# Patient Record
Sex: FEMALE | Race: WHITE | NOT HISPANIC OR LATINO | Employment: OTHER | URBAN - NONMETROPOLITAN AREA
[De-identification: names, ages, dates, MRNs, and addresses within clinical notes are randomized per-mention and may not be internally consistent; named-entity substitution may affect disease eponyms.]

---

## 2017-01-13 ENCOUNTER — OFFICE VISIT (OUTPATIENT)
Dept: RETAIL CLINIC | Facility: CLINIC | Age: 57
End: 2017-01-13

## 2017-01-13 VITALS
DIASTOLIC BLOOD PRESSURE: 76 MMHG | SYSTOLIC BLOOD PRESSURE: 120 MMHG | WEIGHT: 219 LBS | TEMPERATURE: 97.9 F | RESPIRATION RATE: 18 BRPM | OXYGEN SATURATION: 98 % | HEART RATE: 79 BPM

## 2017-01-13 DIAGNOSIS — J02.9 ACUTE PHARYNGITIS, UNSPECIFIED ETIOLOGY: Primary | ICD-10-CM

## 2017-01-13 DIAGNOSIS — J06.9 ACUTE URI: ICD-10-CM

## 2017-01-13 LAB
EXPIRATION DATE: NORMAL
EXPIRATION DATE: NORMAL
FLUAV AG NPH QL: NORMAL
FLUBV AG NPH QL: NORMAL
INTERNAL CONTROL: NORMAL
INTERNAL CONTROL: NORMAL
Lab: NORMAL
Lab: NORMAL
S PYO AG THROAT QL: NEGATIVE

## 2017-01-13 PROCEDURE — 87804 INFLUENZA ASSAY W/OPTIC: CPT | Performed by: NURSE PRACTITIONER

## 2017-01-13 PROCEDURE — 87880 STREP A ASSAY W/OPTIC: CPT | Performed by: NURSE PRACTITIONER

## 2017-01-13 PROCEDURE — 99213 OFFICE O/P EST LOW 20 MIN: CPT | Performed by: NURSE PRACTITIONER

## 2017-01-13 RX ORDER — ONDANSETRON 4 MG/1
4 TABLET, ORALLY DISINTEGRATING ORAL EVERY 8 HOURS PRN
COMMUNITY
End: 2017-11-01

## 2017-01-13 RX ORDER — PANTOPRAZOLE SODIUM 40 MG/1
40 TABLET, DELAYED RELEASE ORAL DAILY
COMMUNITY

## 2017-01-13 RX ORDER — LANSOPRAZOLE 30 MG/1
30 CAPSULE, DELAYED RELEASE ORAL DAILY
COMMUNITY
End: 2018-10-29

## 2017-01-13 RX ORDER — BUDESONIDE AND FORMOTEROL FUMARATE DIHYDRATE 160; 4.5 UG/1; UG/1
2 AEROSOL RESPIRATORY (INHALATION)
COMMUNITY
End: 2017-11-01 | Stop reason: SDUPTHER

## 2017-01-13 RX ORDER — FLUTICASONE PROPIONATE 50 MCG
2 SPRAY, SUSPENSION (ML) NASAL DAILY
Qty: 1 BOTTLE | Refills: 0 | Status: SHIPPED | OUTPATIENT
Start: 2017-01-13

## 2017-01-13 RX ORDER — FUROSEMIDE 20 MG/1
20 TABLET ORAL AS NEEDED
COMMUNITY

## 2017-01-13 RX ORDER — SULFAMETHOXAZOLE AND TRIMETHOPRIM 800; 160 MG/1; MG/1
1 TABLET ORAL 2 TIMES DAILY
Qty: 14 TABLET | Refills: 0 | Status: SHIPPED | OUTPATIENT
Start: 2017-01-13 | End: 2017-11-01

## 2017-01-13 NOTE — MR AVS SNAPSHOT
Phuong Tuttle   1/13/2017 10:15 AM   Office Visit    Dept Phone:  [phone]   Encounter #:  [id number]    Provider:  ALETHA SANCHEZ   Department:  [facility]                Your Full Care Plan              Today's Medication Changes          These changes are accurate as of: 1/13/17 11:07 AM.  If you have any questions, ask your nurse or doctor.               New Medication(s)Ordered:     fluticasone 50 MCG/ACT nasal spray   Commonly known as:  FLONASE   2 sprays into each nostril Daily.       sulfamethoxazole-trimethoprim 800-160 MG per tablet   Commonly known as:  BACTRIM DS   Take 1 tablet by mouth 2 (Two) Times a Day.            Where to Get Your Medications      These medications were sent to [facility] - [address] - [phone]  - [phone] [address] , [facility] [address]     Phone:  [phone]     fluticasone 50 MCG/ACT nasal spray    sulfamethoxazole-trimethoprim 800-160 MG per tablet                  Your Updated Medication List          This list is accurate as of: 1/13/17 11:07 AM.  Always use your most recent med list.                budesonide-formoterol 160-4.5 MCG/ACT inhaler   Commonly known as:  SYMBICORT       fluticasone 50 MCG/ACT nasal spray   Commonly known as:  FLONASE   2 sprays into each nostril Daily.       furosemide 20 MG tablet   Commonly known as:  LASIX       ipratropium-albuterol  MCG/ACT inhaler   Commonly known as:  COMBIVENT RESPIMAT       lansoprazole 30 MG capsule   Commonly known as:  PREVACID       ondansetron ODT 4 MG disintegrating tablet   Commonly known as:  ZOFRAN-ODT       pantoprazole 40 MG EC tablet   Commonly known as:  PROTONIX       sulfamethoxazole-trimethoprim 800-160 MG per tablet   Commonly known as:  BACTRIM DS   Take 1 tablet by mouth 2 (Two) Times a Day.               We Performed the Following     POC Influenza A / B     POC Rapid Strep A     "  You Were Diagnosed With        Codes Comments    Acute pharyngitis, unspecified etiology    -  Primary ICD-10-CM: J02.9  ICD-9-CM: 462     Acute URI     ICD-10-CM: J06.9  ICD-9-CM: 465.9       Instructions    Upper Respiratory Infection, Adult  Most upper respiratory infections (URIs) are a viral infection of the air passages leading to the lungs. A URI affects the nose, throat, and upper air passages. The most common type of URI is nasopharyngitis and is typically referred to as \"the common cold.\"  URIs run their course and usually go away on their own. Most of the time, a URI does not require medical attention, but sometimes a bacterial infection in the upper airways can follow a viral infection. This is called a secondary infection. Sinus and middle ear infections are common types of secondary upper respiratory infections.  Bacterial pneumonia can also complicate a URI. A URI can worsen asthma and chronic obstructive pulmonary disease (COPD). Sometimes, these complications can require emergency medical care and may be life threatening.   CAUSES  Almost all URIs are caused by viruses. A virus is a type of germ and can spread from one person to another.   RISKS FACTORS  You may be at risk for a URI if:   · You smoke.    · You have chronic heart or lung disease.  · You have a weakened defense (immune) system.    · You are very young or very old.    · You have nasal allergies or asthma.  · You work in crowded or poorly ventilated areas.  · You work in health care facilities or schools.  SIGNS AND SYMPTOMS   Symptoms typically develop 2-3 days after you come in contact with a cold virus. Most viral URIs last 7-10 days. However, viral URIs from the influenza virus (flu virus) can last 14-18 days and are typically more severe. Symptoms may include:   · Runny or stuffy (congested) nose.    · Sneezing.    · Cough.    · Sore throat.    · Headache.    · Fatigue.    · Fever.    · Loss of appetite.    · Pain in your " forehead, behind your eyes, and over your cheekbones (sinus pain).  · Muscle aches.    DIAGNOSIS   Your health care provider may diagnose a URI by:  · Physical exam.  · Tests to check that your symptoms are not due to another condition such as:  ¨ Strep throat.  ¨ Sinusitis.  ¨ Pneumonia.  ¨ Asthma.  TREATMENT   A URI goes away on its own with time. It cannot be cured with medicines, but medicines may be prescribed or recommended to relieve symptoms. Medicines may help:  · Reduce your fever.  · Reduce your cough.  · Relieve nasal congestion.  HOME CARE INSTRUCTIONS   · Take medicines only as directed by your health care provider.    · Gargle warm saltwater or take cough drops to comfort your throat as directed by your health care provider.  · Use a warm mist humidifier or inhale steam from a shower to increase air moisture. This may make it easier to breathe.  · Drink enough fluid to keep your urine clear or pale yellow.    · Eat soups and other clear broths and maintain good nutrition.    · Rest as needed.    · Return to work when your temperature has returned to normal or as your health care provider advises. You may need to stay home longer to avoid infecting others. You can also use a face mask and careful hand washing to prevent spread of the virus.  · Increase the usage of your inhaler if you have asthma.    · Do not use any tobacco products, including cigarettes, chewing tobacco, or electronic cigarettes. If you need help quitting, ask your health care provider.  PREVENTION   The best way to protect yourself from getting a cold is to practice good hygiene.   · Avoid oral or hand contact with people with cold symptoms.    · Wash your hands often if contact occurs.    There is no clear evidence that vitamin C, vitamin E, echinacea, or exercise reduces the chance of developing a cold. However, it is always recommended to get plenty of rest, exercise, and practice good nutrition.   SEEK MEDICAL CARE IF:   · You  are getting worse rather than better.    · Your symptoms are not controlled by medicine.    · You have chills.  · You have worsening shortness of breath.  · You have brown or red mucus.  · You have yellow or brown nasal discharge.  · You have pain in your face, especially when you bend forward.  · You have a fever.  · You have swollen neck glands.  · You have pain while swallowing.  · You have white areas in the back of your throat.  SEEK IMMEDIATE MEDICAL CARE IF:   · You have severe or persistent:    Headache.    Ear pain.    Sinus pain.    Chest pain.  · You have chronic lung disease and any of the following:    Wheezing.    Prolonged cough.    Coughing up blood.    A change in your usual mucus.  · You have a stiff neck.  · You have changes in your:    Vision.    Hearing.    Thinking.    Mood.  MAKE SURE YOU:   · Understand these instructions.  · Will watch your condition.  · Will get help right away if you are not doing well or get worse.     This information is not intended to replace advice given to you by your health care provider. Make sure you discuss any questions you have with your health care provider.     Document Released: 2002 Document Revised: 2016 Document Reviewed: 2015  [employer] Interactive Patient Education ©6 [employer]       Patient Instructions History      Upcoming Appointments     Visit Type Date Time Department    OFFICE VISIT 2017 10:15 AM [facility]      Team-Match Signup     [facility] Team-Match allows you to send messages to your doctor, view your test results, renew your prescriptions, schedule appointments, and more. To sign up, go to [email or web address] and click on the Sign Up Now link in the New User? box. Enter your Team-Match Activation Code exactly as it appears below along with the last four digits of your Social Security Number and your Date of Birth () to complete the sign-up process. If you do not sign up before the expiration date, you  must request a new code.    [employer] Activation Code: [email or web address]  Expires: 1/27/2017 11:07 AM    If you have questions, you can email [email or web address] or call [phone] to talk to our [employer] staff. Remember, [employer] is NOT to be used for urgent needs. For medical emergencies, dial 911.               Other Info from Your Visit           Allergies     Zithromax [Azithromycin]  Nausea And Vomiting    Penicillins  Rash      Reason for Visit     Sore Throat     Cough           Vital Signs     Blood Pressure Pulse Temperature Respirations Weight Oxygen Saturation    120/76 79 97.9 °F (36.6 °C) (Oral) 18 219 lb (99.3 kg) 98%    Smoking Status                   Current Some Day Smoker           Problems and Diagnoses Noted     Acute sore throat    -  Primary    Acute upper respiratory infection

## 2017-01-13 NOTE — PROGRESS NOTES
Subjective   Phuong Tuttle is a 56 y.o. female.     Chief Complaint   Patient presents with   • Sore Throat   • Cough      History of Present Illness     Presents to [facility] with c/o onset of sore throat with chest congestion and cough for past several days. Has PMH of GI bleed with Anemia. Also reports currently on an autoimmune medication (unable to recall the medication by name). Is current everyday smoker.     The following portions of the patient's history were reviewed and updated as appropriate: allergies, current medications, past family history, past medical history, past social history, past surgical history and problem list.      Current Outpatient Prescriptions:   •  budesonide-formoterol (SYMBICORT) 160-4.5 MCG/ACT inhaler, Inhale 2 puffs 2 (Two) Times a Day., Disp: , Rfl:   •  furosemide (LASIX) 20 MG tablet, Take 20 mg by mouth 2 (Two) Times a Day., Disp: , Rfl:   •  ipratropium-albuterol (COMBIVENT RESPIMAT)  MCG/ACT inhaler, Inhale 1 puff 4 (Four) Times a Day As Needed for wheezing., Disp: , Rfl:   •  lansoprazole (PREVACID) 30 MG capsule, Take 30 mg by mouth Daily., Disp: , Rfl:   •  ondansetron ODT (ZOFRAN-ODT) 4 MG disintegrating tablet, Take 4 mg by mouth Every 8 (Eight) Hours As Needed for nausea or vomiting., Disp: , Rfl:   •  pantoprazole (PROTONIX) 40 MG EC tablet, Take 40 mg by mouth Daily., Disp: , Rfl:   •  fluticasone (FLONASE) 50 MCG/ACT nasal spray, 2 sprays into each nostril Daily., Disp: 1 bottle, Rfl: 0  •  sulfamethoxazole-trimethoprim (BACTRIM DS) 800-160 MG per tablet, Take 1 tablet by mouth 2 (Two) Times a Day., Disp: 14 tablet, Rfl: 0    Visit Vitals   • /76   • Pulse 79   • Temp 97.9 °F (36.6 °C) (Oral)   • Resp 18   • Wt 219 lb (99.3 kg)   • SpO2 98%     Review of Systems   Constitutional: Negative for chills and fever.   HENT: Positive for postnasal drip, rhinorrhea and sore throat. Negative for ear pain and sinus pressure.    Eyes: Negative for itching.    Respiratory: Positive for cough and wheezing (upon awaking). Negative for chest tightness.    Skin: Negative for color change.   Neurological: Negative for dizziness.        Allergies   Allergen Reactions   • Zithromax [Azithromycin] Nausea And Vomiting   • Penicillins Rash       Physical Exam   Constitutional: She is oriented to person, place, and time.   HENT:   Head: Normocephalic.   Right Ear: Tympanic membrane is bulging. Tympanic membrane is not erythematous.   Left Ear: Tympanic membrane is bulging. Tympanic membrane is not erythematous.   Nose: Mucosal edema and rhinorrhea present.   Mouth/Throat: No uvula swelling. Posterior oropharyngeal erythema (mild) present. No posterior oropharyngeal edema.   Eyes: Conjunctivae are normal.   Cardiovascular: Normal rate and regular rhythm.    Pulmonary/Chest: Effort normal and breath sounds normal. She has no wheezes.   Lymphadenopathy:     She has no cervical adenopathy.   Neurological: She is oriented to person, place, and time.   Skin: Skin is warm and dry.      Assessment/Plan     Phuong was seen today for sore throat and cough.    Diagnoses and all orders for this visit:    Acute pharyngitis, unspecified etiology  -     POC Rapid Strep A  -     POC Influenza A / B    Acute URI  -     sulfamethoxazole-trimethoprim (BACTRIM DS) 800-160 MG per tablet; Take 1 tablet by mouth 2 (Two) Times a Day.  -     fluticasone (FLONASE) 50 MCG/ACT nasal spray; 2 sprays into each nostril Daily.      Lab Results   Component Value Date    RAPFLUA neg 01/13/2017    RAPFLUB neg 01/13/2017     Lab Results   Component Value Date    RAPSCRN Negative 01/13/2017          Discussed POCT.  Follow up with PCP or at the Urgent Care if symptoms worsen or fail to improve within next 48-72 hours.  Patient teaching information discussed and provided to the patient. Patient verbalized understanding.      January 13, 2017 12:07 PM

## 2017-01-13 NOTE — PATIENT INSTRUCTIONS
"Upper Respiratory Infection, Adult  Most upper respiratory infections (URIs) are a viral infection of the air passages leading to the lungs. A URI affects the nose, throat, and upper air passages. The most common type of URI is nasopharyngitis and is typically referred to as \"the common cold.\"  URIs run their course and usually go away on their own. Most of the time, a URI does not require medical attention, but sometimes a bacterial infection in the upper airways can follow a viral infection. This is called a secondary infection. Sinus and middle ear infections are common types of secondary upper respiratory infections.  Bacterial pneumonia can also complicate a URI. A URI can worsen asthma and chronic obstructive pulmonary disease (COPD). Sometimes, these complications can require emergency medical care and may be life threatening.   CAUSES  Almost all URIs are caused by viruses. A virus is a type of germ and can spread from one person to another.   RISKS FACTORS  You may be at risk for a URI if:   · You smoke.    · You have chronic heart or lung disease.  · You have a weakened defense (immune) system.    · You are very young or very old.    · You have nasal allergies or asthma.  · You work in crowded or poorly ventilated areas.  · You work in health care facilities or schools.  SIGNS AND SYMPTOMS   Symptoms typically develop 2-3 days after you come in contact with a cold virus. Most viral URIs last 7-10 days. However, viral URIs from the influenza virus (flu virus) can last 14-18 days and are typically more severe. Symptoms may include:   · Runny or stuffy (congested) nose.    · Sneezing.    · Cough.    · Sore throat.    · Headache.    · Fatigue.    · Fever.    · Loss of appetite.    · Pain in your forehead, behind your eyes, and over your cheekbones (sinus pain).  · Muscle aches.    DIAGNOSIS   Your health care provider may diagnose a URI by:  · Physical exam.  · Tests to check that your symptoms are not due to " another condition such as:  ¨ Strep throat.  ¨ Sinusitis.  ¨ Pneumonia.  ¨ Asthma.  TREATMENT   A URI goes away on its own with time. It cannot be cured with medicines, but medicines may be prescribed or recommended to relieve symptoms. Medicines may help:  · Reduce your fever.  · Reduce your cough.  · Relieve nasal congestion.  HOME CARE INSTRUCTIONS   · Take medicines only as directed by your health care provider.    · Gargle warm saltwater or take cough drops to comfort your throat as directed by your health care provider.  · Use a warm mist humidifier or inhale steam from a shower to increase air moisture. This may make it easier to breathe.  · Drink enough fluid to keep your urine clear or pale yellow.    · Eat soups and other clear broths and maintain good nutrition.    · Rest as needed.    · Return to work when your temperature has returned to normal or as your health care provider advises. You may need to stay home longer to avoid infecting others. You can also use a face mask and careful hand washing to prevent spread of the virus.  · Increase the usage of your inhaler if you have asthma.    · Do not use any tobacco products, including cigarettes, chewing tobacco, or electronic cigarettes. If you need help quitting, ask your health care provider.  PREVENTION   The best way to protect yourself from getting a cold is to practice good hygiene.   · Avoid oral or hand contact with people with cold symptoms.    · Wash your hands often if contact occurs.    There is no clear evidence that vitamin C, vitamin E, echinacea, or exercise reduces the chance of developing a cold. However, it is always recommended to get plenty of rest, exercise, and practice good nutrition.   SEEK MEDICAL CARE IF:   · You are getting worse rather than better.    · Your symptoms are not controlled by medicine.    · You have chills.  · You have worsening shortness of breath.  · You have brown or red mucus.  · You have yellow or brown nasal  discharge.  · You have pain in your face, especially when you bend forward.  · You have a fever.  · You have swollen neck glands.  · You have pain while swallowing.  · You have white areas in the back of your throat.  SEEK IMMEDIATE MEDICAL CARE IF:   · You have severe or persistent:    Headache.    Ear pain.    Sinus pain.    Chest pain.  · You have chronic lung disease and any of the following:    Wheezing.    Prolonged cough.    Coughing up blood.    A change in your usual mucus.  · You have a stiff neck.  · You have changes in your:    Vision.    Hearing.    Thinking.    Mood.  MAKE SURE YOU:   · Understand these instructions.  · Will watch your condition.  · Will get help right away if you are not doing well or get worse.     This information is not intended to replace advice given to you by your health care provider. Make sure you discuss any questions you have with your health care provider.     Document Released: 06/13/2002 Document Revised: 05/03/2016 Document Reviewed: 03/25/2015  [employer] Interactive Patient Education ©2016 [employer].

## 2017-02-07 ENCOUNTER — HOSPITAL ENCOUNTER (EMERGENCY)
Facility: HOSPITAL | Age: 57
Discharge: HOME | End: 2017-02-07
Attending: EMERGENCY MEDICINE | Admitting: EMERGENCY MEDICINE

## 2017-02-07 ENCOUNTER — APPOINTMENT (OUTPATIENT)
Dept: GENERAL RADIOLOGY | Facility: HOSPITAL | Age: 57
End: 2017-02-07

## 2017-02-07 ENCOUNTER — APPOINTMENT (OUTPATIENT)
Dept: CT IMAGING | Facility: HOSPITAL | Age: 57
End: 2017-02-07

## 2017-02-07 VITALS
HEIGHT: 68 IN | WEIGHT: 210 LBS | HEART RATE: 77 BPM | RESPIRATION RATE: 18 BRPM | TEMPERATURE: 98.3 F | SYSTOLIC BLOOD PRESSURE: 91 MMHG | BODY MASS INDEX: 31.83 KG/M2 | OXYGEN SATURATION: 99 % | DIASTOLIC BLOOD PRESSURE: 46 MMHG

## 2017-02-07 DIAGNOSIS — R10.13 EPIGASTRIC PAIN: ICD-10-CM

## 2017-02-07 DIAGNOSIS — D73.2 CHRONIC CONGESTIVE SPLENOMEGALY: ICD-10-CM

## 2017-02-07 DIAGNOSIS — K74.60 CIRRHOSIS OF LIVER WITHOUT ASCITES, UNSPECIFIED HEPATIC CIRRHOSIS TYPE (HCC): Primary | ICD-10-CM

## 2017-02-07 LAB
ALBUMIN SERPL-MCNC: 3.6 G/DL (ref 3.5–5)
ALBUMIN/GLOB SERPL: 1.1 G/DL (ref 1.5–2.5)
ALP SERPL-CCNC: 85 U/L (ref 46–116)
ALT SERPL W P-5'-P-CCNC: 22 U/L (ref 10–36)
AMYLASE SERPL-CCNC: 61 U/L (ref 28–100)
ANION GAP SERPL CALCULATED.3IONS-SCNC: 5.5 MMOL/L (ref 3.6–11.2)
AST SERPL-CCNC: 30 U/L (ref 10–30)
BASOPHILS # BLD AUTO: 0.01 10*3/MM3 (ref 0–0.3)
BASOPHILS NFR BLD AUTO: 0.7 % (ref 0–2)
BILIRUB SERPL-MCNC: 1.6 MG/DL (ref 0.2–1.8)
BUN BLD-MCNC: 10 MG/DL (ref 7–21)
BUN/CREAT SERPL: 11.2 (ref 7–25)
CALCIUM SPEC-SCNC: 8.5 MG/DL (ref 7.7–10)
CHLORIDE SERPL-SCNC: 111 MMOL/L (ref 99–112)
CO2 SERPL-SCNC: 25.5 MMOL/L (ref 24.3–31.9)
CREAT BLD-MCNC: 0.89 MG/DL (ref 0.43–1.29)
DEPRECATED RDW RBC AUTO: 46.1 FL (ref 37–54)
EOSINOPHIL # BLD AUTO: 0.06 10*3/MM3 (ref 0–0.7)
EOSINOPHIL NFR BLD AUTO: 4 % (ref 0–5)
ERYTHROCYTE [DISTWIDTH] IN BLOOD BY AUTOMATED COUNT: 15 % (ref 11.5–14.5)
GFR SERPL CREATININE-BSD FRML MDRD: 66 ML/MIN/1.73
GLOBULIN UR ELPH-MCNC: 3.2 GM/DL
GLUCOSE BLD-MCNC: 141 MG/DL (ref 70–110)
HCT VFR BLD AUTO: 32.9 % (ref 37–47)
HGB BLD-MCNC: 10.7 G/DL (ref 12–16)
IMM GRANULOCYTES # BLD: 0 10*3/MM3 (ref 0–0.03)
IMM GRANULOCYTES NFR BLD: 0 % (ref 0–0.5)
LIPASE SERPL-CCNC: 30 U/L (ref 13–60)
LYMPHOCYTES # BLD AUTO: 0.27 10*3/MM3 (ref 1–3)
LYMPHOCYTES NFR BLD AUTO: 17.9 % (ref 21–51)
MCH RBC QN AUTO: 27.6 PG (ref 27–33)
MCHC RBC AUTO-ENTMCNC: 32.5 G/DL (ref 33–37)
MCV RBC AUTO: 84.8 FL (ref 80–94)
MONOCYTES # BLD AUTO: 0.13 10*3/MM3 (ref 0.1–0.9)
MONOCYTES NFR BLD AUTO: 8.6 % (ref 0–10)
NEUTROPHILS # BLD AUTO: 1.04 10*3/MM3 (ref 1.4–6.5)
NEUTROPHILS NFR BLD AUTO: 68.8 % (ref 30–70)
OSMOLALITY SERPL CALC.SUM OF ELEC: 284.5 MOSM/KG (ref 273–305)
OVALOCYTES BLD QL SMEAR: NORMAL
PLATELET # BLD AUTO: 46 10*3/MM3 (ref 130–400)
PMV BLD AUTO: 11.1 FL (ref 6–10)
POTASSIUM BLD-SCNC: 3.9 MMOL/L (ref 3.5–5.3)
PROT SERPL-MCNC: 6.8 G/DL (ref 6–8)
RBC # BLD AUTO: 3.88 10*6/MM3 (ref 4.2–5.4)
SMALL PLATELETS BLD QL SMEAR: NORMAL
SODIUM BLD-SCNC: 142 MMOL/L (ref 135–153)
WBC NRBC COR # BLD: 1.51 10*3/MM3 (ref 4.5–12.5)

## 2017-02-07 PROCEDURE — 25010000002 MORPHINE PER 10 MG: Performed by: EMERGENCY MEDICINE

## 2017-02-07 PROCEDURE — 36415 COLL VENOUS BLD VENIPUNCTURE: CPT

## 2017-02-07 PROCEDURE — 85025 COMPLETE CBC W/AUTO DIFF WBC: CPT | Performed by: EMERGENCY MEDICINE

## 2017-02-07 PROCEDURE — 83690 ASSAY OF LIPASE: CPT | Performed by: EMERGENCY MEDICINE

## 2017-02-07 PROCEDURE — 25010000002 ONDANSETRON PER 1 MG: Performed by: EMERGENCY MEDICINE

## 2017-02-07 PROCEDURE — 96375 TX/PRO/DX INJ NEW DRUG ADDON: CPT

## 2017-02-07 PROCEDURE — 71010 XR CHEST 1 VW: CPT | Performed by: RADIOLOGY

## 2017-02-07 PROCEDURE — 96374 THER/PROPH/DIAG INJ IV PUSH: CPT

## 2017-02-07 PROCEDURE — 85007 BL SMEAR W/DIFF WBC COUNT: CPT | Performed by: EMERGENCY MEDICINE

## 2017-02-07 PROCEDURE — 71010 HC CHEST PA OR AP: CPT

## 2017-02-07 PROCEDURE — 80053 COMPREHEN METABOLIC PANEL: CPT | Performed by: EMERGENCY MEDICINE

## 2017-02-07 PROCEDURE — 82150 ASSAY OF AMYLASE: CPT | Performed by: EMERGENCY MEDICINE

## 2017-02-07 PROCEDURE — 74176 CT ABD & PELVIS W/O CONTRAST: CPT | Performed by: RADIOLOGY

## 2017-02-07 PROCEDURE — 74176 CT ABD & PELVIS W/O CONTRAST: CPT

## 2017-02-07 PROCEDURE — 99284 EMERGENCY DEPT VISIT MOD MDM: CPT

## 2017-02-07 RX ORDER — SODIUM CHLORIDE 0.9 % (FLUSH) 0.9 %
10 SYRINGE (ML) INJECTION AS NEEDED
Status: DISCONTINUED | OUTPATIENT
Start: 2017-02-07 | End: 2017-02-07 | Stop reason: HOSPADM

## 2017-02-07 RX ORDER — PROMETHAZINE HYDROCHLORIDE 25 MG/1
25 TABLET ORAL EVERY 6 HOURS PRN
Qty: 15 TABLET | Refills: 0 | Status: SHIPPED | OUTPATIENT
Start: 2017-02-07 | End: 2017-11-01

## 2017-02-07 RX ORDER — ONDANSETRON 2 MG/ML
4 INJECTION INTRAMUSCULAR; INTRAVENOUS ONCE
Status: COMPLETED | OUTPATIENT
Start: 2017-02-07 | End: 2017-02-07

## 2017-02-07 RX ORDER — HYDROCODONE BITARTRATE AND ACETAMINOPHEN 7.5; 325 MG/1; MG/1
1 TABLET ORAL EVERY 6 HOURS PRN
Qty: 12 TABLET | Refills: 0 | Status: SHIPPED | OUTPATIENT
Start: 2017-02-07 | End: 2018-10-29

## 2017-02-07 RX ORDER — PANTOPRAZOLE SODIUM 40 MG/1
40 TABLET, DELAYED RELEASE ORAL DAILY
Qty: 30 TABLET | Refills: 0 | Status: SHIPPED | OUTPATIENT
Start: 2017-02-07 | End: 2018-10-29

## 2017-02-07 RX ADMIN — ONDANSETRON 4 MG: 2 INJECTION, SOLUTION INTRAMUSCULAR; INTRAVENOUS at 11:04

## 2017-02-07 RX ADMIN — MORPHINE SULFATE 4 MG: 4 INJECTION, SOLUTION INTRAMUSCULAR; INTRAVENOUS at 11:02

## 2017-02-07 NOTE — DISCHARGE INSTRUCTIONS

## 2017-02-07 NOTE — ED NOTES
"Pt reports epigastric pain began 2-3 hours prior to arrival; pt reports she has a history of cirrhosis of the liver and states \"I think they said I have leukemia but I'm not sure\"; pt abdomen appears slightly distended at this time; pt report the pain begins \"right in the middle here and goes to the right side\"; bowel sounds are present in all four quadrants; pt reports history of constipation however is reporting one episode of diarrhea and vomiting this morning. Pt denies seeing any blood in her stool or emesis this morning.       Debbie Mcgee RN  02/07/17 1038    "

## 2017-02-07 NOTE — ED PROVIDER NOTES
Subjective   HPI Comments: Patient with history of cirrhosis and esophageal varices is noncompliant with follow-up. Has not followed up for her liver since her hospitalization here in November 2015    Patient is a 56 y.o. female presenting with abdominal pain.   History provided by:  Patient   used: No    Abdominal Pain   Pain location:  Epigastric  Pain quality: sharp and shooting    Pain radiates to:  Does not radiate  Pain severity:  Moderate  Onset quality:  Gradual  Duration:  2 hours  Timing:  Constant  Progression:  Worsening  Chronicity:  Recurrent  Context: eating and recent illness    Relieved by:  None tried  Worsened by:  Nothing  Ineffective treatments:  None tried  Associated symptoms: cough and nausea    Associated symptoms: no chest pain, no dysuria and no fever        Review of Systems   Constitutional: Negative.  Negative for fever.   HENT: Negative.    Respiratory: Positive for cough.    Cardiovascular: Negative.  Negative for chest pain.   Gastrointestinal: Positive for abdominal pain and nausea.   Endocrine: Negative.    Genitourinary: Negative.  Negative for dysuria.   Skin: Negative.    Neurological: Negative.    Psychiatric/Behavioral: Negative.    All other systems reviewed and are negative.      Past Medical History   Diagnosis Date   • Anemia    • GI bleed    • Headache    • Immunocompromised patient    • Peptic ulceration        Allergies   Allergen Reactions   • Zithromax [Azithromycin] Nausea And Vomiting   • Penicillins Rash       History reviewed. No pertinent past surgical history.    History reviewed. No pertinent family history.    Social History     Social History   • Marital status:      Spouse name: N/A   • Number of children: N/A   • Years of education: N/A     Occupational History   • Disabled      Social History Main Topics   • Smoking status: Current Some Day Smoker   • Smokeless tobacco: None   • Alcohol use None   • Drug use: None   • Sexual  activity: Not Asked     Other Topics Concern   • None     Social History Narrative   • None           Objective   Physical Exam   Constitutional: She is oriented to person, place, and time. She appears well-developed and well-nourished. No distress.   HENT:   Head: Normocephalic and atraumatic.   Right Ear: External ear normal.   Left Ear: External ear normal.   Nose: Nose normal.   Eyes: Conjunctivae and EOM are normal. Pupils are equal, round, and reactive to light.   Neck: Normal range of motion. Neck supple. No JVD present. No tracheal deviation present.   Cardiovascular: Normal rate, regular rhythm and normal heart sounds.    No murmur heard.  Pulmonary/Chest: Effort normal and breath sounds normal. No respiratory distress. She has no wheezes.   Abdominal: Soft. She exhibits distension. There is tenderness in the epigastric area.   Musculoskeletal: Normal range of motion. She exhibits no edema or deformity.   Neurological: She is alert and oriented to person, place, and time. No cranial nerve deficit.   Skin: Skin is warm and dry. No rash noted. She is not diaphoretic. No erythema. No pallor.   Psychiatric: She has a normal mood and affect. Her behavior is normal. Thought content normal.   Nursing note and vitals reviewed.      Procedures         ED Course  ED Course                  MDM    Final diagnoses:   Cirrhosis of liver without ascites, unspecified hepatic cirrhosis type   Chronic congestive splenomegaly   Epigastric pain            Steve Avendaño MD  02/07/17 4705

## 2017-02-07 NOTE — ED NOTES
"Upon attempting to obtain consent for IV contrast needed for CT ABD/PEL, pt refused stating \"I'm too scared\"; Patient also educated on importance of providing ED staff with urine specimen for further evaluation and testing; pt verbalizes understanding. Dr. Avendaño made aware of refusal of contrast; new orders noted.      Debbie Mcgee RN  02/07/17 1110       Debbie Mcgee RN  02/07/17 1112    "

## 2017-11-01 ENCOUNTER — OFFICE VISIT (OUTPATIENT)
Dept: RETAIL CLINIC | Facility: CLINIC | Age: 57
End: 2017-11-01

## 2017-11-01 VITALS
WEIGHT: 230 LBS | RESPIRATION RATE: 16 BRPM | HEART RATE: 82 BPM | OXYGEN SATURATION: 97 % | BODY MASS INDEX: 36.1 KG/M2 | TEMPERATURE: 98.2 F | HEIGHT: 67 IN

## 2017-11-01 DIAGNOSIS — J44.9 CHRONIC OBSTRUCTIVE PULMONARY DISEASE, UNSPECIFIED COPD TYPE (HCC): Chronic | ICD-10-CM

## 2017-11-01 DIAGNOSIS — K52.9 GASTROENTERITIS: Primary | ICD-10-CM

## 2017-11-01 DIAGNOSIS — F17.200 CURRENT SMOKER: ICD-10-CM

## 2017-11-01 PROCEDURE — 99213 OFFICE O/P EST LOW 20 MIN: CPT | Performed by: NURSE PRACTITIONER

## 2017-11-01 RX ORDER — ONDANSETRON 8 MG/1
8 TABLET, ORALLY DISINTEGRATING ORAL EVERY 8 HOURS PRN
Qty: 15 TABLET | Refills: 0 | Status: SHIPPED | OUTPATIENT
Start: 2017-11-01 | End: 2020-08-12

## 2017-11-01 RX ORDER — BUDESONIDE AND FORMOTEROL FUMARATE DIHYDRATE 160; 4.5 UG/1; UG/1
2 AEROSOL RESPIRATORY (INHALATION)
Qty: 1 INHALER | Refills: 0 | Status: SHIPPED | OUTPATIENT
Start: 2017-11-01 | End: 2020-08-12 | Stop reason: ALTCHOICE

## 2017-11-01 NOTE — PROGRESS NOTES
"  HPI Comments: Phuong presents to the clinic today c/o n/v and diarrhea which started two days ago. Associated symptoms include nausea and vomiting with her last episode of vomiting eight hours ago. She had several episodes of diarrhea yesterday but none today. She has not taken anything for her symptoms. She reports that two of her neighbors came over who had similar symptoms.   In addition, she is requesting refills of her maintenance COPD inhalers which she is needing. She is in transition of changing primary care physicians.  Refer to ROS for additional information.    Vomiting    This is a new problem. The current episode started yesterday. The problem occurs 2 to 4 times per day. The problem has been gradually improving. The emesis has an appearance of stomach contents. There has been no fever. Associated symptoms include diarrhea. Pertinent negatives include no abdominal pain, chills, coughing, dizziness, fever, myalgias or weight loss. Risk factors include ill contacts. She has tried nothing for the symptoms.     Vitals:    11/01/17 1322   Pulse: 82   Resp: 16   Temp: 98.2 °F (36.8 °C)   TempSrc: Oral   SpO2: 97%   Weight: 230 lb (104 kg)   Height: 67\" (170.2 cm)      The following portions of the patient's history were reviewed and updated as appropriate: allergies, current medications, past family history, past medical history, past social history, past surgical history and problem list.    Review of Systems   Constitutional: Positive for appetite change. Negative for activity change, chills, fatigue, fever and weight loss.   HENT: Positive for congestion. Negative for postnasal drip, sore throat and tinnitus.    Eyes: Negative for discharge and redness.   Respiratory: Negative for cough, shortness of breath and wheezing.    Gastrointestinal: Positive for diarrhea, nausea and vomiting. Negative for abdominal pain and blood in stool.   Genitourinary: Negative for difficulty urinating and dysuria. "   Musculoskeletal: Negative for myalgias.   Skin: Negative for color change and rash.   Neurological: Negative for dizziness.   All other systems reviewed and are negative.      Physical Exam   Constitutional: She is oriented to person, place, and time. She appears well-developed and well-nourished. No distress.   HENT:   Head: Normocephalic.   Right Ear: Tympanic membrane and ear canal normal.   Left Ear: Tympanic membrane and ear canal normal.   Nose: Nose normal.   Mouth/Throat: Oropharynx is clear and moist. No oropharyngeal exudate.   Eyes: Conjunctivae are normal. Pupils are equal, round, and reactive to light. Right eye exhibits no discharge. Left eye exhibits no discharge. No scleral icterus.   Neck: Neck supple. No tracheal tenderness present.   Cardiovascular: Normal rate, regular rhythm and normal heart sounds.  Exam reveals no friction rub.    No murmur heard.  Pulmonary/Chest: Effort normal and breath sounds normal. No respiratory distress. She has no wheezes. She has no rales.   Abdominal: Soft. Bowel sounds are increased. There is no tenderness. There is no rebound and no guarding.   Musculoskeletal: She exhibits no edema or tenderness.   Lymphadenopathy:     She has no cervical adenopathy.   Neurological: She is alert and oriented to person, place, and time.   Skin: Skin is warm and dry. No rash noted. No erythema.   Nursing note and vitals reviewed.    Assessment/Plan   Problems Addressed this Visit     None      Visit Diagnoses     Gastroenteritis    -  Primary    Relevant Medications    ondansetron ODT (ZOFRAN ODT) 8 MG disintegrating tablet    Chronic obstructive pulmonary disease, unspecified COPD type        Relevant Medications    budesonide-formoterol (SYMBICORT) 160-4.5 MCG/ACT inhaler    ipratropium-albuterol (COMBIVENT RESPIMAT)  MCG/ACT inhaler        Findings and recommendations discussed with Phuong. Counseled regarding supportive care measures.S/S of concern reviewed and if occur  to seek further medical evaluation or if symptoms do not continue to improve. Encouraged smoking cessation and provided information for her to consider.

## 2017-11-01 NOTE — PATIENT INSTRUCTIONS
Viral Gastroenteritis, Adult    Food Choices to Help Relieve Diarrhea, Adult  When you have diarrhea, the foods you eat and your eating habits are very important. Choosing the right foods and drinks can help relieve diarrhea. Also, because diarrhea can last up to 7 days, you need to replace lost fluids and electrolytes (such as sodium, potassium, and chloride) in order to help prevent dehydration.   WHAT GENERAL GUIDELINES DO I NEED TO FOLLOW?  · Slowly drink 1 cup (8 oz) of fluid for each episode of diarrhea. If you are getting enough fluid, your urine will be clear or pale yellow.  · Eat starchy foods. Some good choices include white rice, white toast, pasta, low-fiber cereal, baked potatoes (without the skin), saltine crackers, and bagels.  · Avoid large servings of any cooked vegetables.  · Limit fruit to two servings per day. A serving is ½ cup or 1 small piece.  · Choose foods with less than 2 g of fiber per serving.  · Limit fats to less than 8 tsp (38 g) per day.  · Avoid fried foods.  · Eat foods that have probiotics in them. Probiotics can be found in certain dairy products.  · Avoid foods and beverages that may increase the speed at which food moves through the stomach and intestines (gastrointestinal tract). Things to avoid include:  ¨ High-fiber foods, such as dried fruit, raw fruits and vegetables, nuts, seeds, and whole grain foods.  ¨ Spicy foods and high-fat foods.  ¨ Foods and beverages sweetened with high-fructose corn syrup, honey, or sugar alcohols such as xylitol, sorbitol, and mannitol.  WHAT FOODS ARE RECOMMENDED?  Grains  White rice. White, [place]ian, or erin breads (fresh or toasted), including plain rolls, buns, or bagels. White pasta. Saltine, soda, or myrna crackers. Pretzels. Low-fiber cereal. Cooked cereals made with water (such as cornmeal, farina, or cream cereals). Plain muffins. Matzo. Brenda toast. Zwieback.   Vegetables  Potatoes (without the skin). Strained tomato and vegetable  juices. Most well-cooked and canned vegetables without seeds. Tender lettuce.  Fruits  Cooked or canned applesauce, apricots, cherries, fruit cocktail, grapefruit, peaches, pears, or plums. Fresh bananas, apples without skin, cherries, grapes, cantaloupe, grapefruit, peaches, oranges, or plums.   Meat and Other Protein Products  Baked or boiled chicken. Eggs. Tofu. Fish. Seafood. Smooth peanut butter. Ground or well-cooked tender beef, ham, veal, lamb, pork, or poultry.   Dairy  Plain yogurt, kefir, and unsweetened liquid yogurt. Lactose-free milk, buttermilk, or soy milk. Plain hard cheese.  Beverages  Sport drinks. Clear broths. Diluted fruit juices (except prune). Regular, caffeine-free sodas such as ginger ale. Water. Decaffeinated teas. Oral rehydration solutions. Sugar-free beverages not sweetened with sugar alcohols.  Other  Bouillon, broth, or soups made from recommended foods.   The items listed above may not be a complete list of recommended foods or beverages. Contact your dietitian for more options.  WHAT FOODS ARE NOT RECOMMENDED?  Grains  Whole grain, whole wheat, bran, or rye breads, rolls, pastas, crackers, and cereals. Wild or brown rice. Cereals that contain more than 2 g of fiber per serving. Corn tortillas or taco shells. Cooked or dry oatmeal. Granola. Popcorn.  Vegetables  Raw vegetables. Cabbage, broccoli, [place] sprouts, artichokes, baked beans, beet greens, corn, kale, legumes, peas, sweet potatoes, and yams. Potato skins. Cooked spinach and cabbage.  Fruits  Dried fruit, including raisins and dates. Raw fruits. Stewed or dried prunes. Fresh apples with skin, apricots, mangoes, pears, raspberries, and strawberries.   Meat and Other Protein Products  [place] peanut butter. Nuts and seeds. Beans and lentils. Chavez.   Dairy  High-fat cheeses. Milk, chocolate milk, and beverages made with milk, such as milk shakes. Cream. Ice cream.  Sweets and Desserts  Sweet rolls, doughnuts, and sweet  breads. Pancakes and waffles.  Fats and Oils  Butter. Cream sauces. Margarine. Salad oils. Plain salad dressings. Olives. Avocados.   Beverages  Caffeinated beverages (such as coffee, tea, soda, or energy drinks). Alcoholic beverages. Fruit juices with pulp. Prune juice. Soft drinks sweetened with high-fructose corn syrup or sugar alcohols.  Other  Coconut. Hot sauce. Chili powder. Mayonnaise. Gravy. Cream-based or milk-based soups.   The items listed above may not be a complete list of foods and beverages to avoid. Contact your dietitian for more information.  WHAT SHOULD I DO IF I BECOME DEHYDRATED?  Diarrhea can sometimes lead to dehydration. Signs of dehydration include dark urine and dry mouth and skin. If you think you are dehydrated, you should rehydrate with an oral rehydration solution. These solutions can be purchased at pharmacies, retail stores, or online.   Drink ½-1 cup (120-240 mL) of oral rehydration solution each time you have an episode of diarrhea. If drinking this amount makes your diarrhea worse, try drinking smaller amounts more often. For example, drink 1-3 tsp (5-15 mL) every 5-10 minutes.   A general rule for staying hydrated is to drink 1½-2 L of fluid per day. Talk to your health care provider about the specific amount you should be drinking each day. Drink enough fluids to keep your urine clear or pale yellow.     This information is not intended to replace advice given to you by your health care provider. Make sure you discuss any questions you have with your health care provider.     Document Released: 03/09/2005 Document Revised: 01/08/2016 Document Reviewed: 11/10/2014  [employer] Interactive Patient Education ©2017 [employer]  Viral gastroenteritis is also known as the stomach flu. This condition is caused by various viruses. These viruses can be passed from person to person very easily (are very contagious). This condition may affect your stomach, small intestine, and large  intestine. It can cause sudden watery diarrhea, fever, and vomiting.  Diarrhea and vomiting can make you feel weak and cause you to become dehydrated. You may not be able to keep fluids down. Dehydration can make you tired and thirsty, cause you to have a dry mouth, and decrease how often you urinate. Older adults and people with other diseases or a weak immune system are at higher risk for dehydration.  It is important to replace the fluids that you lose from diarrhea and vomiting. If you become severely dehydrated, you may need to get fluids through an IV tube.  CAUSES  Gastroenteritis is caused by various viruses, including rotavirus and norovirus. Norovirus is the most common cause in adults.  You can get sick by eating food, drinking water, or touching a surface contaminated with one of these viruses. You can also get sick from sharing utensils or other personal items with an infected person.  RISK FACTORS  This condition is more likely to develop in people:  · Who have a weak defense system (immune system).  · Who live with one or more children who are younger than 2 years old.  · Who live in a nursing home.  · Who go on cruise ships.  SYMPTOMS  Symptoms of this condition start suddenly 1-2 days after exposure to a virus. Symptoms may last a few days or as long as a week. The most common symptoms are watery diarrhea and vomiting. Other symptoms include:  · Fever.  · Headache.  · Fatigue.  · Pain in the abdomen.  · Chills.  · Weakness.  · Nausea.  · Muscle aches.  · Loss of appetite.  DIAGNOSIS  This condition is diagnosed with a medical history and physical exam. You may also have a stool test to check for viruses or other infections.  TREATMENT  This condition typically goes away on its own. The focus of treatment is to restore lost fluids (rehydration). Your health care provider may recommend that you take an oral rehydration solution (ORS) to replace important salts and minerals (electrolytes) in your body.  Severe cases of this condition may require giving fluids through an IV tube.  Treatment may also include medicine to help with your symptoms.  HOME CARE INSTRUCTIONS  Follow instructions from your health care provider about how to care for yourself at home.  Eating and Drinking  Follow these recommendations as told by your health care provider:  · Take an ORS. This is a drink that is sold at pharmacies and retail stores.  · Drink clear fluids in small amounts as you are able. Clear fluids include water, ice chips, diluted fruit juice, and low-calorie sports drinks.  · Eat bland, easy-to-digest foods in small amounts as you are able. These foods include bananas, applesauce, rice, lean meats, toast, and crackers.  · Avoid fluids that contain a lot of sugar or caffeine, such as energy drinks, sports drinks, and soda.  · Avoid alcohol.  · Avoid spicy or fatty foods.  General Instructions  · Drink enough fluid to keep your urine clear or pale yellow.  · Wash your hands often. If soap and water are not available, use hand .  · Make sure that all people in your household wash their hands well and often.  · Take over-the-counter and prescription medicines only as told by your health care provider.  · Rest at home while you recover.  · Watch your condition for any changes.  · Take a warm bath to relieve any burning or pain from frequent diarrhea episodes.  · Keep all follow-up visits as told by your health care provider. This is important.  SEEK MEDICAL CARE IF:  · You cannot keep fluids down.  · Your symptoms get worse.  · You have new symptoms.  · You feel light-headed or dizzy.  · You have muscle cramps.  SEEK IMMEDIATE MEDICAL CARE IF:  · You have chest pain.  · You feel extremely weak or you faint.  · You see blood in your vomit.  · Your vomit looks like coffee grounds.  · You have bloody or black stools or stools that look like tar.  · You have a severe headache, a stiff neck, or both.  · You have a  rash.  · You have severe pain, cramping, or bloating in your abdomen.  · You have trouble breathing or you are breathing very quickly.  · Your heart is beating very quickly.  · Your skin feels cold and clammy.  · You feel confused.  · You have pain when you urinate.  · You have signs of dehydration, such as:    Dark urine, very little urine, or no urine.    Cracked lips.    Dry mouth.    Sunken eyes.    Sleepiness.    Weakness.     This information is not intended to replace advice given to you by your health care provider. Make sure you discuss any questions you have with your health care provider.     Document Released: 12/18/2006 Document Revised: 04/10/2017 Document Reviewed: 08/23/2016  [employer] Interactive Patient Education ©2017 [employer]  Steps to Quit Smoking   Smoking tobacco can be harmful to your health and can affect almost every organ in your body. Smoking puts you, and those around you, at risk for developing many serious chronic diseases. Quitting smoking is difficult, but it is one of the best things that you can do for your health. It is never too late to quit.  WHAT ARE THE BENEFITS OF QUITTING SMOKING?  When you quit smoking, you lower your risk of developing serious diseases and conditions, such as:  · Lung cancer or lung disease, such as COPD.  · Heart disease.  · Stroke.  · Heart attack.  · Infertility.  · Osteoporosis and bone fractures.  Additionally, symptoms such as coughing, wheezing, and shortness of breath may get better when you quit. You may also find that you get sick less often because your body is stronger at fighting off colds and infections. If you are pregnant, quitting smoking can help to reduce your chances of having a baby of low birth weight.  HOW DO I GET READY TO QUIT?  When you decide to quit smoking, create a plan to make sure that you are successful. Before you quit:  · Pick a date to quit. Set a date within the next two weeks to give you time to prepare.  · Write  "down the reasons why you are quitting. Keep this list in places where you will see it often, such as on your bathroom mirror or in your car or wallet.  · Identify the people, places, things, and activities that make you want to smoke (triggers) and avoid them. Make sure to take these actions:    Throw away all cigarettes at home, at work, and in your car.    Throw away smoking accessories, such as ashtrays and lighters.    Clean your car and make sure to empty the ashtray.    Clean your home, including curtains and carpets.  · Tell your family, friends, and coworkers that you are quitting. Support from your loved ones can make quitting easier.  · Talk with your health care provider about your options for quitting smoking.  · Find out what treatment options are covered by your health insurance.  WHAT STRATEGIES CAN I USE TO QUIT SMOKING?   Talk with your healthcare provider about different strategies to quit smoking. Some strategies include:  · Quitting smoking altogether instead of gradually lessening how much you smoke over a period of time. Research shows that quitting \"cold turkey\" is more successful than gradually quitting.  · Attending in-person counseling to help you build problem-solving skills. You are more likely to have success in quitting if you attend several counseling sessions. Even short sessions of 10 minutes can be effective.  · Finding resources and support systems that can help you to quit smoking and remain smoke-free after you quit. These resources are most helpful when you use them often. They can include:    Online chats with a counselor.    Telephone quitlines.    Printed self-help materials.    Support groups or group counseling.    Text messaging programs.    Mobile phone applications.  · Taking medicines to help you quit smoking. (If you are pregnant or breastfeeding, talk with your health care provider first.) Some medicines contain nicotine and some do not. Both types of medicines help " with cravings, but the medicines that include nicotine help to relieve withdrawal symptoms. Your health care provider may recommend:    Nicotine patches, gum, or lozenges.    Nicotine inhalers or sprays.    Non-nicotine medicine that is taken by mouth.  Talk with your health care provider about combining strategies, such as taking medicines while you are also receiving in-person counseling. Using these two strategies together makes you more likely to succeed in quitting than if you used either strategy on its own.  If you are pregnant or breastfeeding, talk with your health care provider about finding counseling or other support strategies to quit smoking. Do not take medicine to help you quit smoking unless told to do so by your health care provider.  WHAT THINGS CAN I DO TO MAKE IT EASIER TO QUIT?  Quitting smoking might feel overwhelming at first, but there is a lot that you can do to make it easier. Take these important actions:  · Reach out to your family and friends and ask that they support and encourage you during this time. Call telephone quitlines, reach out to support groups, or work with a counselor for support.  · Ask people who smoke to avoid smoking around you.  · Avoid places that trigger you to smoke, such as bars, parties, or smoke-break areas at work.  · Spend time around people who do not smoke.  · Lessen stress in your life, because stress can be a smoking trigger for some people. To lessen stress, try:    Exercising regularly.    Deep-breathing exercises.    Yoga.    Meditating.    Performing a body scan. This involves closing your eyes, scanning your body from head to toe, and noticing which parts of your body are particularly tense. Purposefully relax the muscles in those areas.  · Download or purchase mobile phone or tablet apps (applications) that can help you stick to your quit plan by providing reminders, tips, and encouragement. There are many free apps, such as QuitGuide from the [employer]  (Centers for Disease Control and Prevention). You can find other support for quitting smoking (smoking cessation) through smokefree.gov and other websites.  HOW WILL I FEEL WHEN I QUIT SMOKING?  Within the first 24 hours of quitting smoking, you may start to feel some withdrawal symptoms. These symptoms are usually most noticeable 2-3 days after quitting, but they usually do not last beyond 2-3 weeks. Changes or symptoms that you might experience include:  · Mood swings.  · Restlessness, anxiety, or irritation.  · Difficulty concentrating.  · Dizziness.  · Strong cravings for sugary foods in addition to nicotine.  · Mild weight gain.  · Constipation.  · Nausea.  · Coughing or a sore throat.  · Changes in how your medicines work in your body.  · A depressed mood.  · Difficulty sleeping (insomnia).  After the first 2-3 weeks of quitting, you may start to notice more positive results, such as:  · Improved sense of smell and taste.  · Decreased coughing and sore throat.  · Slower heart rate.  · Lower blood pressure.  · Clearer skin.  · The ability to breathe more easily.  · Fewer sick days.  Quitting smoking is very challenging for most people. Do not get discouraged if you are not successful the first time. Some people need to make many attempts to quit before they achieve long-term success. Do your best to stick to your quit plan, and talk with your health care provider if you have any questions or concerns.     This information is not intended to replace advice given to you by your health care provider. Make sure you discuss any questions you have with your health care provider.     Document Released: 12/12/2002 Document Revised: 05/03/2016 Document Reviewed: 05/03/2016  [employer] Interactive Patient Education ©2017 [employer]

## 2018-03-23 ENCOUNTER — OFFICE VISIT (OUTPATIENT)
Dept: RETAIL CLINIC | Facility: CLINIC | Age: 58
End: 2018-03-23

## 2018-03-23 VITALS
WEIGHT: 235.2 LBS | BODY MASS INDEX: 36.84 KG/M2 | RESPIRATION RATE: 18 BRPM | TEMPERATURE: 98.5 F | OXYGEN SATURATION: 97 % | HEART RATE: 88 BPM

## 2018-03-23 DIAGNOSIS — Z76.0 MEDICATION REFILL: Primary | ICD-10-CM

## 2018-03-23 DIAGNOSIS — J44.9 CHRONIC OBSTRUCTIVE PULMONARY DISEASE, UNSPECIFIED COPD TYPE (HCC): ICD-10-CM

## 2018-03-23 PROCEDURE — 99213 OFFICE O/P EST LOW 20 MIN: CPT | Performed by: NURSE PRACTITIONER

## 2018-03-23 NOTE — PROGRESS NOTES
Subjective   Phuong Tuttle is a 57 y.o. female.     Chief Complaint   Patient presents with   • Med Refill      History of Present Illness   Presents to [facility] requesting refill on mediation refills on inhalers. She reports had PMH of COPD and has missed routine scheduled appointment. She denies any smother or shortness of breath.     The following portions of the patient's history were reviewed and updated as appropriate: allergies, current medications, past family history, past medical history, past social history, past surgical history and problem list.      Current Outpatient Prescriptions:   •  fluticasone (FLONASE) 50 MCG/ACT nasal spray, 2 sprays into each nostril Daily., Disp: 1 bottle, Rfl: 0  •  Fluticasone Furoate-Vilanterol (BREO ELLIPTA) 100-25 MCG/INH aerosol powder , Inhale 1 puff Daily., Disp: , Rfl:   •  furosemide (LASIX) 20 MG tablet, Take 20 mg by mouth 2 (Two) Times a Day., Disp: , Rfl:   •  pantoprazole (PROTONIX) 40 MG EC tablet, Take 40 mg by mouth Daily., Disp: , Rfl:   •  pantoprazole (PROTONIX) 40 MG EC tablet, Take 1 tablet by mouth Daily., Disp: 30 tablet, Rfl: 0  •  budesonide-formoterol (SYMBICORT) 160-4.5 MCG/ACT inhaler, Inhale 2 puffs 2 (Two) Times a Day., Disp: 1 inhaler, Rfl: 0  •  HYDROcodone-acetaminophen (NORCO) 7.5-325 MG per tablet, Take 1 tablet by mouth Every 6 (Six) Hours As Needed for moderate pain (4-6)., Disp: 12 tablet, Rfl: 0  •  ipratropium-albuterol (COMBIVENT RESPIMAT)  MCG/ACT inhaler, Inhale 1 puff 4 (Four) Times a Day As Needed for Wheezing., Disp: 4 g, Rfl: 0  •  lansoprazole (PREVACID) 30 MG capsule, Take 30 mg by mouth Daily., Disp: , Rfl:   •  ondansetron ODT (ZOFRAN ODT) 8 MG disintegrating tablet, Take 1 tablet by mouth Every 8 (Eight) Hours As Needed for Nausea or Vomiting., Disp: 15 tablet, Rfl: 0    Pulse 88   Temp 98.5 °F (36.9 °C) (Oral)   Resp 18   Wt 107 kg (235 lb 3.2 oz)   SpO2 97%   BMI 36.84 kg/m²     Review of Systems    Constitutional: Negative for chills, fatigue and fever.   HENT: Positive for congestion. Negative for ear pain.    Respiratory: Positive for cough (nonproductive) and shortness of breath (with exertion). Negative for chest tightness and wheezing.    Cardiovascular: Negative for chest pain.   Gastrointestinal: Negative for diarrhea, nausea and vomiting.   Skin: Negative for color change, pallor and rash.   Neurological: Negative for dizziness and headaches.      Allergies   Allergen Reactions   • Zithromax [Azithromycin] Nausea And Vomiting   • Haldol [Haloperidol Lactate] Rash   • Penicillins Rash     Physical Exam   Constitutional: She is oriented to person, place, and time. She appears well-developed and well-nourished.   HENT:   Head: Normocephalic.   Right Ear: Tympanic membrane normal.   Left Ear: Tympanic membrane normal.   Nose: Mucosal edema and rhinorrhea present.   Mouth/Throat: No posterior oropharyngeal erythema.   Eyes: Pupils are equal, round, and reactive to light.   Cardiovascular: Normal rate and regular rhythm.    Pulmonary/Chest: Effort normal. She has no decreased breath sounds. She has no wheezes. She has no rhonchi. She has no rales. She exhibits no tenderness.   Lymphadenopathy:     She has no cervical adenopathy.   Neurological: She is alert and oriented to person, place, and time.   Skin: Skin is warm and dry.   Psychiatric: She has a normal mood and affect. Her behavior is normal.      Assessment/Plan     Phuong was seen today for med refill.    Diagnoses and all orders for this visit:    Medication refill  -     ipratropium-albuterol (COMBIVENT RESPIMAT)  MCG/ACT inhaler; Inhale 1 puff 4 (Four) Times a Day As Needed for Wheezing.    Chronic obstructive pulmonary disease, unspecified COPD type  -     ipratropium-albuterol (COMBIVENT RESPIMAT)  MCG/ACT inhaler; Inhale 1 puff 4 (Four) Times a Day As Needed for Wheezing.             Follow up with PCP or at the Urgent Care if  symptoms worsen or fail to improve.  Patient teaching information discussed and provided to the patient. Patient verbalized understanding.      March 23, 2018 4:09 PM

## 2018-07-24 ENCOUNTER — HOSPITAL ENCOUNTER (EMERGENCY)
Facility: HOSPITAL | Age: 58
Discharge: HOME | End: 2018-07-24
Attending: INTERNAL MEDICINE | Admitting: EMERGENCY MEDICINE

## 2018-07-24 ENCOUNTER — APPOINTMENT (OUTPATIENT)
Dept: GENERAL RADIOLOGY | Facility: HOSPITAL | Age: 58
End: 2018-07-24

## 2018-07-24 VITALS
OXYGEN SATURATION: 98 % | SYSTOLIC BLOOD PRESSURE: 122 MMHG | HEIGHT: 67 IN | HEART RATE: 88 BPM | DIASTOLIC BLOOD PRESSURE: 68 MMHG | BODY MASS INDEX: 31.39 KG/M2 | TEMPERATURE: 97.8 F | WEIGHT: 200 LBS | RESPIRATION RATE: 20 BRPM
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
## 2018-07-24 DIAGNOSIS — S80.812A ABRASION OF ANTERIOR LEFT LOWER LEG, INITIAL ENCOUNTER: ICD-10-CM

## 2018-07-24 DIAGNOSIS — S89.91XA RIGHT KNEE INJURY, INITIAL ENCOUNTER: Primary | ICD-10-CM

## 2018-07-24 DIAGNOSIS — S39.92XA LOWER BACK INJURY, INITIAL ENCOUNTER: ICD-10-CM

## 2018-07-24 PROCEDURE — 73590 X-RAY EXAM OF LOWER LEG: CPT | Performed by: RADIOLOGY

## 2018-07-24 PROCEDURE — 72110 X-RAY EXAM L-2 SPINE 4/>VWS: CPT

## 2018-07-24 PROCEDURE — 72110 X-RAY EXAM L-2 SPINE 4/>VWS: CPT | Performed by: RADIOLOGY

## 2018-07-24 PROCEDURE — 73630 X-RAY EXAM OF FOOT: CPT

## 2018-07-24 PROCEDURE — 73630 X-RAY EXAM OF FOOT: CPT | Performed by: RADIOLOGY

## 2018-07-24 PROCEDURE — 99284 EMERGENCY DEPT VISIT MOD MDM: CPT

## 2018-07-24 PROCEDURE — 73562 X-RAY EXAM OF KNEE 3: CPT

## 2018-07-24 PROCEDURE — 73590 X-RAY EXAM OF LOWER LEG: CPT

## 2018-07-24 PROCEDURE — 73562 X-RAY EXAM OF KNEE 3: CPT | Performed by: RADIOLOGY

## 2018-07-24 RX ORDER — HYDROCODONE BITARTRATE AND ACETAMINOPHEN 5; 325 MG/1; MG/1
1 TABLET ORAL ONCE
Status: COMPLETED | OUTPATIENT
Start: 2018-07-24 | End: 2018-07-24

## 2018-07-24 RX ADMIN — HYDROCODONE BITARTRATE AND ACETAMINOPHEN 1 TABLET: 5; 325 TABLET ORAL at 19:05

## 2018-10-11 ENCOUNTER — HOSPITAL ENCOUNTER (OUTPATIENT)
Dept: GENERAL RADIOLOGY | Facility: HOSPITAL | Age: 58
Discharge: HOME | End: 2018-10-11
Admitting: NURSE PRACTITIONER

## 2018-10-11 ENCOUNTER — TRANSCRIBE ORDERS (OUTPATIENT)
Dept: ADMINISTRATIVE | Facility: HOSPITAL | Age: 58
End: 2018-10-11

## 2018-10-11 DIAGNOSIS — M25.561 RIGHT KNEE PAIN, UNSPECIFIED CHRONICITY: Primary | ICD-10-CM

## 2018-10-11 DIAGNOSIS — M25.561 RIGHT KNEE PAIN, UNSPECIFIED CHRONICITY: ICD-10-CM

## 2018-10-11 DIAGNOSIS — M25.562 LEFT KNEE PAIN, UNSPECIFIED CHRONICITY: ICD-10-CM

## 2018-10-11 PROCEDURE — 73562 X-RAY EXAM OF KNEE 3: CPT

## 2018-10-11 PROCEDURE — 73562 X-RAY EXAM OF KNEE 3: CPT | Performed by: RADIOLOGY

## 2018-10-23 ENCOUNTER — APPOINTMENT (OUTPATIENT)
Dept: CT IMAGING | Facility: HOSPITAL | Age: 58
End: 2018-10-23

## 2018-10-23 ENCOUNTER — TRANSCRIBE ORDERS (OUTPATIENT)
Dept: ADMINISTRATIVE | Facility: HOSPITAL | Age: 58
End: 2018-10-23

## 2018-10-23 ENCOUNTER — HOSPITAL ENCOUNTER (EMERGENCY)
Facility: HOSPITAL | Age: 58
Discharge: HOME | End: 2018-10-23
Attending: EMERGENCY MEDICINE | Admitting: EMERGENCY MEDICINE

## 2018-10-23 VITALS
SYSTOLIC BLOOD PRESSURE: 119 MMHG | BODY MASS INDEX: 36.37 KG/M2 | RESPIRATION RATE: 18 BRPM | WEIGHT: 240 LBS | DIASTOLIC BLOOD PRESSURE: 80 MMHG | HEIGHT: 68 IN | TEMPERATURE: 97.9 F | OXYGEN SATURATION: 98 % | HEART RATE: 81 BPM

## 2018-10-23 DIAGNOSIS — M54.50 ACUTE LEFT-SIDED LOW BACK PAIN WITHOUT SCIATICA: Primary | ICD-10-CM

## 2018-10-23 DIAGNOSIS — M48.50XA COMPRESSION FRACTURE OF VERTEBRAL COLUMN (HCC): ICD-10-CM

## 2018-10-23 DIAGNOSIS — I73.9 PERIPHERAL VASCULAR DISEASE, UNSPECIFIED (HCC): Primary | ICD-10-CM

## 2018-10-23 LAB
BILIRUB UR QL STRIP: NEGATIVE
CLARITY UR: CLEAR
COLOR UR: YELLOW
GLUCOSE UR STRIP-MCNC: NEGATIVE MG/DL
HGB UR QL STRIP.AUTO: NEGATIVE
KETONES UR QL STRIP: NEGATIVE
LEUKOCYTE ESTERASE UR QL STRIP.AUTO: NEGATIVE
NITRITE UR QL STRIP: NEGATIVE
PH UR STRIP.AUTO: 7 [PH] (ref 5–8)
PROT UR QL STRIP: NEGATIVE
SP GR UR STRIP: 1.01 (ref 1–1.03)
UROBILINOGEN UR QL STRIP: NORMAL

## 2018-10-23 PROCEDURE — 93010 ELECTROCARDIOGRAM REPORT: CPT | Performed by: INTERNAL MEDICINE

## 2018-10-23 PROCEDURE — 25010000002 MORPHINE PER 10 MG: Performed by: EMERGENCY MEDICINE

## 2018-10-23 PROCEDURE — 96372 THER/PROPH/DIAG INJ SC/IM: CPT

## 2018-10-23 PROCEDURE — 93005 ELECTROCARDIOGRAM TRACING: CPT | Performed by: EMERGENCY MEDICINE

## 2018-10-23 PROCEDURE — 72131 CT LUMBAR SPINE W/O DYE: CPT | Performed by: RADIOLOGY

## 2018-10-23 PROCEDURE — 25010000002 ORPHENADRINE CITRATE PER 60 MG: Performed by: EMERGENCY MEDICINE

## 2018-10-23 PROCEDURE — 99285 EMERGENCY DEPT VISIT HI MDM: CPT

## 2018-10-23 PROCEDURE — 72131 CT LUMBAR SPINE W/O DYE: CPT

## 2018-10-23 PROCEDURE — 81003 URINALYSIS AUTO W/O SCOPE: CPT | Performed by: EMERGENCY MEDICINE

## 2018-10-23 RX ORDER — CYCLOBENZAPRINE HCL 10 MG
10 TABLET ORAL 3 TIMES DAILY PRN
Qty: 10 TABLET | Refills: 0 | Status: SHIPPED | OUTPATIENT
Start: 2018-10-23 | End: 2020-08-12

## 2018-10-23 RX ORDER — ONDANSETRON 4 MG/1
4 TABLET, ORALLY DISINTEGRATING ORAL ONCE
Status: COMPLETED | OUTPATIENT
Start: 2018-10-23 | End: 2018-10-23

## 2018-10-23 RX ORDER — MORPHINE SULFATE 10 MG/ML
6 INJECTION INTRAMUSCULAR; INTRAVENOUS; SUBCUTANEOUS ONCE
Status: COMPLETED | OUTPATIENT
Start: 2018-10-23 | End: 2018-10-23

## 2018-10-23 RX ORDER — ERGOCALCIFEROL 1.25 MG/1
50000 CAPSULE ORAL WEEKLY
COMMUNITY

## 2018-10-23 RX ORDER — ORPHENADRINE CITRATE 30 MG/ML
60 INJECTION INTRAMUSCULAR; INTRAVENOUS ONCE
Status: COMPLETED | OUTPATIENT
Start: 2018-10-23 | End: 2018-10-23

## 2018-10-23 RX ORDER — HYDROCODONE BITARTRATE AND ACETAMINOPHEN 7.5; 325 MG/1; MG/1
1 TABLET ORAL EVERY 6 HOURS PRN
Qty: 12 TABLET | Refills: 0 | Status: SHIPPED | OUTPATIENT
Start: 2018-10-23 | End: 2018-10-29

## 2018-10-23 RX ADMIN — MORPHINE SULFATE 6 MG: 10 INJECTION INTRAVENOUS at 10:24

## 2018-10-23 RX ADMIN — ORPHENADRINE CITRATE 60 MG: 30 INJECTION INTRAMUSCULAR; INTRAVENOUS at 10:24

## 2018-10-23 RX ADMIN — ONDANSETRON 4 MG: 4 TABLET, ORALLY DISINTEGRATING ORAL at 14:39

## 2018-10-23 RX ADMIN — ONDANSETRON 4 MG: 4 TABLET, ORALLY DISINTEGRATING ORAL at 10:23

## 2018-10-23 RX ADMIN — MORPHINE SULFATE 6 MG: 10 INJECTION INTRAVENOUS at 14:39

## 2018-10-23 NOTE — ED PROVIDER NOTES
Subjective   57-year-old white female complains of back pain.  She states that she had a fall 3 months ago resulting in back pain.  This had resolved until the last few days when it recurred.  This morning she got up to go to the bathroom, and when she was walking back into her living room she had sudden severe lower back pain which brought her to her knees.  She complains of severe lumbar midline and left-sided pain.  This is worse with movement.  She denies any numbness, weakness, tingling, incontinence.  She has taken Tylenol and hydrocodone without relief.        Back Pain       Review of Systems   Musculoskeletal: Positive for back pain.   All other systems reviewed and are negative.      Past Medical History:   Diagnosis Date   • Anemia    • COPD (chronic obstructive pulmonary disease) (CMS/[facility])    • GI bleed    • Headache    • Immunocompromised patient (CMS/[facility])    • Peptic ulceration        Allergies   Allergen Reactions   • Ibuprofen Anaphylaxis     Pt reports causes bleeding   • Zithromax [Azithromycin] Nausea And Vomiting   • Doxycycline Rash   • Haldol [Haloperidol Lactate] Rash   • Penicillins Rash       History reviewed. No pertinent surgical history.    Family History   Problem Relation Age of Onset   • Hypertension Mother    • Pneumonia Father        Social History     Social History   • Marital status:      Occupational History   • Disabled      Social History Main Topics   • Smoking status: Current Some Day Smoker     Types: Cigarettes   • Smokeless tobacco: Never Used   • Alcohol use No   • Drug use: Unknown   • Sexual activity: Defer     Other Topics Concern   • Not on file           Objective   Physical Exam   Constitutional: She is oriented to person, place, and time. She appears well-developed and well-nourished.   HENT:   Head: Normocephalic and atraumatic.   Cardiovascular: Normal rate, regular rhythm and normal heart sounds.  Exam reveals no gallop and no friction rub.    No murmur  heard.  Pulmonary/Chest: Effort normal and breath sounds normal. No respiratory distress. She has no wheezes. She has no rales.   Musculoskeletal: She exhibits no deformity.        Lumbar back: She exhibits decreased range of motion and tenderness (Left side). She exhibits no spasm.   Neurological: She is alert and oriented to person, place, and time. She has normal strength. She exhibits normal muscle tone.   Reflex Scores:       Patellar reflexes are 0 on the right side and 0 on the left side.  Negative straight leg raise bilaterally.  Dorsiflexion and plantar flexion strength normal bilaterally.   Skin: Skin is warm and dry.   Psychiatric: She has a normal mood and affect.   Nursing note and vitals reviewed.      Procedures       Results for orders placed or performed during the hospital encounter of 10/23/18   Urinalysis With Microscopic If Indicated (No Culture) - Urine, Clean Catch   Result Value Ref Range    Color, UA Yellow Yellow, Straw    Appearance, UA Clear Clear    pH, UA 7.0 5.0 - 8.0    Specific Gravity, UA 1.006 1.005 - 1.030    Glucose, UA Negative Negative    Ketones, UA Negative Negative    Bilirubin, UA Negative Negative    Blood, UA Negative Negative    Protein, UA Negative Negative    Leuk Esterase, UA Negative Negative    Nitrite, UA Negative Negative    Urobilinogen, UA 1.0 E.U./dL 0.2 - 1.0 E.U./dL     Ct Lumbar Spine Without Contrast    Result Date: 10/23/2018  Narrative: EXAMINATION: CT LUMBAR SPINE WO CONTRAST-  CLINICAL INDICATION: Back pain     DOSE:    COMPARISON: None available  Radiation dose reduction techniques were utilized per ALARA protocol. Automated exposure control was initiated through either or Mambu or DoseRight software packages by  protocol.     PROCEDURE: Axial images of the lumbar spine were acquired without any IV contrast. Reformatted images were created.  FINDINGS: Today's study shows mild compression of the superior endplates at L1 and L2. These are  of indeterminate age, but do not appear to be hyperacute  There is no retropulsion  Vacuum phenomena at L1-2, T12-L1, and L4-5      Impression: Slight compression of the superior endplates of L1 and L2. These are of indeterminate age. No retropulsion  This report was finalized on 10/23/2018 1:24 PM by Dr. Rodri Renteria MD.      Xr Knee 3 View Bilateral    Result Date: 10/11/2018  Narrative: EXAMINATION: XR KNEE 3 VW BILATERAL-  CLINICAL INDICATION:M25.561; M25.561-Pain in right knee; M25.562-Pain in left knee    COMPARISON: None  TECHNIQUE: 3 view bilateral knee series  FINDINGS: No acute osseous or articular abnormality. No soft tissue abnormality.  Mild osteoarthritic change noted.      Impression: No acute osseous or articular abnormalities.  This report was finalized on 10/11/2018 1:00 PM by Dr. Robbie Still MD.          ED Course  ED Course as of Oct 23 1431   Tue Oct 23, 2018   1416 I discussed results of workup with patient.  Urinalysis is negative.  CT shows some endplate compression of L1 and L2.  In discussion with the radiologist, they state that the L1 was present on a plain film of the lumbar spine from July 2018, but the other lesion could possibly be new.  There is no retropulsion.  In review of the patient's KEN #[id number], shows that she is on Suboxone, which she did not tell me initially.  She states that she is going to stop going to the clinic because she has got to have  pain medicine for her back.  She states pain medicine here helped, but pain was exacerbated by getting on and off the CT table.  [BC]      ED Course User Index  [BC] Lamont Robles MD                  MDM  Number of Diagnoses or Management Options  Acute left-sided low back pain without sciatica:   Compression fracture of vertebral column (CMS/HCC):      Amount and/or Complexity of Data Reviewed  Clinical lab tests: reviewed  Tests in the radiology section of CPT®: reviewed    Risk of Complications, Morbidity, and/or  Mortality  Presenting problems: moderate  Diagnostic procedures: moderate  Management options: moderate          Final diagnoses:   Acute left-sided low back pain without sciatica   Compression fracture of vertebral column (CMS/HCC)            Lamont Robles MD  10/23/18 [id number]

## 2018-10-24 ENCOUNTER — APPOINTMENT (OUTPATIENT)
Dept: PHYSICAL THERAPY | Facility: HOSPITAL | Age: 58
End: 2018-10-24

## 2018-10-26 ENCOUNTER — APPOINTMENT (OUTPATIENT)
Dept: CARDIOLOGY | Facility: HOSPITAL | Age: 58
End: 2018-10-26
Attending: PSYCHIATRY & NEUROLOGY

## 2018-10-29 ENCOUNTER — HOSPITAL ENCOUNTER (EMERGENCY)
Facility: HOSPITAL | Age: 58
Discharge: HOME | End: 2018-10-29
Attending: EMERGENCY MEDICINE | Admitting: EMERGENCY MEDICINE

## 2018-10-29 VITALS
BODY MASS INDEX: 36.37 KG/M2 | RESPIRATION RATE: 12 BRPM | SYSTOLIC BLOOD PRESSURE: 132 MMHG | DIASTOLIC BLOOD PRESSURE: 84 MMHG | HEIGHT: 68 IN | WEIGHT: 240 LBS | OXYGEN SATURATION: 91 % | TEMPERATURE: 99.1 F | HEART RATE: 81 BPM

## 2018-10-29 DIAGNOSIS — M54.50 CHRONIC MIDLINE LOW BACK PAIN WITHOUT SCIATICA: Primary | ICD-10-CM

## 2018-10-29 DIAGNOSIS — G89.29 CHRONIC MIDLINE LOW BACK PAIN WITHOUT SCIATICA: Primary | ICD-10-CM

## 2018-10-29 LAB
6-ACETYL MORPHINE: NEGATIVE
AMPHET+METHAMPHET UR QL: NEGATIVE
BACTERIA UR QL AUTO: ABNORMAL /HPF
BARBITURATES UR QL SCN: NEGATIVE
BENZODIAZ UR QL SCN: POSITIVE
BILIRUB UR QL STRIP: ABNORMAL
BUPRENORPHINE SERPL-MCNC: POSITIVE NG/ML
CANNABINOIDS SERPL QL: NEGATIVE
CLARITY UR: CLEAR
COCAINE UR QL: NEGATIVE
COLOR UR: ABNORMAL
GLUCOSE UR STRIP-MCNC: NEGATIVE MG/DL
HGB UR QL STRIP.AUTO: NEGATIVE
HYALINE CASTS UR QL AUTO: ABNORMAL /LPF
KETONES UR QL STRIP: ABNORMAL
LEUKOCYTE ESTERASE UR QL STRIP.AUTO: ABNORMAL
METHADONE UR QL SCN: NEGATIVE
NITRITE UR QL STRIP: NEGATIVE
OPIATES UR QL: POSITIVE
OXYCODONE UR QL SCN: NEGATIVE
PCP UR QL SCN: NEGATIVE
PH UR STRIP.AUTO: 6.5 [PH] (ref 5–8)
PROT UR QL STRIP: NEGATIVE
RBC # UR: ABNORMAL /HPF
REF LAB TEST METHOD: ABNORMAL
SP GR UR STRIP: 1.03 (ref 1–1.03)
SQUAMOUS #/AREA URNS HPF: ABNORMAL /HPF
UROBILINOGEN UR QL STRIP: ABNORMAL
WBC UR QL AUTO: ABNORMAL /HPF

## 2018-10-29 PROCEDURE — 81001 URINALYSIS AUTO W/SCOPE: CPT | Performed by: PHYSICIAN ASSISTANT

## 2018-10-29 PROCEDURE — 80307 DRUG TEST PRSMV CHEM ANLYZR: CPT | Performed by: PHYSICIAN ASSISTANT

## 2018-10-29 PROCEDURE — 99283 EMERGENCY DEPT VISIT LOW MDM: CPT

## 2018-10-29 PROCEDURE — 25010000002 KETOROLAC TROMETHAMINE PER 15 MG: Performed by: PHYSICIAN ASSISTANT

## 2018-10-29 PROCEDURE — 96372 THER/PROPH/DIAG INJ SC/IM: CPT

## 2018-10-29 RX ORDER — KETOROLAC TROMETHAMINE 30 MG/ML
60 INJECTION, SOLUTION INTRAMUSCULAR; INTRAVENOUS ONCE
Status: COMPLETED | OUTPATIENT
Start: 2018-10-29 | End: 2018-10-29

## 2018-10-29 RX ADMIN — KETOROLAC TROMETHAMINE 60 MG: 60 INJECTION, SOLUTION INTRAMUSCULAR at 18:33

## 2018-10-31 ENCOUNTER — HOSPITAL ENCOUNTER (EMERGENCY)
Facility: HOSPITAL | Age: 58
Discharge: ANOTHER HOSPITAL | End: 2018-10-31
Attending: FAMILY MEDICINE | Admitting: FAMILY MEDICINE

## 2018-10-31 ENCOUNTER — APPOINTMENT (OUTPATIENT)
Dept: GENERAL RADIOLOGY | Facility: HOSPITAL | Age: 58
End: 2018-10-31

## 2018-10-31 VITALS
BODY MASS INDEX: 36.37 KG/M2 | TEMPERATURE: 97.8 F | WEIGHT: 240 LBS | HEART RATE: 81 BPM | OXYGEN SATURATION: 99 % | RESPIRATION RATE: 18 BRPM | HEIGHT: 68 IN | SYSTOLIC BLOOD PRESSURE: 102 MMHG | DIASTOLIC BLOOD PRESSURE: 59 MMHG

## 2018-10-31 DIAGNOSIS — K92.2 ACUTE UPPER GI BLEEDING: Primary | ICD-10-CM

## 2018-10-31 LAB
ABO GROUP BLD: NORMAL
ABO GROUP BLD: NORMAL
ALBUMIN SERPL-MCNC: 3.6 G/DL (ref 3.5–5)
ALBUMIN/GLOB SERPL: 1.2 G/DL (ref 1.5–2.5)
ALP SERPL-CCNC: 61 U/L (ref 35–104)
ALT SERPL W P-5'-P-CCNC: 14 U/L (ref 10–36)
AMYLASE SERPL-CCNC: 43 U/L (ref 28–100)
ANION GAP SERPL CALCULATED.3IONS-SCNC: 6.4 MMOL/L (ref 3.6–11.2)
ANTI-S: NORMAL
AST SERPL-CCNC: 24 U/L (ref 10–30)
BASOPHILS # BLD AUTO: 0.03 10*3/MM3 (ref 0–0.3)
BASOPHILS NFR BLD AUTO: 0.6 % (ref 0–2)
BILIRUB SERPL-MCNC: 2.6 MG/DL (ref 0.2–1.8)
BLD GP AB SCN SERPL QL: POSITIVE
BLD GP AB SCN SERPL QL: POSITIVE
BNP SERPL-MCNC: 38 PG/ML (ref 0–100)
BUN BLD-MCNC: 35 MG/DL (ref 7–21)
BUN/CREAT SERPL: 34 (ref 7–25)
CALCIUM SPEC-SCNC: 8.4 MG/DL (ref 7.7–10)
CHLORIDE SERPL-SCNC: 110 MMOL/L (ref 99–112)
CO2 SERPL-SCNC: 24.6 MMOL/L (ref 24.3–31.9)
CREAT BLD-MCNC: 1.03 MG/DL (ref 0.43–1.29)
DEPRECATED RDW RBC AUTO: 48.7 FL (ref 37–54)
EOSINOPHIL # BLD AUTO: 0.1 10*3/MM3 (ref 0–0.7)
EOSINOPHIL NFR BLD AUTO: 1.9 % (ref 0–5)
ERYTHROCYTE [DISTWIDTH] IN BLOOD BY AUTOMATED COUNT: 15.3 % (ref 11.5–14.5)
GFR SERPL CREATININE-BSD FRML MDRD: 56 ML/MIN/1.73
GLOBULIN UR ELPH-MCNC: 3 GM/DL
GLUCOSE BLD-MCNC: 108 MG/DL (ref 70–110)
HCT VFR BLD AUTO: 33.7 % (ref 37–47)
HGB BLD-MCNC: 10.6 G/DL (ref 12–16)
IMM GRANULOCYTES # BLD: 0.01 10*3/MM3 (ref 0–0.03)
IMM GRANULOCYTES NFR BLD: 0.2 % (ref 0–0.5)
INR PPP: 1.38 (ref 0.9–1.1)
LIPASE SERPL-CCNC: 33 U/L (ref 13–60)
LYMPHOCYTES # BLD AUTO: 0.78 10*3/MM3 (ref 1–3)
LYMPHOCYTES NFR BLD AUTO: 15.1 % (ref 21–51)
MCH RBC QN AUTO: 27.5 PG (ref 27–33)
MCHC RBC AUTO-ENTMCNC: 31.5 G/DL (ref 33–37)
MCV RBC AUTO: 87.5 FL (ref 80–94)
MONOCYTES # BLD AUTO: 0.29 10*3/MM3 (ref 0.1–0.9)
MONOCYTES NFR BLD AUTO: 5.6 % (ref 0–10)
NEUTROPHILS # BLD AUTO: 3.95 10*3/MM3 (ref 1.4–6.5)
NEUTROPHILS NFR BLD AUTO: 76.6 % (ref 30–70)
OSMOLALITY SERPL CALC.SUM OF ELEC: 289.8 MOSM/KG (ref 273–305)
PLATELET # BLD AUTO: 108 10*3/MM3 (ref 130–400)
PMV BLD AUTO: 9.9 FL (ref 6–10)
POTASSIUM BLD-SCNC: 4.4 MMOL/L (ref 3.5–5.3)
PROT SERPL-MCNC: 6.6 G/DL (ref 6–8)
PROTHROMBIN TIME: 17.2 SECONDS (ref 11–15.4)
RBC # BLD AUTO: 3.85 10*6/MM3 (ref 4.2–5.4)
RH BLD: POSITIVE
RH BLD: POSITIVE
SODIUM BLD-SCNC: 141 MMOL/L (ref 135–153)
T&S EXPIRATION DATE: NORMAL
T&S EXPIRATION DATE: NORMAL
TROPONIN I SERPL-MCNC: <0.006 NG/ML
WBC NRBC COR # BLD: 5.16 10*3/MM3 (ref 4.5–12.5)

## 2018-10-31 PROCEDURE — 86900 BLOOD TYPING SEROLOGIC ABO: CPT | Performed by: FAMILY MEDICINE

## 2018-10-31 PROCEDURE — 96365 THER/PROPH/DIAG IV INF INIT: CPT

## 2018-10-31 PROCEDURE — 83880 ASSAY OF NATRIURETIC PEPTIDE: CPT | Performed by: FAMILY MEDICINE

## 2018-10-31 PROCEDURE — 96376 TX/PRO/DX INJ SAME DRUG ADON: CPT

## 2018-10-31 PROCEDURE — 85610 PROTHROMBIN TIME: CPT | Performed by: FAMILY MEDICINE

## 2018-10-31 PROCEDURE — 85025 COMPLETE CBC W/AUTO DIFF WBC: CPT | Performed by: FAMILY MEDICINE

## 2018-10-31 PROCEDURE — 25010000002 OCTREOTIDE PER 25 MCG: Performed by: FAMILY MEDICINE

## 2018-10-31 PROCEDURE — 82150 ASSAY OF AMYLASE: CPT | Performed by: FAMILY MEDICINE

## 2018-10-31 PROCEDURE — 86920 COMPATIBILITY TEST SPIN: CPT

## 2018-10-31 PROCEDURE — 93005 ELECTROCARDIOGRAM TRACING: CPT | Performed by: FAMILY MEDICINE

## 2018-10-31 PROCEDURE — 86901 BLOOD TYPING SEROLOGIC RH(D): CPT | Performed by: FAMILY MEDICINE

## 2018-10-31 PROCEDURE — 86870 RBC ANTIBODY IDENTIFICATION: CPT

## 2018-10-31 PROCEDURE — 86850 RBC ANTIBODY SCREEN: CPT | Performed by: FAMILY MEDICINE

## 2018-10-31 PROCEDURE — 84484 ASSAY OF TROPONIN QUANT: CPT | Performed by: FAMILY MEDICINE

## 2018-10-31 PROCEDURE — 71045 X-RAY EXAM CHEST 1 VIEW: CPT | Performed by: RADIOLOGY

## 2018-10-31 PROCEDURE — 36415 COLL VENOUS BLD VENIPUNCTURE: CPT

## 2018-10-31 PROCEDURE — 25010000002 ONDANSETRON PER 1 MG: Performed by: FAMILY MEDICINE

## 2018-10-31 PROCEDURE — 86922 COMPATIBILITY TEST ANTIGLOB: CPT

## 2018-10-31 PROCEDURE — 86905 BLOOD TYPING RBC ANTIGENS: CPT

## 2018-10-31 PROCEDURE — 83690 ASSAY OF LIPASE: CPT | Performed by: FAMILY MEDICINE

## 2018-10-31 PROCEDURE — 86870 RBC ANTIBODY IDENTIFICATION: CPT | Performed by: FAMILY MEDICINE

## 2018-10-31 PROCEDURE — 80053 COMPREHEN METABOLIC PANEL: CPT | Performed by: FAMILY MEDICINE

## 2018-10-31 PROCEDURE — 71045 X-RAY EXAM CHEST 1 VIEW: CPT

## 2018-10-31 PROCEDURE — 86902 BLOOD TYPE ANTIGEN DONOR EA: CPT

## 2018-10-31 PROCEDURE — 93010 ELECTROCARDIOGRAM REPORT: CPT | Performed by: INTERNAL MEDICINE

## 2018-10-31 PROCEDURE — 99284 EMERGENCY DEPT VISIT MOD MDM: CPT

## 2018-10-31 PROCEDURE — 96366 THER/PROPH/DIAG IV INF ADDON: CPT

## 2018-10-31 PROCEDURE — 96375 TX/PRO/DX INJ NEW DRUG ADDON: CPT

## 2018-10-31 RX ORDER — PANTOPRAZOLE SODIUM 40 MG/10ML
80 INJECTION, POWDER, LYOPHILIZED, FOR SOLUTION INTRAVENOUS ONCE
Status: COMPLETED | OUTPATIENT
Start: 2018-10-31 | End: 2018-10-31

## 2018-10-31 RX ORDER — OCTREOTIDE ACETATE 100 UG/ML
50 INJECTION, SOLUTION INTRAVENOUS; SUBCUTANEOUS ONCE
Status: COMPLETED | OUTPATIENT
Start: 2018-10-31 | End: 2018-10-31

## 2018-10-31 RX ORDER — ONDANSETRON 2 MG/ML
4 INJECTION INTRAMUSCULAR; INTRAVENOUS ONCE
Status: COMPLETED | OUTPATIENT
Start: 2018-10-31 | End: 2018-10-31

## 2018-10-31 RX ORDER — ONDANSETRON 2 MG/ML
INJECTION INTRAMUSCULAR; INTRAVENOUS
Status: DISPENSED
Start: 2018-10-31 | End: 2018-10-31

## 2018-10-31 RX ORDER — PANTOPRAZOLE SODIUM 40 MG/10ML
INJECTION, POWDER, LYOPHILIZED, FOR SOLUTION INTRAVENOUS
Status: DISPENSED
Start: 2018-10-31 | End: 2018-10-31

## 2018-10-31 RX ORDER — SODIUM CHLORIDE 0.9 % (FLUSH) 0.9 %
10 SYRINGE (ML) INJECTION AS NEEDED
Status: DISCONTINUED | OUTPATIENT
Start: 2018-10-31 | End: 2018-10-31 | Stop reason: HOSPADM

## 2018-10-31 RX ADMIN — SODIUM CHLORIDE 1000 ML: 9 INJECTION, SOLUTION INTRAVENOUS at 05:54

## 2018-10-31 RX ADMIN — ONDANSETRON 4 MG: 2 INJECTION, SOLUTION INTRAMUSCULAR; INTRAVENOUS at 04:57

## 2018-10-31 RX ADMIN — ONDANSETRON 4 MG: 2 INJECTION, SOLUTION INTRAMUSCULAR; INTRAVENOUS at 04:17

## 2018-10-31 RX ADMIN — OCTREOTIDE ACETATE 25 MCG/HR: 100 INJECTION, SOLUTION INTRAVENOUS; SUBCUTANEOUS at 05:02

## 2018-10-31 RX ADMIN — PANTOPRAZOLE SODIUM 80 MG: 40 INJECTION, POWDER, FOR SOLUTION INTRAVENOUS at 04:26

## 2018-10-31 RX ADMIN — SODIUM CHLORIDE 1000 ML: 9 INJECTION, SOLUTION INTRAVENOUS at 04:29

## 2018-10-31 RX ADMIN — OCTREOTIDE ACETATE 50 MCG: 100 INJECTION, SOLUTION INTRAVENOUS; SUBCUTANEOUS at 04:47

## 2018-10-31 RX ADMIN — PANTOPRAZOLE SODIUM 8 MG/HR: 40 INJECTION, POWDER, FOR SOLUTION INTRAVENOUS at 04:49

## 2018-11-01 LAB
ABO + RH BLD: NORMAL
ABO + RH BLD: NORMAL
BH BB BLOOD EXPIRATION DATE: NORMAL
BH BB BLOOD EXPIRATION DATE: NORMAL
BH BB BLOOD TYPE BARCODE: 7300
BH BB BLOOD TYPE BARCODE: 7300
BH BB DISPENSE STATUS: NORMAL
BH BB DISPENSE STATUS: NORMAL
BH BB PRODUCT CODE: NORMAL
BH BB PRODUCT CODE: NORMAL
BH BB UNIT NUMBER: NORMAL
BH BB UNIT NUMBER: NORMAL
CROSSMATCH INTERPRETATION: NORMAL
CROSSMATCH INTERPRETATION: NORMAL
UNIT  ABO: NORMAL
UNIT  ABO: NORMAL
UNIT  RH: NORMAL
UNIT  RH: NORMAL

## 2018-11-05 ENCOUNTER — HOSPITAL ENCOUNTER (OUTPATIENT)
Dept: CARDIOLOGY | Facility: HOSPITAL | Age: 58
Discharge: HOME | End: 2018-11-05
Attending: PSYCHIATRY & NEUROLOGY | Admitting: PSYCHIATRY & NEUROLOGY

## 2018-11-05 DIAGNOSIS — I73.9 PERIPHERAL VASCULAR DISEASE, UNSPECIFIED (HCC): ICD-10-CM

## 2018-11-05 PROCEDURE — 93923 UPR/LXTR ART STDY 3+ LVLS: CPT | Performed by: RADIOLOGY

## 2018-11-05 PROCEDURE — 93923 UPR/LXTR ART STDY 3+ LVLS: CPT

## 2019-04-03 ENCOUNTER — TRANSCRIBE ORDERS (OUTPATIENT)
Dept: ADMINISTRATIVE | Facility: HOSPITAL | Age: 59
End: 2019-04-03

## 2019-04-03 DIAGNOSIS — R09.89 SUSPECTED DEEP VEIN THROMBOSIS (DVT): Primary | ICD-10-CM

## 2019-04-18 ENCOUNTER — TRANSCRIBE ORDERS (OUTPATIENT)
Dept: ADMINISTRATIVE | Facility: HOSPITAL | Age: 59
End: 2019-04-18

## 2019-04-18 DIAGNOSIS — I50.9 HEART FAILURE, UNSPECIFIED HF CHRONICITY, UNSPECIFIED HEART FAILURE TYPE (HCC): Primary | ICD-10-CM

## 2019-04-22 ENCOUNTER — TRANSCRIBE ORDERS (OUTPATIENT)
Dept: ADMINISTRATIVE | Facility: HOSPITAL | Age: 59
End: 2019-04-22

## 2019-04-22 DIAGNOSIS — D61.818 ACQUIRED PANCYTOPENIA (HCC): Primary | ICD-10-CM

## 2019-05-16 ENCOUNTER — HOSPITAL ENCOUNTER (OUTPATIENT)
Dept: ULTRASOUND IMAGING | Facility: HOSPITAL | Age: 59
Discharge: HOME | End: 2019-05-16

## 2019-05-16 ENCOUNTER — HOSPITAL ENCOUNTER (OUTPATIENT)
Dept: CARDIOLOGY | Facility: HOSPITAL | Age: 59
Discharge: HOME | End: 2019-05-16
Admitting: INTERNAL MEDICINE

## 2019-05-16 DIAGNOSIS — D61.818 ACQUIRED PANCYTOPENIA (HCC): ICD-10-CM

## 2019-05-16 DIAGNOSIS — I50.9 HEART FAILURE, UNSPECIFIED HF CHRONICITY, UNSPECIFIED HEART FAILURE TYPE (HCC): ICD-10-CM

## 2019-05-16 LAB
BH CV ECHO MEAS - % IVS THICK: -16.7 %
BH CV ECHO MEAS - % LVPW THICK: -7.1 %
BH CV ECHO MEAS - ACS: 2.1 CM
BH CV ECHO MEAS - AO MAX PG: 17.6 MMHG
BH CV ECHO MEAS - AO MEAN PG: 7 MMHG
BH CV ECHO MEAS - AO ROOT AREA (BSA CORRECTED): 1.3
BH CV ECHO MEAS - AO ROOT AREA: 6.2 CM^2
BH CV ECHO MEAS - AO ROOT DIAM: 2.8 CM
BH CV ECHO MEAS - AO V2 MAX: 210 CM/SEC
BH CV ECHO MEAS - AO V2 MEAN: 122 CM/SEC
BH CV ECHO MEAS - AO V2 VTI: 52.3 CM
BH CV ECHO MEAS - BSA(HAYCOCK): 2.3 M^2
BH CV ECHO MEAS - BSA: 2.2 M^2
BH CV ECHO MEAS - BZI_BMI: 36.5 KILOGRAMS/M^2
BH CV ECHO MEAS - BZI_METRIC_HEIGHT: 172.7 CM
BH CV ECHO MEAS - BZI_METRIC_WEIGHT: 108.9 KG
BH CV ECHO MEAS - CI(CUBED): 1.9 L/MIN/M^2
BH CV ECHO MEAS - CI(MOD-SP4): 1.7 L/MIN/M^2
BH CV ECHO MEAS - CI(TEICH): 1.7 L/MIN/M^2
BH CV ECHO MEAS - CO(CUBED): 4.2 L/MIN
BH CV ECHO MEAS - CO(MOD-SP4): 3.9 L/MIN
BH CV ECHO MEAS - CO(TEICH): 3.9 L/MIN
BH CV ECHO MEAS - EDV(CUBED): 84.9 ML
BH CV ECHO MEAS - EDV(MOD-SP4): 85 ML
BH CV ECHO MEAS - EDV(TEICH): 87.5 ML
BH CV ECHO MEAS - EF(CUBED): 72 %
BH CV ECHO MEAS - EF(MOD-SP4): 65.9 %
BH CV ECHO MEAS - EF(TEICH): 63.9 %
BH CV ECHO MEAS - ESV(CUBED): 23.8 ML
BH CV ECHO MEAS - ESV(MOD-SP4): 29 ML
BH CV ECHO MEAS - ESV(TEICH): 31.5 ML
BH CV ECHO MEAS - FS: 34.6 %
BH CV ECHO MEAS - IVS/LVPW: 1
BH CV ECHO MEAS - IVSD: 1.3 CM
BH CV ECHO MEAS - IVSS: 1.1 CM
BH CV ECHO MEAS - LA DIMENSION: 4 CM
BH CV ECHO MEAS - LA/AO: 1.4
BH CV ECHO MEAS - LV DIASTOLIC VOL/BSA (35-75): 38.5 ML/M^2
BH CV ECHO MEAS - LV MASS(C)D: 209.3 GRAMS
BH CV ECHO MEAS - LV MASS(C)DI: 94.8 GRAMS/M^2
BH CV ECHO MEAS - LV MASS(C)S: 92.8 GRAMS
BH CV ECHO MEAS - LV MASS(C)SI: 42 GRAMS/M^2
BH CV ECHO MEAS - LV SYSTOLIC VOL/BSA (12-30): 13.1 ML/M^2
BH CV ECHO MEAS - LVIDD: 4.4 CM
BH CV ECHO MEAS - LVIDS: 2.9 CM
BH CV ECHO MEAS - LVLD AP4: 7 CM
BH CV ECHO MEAS - LVLS AP4: 5.4 CM
BH CV ECHO MEAS - LVOT AREA (M): 2.8 CM^2
BH CV ECHO MEAS - LVOT AREA: 2.8 CM^2
BH CV ECHO MEAS - LVOT DIAM: 1.9 CM
BH CV ECHO MEAS - LVPWD: 1.3 CM
BH CV ECHO MEAS - LVPWS: 1.2 CM
BH CV ECHO MEAS - MM HR: 69 BPM
BH CV ECHO MEAS - MM R-R INT: 0.87 SEC
BH CV ECHO MEAS - MV A MAX VEL: 72.1 CM/SEC
BH CV ECHO MEAS - MV E MAX VEL: 107 CM/SEC
BH CV ECHO MEAS - MV E/A: 1.5
BH CV ECHO MEAS - PA ACC SLOPE: 1045 CM/SEC^2
BH CV ECHO MEAS - PA ACC TIME: 0.11 SEC
BH CV ECHO MEAS - PA PR(ACCEL): 30 MMHG
BH CV ECHO MEAS - RAP SYSTOLE: 10 MMHG
BH CV ECHO MEAS - RVSP: 34 MMHG
BH CV ECHO MEAS - SI(AO): 145.8 ML/M^2
BH CV ECHO MEAS - SI(CUBED): 27.7 ML/M^2
BH CV ECHO MEAS - SI(MOD-SP4): 25.4 ML/M^2
BH CV ECHO MEAS - SI(TEICH): 25.3 ML/M^2
BH CV ECHO MEAS - SV(AO): 322 ML
BH CV ECHO MEAS - SV(CUBED): 61.1 ML
BH CV ECHO MEAS - SV(MOD-SP4): 56 ML
BH CV ECHO MEAS - SV(TEICH): 55.9 ML
BH CV ECHO MEAS - TR MAX VEL: 293 CM/SEC
MAXIMAL PREDICTED HEART RATE: 164 BPM
STRESS TARGET HR: 139 BPM

## 2019-05-16 PROCEDURE — 93306 TTE W/DOPPLER COMPLETE: CPT

## 2019-05-16 PROCEDURE — 93306 TTE W/DOPPLER COMPLETE: CPT | Performed by: INTERNAL MEDICINE

## 2019-05-16 PROCEDURE — 76700 US EXAM ABDOM COMPLETE: CPT | Performed by: RADIOLOGY

## 2019-05-16 PROCEDURE — 76700 US EXAM ABDOM COMPLETE: CPT

## 2019-06-12 ENCOUNTER — OFFICE VISIT (OUTPATIENT)
Dept: PHARMACY | Facility: HOSPITAL | Age: 59
End: 2019-06-12

## 2019-06-12 VITALS
DIASTOLIC BLOOD PRESSURE: 62 MMHG | SYSTOLIC BLOOD PRESSURE: 117 MMHG | HEART RATE: 74 BPM | BODY MASS INDEX: 33.45 KG/M2 | WEIGHT: 220 LBS | OXYGEN SATURATION: 93 %

## 2019-06-12 DIAGNOSIS — Z87.19 HISTORY OF ESOPHAGEAL VARICES: ICD-10-CM

## 2019-06-12 DIAGNOSIS — B18.2 CHRONIC HEPATITIS C WITHOUT HEPATIC COMA (HCC): Primary | ICD-10-CM

## 2019-06-12 DIAGNOSIS — K74.60 CIRRHOSIS OF LIVER WITHOUT ASCITES, UNSPECIFIED HEPATIC CIRRHOSIS TYPE (HCC): ICD-10-CM

## 2019-06-12 DIAGNOSIS — F19.91 HISTORY OF DRUG USE: ICD-10-CM

## 2019-06-12 LAB
ALBUMIN SERPL-MCNC: 4 G/DL (ref 3.5–5.2)
ALBUMIN/GLOB SERPL: 1.3 G/DL
ALP SERPL-CCNC: 76 U/L (ref 39–117)
ALPHA-FETOPROTEIN: 1.83 NG/ML (ref 0–8.3)
ALT SERPL W P-5'-P-CCNC: 15 U/L (ref 1–33)
ANION GAP SERPL CALCULATED.3IONS-SCNC: 11.2 MMOL/L
AST SERPL-CCNC: 27 U/L (ref 1–32)
BILIRUB SERPL-MCNC: 0.8 MG/DL (ref 0.2–1.2)
BUN BLD-MCNC: 15 MG/DL (ref 6–20)
BUN/CREAT SERPL: 15 (ref 7–25)
CALCIUM SPEC-SCNC: 8.7 MG/DL (ref 8.6–10.5)
CERULOPLASMIN SERPL-MCNC: 24 MG/DL (ref 19–39)
CHLORIDE SERPL-SCNC: 106 MMOL/L (ref 98–107)
CO2 SERPL-SCNC: 24.8 MMOL/L (ref 22–29)
CREAT BLD-MCNC: 1 MG/DL (ref 0.57–1)
GFR SERPL CREATININE-BSD FRML MDRD: 57 ML/MIN/1.73
GLOBULIN UR ELPH-MCNC: 3.2 GM/DL
GLUCOSE BLD-MCNC: 86 MG/DL (ref 65–99)
HBV SURFACE AB SER RIA-ACNC: NORMAL
HIV1+2 AB SER QL: NORMAL
IGA1 MFR SER: 119 MG/DL (ref 70–400)
IGG1 SER-MCNC: 1498 MG/DL (ref 700–1600)
IGM SERPL-MCNC: 62 MG/DL (ref 40–230)
INR PPP: 1.23 (ref 0.9–1.1)
POTASSIUM BLD-SCNC: 4.2 MMOL/L (ref 3.5–5.2)
PROT SERPL-MCNC: 7.2 G/DL (ref 6–8.5)
PROTHROMBIN TIME: 16.2 SECONDS (ref 11–15.4)
SODIUM BLD-SCNC: 142 MMOL/L (ref 136–145)

## 2019-06-12 PROCEDURE — 82104 ALPHA-1-ANTITRYPSIN PHENO: CPT

## 2019-06-12 PROCEDURE — 80053 COMPREHEN METABOLIC PANEL: CPT

## 2019-06-12 PROCEDURE — 83516 IMMUNOASSAY NONANTIBODY: CPT

## 2019-06-12 PROCEDURE — 85610 PROTHROMBIN TIME: CPT

## 2019-06-12 PROCEDURE — 87522 HEPATITIS C REVRS TRNSCRPJ: CPT

## 2019-06-12 PROCEDURE — 82784 ASSAY IGA/IGD/IGG/IGM EACH: CPT

## 2019-06-12 PROCEDURE — 86708 HEPATITIS A ANTIBODY: CPT

## 2019-06-12 PROCEDURE — G0483 DRUG TEST DEF 22+ CLASSES: HCPCS

## 2019-06-12 PROCEDURE — 36415 COLL VENOUS BLD VENIPUNCTURE: CPT

## 2019-06-12 PROCEDURE — 82103 ALPHA-1-ANTITRYPSIN TOTAL: CPT

## 2019-06-12 PROCEDURE — 80307 DRUG TEST PRSMV CHEM ANLYZR: CPT

## 2019-06-12 PROCEDURE — 82105 ALPHA-FETOPROTEIN SERUM: CPT

## 2019-06-12 PROCEDURE — 87902 NFCT AGT GNTYP ALYS HEP C: CPT

## 2019-06-12 PROCEDURE — 86706 HEP B SURFACE ANTIBODY: CPT

## 2019-06-12 PROCEDURE — G0432 EIA HIV-1/HIV-2 SCREEN: HCPCS

## 2019-06-12 PROCEDURE — 82390 ASSAY OF CERULOPLASMIN: CPT

## 2019-06-12 PROCEDURE — 81596 NFCT DS CHRNC HCV 6 ASSAYS: CPT

## 2019-06-12 PROCEDURE — 99244 OFF/OP CNSLTJ NEW/EST MOD 40: CPT | Performed by: PHYSICIAN ASSISTANT

## 2019-06-12 RX ORDER — BUPRENORPHINE HYDROCHLORIDE AND NALOXONE HYDROCHLORIDE DIHYDRATE 2; .5 MG/1; MG/1
TABLET SUBLINGUAL DAILY
COMMUNITY
End: 2020-08-12

## 2019-06-12 RX ORDER — VITAMIN B COMPLEX
CAPSULE ORAL DAILY
COMMUNITY

## 2019-06-12 NOTE — PROGRESS NOTES
": 1960    Chief Complaint   Patient presents with   • Hepatitis C       Phuong Tuttle is a 58 y.o. female who presents to the office today as a consultation from Dr. Banerjee for evaluation of Hepatitis C.    History of Present Illness:  She has known about having Hep C infection for the past couple of years. She is now prescribed Suboxone from a physician in [place]. She has never been treated for Hep C in the past. She reports swelling in her abdomen and LEs. She notes there are swollen veins on her abdomen. She denies any past history of daily alcohol use and no current alcohol use. She previously used IV drugs but denies any current illicit drug use. No tattoos. No known family history of liver disease.    Her  was murdered years ago and had a \"nervous breakdown\" after. She was told in  that she did not have but a few months to live because of her severe liver condition. She lived [facility] and states that she spent all of her money and sold her home at that time. 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 She thinks that her liver disease is related to long term tylenol and pain medication use. She later started taking Motrin OTC instead. 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 In the past, she had UGI related to taking Motrin. She vomited blood at that time and has had several blood transfusions related to this. She states she only learned of cirrhosis for certain recently. Has been told that she has esophageal varices by Dr. LOC Olmos years ago and that was the time of her last scope and was told not to have it again because she would \"bleed to death on the table.\"    No decline in memory that she considers abnormal. No confusion.     2019 US abd:  1. Liver cirrhosis and portal hypertension with splenomegaly.  2. No ascites identified.  3. Otherwise unremarkable exam.     Review of Systems   Constitutional: Positive for activity " change, appetite change, chills, fatigue, fever and unexpected weight change.   HENT: Positive for sore throat and trouble swallowing.    Eyes: Negative.    Respiratory: Positive for cough, chest tightness, shortness of breath and wheezing.    Cardiovascular: Positive for leg swelling. Negative for chest pain.   Gastrointestinal: Positive for abdominal distention, abdominal pain, constipation, nausea, rectal pain and vomiting. Negative for anal bleeding, blood in stool and diarrhea.   Endocrine: Negative.    Genitourinary: Negative for difficulty urinating.   Musculoskeletal: Positive for back pain, joint swelling, neck pain and neck stiffness.   Skin: Positive for color change and rash.   Allergic/Immunologic: Positive for environmental allergies and food allergies.   Neurological: Positive for dizziness, tremors, seizures, syncope, speech difficulty, weakness and headaches.   Hematological: Bruises/bleeds easily.   Psychiatric/Behavioral: Positive for agitation, confusion and sleep disturbance. Negative for suicidal ideas. The patient is nervous/anxious.        Past Medical History:   Diagnosis Date   • Anemia    • Cirrhosis of liver (CMS/HCC)    • COPD (chronic obstructive pulmonary disease) (CMS/HCC)    • Esophageal varices with bleeding (CMS/HCC)    • GI bleed    • Headache    • Immunocompromised patient (CMS/HCC)    • Peptic ulceration        Past Surgical History:   Procedure Laterality Date   • UPPER GASTROINTESTINAL ENDOSCOPY         Family History   Problem Relation Age of Onset   • Hypertension Mother    • Pneumonia Father        Social History     Socioeconomic History   • Marital status:      Spouse name: Not on file   • Number of children: Not on file   • Years of education: Not on file   • Highest education level: Not on file   Occupational History   • Occupation: Disabled   Tobacco Use   • Smoking status: Current Some Day Smoker     Types: Cigarettes   • Smokeless tobacco: Never Used    Substance and Sexual Activity   • Alcohol use: No   • Drug use: Defer   • Sexual activity: Defer       Current Outpatient Medications:   •  B Complex Vitamins (VITAMIN B COMPLEX) capsule capsule, Take  by mouth Daily., Disp: , Rfl:   •  budesonide-formoterol (SYMBICORT) 160-4.5 MCG/ACT inhaler, Inhale 2 puffs 2 (Two) Times a Day., Disp: 1 inhaler, Rfl: 0  •  cyclobenzaprine (FLEXERIL) 10 MG tablet, Take 1 tablet by mouth 3 (Three) Times a Day As Needed for Muscle Spasms., Disp: 10 tablet, Rfl: 0  •  fluticasone (FLONASE) 50 MCG/ACT nasal spray, 2 sprays into each nostril Daily., Disp: 1 bottle, Rfl: 0  •  furosemide (LASIX) 20 MG tablet, Take 20 mg by mouth As Needed., Disp: , Rfl:   •  ipratropium-albuterol (COMBIVENT RESPIMAT)  MCG/ACT inhaler, Inhale 1 puff 4 (Four) Times a Day As Needed for Wheezing., Disp: 4 g, Rfl: 0  •  ondansetron ODT (ZOFRAN ODT) 8 MG disintegrating tablet, Take 1 tablet by mouth Every 8 (Eight) Hours As Needed for Nausea or Vomiting., Disp: 15 tablet, Rfl: 0  •  pantoprazole (PROTONIX) 40 MG EC tablet, Take 40 mg by mouth Daily., Disp: , Rfl:   •  vitamin D (ERGOCALCIFEROL) 92097 units capsule capsule, Take 50,000 Units by mouth 1 (One) Time Per Week., Disp: , Rfl:   •  Fluticasone Furoate-Vilanterol (BREO ELLIPTA) 100-25 MCG/INH aerosol powder , Inhale 1 puff Daily., Disp: 1 each, Rfl: 0    Allergies:   Ibuprofen; Tylenol [acetaminophen]; Zithromax [azithromycin]; Doxycycline; Haldol [haloperidol lactate]; and Penicillins    Vitals:  /62   Pulse 74   Wt 99.8 kg (220 lb)   SpO2 93%   BMI 33.45 kg/m²     Physical Exam   Constitutional: She is oriented to person, place, and time. She appears well-developed and well-nourished. No distress.   HENT:   Head: Normocephalic and atraumatic.   Nose: Nose normal.   Mouth/Throat: Oropharynx is clear and moist.   Eyes: Conjunctivae are normal. Right eye exhibits no discharge. Left eye exhibits no discharge. No scleral icterus.   Neck:  Normal range of motion. No JVD present.   Cardiovascular: Normal rate and regular rhythm. Exam reveals no gallop and no friction rub.   Murmur (systolic, best heard on left) heard.  Pulmonary/Chest: Effort normal and breath sounds normal. No respiratory distress. She has no wheezes. She has no rales. She exhibits no tenderness.   Abdominal: Soft. Bowel sounds are normal. She exhibits distension (mild). She exhibits no mass. There is no tenderness.   Caput medusa, mild   Musculoskeletal: Normal range of motion. She exhibits edema (mild bilateral with chronic skin changes (severely hyperpigmented)). She exhibits no deformity.   Neurological: She is alert and oriented to person, place, and time. Coordination normal.   Skin: Skin is warm and dry. She is not diaphoretic.   Dry, scaling skin on bilateral lower extremities   Psychiatric: Her behavior is normal. Judgment and thought content normal.   Flat affect   Vitals reviewed.      Assessment/Plan:  1. Chronic hepatitis C without hepatic coma (CMS/HCC)    2. Cirrhosis of liver without ascites, unspecified hepatic cirrhosis type (CMS/HCC)    3. History of drug use    4. History of esophageal varices      Orders Placed This Encounter   Procedures   • Alpha - 1 - Antitrypsin Phenotype   • Anti-Smooth Muscle Antibody Titer   • Ceruloplasmin   • HCV RNA By PCR, Qn Rfx Ailyn   • Hepatitis A Antibody, Total   • Hepatitis B Surface Antibody   • IgG, IgA, IgM   • Mitochondrial Antibodies, M2   • Soluble Liver Ag (IgG Ab)   • Tissue Transglutaminase, IgA   • AFP Tumor Marker   • Comprehensive Metabolic Panel   • HCV FibroSURE   • HIV-1 / O / 2 Ag / Antibody 4th Generation   • Compliance Drug Analysis, Ur - Urine, Clean Catch   • Protime-INR     She would like to be considered for Hepatitis C treatment but she appears to have advanced cirrhosis and likely end stage liver disease with secondary esophageal varices, caput medusa, peripheral edema, etc. She does not seem to have  abdominal distension with ascites and denies any memory loss concerning for hepatic encephalopathy. She will have labs as directed today. We will be looking for liver diseases that could have lead to her cirrhosis including autoimmune or genetic conditions. She will have labs which will be used to calculate her Child Ellis score and MELD score. Depending on those results, she may be considered for direct acting antiviral treatment for her chronic hepatitis C infection. Recent US abdomen on file.    She will need repeat EGD with possible banding, she is currently declining this test.         Follow up will be dependent on lab results. She will definitely need long term monitoring of her cirrhosis.      Electronically signed 6/14/2019 2:10 PM  Leonila Greene PA-C, [facility]

## 2019-06-13 LAB
ACTIN IGG SERPL-ACNC: 9 UNITS (ref 0–19)
DEPRECATED MITOCHONDRIA M2 IGG SER-ACNC: <20 UNITS (ref 0–20)
HAV AB SER QL IA: NEGATIVE
TTG IGA SER-ACNC: <2 U/ML (ref 0–3)

## 2019-06-14 LAB
A2 MACROGLOB SERPL-MCNC: 223 MG/DL (ref 110–276)
ALT SERPL W P-5'-P-CCNC: 19 IU/L (ref 0–40)
APO A-I SERPL-MCNC: 119 MG/DL (ref 116–209)
BILIRUB SERPL-MCNC: 0.7 MG/DL (ref 0–1.2)
FIBROSIS SCORING:: ABNORMAL
FIBROSIS STAGE SERPL QL: ABNORMAL
GGT SERPL-CCNC: 22 IU/L (ref 0–60)
HAPTOGLOB SERPL-MCNC: 47 MG/DL (ref 34–200)
HCV AB SER QL: ABNORMAL
LABORATORY COMMENT REPORT: ABNORMAL
LIMITATIONS:: ABNORMAL
LIVER FIBR SCORE SERPL CALC.FIBROSURE: 0.47 (ref 0–0.21)
NECROINFLAMM ACTIVITY SCORING:: ABNORMAL
NECROINFLAMMATORY ACT GRADE SERPL QL: ABNORMAL
NECROINFLAMMATORY ACT SCORE SERPL: 0.09 (ref 0–0.17)

## 2019-06-17 LAB
A1AT SERPL-MCNC: 157 MG/DL (ref 90–200)
HCV GENTYP SERPL NAA+PROBE: NORMAL
HCV GENTYP SERPL NAA+PROBE: NORMAL
HCV RNA SERPL NAA+PROBE-ACNC: NORMAL IU/ML
HCV RNA SERPL NAA+PROBE-LOG IU: 5.63 LOG10 IU/ML
Lab: NORMAL
PHENOTYPE: NORMAL
REF LAB TEST REF RANGE: NORMAL

## 2019-06-18 ENCOUNTER — TELEPHONE (OUTPATIENT)
Dept: GASTROENTEROLOGY | Facility: CLINIC | Age: 59
End: 2019-06-18

## 2019-06-18 LAB
CONV REPORT SUMMARY: NORMAL
SOLUBLE LIVER IGG SER IA-ACNC: 1.7 UNITS (ref 0–20)

## 2019-07-25 ENCOUNTER — TRANSCRIBE ORDERS (OUTPATIENT)
Dept: INFUSION THERAPY | Facility: HOSPITAL | Age: 59
End: 2019-07-25

## 2019-07-25 DIAGNOSIS — D50.9 IRON DEFICIENCY ANEMIA, UNSPECIFIED IRON DEFICIENCY ANEMIA TYPE: Primary | ICD-10-CM

## 2019-07-25 DIAGNOSIS — K90.9 INTESTINAL MALABSORPTION, UNSPECIFIED TYPE: ICD-10-CM

## 2019-08-02 PROBLEM — D50.9 IRON DEFICIENCY ANEMIA, UNSPECIFIED: Status: ACTIVE | Noted: 2019-08-02

## 2020-01-12 ENCOUNTER — HOSPITAL ENCOUNTER (EMERGENCY)
Facility: HOSPITAL | Age: 60
Discharge: LEFT BEFORE DISCHARGE | End: 2020-01-12

## 2020-01-12 VITALS
TEMPERATURE: 97.6 F | WEIGHT: 220 LBS | OXYGEN SATURATION: 99 % | SYSTOLIC BLOOD PRESSURE: 131 MMHG | HEART RATE: 71 BPM | BODY MASS INDEX: 33.34 KG/M2 | DIASTOLIC BLOOD PRESSURE: 75 MMHG | HEIGHT: 68 IN | RESPIRATION RATE: 16 BRPM

## 2020-01-12 NOTE — ED NOTES
Called the patient to a room x 3 with no answer. The patient is unable to be found in ED lobby, outside ED doors or in bathroom. Patient appears to have left without being seen and without notifying staff.      Jessica Dunn RN  01/12/20 [id number]

## 2020-01-12 NOTE — ED NOTES
Apologized to the patient for the wait. Ensured that we would get into a room as soon as possible. Encouraged to notify the staff with any worsening or changing symptoms. VS stable. NADN. Patient was directed to ED lobby to await available room.      Jessica Dunn RN  01/12/20 [id number]

## 2020-04-26 ENCOUNTER — APPOINTMENT (OUTPATIENT)
Dept: CT IMAGING | Facility: HOSPITAL | Age: 60
End: 2020-04-26

## 2020-04-26 ENCOUNTER — APPOINTMENT (OUTPATIENT)
Dept: GENERAL RADIOLOGY | Facility: HOSPITAL | Age: 60
End: 2020-04-26

## 2020-04-26 ENCOUNTER — HOSPITAL ENCOUNTER (OUTPATIENT)
Facility: HOSPITAL | Age: 60
Setting detail: OBSERVATION
Discharge: LEFT BEFORE DISCHARGE | End: 2020-04-27
Attending: EMERGENCY MEDICINE | Admitting: INTERNAL MEDICINE

## 2020-04-26 ENCOUNTER — APPOINTMENT (OUTPATIENT)
Dept: ULTRASOUND IMAGING | Facility: HOSPITAL | Age: 60
End: 2020-04-26

## 2020-04-26 DIAGNOSIS — R07.9 CHEST PAIN, UNSPECIFIED TYPE: Primary | ICD-10-CM

## 2020-04-26 LAB
ALBUMIN SERPL-MCNC: 3.78 G/DL (ref 3.5–5.2)
ALBUMIN/GLOB SERPL: 1.3 G/DL
ALP SERPL-CCNC: 89 U/L (ref 39–117)
ALT SERPL W P-5'-P-CCNC: 16 U/L (ref 1–33)
ANION GAP SERPL CALCULATED.3IONS-SCNC: 9.4 MMOL/L (ref 5–15)
ANISOCYTOSIS BLD QL: ABNORMAL
AST SERPL-CCNC: 25 U/L (ref 1–32)
BILIRUB SERPL-MCNC: 0.8 MG/DL (ref 0.2–1.2)
BUN BLD-MCNC: 13 MG/DL (ref 6–20)
BUN/CREAT SERPL: 11.5 (ref 7–25)
CALCIUM SPEC-SCNC: 8.7 MG/DL (ref 8.6–10.5)
CHLORIDE SERPL-SCNC: 107 MMOL/L (ref 98–107)
CO2 SERPL-SCNC: 25.6 MMOL/L (ref 22–29)
CREAT BLD-MCNC: 1.13 MG/DL (ref 0.57–1)
D DIMER PPP FEU-MCNC: 3.85 MCGFEU/ML (ref 0–0.5)
DEPRECATED RDW RBC AUTO: 51.7 FL (ref 37–54)
ERYTHROCYTE [DISTWIDTH] IN BLOOD BY AUTOMATED COUNT: 15.1 % (ref 12.3–15.4)
GFR SERPL CREATININE-BSD FRML MDRD: 50 ML/MIN/1.73
GLOBULIN UR ELPH-MCNC: 2.9 GM/DL
GLUCOSE BLD-MCNC: 95 MG/DL (ref 65–99)
HCT VFR BLD AUTO: 33.6 % (ref 34–46.6)
HGB BLD-MCNC: 10.4 G/DL (ref 12–15.9)
HOLD SPECIMEN: NORMAL
HOLD SPECIMEN: NORMAL
HYPOCHROMIA BLD QL: ABNORMAL
LYMPHOCYTES # BLD MANUAL: 0.51 10*3/MM3 (ref 0.7–3.1)
LYMPHOCYTES NFR BLD MANUAL: 30 % (ref 19.6–45.3)
LYMPHOCYTES NFR BLD MANUAL: 8 % (ref 5–12)
MCH RBC QN AUTO: 28.6 PG (ref 26.6–33)
MCHC RBC AUTO-ENTMCNC: 31 G/DL (ref 31.5–35.7)
MCV RBC AUTO: 92.3 FL (ref 79–97)
MONOCYTES # BLD AUTO: 0.14 10*3/MM3 (ref 0.1–0.9)
NEUTROPHILS # BLD AUTO: 1.05 10*3/MM3 (ref 1.7–7)
NEUTROPHILS NFR BLD MANUAL: 62 % (ref 42.7–76)
PLAT MORPH BLD: NORMAL
PLATELET # BLD AUTO: 53 10*3/MM3 (ref 140–450)
PMV BLD AUTO: 10.4 FL (ref 6–12)
POTASSIUM BLD-SCNC: 3.7 MMOL/L (ref 3.5–5.2)
PROT SERPL-MCNC: 6.7 G/DL (ref 6–8.5)
RBC # BLD AUTO: 3.64 10*6/MM3 (ref 3.77–5.28)
SODIUM BLD-SCNC: 142 MMOL/L (ref 136–145)
TROPONIN T SERPL-MCNC: <0.01 NG/ML (ref 0–0.03)
WBC NRBC COR # BLD: 1.7 10*3/MM3 (ref 3.4–10.8)
WHOLE BLOOD HOLD SPECIMEN: NORMAL

## 2020-04-26 PROCEDURE — 71045 X-RAY EXAM CHEST 1 VIEW: CPT

## 2020-04-26 PROCEDURE — 93005 ELECTROCARDIOGRAM TRACING: CPT | Performed by: PHYSICIAN ASSISTANT

## 2020-04-26 PROCEDURE — 25010000002 MORPHINE PER 10 MG: Performed by: EMERGENCY MEDICINE

## 2020-04-26 PROCEDURE — 93010 ELECTROCARDIOGRAM REPORT: CPT | Performed by: SPECIALIST

## 2020-04-26 PROCEDURE — 36415 COLL VENOUS BLD VENIPUNCTURE: CPT

## 2020-04-26 PROCEDURE — 85007 BL SMEAR W/DIFF WBC COUNT: CPT | Performed by: PHYSICIAN ASSISTANT

## 2020-04-26 PROCEDURE — 71250 CT THORAX DX C-: CPT

## 2020-04-26 PROCEDURE — 84484 ASSAY OF TROPONIN QUANT: CPT | Performed by: PHYSICIAN ASSISTANT

## 2020-04-26 PROCEDURE — 99284 EMERGENCY DEPT VISIT MOD MDM: CPT

## 2020-04-26 PROCEDURE — 85379 FIBRIN DEGRADATION QUANT: CPT | Performed by: PHYSICIAN ASSISTANT

## 2020-04-26 PROCEDURE — 80053 COMPREHEN METABOLIC PANEL: CPT | Performed by: PHYSICIAN ASSISTANT

## 2020-04-26 PROCEDURE — 96374 THER/PROPH/DIAG INJ IV PUSH: CPT

## 2020-04-26 PROCEDURE — 82746 ASSAY OF FOLIC ACID SERUM: CPT | Performed by: PHYSICIAN ASSISTANT

## 2020-04-26 PROCEDURE — 85025 COMPLETE CBC W/AUTO DIFF WBC: CPT | Performed by: PHYSICIAN ASSISTANT

## 2020-04-26 PROCEDURE — 82607 VITAMIN B-12: CPT | Performed by: PHYSICIAN ASSISTANT

## 2020-04-26 PROCEDURE — 93970 EXTREMITY STUDY: CPT

## 2020-04-26 RX ORDER — SODIUM CHLORIDE 0.9 % (FLUSH) 0.9 %
10 SYRINGE (ML) INJECTION AS NEEDED
Status: DISCONTINUED | OUTPATIENT
Start: 2020-04-26 | End: 2020-04-26

## 2020-04-26 RX ORDER — SODIUM CHLORIDE 0.9 % (FLUSH) 0.9 %
10 SYRINGE (ML) INJECTION AS NEEDED
Status: DISCONTINUED | OUTPATIENT
Start: 2020-04-26 | End: 2020-04-27 | Stop reason: HOSPADM

## 2020-04-26 RX ORDER — MORPHINE SULFATE 2 MG/ML
2 INJECTION, SOLUTION INTRAMUSCULAR; INTRAVENOUS ONCE
Status: COMPLETED | OUTPATIENT
Start: 2020-04-26 | End: 2020-04-26

## 2020-04-26 RX ADMIN — MORPHINE SULFATE 2 MG: 2 INJECTION, SOLUTION INTRAMUSCULAR; INTRAVENOUS at 22:59

## 2020-04-27 VITALS
DIASTOLIC BLOOD PRESSURE: 60 MMHG | BODY MASS INDEX: 34.16 KG/M2 | WEIGHT: 225.4 LBS | HEART RATE: 71 BPM | TEMPERATURE: 98.2 F | HEIGHT: 68 IN | OXYGEN SATURATION: 97 % | SYSTOLIC BLOOD PRESSURE: 101 MMHG | RESPIRATION RATE: 18 BRPM

## 2020-04-27 PROBLEM — I85.00 ESOPHAGEAL VARICES (HCC): Chronic | Status: ACTIVE | Noted: 2020-04-27

## 2020-04-27 PROBLEM — D72.819 LEUKOPENIA: Chronic | Status: ACTIVE | Noted: 2020-04-27

## 2020-04-27 PROBLEM — F17.200 CURRENT SMOKER: Chronic | Status: ACTIVE | Noted: 2017-11-01

## 2020-04-27 PROBLEM — F19.11 HISTORY OF SUBSTANCE ABUSE (HCC): Chronic | Status: ACTIVE | Noted: 2020-04-27

## 2020-04-27 PROBLEM — R07.9 CHEST PAIN: Status: ACTIVE | Noted: 2020-04-27

## 2020-04-27 PROBLEM — E66.9 OBESITY (BMI 30-39.9): Chronic | Status: ACTIVE | Noted: 2020-04-27

## 2020-04-27 PROBLEM — B19.20 HEPATITIS C: Chronic | Status: ACTIVE | Noted: 2020-04-27

## 2020-04-27 PROBLEM — K76.6 PORTAL HYPERTENSION (HCC): Chronic | Status: ACTIVE | Noted: 2020-04-27

## 2020-04-27 PROBLEM — N18.30 CHRONIC KIDNEY DISEASE (CKD), STAGE III (MODERATE) (HCC): Chronic | Status: ACTIVE | Noted: 2020-04-27

## 2020-04-27 PROBLEM — J44.9 COPD (CHRONIC OBSTRUCTIVE PULMONARY DISEASE): Chronic | Status: ACTIVE | Noted: 2017-11-01

## 2020-04-27 PROBLEM — K72.10 END STAGE LIVER DISEASE (HCC): Chronic | Status: ACTIVE | Noted: 2020-04-27

## 2020-04-27 PROBLEM — R16.1 SPLENOMEGALY: Chronic | Status: ACTIVE | Noted: 2020-04-27

## 2020-04-27 LAB
ALBUMIN SERPL-MCNC: 3.55 G/DL (ref 3.5–5.2)
ALBUMIN/GLOB SERPL: 1.1 G/DL
ALP SERPL-CCNC: 77 U/L (ref 39–117)
ALT SERPL W P-5'-P-CCNC: 16 U/L (ref 1–33)
AMPHET+METHAMPHET UR QL: NEGATIVE
ANION GAP SERPL CALCULATED.3IONS-SCNC: 12.5 MMOL/L (ref 5–15)
ANISOCYTOSIS BLD QL: ABNORMAL
AST SERPL-CCNC: 28 U/L (ref 1–32)
BACTERIA UR QL AUTO: NORMAL /HPF
BARBITURATES UR QL SCN: NEGATIVE
BENZODIAZ UR QL SCN: POSITIVE
BILIRUB SERPL-MCNC: 0.9 MG/DL (ref 0.2–1.2)
BILIRUB UR QL STRIP: NEGATIVE
BUN BLD-MCNC: 12 MG/DL (ref 6–20)
BUN/CREAT SERPL: 12.8 (ref 7–25)
CALCIUM SPEC-SCNC: 8.6 MG/DL (ref 8.6–10.5)
CANNABINOIDS SERPL QL: NEGATIVE
CHLORIDE SERPL-SCNC: 108 MMOL/L (ref 98–107)
CHOLEST SERPL-MCNC: 114 MG/DL (ref 0–200)
CLARITY UR: CLEAR
CO2 SERPL-SCNC: 20.5 MMOL/L (ref 22–29)
COCAINE UR QL: NEGATIVE
COLOR UR: YELLOW
CREAT BLD-MCNC: 0.94 MG/DL (ref 0.57–1)
DEPRECATED RDW RBC AUTO: 54.4 FL (ref 37–54)
EOSINOPHIL # BLD MANUAL: 0.06 10*3/MM3 (ref 0–0.4)
EOSINOPHIL NFR BLD MANUAL: 4 % (ref 0.3–6.2)
ERYTHROCYTE [DISTWIDTH] IN BLOOD BY AUTOMATED COUNT: 15.5 % (ref 12.3–15.4)
FERRITIN SERPL-MCNC: 45.94 NG/ML (ref 13–150)
FOLATE SERPL-MCNC: 12.5 NG/ML (ref 4.78–24.2)
GFR SERPL CREATININE-BSD FRML MDRD: 61 ML/MIN/1.73
GLOBULIN UR ELPH-MCNC: 3.4 GM/DL
GLUCOSE BLD-MCNC: 78 MG/DL (ref 65–99)
GLUCOSE BLDC GLUCOMTR-MCNC: 100 MG/DL (ref 70–130)
GLUCOSE UR STRIP-MCNC: NEGATIVE MG/DL
HCT VFR BLD AUTO: 36.2 % (ref 34–46.6)
HDLC SERPL-MCNC: 57 MG/DL (ref 40–60)
HGB BLD-MCNC: 10.9 G/DL (ref 12–15.9)
HGB UR QL STRIP.AUTO: NEGATIVE
HYALINE CASTS UR QL AUTO: NORMAL /LPF
HYPOCHROMIA BLD QL: ABNORMAL
INR PPP: 1.25 (ref 0.9–1.1)
IRON 24H UR-MRATE: 28 MCG/DL (ref 37–145)
IRON 24H UR-MRATE: 32 MCG/DL (ref 37–145)
IRON SATN MFR SERPL: 8 % (ref 20–50)
KETONES UR QL STRIP: NEGATIVE
LDLC SERPL CALC-MCNC: 45 MG/DL (ref 0–100)
LDLC/HDLC SERPL: 0.8 {RATIO}
LEUKOCYTE ESTERASE UR QL STRIP.AUTO: ABNORMAL
LYMPHOCYTES # BLD MANUAL: 0.46 10*3/MM3 (ref 0.7–3.1)
LYMPHOCYTES NFR BLD MANUAL: 32 % (ref 19.6–45.3)
LYMPHOCYTES NFR BLD MANUAL: 6 % (ref 5–12)
MAGNESIUM SERPL-MCNC: 2 MG/DL (ref 1.6–2.6)
MCH RBC QN AUTO: 28.5 PG (ref 26.6–33)
MCHC RBC AUTO-ENTMCNC: 30.1 G/DL (ref 31.5–35.7)
MCV RBC AUTO: 94.8 FL (ref 79–97)
METHADONE UR QL SCN: NEGATIVE
MONOCYTES # BLD AUTO: 0.09 10*3/MM3 (ref 0.1–0.9)
NEUTROPHILS # BLD AUTO: 0.84 10*3/MM3 (ref 1.7–7)
NEUTROPHILS NFR BLD MANUAL: 56 % (ref 42.7–76)
NEUTS BAND NFR BLD MANUAL: 2 % (ref 0–5)
NITRITE UR QL STRIP: NEGATIVE
OPIATES UR QL: POSITIVE
OXYCODONE UR QL SCN: NEGATIVE
PH UR STRIP.AUTO: 7 [PH] (ref 5–8)
PHOSPHATE SERPL-MCNC: 4.3 MG/DL (ref 2.5–4.5)
PLAT MORPH BLD: NORMAL
PLATELET # BLD AUTO: 45 10*3/MM3 (ref 140–450)
PMV BLD AUTO: 11 FL (ref 6–12)
POTASSIUM BLD-SCNC: 4.2 MMOL/L (ref 3.5–5.2)
PROT SERPL-MCNC: 6.9 G/DL (ref 6–8.5)
PROT UR QL STRIP: NEGATIVE
PROTHROMBIN TIME: 16.4 SECONDS (ref 11–15.4)
RBC # BLD AUTO: 3.82 10*6/MM3 (ref 3.77–5.28)
RBC # UR: NORMAL /HPF
REF LAB TEST METHOD: NORMAL
SODIUM BLD-SCNC: 141 MMOL/L (ref 136–145)
SP GR UR STRIP: 1.01 (ref 1–1.03)
SQUAMOUS #/AREA URNS HPF: NORMAL /HPF
TIBC SERPL-MCNC: 337 MCG/DL (ref 298–536)
TRANSFERRIN SERPL-MCNC: 226 MG/DL (ref 200–360)
TRIGL SERPL-MCNC: 58 MG/DL (ref 0–150)
TROPONIN T SERPL-MCNC: <0.01 NG/ML (ref 0–0.03)
UROBILINOGEN UR QL STRIP: ABNORMAL
VIT B12 BLD-MCNC: 571 PG/ML (ref 211–946)
VLDLC SERPL-MCNC: 11.6 MG/DL
WBC NRBC COR # BLD: 1.44 10*3/MM3 (ref 3.4–10.8)
WBC UR QL AUTO: NORMAL /HPF

## 2020-04-27 PROCEDURE — 84100 ASSAY OF PHOSPHORUS: CPT | Performed by: PHYSICIAN ASSISTANT

## 2020-04-27 PROCEDURE — 80307 DRUG TEST PRSMV CHEM ANLYZR: CPT | Performed by: EMERGENCY MEDICINE

## 2020-04-27 PROCEDURE — G0378 HOSPITAL OBSERVATION PER HR: HCPCS

## 2020-04-27 PROCEDURE — 81001 URINALYSIS AUTO W/SCOPE: CPT | Performed by: PHYSICIAN ASSISTANT

## 2020-04-27 PROCEDURE — 93005 ELECTROCARDIOGRAM TRACING: CPT | Performed by: INTERNAL MEDICINE

## 2020-04-27 PROCEDURE — 25010000002 MORPHINE PER 10 MG: Performed by: PHYSICIAN ASSISTANT

## 2020-04-27 PROCEDURE — 85610 PROTHROMBIN TIME: CPT | Performed by: PHYSICIAN ASSISTANT

## 2020-04-27 PROCEDURE — 83735 ASSAY OF MAGNESIUM: CPT | Performed by: PHYSICIAN ASSISTANT

## 2020-04-27 PROCEDURE — 80053 COMPREHEN METABOLIC PANEL: CPT | Performed by: INTERNAL MEDICINE

## 2020-04-27 PROCEDURE — 85025 COMPLETE CBC W/AUTO DIFF WBC: CPT | Performed by: INTERNAL MEDICINE

## 2020-04-27 PROCEDURE — 82962 GLUCOSE BLOOD TEST: CPT

## 2020-04-27 PROCEDURE — 93010 ELECTROCARDIOGRAM REPORT: CPT | Performed by: SPECIALIST

## 2020-04-27 PROCEDURE — 85007 BL SMEAR W/DIFF WBC COUNT: CPT | Performed by: INTERNAL MEDICINE

## 2020-04-27 PROCEDURE — 83540 ASSAY OF IRON: CPT | Performed by: PHYSICIAN ASSISTANT

## 2020-04-27 PROCEDURE — 82728 ASSAY OF FERRITIN: CPT | Performed by: PHYSICIAN ASSISTANT

## 2020-04-27 PROCEDURE — 80061 LIPID PANEL: CPT | Performed by: PHYSICIAN ASSISTANT

## 2020-04-27 PROCEDURE — 84484 ASSAY OF TROPONIN QUANT: CPT | Performed by: PHYSICIAN ASSISTANT

## 2020-04-27 PROCEDURE — 84466 ASSAY OF TRANSFERRIN: CPT | Performed by: PHYSICIAN ASSISTANT

## 2020-04-27 PROCEDURE — 99220 PR INITIAL OBSERVATION CARE/DAY 70 MINUTES: CPT | Performed by: PHYSICIAN ASSISTANT

## 2020-04-27 PROCEDURE — 96376 TX/PRO/DX INJ SAME DRUG ADON: CPT

## 2020-04-27 PROCEDURE — 96375 TX/PRO/DX INJ NEW DRUG ADDON: CPT

## 2020-04-27 RX ORDER — NICOTINE 21 MG/24HR
1 PATCH, TRANSDERMAL 24 HOURS TRANSDERMAL
Status: DISCONTINUED | OUTPATIENT
Start: 2020-04-27 | End: 2020-04-27 | Stop reason: HOSPADM

## 2020-04-27 RX ORDER — SODIUM CHLORIDE 0.9 % (FLUSH) 0.9 %
10 SYRINGE (ML) INJECTION EVERY 12 HOURS SCHEDULED
Status: DISCONTINUED | OUTPATIENT
Start: 2020-04-27 | End: 2020-04-27 | Stop reason: HOSPADM

## 2020-04-27 RX ORDER — SODIUM CHLORIDE 0.9 % (FLUSH) 0.9 %
10 SYRINGE (ML) INJECTION AS NEEDED
Status: DISCONTINUED | OUTPATIENT
Start: 2020-04-27 | End: 2020-04-27 | Stop reason: HOSPADM

## 2020-04-27 RX ORDER — MORPHINE SULFATE 2 MG/ML
2 INJECTION, SOLUTION INTRAMUSCULAR; INTRAVENOUS ONCE
Status: DISCONTINUED | OUTPATIENT
Start: 2020-04-27 | End: 2020-04-27

## 2020-04-27 RX ORDER — PANTOPRAZOLE SODIUM 40 MG/10ML
40 INJECTION, POWDER, LYOPHILIZED, FOR SOLUTION INTRAVENOUS EVERY 12 HOURS SCHEDULED
Status: DISCONTINUED | OUTPATIENT
Start: 2020-04-27 | End: 2020-04-27 | Stop reason: HOSPADM

## 2020-04-27 RX ORDER — NITROGLYCERIN 0.4 MG/1
0.4 TABLET SUBLINGUAL
Status: DISCONTINUED | OUTPATIENT
Start: 2020-04-27 | End: 2020-04-27 | Stop reason: HOSPADM

## 2020-04-27 RX ORDER — MORPHINE SULFATE 2 MG/ML
2 INJECTION, SOLUTION INTRAMUSCULAR; INTRAVENOUS ONCE
Status: COMPLETED | OUTPATIENT
Start: 2020-04-27 | End: 2020-04-27

## 2020-04-27 RX ADMIN — PANTOPRAZOLE SODIUM 40 MG: 40 INJECTION, POWDER, FOR SOLUTION INTRAVENOUS at 08:43

## 2020-04-27 RX ADMIN — MORPHINE SULFATE 2 MG: 2 INJECTION, SOLUTION INTRAMUSCULAR; INTRAVENOUS at 05:15

## 2020-04-27 RX ADMIN — SODIUM CHLORIDE, PRESERVATIVE FREE 10 ML: 5 INJECTION INTRAVENOUS at 08:44

## 2020-04-27 NOTE — ED NOTES
Provider at bedside explaining to pt why we need to be allowed to try and gain IV access     Wilda Phillip, RN  04/26/20 [id number]

## 2020-04-27 NOTE — ED NOTES
At bedside at this time. Attempted to gain IV access and get blood x3 times with no success       Wilda Phillip RN  04/27/20 0003

## 2020-04-27 NOTE — ED NOTES
Pt leaving the floor at this time via wheelchair with ED tech. Pt is updated on plan for admission. Pt respirations even and unlabored, HERMELINDO CARVER. Pt belongings being transferred with pt      Wilda Phillip RN  04/27/20 [id number]

## 2020-04-27 NOTE — ED NOTES
Lead RN attempted to stick pt at this time for IV and blood. Pt cussed at lead RN stating that she's already been stuck 4 times and it's ridiculous. Provider notified          Wilda Phillip RN  04/26/20 [id number]

## 2020-04-27 NOTE — NURSING NOTE
"Entered room to perform assessment and to pass medications. Pt complains of chest pain rating it a 10 out of 10. Said she couldn't describe it or tell if it radiates anywhere. Stated, \"I haven't seen a doctor in 2 days, I came here to get pain meds.\" Ordered the protocol, troponin and a EKG. Pt refused for her labs to be drawn. When lab came in to get her labs, pt then pulled her telemetry monitor off and threw it. Pt said she was leaving.  Attempted to get her to sign the AMA form, she refused. Pt stated, \"I am not fucking staying here. Fuck this place. Who is the Doctor so I know who fucking killed me.\" Educated pt on the risks of leaving and the benefits of staying. Pt still stated she was leaving. Attempted multiple times to get pt to sign AMA form, continued to refused. MD notified and aware.   "

## 2020-04-27 NOTE — ED PROVIDER NOTES
Subjective   Patient is a 59-year-old female with COPD, cirrhosis of the liver that presents the ED with complaints of substernal and left-sided chest pain.  She states this started early Sunday morning.  She describes it as a sharp aching pain.  She states it radiates to the left shoulder and arm.  She states when she raises her arm it does make the chest pain worse.  She states when the chest pain is present she does have associated shortness of breath.  When the chest pain eases off shortness of breath subsides.  She denies associated nausea, vomiting, fever, dizziness, diaphoresis, headache, abdominal pain, or dysuria.      History provided by:  Patient   used: No    Chest Pain   Pain location:  Substernal area  Pain quality: aching    Pain radiates to:  L arm  Pain severity:  Moderate  Onset quality:  Sudden  Duration:  1 day  Timing:  Constant  Progression:  Worsening  Chronicity:  New  Context: lifting and at rest    Relieved by:  Nothing  Worsened by:  Nothing  Ineffective treatments:  None tried  Associated symptoms: anxiety    Associated symptoms: no abdominal pain, no back pain, no cough, no diaphoresis, no dizziness, no dysphagia, no fever, no headache, no nausea, no numbness, no shortness of breath, no vomiting and no weakness        Review of Systems   Constitutional: Negative.  Negative for diaphoresis and fever.   HENT: Negative.  Negative for congestion, drooling, ear discharge, ear pain, facial swelling, rhinorrhea, sinus pressure, sinus pain, sneezing, sore throat, tinnitus and trouble swallowing.    Eyes: Negative.  Negative for pain, discharge, redness and itching.   Respiratory: Negative.  Negative for cough, shortness of breath and wheezing.    Cardiovascular: Positive for chest pain.   Gastrointestinal: Negative.  Negative for abdominal pain, diarrhea, nausea and vomiting.   Endocrine: Negative.    Genitourinary: Negative.  Negative for dysuria, flank pain, frequency and  urgency.   Musculoskeletal: Negative.  Negative for back pain.   Skin: Negative.    Neurological: Negative.  Negative for dizziness, syncope, weakness, numbness and headaches.   Psychiatric/Behavioral: Negative.    All other systems reviewed and are negative.      Past Medical History:   Diagnosis Date   • Anemia    • Cirrhosis of liver (CMS/HCC)    • COPD (chronic obstructive pulmonary disease) (CMS/HCC)    • Esophageal varices with bleeding (CMS/HCC)    • GI bleed    • Headache    • Immunocompromised patient (CMS/HCC)    • Peptic ulceration        Allergies   Allergen Reactions   • Ibuprofen Anaphylaxis     Pt reports causes bleeding   • Tylenol [Acetaminophen] GI Bleeding   • Zithromax [Azithromycin] Nausea And Vomiting   • Doxycycline Rash   • Haldol [Haloperidol Lactate] Rash   • Penicillins Rash       Past Surgical History:   Procedure Laterality Date   • UPPER GASTROINTESTINAL ENDOSCOPY         Family History   Problem Relation Age of Onset   • Hypertension Mother    • Pneumonia Father        Social History     Socioeconomic History   • Marital status:      Spouse name: Not on file   • Number of children: Not on file   • Years of education: Not on file   • Highest education level: Not on file   Occupational History   • Occupation: Disabled   Tobacco Use   • Smoking status: Current Some Day Smoker     Types: Cigarettes   • Smokeless tobacco: Never Used   Substance and Sexual Activity   • Alcohol use: No   • Drug use: Defer   • Sexual activity: Defer           Objective   Physical Exam   Constitutional: She is oriented to person, place, and time. She appears well-developed and well-nourished. No distress.   HENT:   Head: Normocephalic and atraumatic.   Right Ear: External ear normal.   Left Ear: External ear normal.   Nose: Nose normal.   Eyes: Pupils are equal, round, and reactive to light. Conjunctivae and EOM are normal.   Neck: Normal range of motion. Neck supple. No JVD present. No tracheal  deviation present.   Cardiovascular: Normal rate, regular rhythm and normal heart sounds.   No murmur heard.  Pulmonary/Chest: Effort normal and breath sounds normal. No respiratory distress. She has no wheezes.   Abdominal: Soft. Bowel sounds are normal. There is no tenderness.   Musculoskeletal: Normal range of motion. She exhibits no edema or deformity.   Neurological: She is alert and oriented to person, place, and time. No cranial nerve deficit.   Skin: Skin is warm and dry. No rash noted. She is not diaphoretic. No erythema. No pallor.   Psychiatric: She has a normal mood and affect. Her behavior is normal. Thought content normal.   Nursing note and vitals reviewed.      Procedures           ED Course  ED Course as of Apr 27 0207   Sun Apr 26, 2020   2230 IMPRESSION:  No active disease is seen in the chest.     Signer Name: Vicky Zuleta MD   Signed: 4/26/2020 10:25 PM   XR Chest 1 View [[facility]]   Mon Apr 27, 2020   0007 FINDINGS:  The lungs are expanded and clear. No diffuse pulmonary edema and no focal or multifocal pulmonary infiltrate. No pleural effusion. Trachea and central bronchi are normal in caliber and widely patent.     Heart size is normal. Normal caliber thoracic aorta. No pericardial effusion. No mass or adenopathy within the chest.     Limited upper abdominal images show end-stage cirrhosis with profound portal venous hypertension, including large distal esophageal varices, a large recanalized periumbilical vein, marked splenomegaly and near aneurysmal dilatation of the portal veins  and splenic vein. Mural calcification within left portal and splenic veins may be related to old recanalized thrombus. No upper abdominal ascites.     IMPRESSION:  1.  No active disease in the chest. The lungs are clear.  2.  End-stage hepatic cirrhosis with multiple sequela of severe portal venous hypertension as detailed above.     Signer Name: Adam Espinosa MD   Signed: 4/26/2020 11:50 PM   CT Chest Without  Contrast []   0026 IMPRESSION:  Negative examination.  No evidence of lower extremity DVT on the right or left.     Signer Name: Adam Espinosa MD   Signed: 4/27/2020 12:16 AM   US Venous Doppler Lower Extremity Bilateral (duplex) []   0057 DR. Ceirra noriega.     []   0121 Spoke with Dr. Norton agrees to admit for further workup    []   0144 Evaluated at bedside, and agree with assessment and plan to admit at this time.  ALFREDO CABA    [ES]      ED Course User Index  [ES] Wojciech Devi MD  [] Meme Moe PA-C                                           MDM  Number of Diagnoses or Management Options  Chest pain, unspecified type: new and requires workup     Amount and/or Complexity of Data Reviewed  Clinical lab tests: reviewed and ordered  Tests in the radiology section of CPT®: reviewed and ordered  Tests in the medicine section of CPT®: reviewed  Discuss the patient with other providers: yes    Risk of Complications, Morbidity, and/or Mortality  Presenting problems: moderate  Diagnostic procedures: moderate  Management options: moderate    Patient Progress  Patient progress: stable      Final diagnoses:   Chest pain, unspecified type            Meme Moe PA-C  04/27/20 0209

## 2020-04-27 NOTE — H&P
[facility]  HISTORY & PHYSICAL    Patient Identification:  Name:  Phuong Tuttle  Age:  59 y.o.  Sex:  female  :  1960  MRN:  [id number]   Visit Number:  [id number]  Primary Care Physician:  Lissette Mike APRN     Subjective     Chief complaint:   Chief Complaint   Patient presents with   • Chest Pain     History of presenting illness:   Patient is a 59 y.o. female with past medical history significant for end-stage liver disease, severe portal venous hypertension, chronic kidney disease stage III, esophageal varices, splenomegaly, history of upper GI bleed, chronic hepatitis C without treatment, history of IV drug use, smoking tobacco use, chronic pancytopenia, that presented to the [facility] emergency department for evaluation of chest pain.    The patient states that she began experiencing left-sided chest pain that she describes as a soreness yesterday.  She states that her chest pain comes and goes with movement and deep breaths making it worse.  She denies any radiation and reports that when she is having the chest pain it lasts less than 5 minutes.  She denies taking any medications at home for the pain.  She reports that she is currently experiencing some chest pain now that she still describes as a soreness but only when she moves.  She denies any cardiac history or family history of cardiac problems.  She does, however, admit to increased stress recently due to her mother passing away around 2 weeks ago.  She is currently in between homes and is supposed to have movers come over this morning to help her move things to her new apartment.  She denies any suicidal homicidal thoughts.  She further denies any recent illness, diaphoresis, chills, respiratory symptoms, palpitations, leg edema, abdominal symptoms, urinary symptoms, dizziness, lightheadedness, syncope, or confusion.  She did state that when her mother was in the hospital she  was sitting for long periods of time.  She denies any recent travel or history of clots however.  She is a current smoking tobacco user and smokes half a pack a day for around 40 years.  She denies any smokeless tobacco use or e-cigarette use.  She further denies any alcohol use or illicit drug use.  She does state that she is receiving Suboxone from a clinic in [place].    RN Sagar present at bedside during exam.     Upon arrival to the ED, vitals were temperature 97.8 °F, pulse 75, respirations 20, /50, SPO2 100% on room air.  Troponin T negative x2.  CMP with creatinine 1.13, GFR 50.  D-dimer 3.85.  CBC with WBC 1.70, RBC 3.64, hemoglobin 10.4, hematocrit 32.6, MCHC 31.0, platelets 53.  Urinalysis with trace leukocytes, otherwise unremarkable.  Chest x-ray shows no acute cardiopulmonary findings.  CT of chest without contrast shows no active disease in the chest, lungs are clear, end-stage hepatic cirrhosis with multiple sequelae of several portal venous hypertension.  Ultrasound venous Doppler of bilateral lower extremities shows a negative exam, no evidence of lower extremity DVT on the right or left.  EKG shows normal sinus rhythm, heart rate 76 bpm, QTc 465 MS.    In the emergency department the patient received IV morphine 2 mg x 1.    Patient has been admitted to the telemetry floor as an observation unit for further evaluation and treatment.   ---------------------------------------------------------------------------------------------------------------------   Review of Systems   Constitutional: Negative for appetite change, chills, diaphoresis and fever.   HENT: Negative for congestion and sore throat.    Eyes: Negative for discharge and visual disturbance.   Respiratory: Negative for cough, shortness of breath and wheezing.    Cardiovascular: Positive for chest pain (Left-sided chest pain described as a soreness without radiation). Negative for palpitations and leg swelling.   Gastrointestinal:  Negative for abdominal pain, constipation, diarrhea, nausea and vomiting.   Endocrine: Negative for polydipsia and polyuria.   Genitourinary: Negative for dysuria and frequency.   Musculoskeletal: Negative for gait problem.   Skin: Negative for rash and wound.   Neurological: Negative for dizziness, syncope and light-headedness.   Hematological: Does not bruise/bleed easily.   Psychiatric/Behavioral: Negative for confusion. The patient is not nervous/anxious.       ---------------------------------------------------------------------------------------------------------------------   Past Medical History:   Diagnosis Date   • Chronic kidney disease (CKD), stage III (moderate) (CMS/[facility]) 4/27/2020   • COPD (chronic obstructive pulmonary disease) (CMS/[facility])     non-oxygen dependent   • End stage liver disease (CMS/[facility]) 4/27/2020   • Esophageal varices (CMS/[facility])    • GI bleed     history of GI bleed    • Hepatitis C 4/27/2020   • History of substance abuse (CMS/[facility]) 4/27/2020   • Immunocompromised patient (CMS/[facility])    • Peptic ulceration    • Portal hypertension (CMS/HCC) 4/27/2020   • Splenomegaly 4/27/2020     Past Surgical History:   Procedure Laterality Date   • UPPER GASTROINTESTINAL ENDOSCOPY       Family History   Problem Relation Age of Onset   • Hypertension Mother    • Pneumonia Father      Social History     Socioeconomic History   • Marital status:      Spouse name: Not on file   • Number of children: Not on file   • Years of education: Not on file   • Highest education level: Not on file   Occupational History   • Occupation: Disabled   Tobacco Use   • Smoking status: Current Some Day Smoker     Packs/day: 0.50     Years: 50.00     Pack years: 25.00     Types: Cigarettes   • Smokeless tobacco: Never Used   Substance and Sexual Activity   • Alcohol use: No   • Drug use: Not Currently     Comment: Prescribed Suboxone    • Sexual activity: Defer      ---------------------------------------------------------------------------------------------------------------------   Allergies:  Ibuprofen; Tylenol [acetaminophen]; Zithromax [azithromycin]; Doxycycline; Haldol [haloperidol lactate]; and Penicillins  ---------------------------------------------------------------------------------------------------------------------   Medications below are reported home medications pulling from within the system; at this time, these medications have not been reconciled unless otherwise specified and are in the verification process for further verifcation as current home medications.    Prior to Admission Medications     Prescriptions Last Dose Informant Patient Reported? Taking?    B Complex Vitamins (VITAMIN B COMPLEX) capsule capsule   Yes No    Take  by mouth Daily.    budesonide-formoterol (SYMBICORT) 160-4.5 MCG/ACT inhaler   No No    Inhale 2 puffs 2 (Two) Times a Day.    buprenorphine-naloxone (SUBOXONE) 2-0.5 MG per SL tablet   Yes No    Place  under the tongue Daily.    cyclobenzaprine (FLEXERIL) 10 MG tablet   No No    Take 1 tablet by mouth 3 (Three) Times a Day As Needed for Muscle Spasms.    fluticasone (FLONASE) 50 MCG/ACT nasal spray   No No    2 sprays into each nostril Daily.    Fluticasone Furoate-Vilanterol (BREO ELLIPTA) 100-25 MCG/INH aerosol powder    No No    Inhale 1 puff Daily.    furosemide (LASIX) 20 MG tablet   Yes No    Take 20 mg by mouth As Needed.    ipratropium-albuterol (COMBIVENT RESPIMAT)  MCG/ACT inhaler   No No    Inhale 1 puff 4 (Four) Times a Day As Needed for Wheezing.    ondansetron ODT (ZOFRAN ODT) 8 MG disintegrating tablet   No No    Take 1 tablet by mouth Every 8 (Eight) Hours As Needed for Nausea or Vomiting.    pantoprazole (PROTONIX) 40 MG EC tablet   Yes No    Take 40 mg by mouth Daily.    vitamin D (ERGOCALCIFEROL) 23876 units capsule capsule   Yes No    Take 50,000 Units by mouth 1 (One) Time Per Week.         ---------------------------------------------------------------------------------------------------------------------    Objective     Hospital Scheduled Meds:    Morphine 2 mg Intravenous Once   pantoprazole 40 mg Intravenous Q12H   sodium chloride 10 mL Intravenous Q12H          Current listed hospital scheduled medications may not yet reflect those currently placed in orders that are signed and held, awaiting patient's arrival to floor/unit.    ---------------------------------------------------------------------------------------------------------------------   Vital Signs:  Temp:  [97.6 °F (36.4 °C)-97.8 °F (36.6 °C)] 97.6 °F (36.4 °C)  Heart Rate:  [66-81] 81  Resp:  [18-20] 18  BP: (103-124)/(46-95) 124/55  Mean Arterial Pressure (Non-Invasive) for the past 24 hrs (Last 3 readings):   Noninvasive MAP (mmHg)   04/27/20 0229 83   04/27/20 0152 80   04/27/20 0121 75     SpO2 Percentage    04/27/20 0147 04/27/20 0150 04/27/20 0229   SpO2: 97% 97% 99%     SpO2:  [97 %-100 %] 99 %  on   ;        Body mass index is 34.27 kg/m².  Wt Readings from Last 3 Encounters:   04/27/20 102 kg (225 lb 6.4 oz)   06/12/19 99.8 kg (220 lb)   10/31/18 109 kg (240 lb)     ---------------------------------------------------------------------------------------------------------------------   Physical Exam:  Physical Exam   Constitutional: She is oriented to person, place, and time. Vital signs are normal. She appears well-developed and well-nourished. She is cooperative.   Upon evaluation, the patient was lying in bed.  Appears to be in no acute distress.   HENT:   Head: Normocephalic and atraumatic.   Nose: Nose normal. No nasal deformity.   Eyes: Conjunctivae and lids are normal. Right eye exhibits no discharge. Left eye exhibits no discharge. Right conjunctiva is not injected. Left conjunctiva is not injected.   Neck: No JVD present. Carotid bruit is not present.   Cardiovascular: Normal rate, regular rhythm, intact distal  pulses and normal pulses. Exam reveals no gallop and no friction rub.   Murmur (TVR) heard.  Pulses:       Dorsalis pedis pulses are 2+ on the right side, and 2+ on the left side.        Posterior tibial pulses are 2+ on the right side, and 2+ on the left side.   Pulmonary/Chest: Effort normal and breath sounds normal. No accessory muscle usage. No respiratory distress. She has no decreased breath sounds. She has no wheezes. She has no rhonchi. She has no rales.   Abdominal: Soft. Normal appearance and bowel sounds are normal. She exhibits distension (Increased body habitus). She exhibits no mass. There is no tenderness. There is no rigidity and no guarding.   Musculoskeletal:        Right lower leg: She exhibits no edema.        Left lower leg: She exhibits no edema.     Vascular Status -  Her right foot exhibits normal foot vasculature  and no edema. Her left foot exhibits normal foot vasculature  and no edema.  Skin Integrity  -  Her right foot skin is intact.Her left foot skin is intact..  Neurological: She is alert and oriented to person, place, and time. She has normal strength. She is not disoriented (Alert and oriented to time, place, herself, month, year). She displays no atrophy (Intact in bilateral upper and lower extremities). No sensory deficit (Intact in bilateral upper and lower extremities).   Able to move arms and legs equal bilaterally   Skin: No abrasion noted. No erythema.   Psychiatric: She has a normal mood and affect. Thought content normal. Her mood appears not anxious. Cognition and memory are normal. She does not exhibit a depressed mood. She expresses no homicidal and no suicidal ideation.     ---------------------------------------------------------------------------------------------------------------------  EKG:    Pending cardiology read. Per my evaluation, NSR with possible left atrial enlargement. HR  76 bpm, QTc 465 ms.     Telemetry:    NSR, HR 71 bpm, SpO2 98% on room air.     I  have personally reviewed the EKG/Telemetry strip  ---------------------------------------------------------------------------------------------------------------------   Results from last 7 days   Lab Units 04/27/20 0018 04/26/20 2205   TROPONIN T ng/mL <0.010 <0.010           Results from last 7 days   Lab Units 04/27/20 0254 04/26/20 2205   WBC 10*3/mm3 1.44* 1.70*   HEMOGLOBIN g/dL 10.9* 10.4*   HEMATOCRIT % 36.2 33.6*   MCV fL 94.8 92.3   MCHC g/dL 30.1* 31.0*   PLATELETS 10*3/mm3 45* 53*     Results from last 7 days   Lab Units 04/27/20 0254 04/26/20 2205   SODIUM mmol/L 141 142   POTASSIUM mmol/L 4.2 3.7   CHLORIDE mmol/L 108* 107   CO2 mmol/L 20.5* 25.6   BUN mg/dL 12 13   CREATININE mg/dL 0.94 1.13*   EGFR IF NONAFRICN AM mL/min/1.73 61 50*   CALCIUM mg/dL 8.6 8.7   GLUCOSE mg/dL 78 95   ALBUMIN g/dL 3.55 3.78   BILIRUBIN mg/dL 0.9 0.8   ALK PHOS U/L 77 89   AST (SGOT) U/L 28 25   ALT (SGPT) U/L 16 16   Estimated Creatinine Clearance: 82.5 mL/min (by C-G formula based on SCr of 0.94 mg/dL).  No results found for: AMMONIA    No results found for: HGBA1C, POCGLU  No results found for: HGBA1C  No results found for: TSH, FREET4    No results found for: BLOODCX  No results found for: URINECX  No results found for: WOUNDCX  No results found for: STOOLCX  No results found for: RESPCX  No results found for: MRSACX  Pain Management Panel     Pain Management Panel Latest Ref Rng & Units 10/29/2018    AMPHETAMINES SCREEN, URINE Negative Negative    BARBITURATES SCREEN Negative Negative    BENZODIAZEPINE SCREEN, URINE Negative Positive(A)    BUPRENORPHINEUR Negative Positive(A)    COCAINE SCREEN, URINE Negative Negative    METHADONE SCREEN, URINE Negative Negative        I have personally reviewed the above laboratory results.   ---------------------------------------------------------------------------------------------------------------------  Imaging Results (Last 7 Days)     Procedure Component Value Units  Date/Time     Venous Doppler Lower Extremity Bilateral (duplex) [[id number]] Collected:  04/27/20 0016     Updated:  04/27/20 0018    Narrative:       VENOUS DOPPLER ULTRASOUND, BILATERAL LOWER EXTREMITIES, 4/26/2020    HISTORY:   59-year-old female in the ED with chest pain. Elevated d-dimer.    TECHNIQUE:   Venous Doppler ultrasound examination of both legs was performed using grey-scale, spectral Doppler, and color flow Doppler ultrasound imaging. This laboratory does not examine anterior tibial veins or the majority of the greater saphenous veins.    FINDINGS:   The examination is negative.  There is no evidence of deep venous thrombosis from the groin to the lower calf bilaterally. Patent saphenofemoral junctions bilaterally.      Impression:       Negative examination.  No evidence of lower extremity DVT on the right or left.    Signer Name: Adam Espinosa MD   Signed: 4/27/2020 12:16 AM   Workstation Name: [id number]    [facility]    CT Chest Without Contrast [[id number]] Collected:  04/26/20 2350     Updated:  04/26/20 2352    Narrative:       CT CHEST, NONCONTRAST, 4/26/2020    HISTORY:  59-year-old female in the ED complaining of chest pain beginning at about 4:00 AM today. D-dimer is elevated. A noncontrast chest CT examination is requested.    TECHNIQUE:  CT imaging of the chest without IV contrast. Radiation dose reduction techniques included automated exposure control. Radiation audit for CT and nuclear cardiology exams in the last 12 months: 0.    FINDINGS:  The lungs are expanded and clear. No diffuse pulmonary edema and no focal or multifocal pulmonary infiltrate. No pleural effusion. Trachea and central bronchi are normal in caliber and widely patent.    Heart size is normal. Normal caliber thoracic aorta. No pericardial effusion. No mass or adenopathy within the chest.    Limited upper abdominal images show end-stage cirrhosis with profound portal venous  hypertension, including large distal esophageal varices, a large recanalized periumbilical vein, marked splenomegaly and near aneurysmal dilatation of the portal veins  and splenic vein. Mural calcification within left portal and splenic veins may be related to old recanalized thrombus. No upper abdominal ascites.      Impression:       1.  No active disease in the chest. The lungs are clear.  2.  End-stage hepatic cirrhosis with multiple sequela of severe portal venous hypertension as detailed above.    Signer Name: Adam Espinosa MD   Signed: 4/26/2020 11:50 PM   Workstation Name: [email or web address]    [facility]    XR Chest 1 View [[id number]] Collected:  04/26/20 2225     Updated:  04/26/20 2227    Narrative:       CR Chest 1 Vw    INDICATION:   Chest pain, ER presentation     COMPARISON:    Chest x-ray 10/31/2018.    FINDINGS:  Single portable AP view(s) of the chest.  Cardiac silhouette size is normal. There is mild to moderate upper thoracic levoconvexed scoliosis, unchanged. There is mild chronic prominence of the central bronchovascular markings. There is no acute  infiltrate, acute congestive failure, pleural effusion or pneumothorax.      Impression:       No active disease is seen in the chest.    Signer Name: Vicky Zuleta MD   Signed: 4/26/2020 10:25 PM   [email or web address]    [facility]        I have personally reviewed the above radiology results.     Last Echocardiogram:  Results for orders placed during the hospital encounter of 05/16/19   Adult Transthoracic Echo Complete W/ Cont if Necessary Per Protocol    Narrative · Normal left ventricular cavity size and wall thickness noted.  · Left ventricular systolic function is normal. Estimated EF appears to be   in the range of 61 - 65%.  · Left ventricular diastolic function is normal.  · Mild tricuspid valve regurgitation is present.  · . No evidence of pulmonary hypertension  · No significant  valvular heart disease  · There is no evidence of pericardial effusion.        ---------------------------------------------------------------------------------------------------------------------    Assessment & Plan      Active Hospital Problems    Diagnosis POA   • **Chest pain [R07.9] Yes   • End stage liver disease (CMS/HCC) [K72.90] Yes   • Obesity (BMI 30-39.9) [E66.9] Yes   • Portal hypertension (CMS/HCC) [K76.6] Yes   • Hepatitis C [B19.20] Yes   • History of substance abuse (CMS/HCC) [F19.11] Yes   • Esophageal varices (CMS/HCC) [I85.00] Yes   • Splenomegaly [R16.1] Yes   • Chronic kidney disease (CKD), stage III (moderate) (CMS/HCC) [N18.3] Yes   • Leukopenia [D72.819] Yes   • COPD (chronic obstructive pulmonary disease) (CMS/HCC) [J44.9] Yes   • Current smoker [F17.200] Yes     · Chest pain with atypical features and elevated d-dimer (3.85): currently experiencing chest pain with movement and with palpation. Troponin negative x 2. EKG without acute ischemic changes. Will obtain fasting AM lipid panel. D-dimer is elevated. Patient states she is allergic to contrast dye, therefore, we will obtain VQ scan. Bilateral venous US of lower extremities shows no DVT. CXR unremarkable. CT of chest shows no active disease. SCDS currently in place given patient ESLD. HR is controlled at 81 bpm, SpO2 99% on room air. Monitor closely.     · Mildly prolonged QTc (465 ms): obtain magnesium and phosphorous levels, replace as necessary per protocol.     · Chronic pancytopenia suspect 2/2 to end stage cirrhosis with chronic hepatitis C: WBC 1.44, absolute neutrophils 1.05. Will hold on neutropenic precautions for now as neutropenia is mild. If neutrophils <0.8, will order precautions.  Obtain iron, folate, ferritin, iron panel, vitamin B12 levels.  Replace as necessary.  If hemoglobin less than 7 will transfuse.    · End stage liver cirrhosis 2/2 to hepatitis C with severe portal venous hypertension, esophageal varices,  splenomegaly and history of UGI bleed: AST and ALT are WNL. Patient denies any treatment of hepatitis C. Will start IV Protonix. CT of chest reviewed. Repeat AM CMP, monitor liver function closely. PT/INR ordered. MELD score 10 showing 6.0% estimated 3 month mortality.     · CKD stage III: baseline creatinine 0.7-1.0. Creatinine on admission 1.13. Repeat AM CMP and monitor renal function closely. Avoid nephrotoxic agents as much as possible. Monitor I's and O's.     · History of IV drug use: denies any current illicit drug use. Obtain Sergio. States she receives suboxone from a clinic in [place]. Continue if pharmacy is able to confirm.     Smoking tobacco use: Phuong Tuttle  reports that she has been smoking cigarettes. She has a 25.00 pack-year smoking history. She has never used smokeless tobacco.. I have educated her on the risk of diseases from using tobacco products such as cancer, COPD and heart diease. I advised her to quit and she is not willing to quit. I spent 4 minutes counseling the patient. Nicotine patch ordered.     · Obesity: 34.27 kg/m2     · F/E/N: No IV fluids. Replace electrolytes as necessary per protocol. Regular diet.     ---------------------------------------------------  DVT Prophylaxis: SCDS  GI Prophylaxis: IV Protonix   Activity: Up with assistance, fall precautions in place    OBSERVATION status, however if further evaluation or treatment plans warrant, status may change.  Based upon current information, I predict patient's care encounter to be less than or equal to 2 midnights.    Code Status: FULL CODE     I have discussed the patient's assessment and plan with the patient, BROOK Keith, and attending physician.       Grace Matthew PA-C  Hospitalist Service -- [facility]       04/27/20  04:27    Attending Physician: Haven Norton DO

## 2020-04-27 NOTE — DISCHARGE SUMMARY
Date of admission: 4/26/2020  Date of discharge: 4/27/20    Disposition: AMA    Principal diagnosis: Chest pain not otherwise specified  Secondary diagnoses:  Chronic kidney disease stage III  COPD  Esophageal varices  Hepatitis C  Portal hypertension  Cirrhosis and splenomegaly  Chronic mild elevation in d-dimer    Consultants: None    Procedures:  CT chest showing end-stage cirrhosis otherwise negative  Venous Doppler negative    Exam: Patient left AMA prior to me being able to examine her    Hospital course: Please see H&P done by Dr. Norton, I was unable to evaluate the patient for she left.  Patient was admitted with chest pain, she was to have VQ scan for minimally elevated d-dimer however this was not new for her to have an elevated d-dimer, Dopplers are negative.  Chest pain was reportedly atypical, EKG which is nonspecific and troponins negative.  I received report that the patient was threatening to leave AMA and wanting something for pain, I was with the patient that was actively having a stroke but I did tell the nursing staff that I would get to her as soon as I possibly could however despite this patient decided to leave AMA before I saw her see chart for details.

## 2020-04-27 NOTE — ED NOTES
Pt presents to ED stating that she has been experiencing chest pain since 4am today. Pt states that her mother recently passed away and whenever she thinks about it her pain increases. Pt is very tearful at this time and continues to talk about her mother. When asked if the pt wanted to hurt herself or anyone else the pt stated no. Provider aware.      Wilda Phillip, RN  04/27/20 0007

## 2020-07-15 ENCOUNTER — TRANSCRIBE ORDERS (OUTPATIENT)
Dept: ADMINISTRATIVE | Facility: HOSPITAL | Age: 60
End: 2020-07-15

## 2020-07-15 ENCOUNTER — HOSPITAL ENCOUNTER (OUTPATIENT)
Dept: ULTRASOUND IMAGING | Facility: HOSPITAL | Age: 60
Discharge: HOME | End: 2020-07-15
Admitting: INTERNAL MEDICINE

## 2020-07-15 DIAGNOSIS — L03.119 CELLULITIS OF LEG, EXCEPT FOOT: ICD-10-CM

## 2020-07-15 DIAGNOSIS — L03.119 CELLULITIS OF LEG, EXCEPT FOOT: Primary | ICD-10-CM

## 2020-07-15 PROCEDURE — 93923 UPR/LXTR ART STDY 3+ LVLS: CPT

## 2020-08-12 ENCOUNTER — LAB (OUTPATIENT)
Dept: LAB | Facility: HOSPITAL | Age: 60
End: 2020-08-12

## 2020-08-12 ENCOUNTER — CONSULT (OUTPATIENT)
Dept: ONCOLOGY | Facility: CLINIC | Age: 60
End: 2020-08-12

## 2020-08-12 ENCOUNTER — TELEPHONE (OUTPATIENT)
Dept: ONCOLOGY | Facility: CLINIC | Age: 60
End: 2020-08-12

## 2020-08-12 VITALS
TEMPERATURE: 97.8 F | HEIGHT: 68 IN | RESPIRATION RATE: 16 BRPM | HEART RATE: 71 BPM | WEIGHT: 225 LBS | OXYGEN SATURATION: 97 % | SYSTOLIC BLOOD PRESSURE: 127 MMHG | DIASTOLIC BLOOD PRESSURE: 61 MMHG | BODY MASS INDEX: 34.1 KG/M2

## 2020-08-12 DIAGNOSIS — D61.818 SPLENIC PANCYTOPENIA SYNDROME (HCC): ICD-10-CM

## 2020-08-12 DIAGNOSIS — K90.9 IRON MALABSORPTION: ICD-10-CM

## 2020-08-12 DIAGNOSIS — D73.9 SPLENIC PANCYTOPENIA SYNDROME (HCC): Primary | ICD-10-CM

## 2020-08-12 DIAGNOSIS — D50.0 IRON DEFICIENCY ANEMIA DUE TO CHRONIC BLOOD LOSS: ICD-10-CM

## 2020-08-12 DIAGNOSIS — D61.818 SPLENIC PANCYTOPENIA SYNDROME (HCC): Primary | ICD-10-CM

## 2020-08-12 DIAGNOSIS — D73.9 SPLENIC PANCYTOPENIA SYNDROME (HCC): ICD-10-CM

## 2020-08-12 PROBLEM — D50.9 IRON DEFICIENCY ANEMIA: Status: ACTIVE | Noted: 2020-08-12

## 2020-08-12 LAB
BASOPHILS # BLD AUTO: 0.01 10*3/MM3 (ref 0–0.2)
BASOPHILS NFR BLD AUTO: 0.6 % (ref 0–1.5)
DEPRECATED RDW RBC AUTO: 55.4 FL (ref 37–54)
EOSINOPHIL # BLD AUTO: 0.03 10*3/MM3 (ref 0–0.4)
EOSINOPHIL NFR BLD AUTO: 1.9 % (ref 0.3–6.2)
ERYTHROCYTE [DISTWIDTH] IN BLOOD BY AUTOMATED COUNT: 19.2 % (ref 12.3–15.4)
FERRITIN SERPL-MCNC: 28.85 NG/ML (ref 13–150)
FOLATE SERPL-MCNC: 13.3 NG/ML (ref 4.78–24.2)
HCT VFR BLD AUTO: 31.3 % (ref 34–46.6)
HGB BLD-MCNC: 9.4 G/DL (ref 12–15.9)
IMM GRANULOCYTES # BLD AUTO: 0.01 10*3/MM3 (ref 0–0.05)
IMM GRANULOCYTES NFR BLD AUTO: 0.6 % (ref 0–0.5)
IRON 24H UR-MRATE: 26 MCG/DL (ref 37–145)
IRON SATN MFR SERPL: 6 % (ref 20–50)
LYMPHOCYTES # BLD AUTO: 0.42 10*3/MM3 (ref 0.7–3.1)
LYMPHOCYTES NFR BLD AUTO: 26.4 % (ref 19.6–45.3)
MCH RBC QN AUTO: 24 PG (ref 26.6–33)
MCHC RBC AUTO-ENTMCNC: 30 G/DL (ref 31.5–35.7)
MCV RBC AUTO: 79.8 FL (ref 79–97)
MONOCYTES # BLD AUTO: 0.06 10*3/MM3 (ref 0.1–0.9)
MONOCYTES NFR BLD AUTO: 3.8 % (ref 5–12)
NEUTROPHILS NFR BLD AUTO: 1.06 10*3/MM3 (ref 1.7–7)
NEUTROPHILS NFR BLD AUTO: 66.7 % (ref 42.7–76)
NRBC BLD AUTO-RTO: 0 /100 WBC (ref 0–0.2)
PLATELET # BLD AUTO: 52 10*3/MM3 (ref 140–450)
PMV BLD AUTO: 10.2 FL (ref 6–12)
RBC # BLD AUTO: 3.92 10*6/MM3 (ref 3.77–5.28)
TIBC SERPL-MCNC: 459 MCG/DL (ref 298–536)
TRANSFERRIN SERPL-MCNC: 308 MG/DL (ref 200–360)
VIT B12 BLD-MCNC: 839 PG/ML (ref 211–946)
WBC # BLD AUTO: 1.59 10*3/MM3 (ref 3.4–10.8)

## 2020-08-12 PROCEDURE — 36415 COLL VENOUS BLD VENIPUNCTURE: CPT

## 2020-08-12 PROCEDURE — 99204 OFFICE O/P NEW MOD 45 MIN: CPT | Performed by: INTERNAL MEDICINE

## 2020-08-12 PROCEDURE — 82607 VITAMIN B-12: CPT

## 2020-08-12 PROCEDURE — 83540 ASSAY OF IRON: CPT

## 2020-08-12 PROCEDURE — 82746 ASSAY OF FOLIC ACID SERUM: CPT

## 2020-08-12 PROCEDURE — 82728 ASSAY OF FERRITIN: CPT

## 2020-08-12 PROCEDURE — 84466 ASSAY OF TRANSFERRIN: CPT

## 2020-08-12 PROCEDURE — 85025 COMPLETE CBC W/AUTO DIFF WBC: CPT

## 2020-08-12 RX ORDER — PAROXETINE HYDROCHLORIDE 20 MG/1
20 TABLET, FILM COATED ORAL DAILY
COMMUNITY
Start: 2020-07-02

## 2020-08-12 RX ORDER — HYDROCODONE BITARTRATE AND ACETAMINOPHEN 5; 325 MG/1; MG/1
1 TABLET ORAL 2 TIMES DAILY
COMMUNITY
Start: 2020-06-15

## 2020-08-12 RX ORDER — TRAZODONE HYDROCHLORIDE 50 MG/1
TABLET ORAL
COMMUNITY
Start: 2020-07-02

## 2020-08-12 RX ORDER — SODIUM CHLORIDE 9 MG/ML
250 INJECTION, SOLUTION INTRAVENOUS ONCE
OUTPATIENT
Start: 2020-08-26

## 2020-08-12 RX ORDER — DIPHENHYDRAMINE HYDROCHLORIDE 50 MG/ML
25 INJECTION INTRAMUSCULAR; INTRAVENOUS ONCE
OUTPATIENT
Start: 2020-08-26

## 2020-08-12 RX ORDER — ALPRAZOLAM 0.5 MG/1
TABLET ORAL
COMMUNITY
Start: 2020-07-02

## 2020-08-12 RX ORDER — SULFAMETHOXAZOLE AND TRIMETHOPRIM 800; 160 MG/1; MG/1
TABLET ORAL
COMMUNITY
Start: 2020-07-15

## 2020-08-12 RX ORDER — FAMOTIDINE 10 MG/ML
20 INJECTION, SOLUTION INTRAVENOUS ONCE
OUTPATIENT
Start: 2020-08-26

## 2020-08-12 RX ORDER — HYDROCHLOROTHIAZIDE 25 MG/1
1 TABLET ORAL DAILY
COMMUNITY
Start: 2016-12-13

## 2020-08-12 RX ORDER — FAMOTIDINE 10 MG/ML
20 INJECTION, SOLUTION INTRAVENOUS ONCE
OUTPATIENT
Start: 2020-08-19

## 2020-08-12 RX ORDER — ONDANSETRON HYDROCHLORIDE 8 MG/1
TABLET, FILM COATED ORAL
COMMUNITY
Start: 2020-07-15 | End: 2020-11-01

## 2020-08-12 RX ORDER — OMEPRAZOLE 40 MG/1
CAPSULE, DELAYED RELEASE ORAL
COMMUNITY
Start: 2020-07-15

## 2020-08-12 RX ORDER — DIPHENHYDRAMINE HYDROCHLORIDE 50 MG/ML
25 INJECTION INTRAMUSCULAR; INTRAVENOUS ONCE
OUTPATIENT
Start: 2020-08-19

## 2020-08-12 RX ORDER — SODIUM CHLORIDE 9 MG/ML
250 INJECTION, SOLUTION INTRAVENOUS ONCE
OUTPATIENT
Start: 2020-08-19

## 2020-08-12 NOTE — PROGRESS NOTES
DATE OF CONSULTATION: 8/12/2020    REFERRING PHYSICIAN: Lissette Mike APRN    Dear Lissette Baez APRN  Thank you for asking for my medical advice on this patient. I saw her in the  [facility] on 8/12/2020    REASON FOR CONSULTATION: Pancytopenia    HISTORY OF PRESENT ILLNESS: The patient is a very pleasant 59 y.o.  female   who was in her usual state of health until the most recent follow-up visit with her primary care provider April 2020 blood work revealed pancytopenia.  The patient was referred to me for recommendations.    SUBJECTIVE: When I saw the patient today she is here by herself.  She has been anemic for a long time.  She has been told before that she has low blood counts.  She is been having on and off upper GI bleeds most likely due to esophageal varices.    Review of Systems   Constitutional: Negative for activity change, appetite change, chills, fatigue, fever and unexpected weight change.   HENT: Negative for hearing loss, mouth sores, nosebleeds, sore throat and trouble swallowing.    Eyes: Negative for visual disturbance.   Respiratory: Negative for cough, chest tightness, shortness of breath and wheezing.    Cardiovascular: Negative for chest pain, palpitations and leg swelling.   Gastrointestinal: Negative for abdominal distention, abdominal pain, blood in stool, constipation, diarrhea, nausea, rectal pain and vomiting.   Endocrine: Negative for cold intolerance and heat intolerance.   Genitourinary: Negative for difficulty urinating, dysuria, frequency and urgency.   Musculoskeletal: Negative for arthralgias, back pain, gait problem, joint swelling and myalgias.   Skin: Negative for rash.   Neurological: Negative for dizziness, tremors, syncope, weakness, light-headedness, numbness and headaches.   Hematological: Negative for adenopathy. Does not bruise/bleed easily.   Psychiatric/Behavioral: Negative for confusion, sleep disturbance and suicidal ideas. The patient is  not nervous/anxious.        Past Medical History:   Diagnosis Date   • Chronic kidney disease (CKD), stage III (moderate) (CMS/HCC) 4/27/2020   • COPD (chronic obstructive pulmonary disease) (CMS/HCC)     non-oxygen dependent   • End stage liver disease (CMS/HCC) 4/27/2020   • Esophageal varices (CMS/HCC)    • GI bleed     history of GI bleed    • Hepatitis C 4/27/2020   • History of substance abuse (CMS/HCC) 4/27/2020   • Immunocompromised patient (CMS/HCC)    • Peptic ulceration    • Portal hypertension (CMS/HCC) 4/27/2020   • Splenomegaly 4/27/2020       Social History     Socioeconomic History   • Marital status:      Spouse name: Not on file   • Number of children: Not on file   • Years of education: Not on file   • Highest education level: Not on file   Occupational History   • Occupation: Disabled   Tobacco Use   • Smoking status: Current Some Day Smoker     Packs/day: 0.50     Years: 35.00     Pack years: 17.50     Types: Cigarettes   • Smokeless tobacco: Never Used   Substance and Sexual Activity   • Alcohol use: No   • Drug use: Not Currently     Comment: Prescribed Suboxone    • Sexual activity: Defer       Family History   Problem Relation Age of Onset   • Hypertension Mother    • Colon cancer Mother    • Pneumonia Father    • Alzheimer's disease Father    • Stroke Father    • Lung cancer Brother    • Alzheimer's disease Maternal Grandfather    • Emphysema Paternal Grandfather        Past Surgical History:   Procedure Laterality Date   • ESOPHAGEAL VARICES LIGATION     • UPPER GASTROINTESTINAL ENDOSCOPY         Allergies   Allergen Reactions   • Doxycycline Rash and GI Bleeding   • Ibuprofen Anaphylaxis     Pt reports causes bleeding   • Iodinated Diagnostic Agents Anaphylaxis   • Prednisone Unknown - Low Severity     No Steroids causes shaking   • Zithromax [Azithromycin] Nausea And Vomiting   • Haldol [Haloperidol Lactate] Rash   • Latex Rash   • Penicillins Rash          Current Outpatient  "Medications:   •  ALPRAZolam (XANAX) 0.5 MG tablet, TAKE 1 Tablet BY MOUTH EVERY DAY AS NEEDED FOR ANXIETY must last 30 days, Disp: , Rfl:   •  B Complex Vitamins (VITAMIN B COMPLEX) capsule capsule, Take  by mouth Daily., Disp: , Rfl:   •  fluticasone (FLONASE) 50 MCG/ACT nasal spray, 2 sprays into each nostril Daily., Disp: 1 bottle, Rfl: 0  •  furosemide (LASIX) 20 MG tablet, Take 20 mg by mouth As Needed., Disp: , Rfl:   •  hydroCHLOROthiazide (HYDRODIURIL) 25 MG tablet, Take 1 tablet by mouth Daily., Disp: , Rfl:   •  HYDROcodone-acetaminophen (NORCO) 5-325 MG per tablet, Take 1 tablet by mouth 2 (Two) Times a Day., Disp: , Rfl:   •  omeprazole (priLOSEC) 40 MG capsule, , Disp: , Rfl:   •  ondansetron (ZOFRAN) 8 MG tablet, , Disp: , Rfl:   •  pantoprazole (PROTONIX) 40 MG EC tablet, Take 40 mg by mouth Daily., Disp: , Rfl:   •  PARoxetine (PAXIL) 20 MG tablet, Take 20 mg by mouth Daily., Disp: , Rfl:   •  sulfamethoxazole-trimethoprim (BACTRIM DS,SEPTRA DS) 800-160 MG per tablet, , Disp: , Rfl:   •  traZODone (DESYREL) 50 MG tablet, TAKE ONE Tablet BY MOUTH EACH NIGHT AT BEDTIME, Disp: , Rfl:   •  vitamin D (ERGOCALCIFEROL) 16197 units capsule capsule, Take 50,000 Units by mouth 1 (One) Time Per Week., Disp: , Rfl:     PHYSICAL EXAMINATION:   /61   Pulse 71   Temp 97.8 °F (36.6 °C) (Temporal)   Resp 16   Ht 172.7 cm (68\")   Wt 102 kg (225 lb)   SpO2 97%   BMI 34.21 kg/m²   Pain Score    08/12/20 1119   PainSc:   6   PainLoc: Back       ECOG Performance Status: 2 - Symptomatic, <50% confined to bed  General Appearance:  alert, cooperative, no apparent distress and appears stated age   Neurologic/Psychiatric: A&O x 3, gait steady, appropriate affect, strength 5/5 in all muscle groups   HEENT:  Normocephalic, without obvious abnormality, mucous membranes moist   Neck: Supple, symmetrical, trachea midline, no adenopathy;  No thyromegaly, masses, or tenderness   Lungs:   Clear to auscultation " bilaterally; respirations regular, even, and unlabored bilaterally   Heart:  Regular rate and rhythm, no murmurs appreciated   Abdomen:   Soft, non-tender, non-distended and no organomegaly   Lymph nodes: No cervical, supraclavicular, inguinal or axillary adenopathy noted   Extremities: Normal, atraumatic; no clubbing, cyanosis, or edema    Skin: No rashes, ulcers, or suspicious lesions noted       No visits with results within 2 Week(s) from this visit.   Latest known visit with results is:   Admission on 04/26/2020, Discharged on 04/27/2020   Component Date Value Ref Range Status   • Glucose 04/26/2020 95  65 - 99 mg/dL Final   • BUN 04/26/2020 13  6 - 20 mg/dL Final   • Creatinine 04/26/2020 1.13* 0.57 - 1.00 mg/dL Final   • Sodium 04/26/2020 142  136 - 145 mmol/L Final   • Potassium 04/26/2020 3.7  3.5 - 5.2 mmol/L Final   • Chloride 04/26/2020 107  98 - 107 mmol/L Final   • CO2 04/26/2020 25.6  22.0 - 29.0 mmol/L Final   • Calcium 04/26/2020 8.7  8.6 - 10.5 mg/dL Final   • Total Protein 04/26/2020 6.7  6.0 - 8.5 g/dL Final   • Albumin 04/26/2020 3.78  3.50 - 5.20 g/dL Final   • ALT (SGPT) 04/26/2020 16  1 - 33 U/L Final   • AST (SGOT) 04/26/2020 25  1 - 32 U/L Final   • Alkaline Phosphatase 04/26/2020 89  39 - 117 U/L Final   • Total Bilirubin 04/26/2020 0.8  0.2 - 1.2 mg/dL Final   • eGFR Non African Amer 04/26/2020 50* >60 mL/min/1.73 Final   • Globulin 04/26/2020 2.9  gm/dL Final   • A/G Ratio 04/26/2020 1.3  g/dL Final   • BUN/Creatinine Ratio 04/26/2020 11.5  7.0 - 25.0 Final   • Anion Gap 04/26/2020 9.4  5.0 - 15.0 mmol/L Final   • Troponin T 04/26/2020 <0.010  0.000 - 0.030 ng/mL Final   • Troponin T 04/27/2020 <0.010  0.000 - 0.030 ng/mL Final   • D-Dimer, Quantitative 04/26/2020 3.85* 0.00 - 0.50 MCGFEU/mL Final   • Color, UA 04/27/2020 Yellow  Yellow, Straw Final   • Appearance, UA 04/27/2020 Clear  Clear Final   • pH, UA 04/27/2020 7.0  5.0 - 8.0 Final   • Specific Gravity, UA 04/27/2020 1.009  1.005  - 1.030 Final   • Glucose, UA 04/27/2020 Negative  Negative Final   • Ketones, UA 04/27/2020 Negative  Negative Final   • Bilirubin, UA 04/27/2020 Negative  Negative Final   • Blood, UA 04/27/2020 Negative  Negative Final   • Protein, UA 04/27/2020 Negative  Negative Final   • Leuk Esterase, UA 04/27/2020 Trace* Negative Final   • Nitrite, UA 04/27/2020 Negative  Negative Final   • Urobilinogen, UA 04/27/2020 1.0 E.U./dL  0.2 - 1.0 E.U./dL Final   • Extra Tube 04/26/2020 hold for add-on   Final    Auto resulted   • Extra Tube 04/26/2020 Hold for add-ons.   Final    Auto resulted.   • Extra Tube 04/26/2020 Hold for add-ons.   Final    Auto resulted.   • WBC 04/26/2020 1.70* 3.40 - 10.80 10*3/mm3 Final   • RBC 04/26/2020 3.64* 3.77 - 5.28 10*6/mm3 Final   • Hemoglobin 04/26/2020 10.4* 12.0 - 15.9 g/dL Final   • Hematocrit 04/26/2020 33.6* 34.0 - 46.6 % Final   • MCV 04/26/2020 92.3  79.0 - 97.0 fL Final   • MCH 04/26/2020 28.6  26.6 - 33.0 pg Final   • MCHC 04/26/2020 31.0* 31.5 - 35.7 g/dL Final   • RDW 04/26/2020 15.1  12.3 - 15.4 % Final   • RDW-SD 04/26/2020 51.7  37.0 - 54.0 fl Final   • MPV 04/26/2020 10.4  6.0 - 12.0 fL Final   • Platelets 04/26/2020 53* 140 - 450 10*3/mm3 Final   • Neutrophil % 04/26/2020 62.0  42.7 - 76.0 % Final   • Lymphocyte % 04/26/2020 30.0  19.6 - 45.3 % Final   • Monocyte % 04/26/2020 8.0  5.0 - 12.0 % Final   • Neutrophils Absolute 04/26/2020 1.05* 1.70 - 7.00 10*3/mm3 Final   • Lymphocytes Absolute 04/26/2020 0.51* 0.70 - 3.10 10*3/mm3 Final   • Monocytes Absolute 04/26/2020 0.14  0.10 - 0.90 10*3/mm3 Final   • Anisocytosis 04/26/2020 Slight/1+  None Seen Final   • Hypochromia 04/26/2020 Slight/1+  None Seen Final   • Platelet Morphology 04/26/2020 Normal  Normal Final   • Amphet/Methamphet, Screen 04/27/2020 Negative  Negative Final   • Barbiturates Screen, Urine 04/27/2020 Negative  Negative Final   • Benzodiazepine Screen, Urine 04/27/2020 Positive* Negative Final   • Cocaine  Screen, Urine 04/27/2020 Negative  Negative Final   • Opiate Screen 04/27/2020 Positive* Negative Final   • THC, Screen, Urine 04/27/2020 Negative  Negative Final   • Methadone Screen, Urine 04/27/2020 Negative  Negative Final   • Oxycodone Screen, Urine 04/27/2020 Negative  Negative Final   • RBC, UA 04/27/2020 0-2  None Seen, 0-2 /HPF Final   • WBC, UA 04/27/2020 0-2  None Seen, 0-2 /HPF Final   • Bacteria, UA 04/27/2020 None Seen  None Seen /HPF Final   • Squamous Epithelial Cells, UA 04/27/2020 0-2  None Seen, 0-2 /HPF Final   • Hyaline Casts, UA 04/27/2020 None Seen  None Seen /LPF Final   • Methodology 04/27/2020 Automated Microscopy   Final   • Glucose 04/27/2020 78  65 - 99 mg/dL Final   • BUN 04/27/2020 12  6 - 20 mg/dL Final   • Creatinine 04/27/2020 0.94  0.57 - 1.00 mg/dL Final   • Sodium 04/27/2020 141  136 - 145 mmol/L Final   • Potassium 04/27/2020 4.2  3.5 - 5.2 mmol/L Final   • Chloride 04/27/2020 108* 98 - 107 mmol/L Final   • CO2 04/27/2020 20.5* 22.0 - 29.0 mmol/L Final   • Calcium 04/27/2020 8.6  8.6 - 10.5 mg/dL Final   • Total Protein 04/27/2020 6.9  6.0 - 8.5 g/dL Final   • Albumin 04/27/2020 3.55  3.50 - 5.20 g/dL Final   • ALT (SGPT) 04/27/2020 16  1 - 33 U/L Final   • AST (SGOT) 04/27/2020 28  1 - 32 U/L Final   • Alkaline Phosphatase 04/27/2020 77  39 - 117 U/L Final   • Total Bilirubin 04/27/2020 0.9  0.2 - 1.2 mg/dL Final   • eGFR Non African Amer 04/27/2020 61  >60 mL/min/1.73 Final   • Globulin 04/27/2020 3.4  gm/dL Final   • A/G Ratio 04/27/2020 1.1  g/dL Final   • BUN/Creatinine Ratio 04/27/2020 12.8  7.0 - 25.0 Final   • Anion Gap 04/27/2020 12.5  5.0 - 15.0 mmol/L Final   • WBC 04/27/2020 1.44* 3.40 - 10.80 10*3/mm3 Final   • RBC 04/27/2020 3.82  3.77 - 5.28 10*6/mm3 Final   • Hemoglobin 04/27/2020 10.9* 12.0 - 15.9 g/dL Final   • Hematocrit 04/27/2020 36.2  34.0 - 46.6 % Final   • MCV 04/27/2020 94.8  79.0 - 97.0 fL Final   • MCH 04/27/2020 28.5  26.6 - 33.0 pg Final   • MCHC  04/27/2020 30.1* 31.5 - 35.7 g/dL Final   • RDW 04/27/2020 15.5* 12.3 - 15.4 % Final   • RDW-SD 04/27/2020 54.4* 37.0 - 54.0 fl Final   • MPV 04/27/2020 11.0  6.0 - 12.0 fL Final   • Platelets 04/27/2020 45* 140 - 450 10*3/mm3 Final   • Neutrophil % 04/27/2020 56.0  42.7 - 76.0 % Final   • Lymphocyte % 04/27/2020 32.0  19.6 - 45.3 % Final   • Monocyte % 04/27/2020 6.0  5.0 - 12.0 % Final   • Eosinophil % 04/27/2020 4.0  0.3 - 6.2 % Final   • Bands %  04/27/2020 2.0  0.0 - 5.0 % Final   • Neutrophils Absolute 04/27/2020 0.84* 1.70 - 7.00 10*3/mm3 Final   • Lymphocytes Absolute 04/27/2020 0.46* 0.70 - 3.10 10*3/mm3 Final   • Monocytes Absolute 04/27/2020 0.09* 0.10 - 0.90 10*3/mm3 Final   • Eosinophils Absolute 04/27/2020 0.06  0.00 - 0.40 10*3/mm3 Final   • Anisocytosis 04/27/2020 Slight/1+  None Seen Final   • Hypochromia 04/27/2020 Slight/1+  None Seen Final   • Platelet Morphology 04/27/2020 Normal  Normal Final   • Magnesium 04/27/2020 2.0  1.6 - 2.6 mg/dL Final   • Phosphorus 04/27/2020 4.3  2.5 - 4.5 mg/dL Final   • Vitamin B-12 04/26/2020 571  211 - 946 pg/mL Final   • Folate 04/26/2020 12.50  4.78 - 24.20 ng/mL Final   • Ferritin 04/27/2020 45.94  13.00 - 150.00 ng/mL Final   • Iron 04/27/2020 32* 37 - 145 mcg/dL Final   • Iron 04/27/2020 28* 37 - 145 mcg/dL Final   • Iron Saturation 04/27/2020 8* 20 - 50 % Final   • Transferrin 04/27/2020 226  200 - 360 mg/dL Final   • TIBC 04/27/2020 337  298 - 536 mcg/dL Final   • Total Cholesterol 04/27/2020 114  0 - 200 mg/dL Final   • Triglycerides 04/27/2020 58  0 - 150 mg/dL Final   • HDL Cholesterol 04/27/2020 57  40 - 60 mg/dL Final   • LDL Cholesterol  04/27/2020 45  0 - 100 mg/dL Final   • VLDL Cholesterol 04/27/2020 11.6  mg/dL Final   • LDL/HDL Ratio 04/27/2020 0.80   Final   • Protime 04/27/2020 16.4* 11.0 - 15.4 Seconds Final   • INR 04/27/2020 1.25* 0.90 - 1.10 Final   • Glucose 04/27/2020 100  70 - 130 mg/dL Final        Us Arterial Doppler Lower Extremity  Bilateral    Result Date: 7/15/2020  Narrative: PROCEDURE: US ARTERIAL DOPPLER LOWER EXTREMITY  BILATERAL-  HISTORY: EDEMA; L03.119-Cellulitis of unspecified part of limb  PROCEDURE: Doppler waveform evaluation of the lower extremities were performed bilaterally.  FINDINGS:  RIGHT LOWER EXTREMITY.      Velocities cm/sec :  CFA: 181 SFA Mid: 124 SFA Dist: 130 POP: 92 PT: 140 HA: 79 Waveforms are monophasic.    LEFT LOWER EXTREMITY.      Velocities cm/sec :  CFA: 242 SFA Mid: 120 SFA Dist: 140 POP: 125 PT: 134 HA: 87 Waveforms are monophasic.       Impression: Diffuse bilateral atherosclerotic disease with no focal stenosis identified.  This report was finalized on 7/15/2020 1:31 PM by Deon Wong M.D..        DIAGNOSTIC DATA:   1. Radiology:    CT CHEST, NONCONTRAST, 4/26/2020     HISTORY:  59-year-old female in the ED complaining of chest pain beginning at about 4:00 AM today. D-dimer is elevated. A noncontrast chest CT examination is requested.     TECHNIQUE:  CT imaging of the chest without IV contrast. Radiation dose reduction techniques included automated exposure control. Radiation audit for CT and nuclear cardiology exams in the last 12 months: 0.     FINDINGS:  The lungs are expanded and clear. No diffuse pulmonary edema and no focal or multifocal pulmonary infiltrate. No pleural effusion. Trachea and central bronchi are normal in caliber and widely patent.     Heart size is normal. Normal caliber thoracic aorta. No pericardial effusion. No mass or adenopathy within the chest.     Limited upper abdominal images show end-stage cirrhosis with profound portal venous hypertension, including large distal esophageal varices, a large recanalized periumbilical vein, marked splenomegaly and near aneurysmal dilatation of the portal veins  and splenic vein. Mural calcification within left portal and splenic veins may be related to old recanalized thrombus. No upper abdominal ascites.     IMPRESSION:  1.  No  active disease in the chest. The lungs are clear.  2.  End-stage hepatic cirrhosis with multiple sequela of severe portal venous hypertension as detailed above.     Signer Name: Adam Espinosa MD   Signed: 4/26/2020 11:50 PM   Workstation Name: [email or web address]    [facility]  2. Dr. Nova's note from April 27, 2020 reviewed by me and documented in the  chart.   3. Pathology report: None available  4. Laboratory data:    Results for KATLYN MARROQUIN (MRN [id number]) as of 8/12/2020 11:16   Ref. Range 6/12/2019 12:38   Hepatitis C Genotype Unknown 1a   HCV Genotype Unknown Comment   HCV Qual Interp Unknown Comment   Fibrosis Scoring: Unknown Comment   Fibrosis Score Latest Ref Range: 0.00 - 0.21  0.47 (H)   Necroinflamm Activity Scoring: Unknown Comment   Necroinflammat Activity Grade Unknown A0-No activity   Necroinflammat Activity Score Latest Ref Range: 0.00 - 0.17  0.09   Fibrosis Stage Unknown F1-F2   Limitations: Unknown Comment   HCV log10 Latest Units: log10 IU/mL 5.635   Results for KATLYN MARROQUIN (MRN [id number]) as of 8/12/2020 11:16   Ref. Range 2/7/2017 11:00 10/31/2018 04:15 4/26/2020 22:05 4/27/2020 02:54   WBC Latest Ref Range: 3.40 - 10.80 10*3/mm3 1.51 (C) 5.16 1.70 (C) 1.44 (C)   RBC Latest Ref Range: 3.77 - 5.28 10*6/mm3 3.88 (L) 3.85 (L) 3.64 (L) 3.82   Hemoglobin Latest Ref Range: 12.0 - 15.9 g/dL 10.7 (L) 10.6 (L) 10.4 (L) 10.9 (L)   Hematocrit Latest Ref Range: 34.0 - 46.6 % 32.9 (L) 33.7 (L) 33.6 (L) 36.2   RDW Latest Ref Range: 12.3 - 15.4 % 15.0 (H) 15.3 (H) 15.1 15.5 (H)   MCV Latest Ref Range: 79.0 - 97.0 fL 84.8 87.5 92.3 94.8   MCH Latest Ref Range: 26.6 - 33.0 pg 27.6 27.5 28.6 28.5   MCHC Latest Ref Range: 31.5 - 35.7 g/dL 32.5 (L) 31.5 (L) 31.0 (L) 30.1 (L)   MPV Latest Ref Range: 6.0 - 12.0 fL 11.1 (H) 9.9 10.4 11.0   Platelets Latest Ref Range: 140 - 450 10*3/mm3 46 (C) 108 (L) 53 (L) 45 (C)     ASSESSMENT: The patient is a very pleasant 59 y.o.  female   with pancytopenia    PROBLEM LIST:   1.  Pancytopenia:  A.  Chronic and stable since 2015  B.  Use by portal hypertension with liver and spleen sequestration  2.  Liver cirrhosis:  A.  Induced by chronic hepatitis C  3.  Iron deficiency anemia      PLAN:   1. I had a long discussion today with the patient about her  new diagnosis of pancytopenia. I reviewed the patient's documents including refereing provider's notes, lab results and imaging report.   2.  The patient will be treated with 2 dose of IV iron using Injectafer 750 mg 1 week apart.  3.  Beyond replacing her iron deficiency and vitamins deficiencies she has any there is nothing much I can do and fortunately her pancytopenia is induced by end-stage liver disease with portal hypertension and associated liver and spleen sequestration.  4.  I will check blood work today including CBC iron profile and vitamin levels per  5 patient follow-up with us in 6 months with repeated labs.  7.  The patient might benefit from thrombopoietin analogs if she gets schedule for a surgical procedue with active bleeding trying to improve her thrombocytopenia.  Asad Haile MD  8/12/2020

## 2020-08-12 NOTE — TELEPHONE ENCOUNTER
----- Message from Mony Jha RN sent at 8/12/2020 12:50 PM EDT -----      Critical Test Results      MD: Mic    Date: 08/12/2020     Critical test result: WBC 1.59  Time results received:12:50

## 2020-09-14 ENCOUNTER — TRANSCRIBE ORDERS (OUTPATIENT)
Dept: OTHER | Facility: OTHER | Age: 60
End: 2020-09-14

## 2020-09-14 DIAGNOSIS — Z12.31 ENCOUNTER FOR SCREENING MAMMOGRAM FOR MALIGNANT NEOPLASM OF BREAST: Primary | ICD-10-CM

## 2020-10-15 ENCOUNTER — HOSPITAL ENCOUNTER (EMERGENCY)
Facility: HOSPITAL | Age: 60
Discharge: HOME | End: 2020-10-15
Attending: FAMILY MEDICINE | Admitting: FAMILY MEDICINE

## 2020-10-15 ENCOUNTER — APPOINTMENT (OUTPATIENT)
Dept: GENERAL RADIOLOGY | Facility: HOSPITAL | Age: 60
End: 2020-10-15

## 2020-10-15 VITALS
SYSTOLIC BLOOD PRESSURE: 124 MMHG | TEMPERATURE: 99.9 F | DIASTOLIC BLOOD PRESSURE: 62 MMHG | HEART RATE: 78 BPM | RESPIRATION RATE: 20 BRPM | BODY MASS INDEX: 30.31 KG/M2 | OXYGEN SATURATION: 100 % | WEIGHT: 200 LBS | HEIGHT: 68 IN
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
## 2020-10-15 DIAGNOSIS — R07.89 CHEST WALL PAIN: Primary | ICD-10-CM

## 2020-10-15 LAB
ALBUMIN SERPL-MCNC: 4.12 G/DL (ref 3.5–5.2)
ALBUMIN/GLOB SERPL: 1.4 G/DL
ALP SERPL-CCNC: 75 U/L (ref 39–117)
ALT SERPL W P-5'-P-CCNC: 10 U/L (ref 1–33)
ANION GAP SERPL CALCULATED.3IONS-SCNC: 8.8 MMOL/L (ref 5–15)
AST SERPL-CCNC: 20 U/L (ref 1–32)
BASOPHILS # BLD AUTO: 0.02 10*3/MM3 (ref 0–0.2)
BASOPHILS NFR BLD AUTO: 1.3 % (ref 0–1.5)
BILIRUB SERPL-MCNC: 1 MG/DL (ref 0–1.2)
BILIRUB UR QL STRIP: NEGATIVE
BUN SERPL-MCNC: 11 MG/DL (ref 6–20)
BUN/CREAT SERPL: 11.1 (ref 7–25)
CALCIUM SPEC-SCNC: 8.8 MG/DL (ref 8.6–10.5)
CHLORIDE SERPL-SCNC: 107 MMOL/L (ref 98–107)
CLARITY UR: CLEAR
CO2 SERPL-SCNC: 26.2 MMOL/L (ref 22–29)
COLOR UR: YELLOW
CREAT SERPL-MCNC: 0.99 MG/DL (ref 0.57–1)
DEPRECATED RDW RBC AUTO: 53.6 FL (ref 37–54)
EOSINOPHIL # BLD AUTO: 0.02 10*3/MM3 (ref 0–0.4)
EOSINOPHIL NFR BLD AUTO: 1.3 % (ref 0.3–6.2)
ERYTHROCYTE [DISTWIDTH] IN BLOOD BY AUTOMATED COUNT: 18.3 % (ref 12.3–15.4)
GFR SERPL CREATININE-BSD FRML MDRD: 58 ML/MIN/1.73
GLOBULIN UR ELPH-MCNC: 3 GM/DL
GLUCOSE SERPL-MCNC: 104 MG/DL (ref 65–99)
GLUCOSE UR STRIP-MCNC: NEGATIVE MG/DL
HCT VFR BLD AUTO: 32.6 % (ref 34–46.6)
HGB BLD-MCNC: 9.5 G/DL (ref 12–15.9)
HGB UR QL STRIP.AUTO: NEGATIVE
HOLD SPECIMEN: NORMAL
HOLD SPECIMEN: NORMAL
IMM GRANULOCYTES # BLD AUTO: 0 10*3/MM3 (ref 0–0.05)
IMM GRANULOCYTES NFR BLD AUTO: 0 % (ref 0–0.5)
KETONES UR QL STRIP: NEGATIVE
LEUKOCYTE ESTERASE UR QL STRIP.AUTO: NEGATIVE
LYMPHOCYTES # BLD AUTO: 0.36 10*3/MM3 (ref 0.7–3.1)
LYMPHOCYTES NFR BLD AUTO: 23.7 % (ref 19.6–45.3)
MCH RBC QN AUTO: 23.4 PG (ref 26.6–33)
MCHC RBC AUTO-ENTMCNC: 29.1 G/DL (ref 31.5–35.7)
MCV RBC AUTO: 80.3 FL (ref 79–97)
MONOCYTES # BLD AUTO: 0.09 10*3/MM3 (ref 0.1–0.9)
MONOCYTES NFR BLD AUTO: 5.9 % (ref 5–12)
NEUTROPHILS NFR BLD AUTO: 1.03 10*3/MM3 (ref 1.7–7)
NEUTROPHILS NFR BLD AUTO: 67.8 % (ref 42.7–76)
NITRITE UR QL STRIP: NEGATIVE
NRBC BLD AUTO-RTO: 0 /100 WBC (ref 0–0.2)
NT-PROBNP SERPL-MCNC: 385.9 PG/ML (ref 0–900)
PH UR STRIP.AUTO: 6.5 [PH] (ref 5–8)
PLATELET # BLD AUTO: 56 10*3/MM3 (ref 140–450)
PMV BLD AUTO: 10 FL (ref 6–12)
POTASSIUM SERPL-SCNC: 4.1 MMOL/L (ref 3.5–5.2)
PROT SERPL-MCNC: 7.1 G/DL (ref 6–8.5)
PROT UR QL STRIP: NEGATIVE
RBC # BLD AUTO: 4.06 10*6/MM3 (ref 3.77–5.28)
SODIUM SERPL-SCNC: 142 MMOL/L (ref 136–145)
SP GR UR STRIP: 1.01 (ref 1–1.03)
TROPONIN T SERPL-MCNC: <0.01 NG/ML (ref 0–0.03)
UROBILINOGEN UR QL STRIP: NORMAL
WBC # BLD AUTO: 1.52 10*3/MM3 (ref 3.4–10.8)
WHOLE BLOOD HOLD SPECIMEN: NORMAL
WHOLE BLOOD HOLD SPECIMEN: NORMAL

## 2020-10-15 PROCEDURE — 84484 ASSAY OF TROPONIN QUANT: CPT | Performed by: PHYSICIAN ASSISTANT

## 2020-10-15 PROCEDURE — 93005 ELECTROCARDIOGRAM TRACING: CPT | Performed by: FAMILY MEDICINE

## 2020-10-15 PROCEDURE — 81003 URINALYSIS AUTO W/O SCOPE: CPT | Performed by: PHYSICIAN ASSISTANT

## 2020-10-15 PROCEDURE — 99284 EMERGENCY DEPT VISIT MOD MDM: CPT

## 2020-10-15 PROCEDURE — 85025 COMPLETE CBC W/AUTO DIFF WBC: CPT | Performed by: PHYSICIAN ASSISTANT

## 2020-10-15 PROCEDURE — 93010 ELECTROCARDIOGRAM REPORT: CPT | Performed by: INTERNAL MEDICINE

## 2020-10-15 PROCEDURE — 71111 X-RAY EXAM RIBS/CHEST4/> VWS: CPT

## 2020-10-15 PROCEDURE — 83880 ASSAY OF NATRIURETIC PEPTIDE: CPT | Performed by: PHYSICIAN ASSISTANT

## 2020-10-15 PROCEDURE — 80053 COMPREHEN METABOLIC PANEL: CPT | Performed by: PHYSICIAN ASSISTANT

## 2020-10-15 PROCEDURE — 93005 ELECTROCARDIOGRAM TRACING: CPT | Performed by: PHYSICIAN ASSISTANT

## 2020-10-15 RX ORDER — HYDROCODONE BITARTRATE AND ACETAMINOPHEN 7.5; 325 MG/1; MG/1
1 TABLET ORAL ONCE
Status: COMPLETED | OUTPATIENT
Start: 2020-10-15 | End: 2020-10-15

## 2020-10-15 RX ADMIN — HYDROCODONE BITARTRATE AND ACETAMINOPHEN 1 TABLET: 7.5; 325 TABLET ORAL at 15:54

## 2020-10-15 NOTE — ED PROVIDER NOTES
Subjective   60 y/o female patient presents to the ED with complaints of chest pain that worsens with stretching or raising arms. Patient states that she has been visiting her ill brother in the hospital and was tiptoeing over the bed rail to see him when she lost her balance and fell on the rail. Since then she has had pain.        History provided by:  Patient   used: No        Review of Systems   Constitutional: Negative.    HENT: Negative.    Eyes: Negative.    Respiratory: Negative.    Cardiovascular: Positive for chest pain.   Gastrointestinal: Negative.    Endocrine: Negative.    Genitourinary: Negative.    Musculoskeletal: Negative.    Skin: Negative.    Allergic/Immunologic: Negative.    Neurological: Negative.    Hematological: Negative.    Psychiatric/Behavioral: Negative.    All other systems reviewed and are negative.      Past Medical History:   Diagnosis Date   • Chronic kidney disease (CKD), stage III (moderate) (CMS/HCC) 4/27/2020   • COPD (chronic obstructive pulmonary disease) (CMS/HCC)     non-oxygen dependent   • End stage liver disease (CMS/[facility]) 4/27/2020   • Esophageal varices (CMS/HCC)    • GI bleed     history of GI bleed    • Hepatitis C 4/27/2020   • History of substance abuse (CMS/HCC) 4/27/2020   • Immunocompromised patient (CMS/HCC)    • Peptic ulceration    • Portal hypertension (CMS/HCC) 4/27/2020   • Splenomegaly 4/27/2020       Allergies   Allergen Reactions   • Doxycycline Rash and GI Bleeding   • Ibuprofen Anaphylaxis     Pt reports causes bleeding   • Iodinated Diagnostic Agents Anaphylaxis   • Prednisone Unknown - Low Severity     No Steroids causes shaking   • Zithromax [Azithromycin] Nausea And Vomiting   • Haldol [Haloperidol Lactate] Rash   • Latex Rash   • Penicillins Rash       Past Surgical History:   Procedure Laterality Date   • ESOPHAGEAL VARICES LIGATION     • UPPER GASTROINTESTINAL ENDOSCOPY         Family History   Problem Relation Age of Onset    • Hypertension Mother    • Colon cancer Mother    • Pneumonia Father    • Alzheimer's disease Father    • Stroke Father    • Lung cancer Brother    • Alzheimer's disease Maternal Grandfather    • Emphysema Paternal Grandfather        Social History     Socioeconomic History   • Marital status:      Spouse name: Not on file   • Number of children: Not on file   • Years of education: Not on file   • Highest education level: Not on file   Occupational History   • Occupation: Disabled   Tobacco Use   • Smoking status: Current Some Day Smoker     Packs/day: 0.50     Years: 35.00     Pack years: 17.50     Types: Cigarettes   • Smokeless tobacco: Never Used   Substance and Sexual Activity   • Alcohol use: No   • Drug use: Not Currently     Comment: Prescribed Suboxone    • Sexual activity: Defer           Objective   Physical Exam  Vitals signs and nursing note reviewed.   Constitutional:       Appearance: She is well-developed and normal weight.   HENT:      Head: Normocephalic and atraumatic.   Eyes:      Extraocular Movements: Extraocular movements intact.      Pupils: Pupils are equal, round, and reactive to light.   Neck:      Musculoskeletal: Normal range of motion and neck supple.   Cardiovascular:      Rate and Rhythm: Normal rate and regular rhythm.      Heart sounds: Normal heart sounds.   Pulmonary:      Effort: Pulmonary effort is normal.      Breath sounds: Normal breath sounds.   Chest:      Chest wall: Tenderness present.      Comments: Chest wall TTP. Reproducible chest wall pain with palpation to the upper chest wall.   Abdominal:      General: Bowel sounds are normal.      Palpations: Abdomen is soft.   Musculoskeletal: Normal range of motion.   Skin:     General: Skin is warm and dry.      Capillary Refill: Capillary refill takes less than 2 seconds.   Neurological:      General: No focal deficit present.      Mental Status: She is alert and oriented to person, place, and time.   Psychiatric:          Mood and Affect: Mood normal.         Behavior: Behavior normal.         Procedures           ED Course  ED Course as of Oct 15 2210   Thu Oct 15, 2020   1540 1415- Reviewed by Flor Green. rate 82 BPM  GA interval 200 ms  QRS duration 90 ms  QT/QTc 394/460 ms  P-R-T axes -1 7 31   NSR  Normal EKG    ECG 12 Lead [ML]   1544    IMPRESSION:  No acute displaced rib fracture. Underlying lung  parenchyma appears unremarkable.     This report was finalized on 10/15/2020 3:40 PM by Dr. Robbie Still MD.      XR Ribs Bilateral 4+ View With PA Chest [ML]   1606 Pt is requesting covid swab.     [ML]      ED Course User Index  [ML] Ramandeep Valadez PA                                           MDM  Number of Diagnoses or Management Options  Chest wall pain:   Fall, initial encounter:      Amount and/or Complexity of Data Reviewed  Clinical lab tests: ordered and reviewed  Tests in the radiology section of CPT®: ordered and reviewed    Risk of Complications, Morbidity, and/or Mortality  Presenting problems: low  Diagnostic procedures: low  Management options: low    Patient Progress  Patient progress: improved      Final diagnoses:   Chest wall pain   Fall, initial encounter            Ramandeep Valadez PA  10/15/20 1547       Ramandeep Valadez PA  10/15/20 2210       Ramandeep Valadez PA  10/15/20 2210

## 2020-10-15 NOTE — ED PROVIDER NOTES
58 y/o female patient presents to the ED with complaints of chest pain that worsens with stretching or raising arms. Patient states that she has been visiting her ill brother in the hospital and was tiptoeing over the bed rail to see him when she lost her balance and fell on the rail. Since then she has had pain.  Chest wall tenderness. Normal breath sounds. Normal rate and rhythm.      Ramandeep Valadez PA  10/15/20 9026

## 2020-10-15 NOTE — DISCHARGE INSTRUCTIONS
Call one of the offices below to establish a primary care provider.  If you are unable to get an appointment and feel it is an emergency and need to be seen immediately please return to the Emergency Department.    Call one of the office below to set up a primary care provider.    Dr. Jose Doty                                                                                                       [address] [facility] [address]  [phone]    Dr. Ferris, Dr. LAISHA Toledo, Dr. THEO Toledo ([facility])  [address] [facility] [address]  [phone]    Dr. Viera, Dr. Fernando, Dr. Raza ([facility])  [address] [facility] [address]  [phone]    Dr. Banerjee  [address] [facility] [address]  [phone]    Dr. Ware, Dr. Carmona, Dr. Olvera, Dr. Alford ([facility])  [address] [place] [address] [address]  [facility] [address]  [phone]    Dr. Ju Huang  [address] [facility] [place] [address]  [phone]    Dr. Sabrina Arreola  [address]   [address]  [facility] [address]  [phone]    Dr. Doty  [address] [facility] [address]  [phone]    Dr. West, Dr. Parekh  [address] [facility] [place] [address]  [phone]    Dr. Rocha  [address]                                                              [address]  [facility] [address]  [phone]    Dr. Wellington  [address] [facility] [address]  [phone]    Dr. Bhavna Slaughter  [address]  [address]  [place] [address]  [phone]    Dr. Tran and [facility]   [address] [facility]  [address]  [place] [address]  [phone]

## 2020-10-15 NOTE — ED NOTES
[place] completed @ 8213 and given to Dr. Moe @ 8336     Elzbieta Echeverria, PCT  10/15/20 5804       Elzbieta Echeverria, PCT  10/15/20 0463

## 2020-10-17 ENCOUNTER — APPOINTMENT (OUTPATIENT)
Dept: GENERAL RADIOLOGY | Facility: HOSPITAL | Age: 60
End: 2020-10-17

## 2020-10-17 ENCOUNTER — HOSPITAL ENCOUNTER (EMERGENCY)
Facility: HOSPITAL | Age: 60
Discharge: HOME | End: 2020-10-17
Attending: EMERGENCY MEDICINE | Admitting: EMERGENCY MEDICINE

## 2020-10-17 ENCOUNTER — APPOINTMENT (OUTPATIENT)
Dept: CT IMAGING | Facility: HOSPITAL | Age: 60
End: 2020-10-17

## 2020-10-17 VITALS
SYSTOLIC BLOOD PRESSURE: 117 MMHG | DIASTOLIC BLOOD PRESSURE: 64 MMHG | BODY MASS INDEX: 34.53 KG/M2 | WEIGHT: 220 LBS | OXYGEN SATURATION: 98 % | RESPIRATION RATE: 18 BRPM | TEMPERATURE: 98.6 F | HEART RATE: 80 BPM | HEIGHT: 67 IN

## 2020-10-17 DIAGNOSIS — S22.20XA CLOSED FRACTURE OF STERNUM, UNSPECIFIED PORTION OF STERNUM, INITIAL ENCOUNTER: Primary | ICD-10-CM

## 2020-10-17 PROCEDURE — 93010 ELECTROCARDIOGRAM REPORT: CPT | Performed by: SPECIALIST

## 2020-10-17 PROCEDURE — 99284 EMERGENCY DEPT VISIT MOD MDM: CPT

## 2020-10-17 PROCEDURE — 71045 X-RAY EXAM CHEST 1 VIEW: CPT | Performed by: RADIOLOGY

## 2020-10-17 PROCEDURE — 71045 X-RAY EXAM CHEST 1 VIEW: CPT

## 2020-10-17 PROCEDURE — 93005 ELECTROCARDIOGRAM TRACING: CPT | Performed by: PHYSICIAN ASSISTANT

## 2020-10-17 RX ORDER — SODIUM CHLORIDE 0.9 % (FLUSH) 0.9 %
10 SYRINGE (ML) INJECTION AS NEEDED
Status: DISCONTINUED | OUTPATIENT
Start: 2020-10-17 | End: 2020-10-17 | Stop reason: HOSPADM

## 2020-10-17 RX ORDER — HYDROCODONE BITARTRATE AND ACETAMINOPHEN 7.5; 325 MG/1; MG/1
1 TABLET ORAL EVERY 4 HOURS PRN
Qty: 12 TABLET | Refills: 0 | Status: SHIPPED | OUTPATIENT
Start: 2020-10-17

## 2020-10-17 RX ORDER — HYDROCODONE BITARTRATE AND ACETAMINOPHEN 10; 325 MG/1; MG/1
1 TABLET ORAL ONCE
Status: COMPLETED | OUTPATIENT
Start: 2020-10-17 | End: 2020-10-17

## 2020-10-17 RX ADMIN — HYDROCODONE BITARTRATE AND ACETAMINOPHEN 1 TABLET: 10; 325 TABLET ORAL at 13:46

## 2020-10-17 NOTE — ED NOTES
Patient is refusing all blood work at this time. Provider has been made aware.      Dorina Augustine  10/17/20 0199

## 2020-10-17 NOTE — ED PROVIDER NOTES
Subjective   60 y/o female patient presents to the ED with complaints of chest pain that worsens with stretching or raising arms. Patient states that she has been visiting her ill brother in the hospital and was tiptoeing over the bed rail to see him when she lost her balance and fell on the rail. Since then she has had pain.        History provided by:  Patient   used: No    Chest Pain  Pain location:  Substernal area  Pain quality: pressure    Pain radiates to:  Does not radiate  Pain severity:  Moderate  Onset quality:  Gradual  Duration:  1 week  Timing:  Intermittent  Progression:  Worsening  Chronicity:  New  Context: not breathing, not drug use, not intercourse and not lifting    Relieved by:  Nothing  Worsened by:  Nothing  Ineffective treatments:  None tried  Associated symptoms: no abdominal pain, no AICD problem, no altered mental status, no back pain, no claudication, no dizziness, no dysphagia, no fatigue, no heartburn, no numbness, no PND and no shortness of breath    Risk factors: no birth control, no Melina-Danlos syndrome, no immobilization, not male, no Marfan's syndrome, not pregnant, no prior DVT/PE and no smoking        Review of Systems   Constitutional: Negative.  Negative for fatigue.   HENT: Negative for trouble swallowing.    Eyes: Negative.    Respiratory: Negative.  Negative for apnea, choking, chest tightness and shortness of breath.    Cardiovascular: Positive for chest pain. Negative for claudication, leg swelling and PND.   Gastrointestinal: Negative.  Negative for abdominal pain, anal bleeding, blood in stool, constipation and heartburn.   Endocrine: Negative.  Negative for cold intolerance and polydipsia.   Genitourinary: Negative.  Negative for difficulty urinating, dyspareunia, dysuria, enuresis, frequency and genital sores.   Musculoskeletal: Negative for arthralgias, back pain, gait problem, joint swelling and myalgias.   Neurological: Negative for dizziness  and numbness.   Hematological: Negative.  Negative for adenopathy. Does not bruise/bleed easily.   Psychiatric/Behavioral: Negative.    All other systems reviewed and are negative.      Past Medical History:   Diagnosis Date   • Chronic kidney disease (CKD), stage III (moderate) (CMS/HCC) 4/27/2020   • COPD (chronic obstructive pulmonary disease) (CMS/HCC)     non-oxygen dependent   • End stage liver disease (CMS/HCC) 4/27/2020   • Esophageal varices (CMS/[facility])    • GI bleed     history of GI bleed    • Hepatitis C 4/27/2020   • History of substance abuse (CMS/HCC) 4/27/2020   • Immunocompromised patient (CMS/HCC)    • Peptic ulceration    • Portal hypertension (CMS/HCC) 4/27/2020   • Splenomegaly 4/27/2020       Allergies   Allergen Reactions   • Doxycycline Rash and GI Bleeding   • Ibuprofen Anaphylaxis     Pt reports causes bleeding   • Iodinated Diagnostic Agents Anaphylaxis   • Prednisone Unknown - Low Severity     No Steroids causes shaking   • Zithromax [Azithromycin] Nausea And Vomiting   • Haldol [Haloperidol Lactate] Rash   • Latex Rash   • Penicillins Rash       Past Surgical History:   Procedure Laterality Date   • ESOPHAGEAL VARICES LIGATION     • UPPER GASTROINTESTINAL ENDOSCOPY         Family History   Problem Relation Age of Onset   • Hypertension Mother    • Colon cancer Mother    • Pneumonia Father    • Alzheimer's disease Father    • Stroke Father    • Lung cancer Brother    • Alzheimer's disease Maternal Grandfather    • Emphysema Paternal Grandfather        Social History     Socioeconomic History   • Marital status:      Spouse name: Not on file   • Number of children: Not on file   • Years of education: Not on file   • Highest education level: Not on file   Occupational History   • Occupation: Disabled   Tobacco Use   • Smoking status: Current Some Day Smoker     Packs/day: 0.50     Years: 35.00     Pack years: 17.50     Types: Cigarettes   • Smokeless tobacco: Never Used   Substance and  Sexual Activity   • Alcohol use: No   • Drug use: Not Currently     Comment: Prescribed Suboxone    • Sexual activity: Defer           Objective   Physical Exam  Vitals signs and nursing note reviewed.   Constitutional:       General: She is not in acute distress.     Appearance: She is well-developed. She is not ill-appearing, toxic-appearing or diaphoretic.   HENT:      Head: Normocephalic and atraumatic.   Eyes:      Extraocular Movements: Extraocular movements intact.      Pupils: Pupils are equal, round, and reactive to light.   Neck:      Musculoskeletal: Normal range of motion and neck supple.      Thyroid: No thyromegaly.      Vascular: No hepatojugular reflux or JVD.   Cardiovascular:      Rate and Rhythm: Normal rate and regular rhythm.  No extrasystoles are present.     Chest Wall: PMI is not displaced.      Pulses:           Carotid pulses are 2+ on the right side and 2+ on the left side.       Radial pulses are 2+ on the right side and 2+ on the left side.        Dorsalis pedis pulses are 2+ on the right side and 2+ on the left side.        Posterior tibial pulses are 2+ on the right side and 2+ on the left side.      Heart sounds: Heart sounds not distant. No murmur. No systolic murmur.   Pulmonary:      Effort: Pulmonary effort is normal. No tachypnea, accessory muscle usage or respiratory distress.      Breath sounds: Normal breath sounds. No stridor. No decreased breath sounds, wheezing, rhonchi or rales.   Chest:      Chest wall: Swelling present. No mass, deformity, tenderness or crepitus.      Comments: Mid sternal pain.   Abdominal:      General: Bowel sounds are normal. There is no abdominal bruit.      Palpations: Abdomen is soft. There is no fluid wave, hepatomegaly, splenomegaly or mass.      Tenderness: There is no abdominal tenderness.   Musculoskeletal: Normal range of motion.      Right lower leg: She exhibits no tenderness. No edema.      Left lower leg: She exhibits no tenderness. No  edema.   Lymphadenopathy:      Cervical: No cervical adenopathy.   Skin:     General: Skin is warm and dry.      Capillary Refill: Capillary refill takes less than 2 seconds.      Coloration: Skin is not cyanotic or pale.      Findings: No ecchymosis, erythema or rash.      Nails: There is no clubbing.     Neurological:      General: No focal deficit present.      Mental Status: She is alert.      Cranial Nerves: No cranial nerve deficit.      Motor: No weakness.   Psychiatric:         Mood and Affect: Mood normal. Mood is not anxious.         Behavior: Behavior normal. Behavior is not agitated.         Procedures           ED Course  ED Course as of Oct 17 1349   Sat Oct 17, 2020   1347 [facility] report [id number].  Patient has appointment with her PCP Monday morning 10 AM.  Vies to follow-up as scheduled.    []      ED Course User Index  [] Akbar Moe PA-C                                           [facility]    Final diagnoses:   Closed fracture of sternum, unspecified portion of sternum, initial encounter            Akbar Moe PA-C  10/17/20 1344

## 2020-10-17 NOTE — ED NOTES
Discharge instructions reviewed with patient, patient instructed to return to ED if symptoms worsen or if any new problems arise. Patient verbalizes understanding of discharge instructions, patient ambulatory out of ED. No acute distress noted.     Ashley Araya RN  10/17/20 1926

## 2020-11-01 ENCOUNTER — HOSPITAL ENCOUNTER (EMERGENCY)
Facility: HOSPITAL | Age: 60
Discharge: HOME | End: 2020-11-01
Attending: EMERGENCY MEDICINE | Admitting: EMERGENCY MEDICINE

## 2020-11-01 VITALS
WEIGHT: 220 LBS | TEMPERATURE: 97.8 F | OXYGEN SATURATION: 98 % | HEART RATE: 74 BPM | BODY MASS INDEX: 33.34 KG/M2 | DIASTOLIC BLOOD PRESSURE: 69 MMHG | HEIGHT: 68 IN | RESPIRATION RATE: 18 BRPM | SYSTOLIC BLOOD PRESSURE: 118 MMHG

## 2020-11-01 DIAGNOSIS — R11.0 NAUSEA: Primary | ICD-10-CM

## 2020-11-01 PROCEDURE — 99283 EMERGENCY DEPT VISIT LOW MDM: CPT

## 2020-11-01 PROCEDURE — 63710000001 ONDANSETRON ODT 4 MG TABLET DISPERSIBLE: Performed by: PHYSICIAN ASSISTANT

## 2020-11-01 RX ORDER — ONDANSETRON 4 MG/1
4 TABLET, FILM COATED ORAL EVERY 8 HOURS PRN
Qty: 21 TABLET | Refills: 0 | Status: SHIPPED | OUTPATIENT
Start: 2020-11-01 | End: 2020-11-08

## 2020-11-01 RX ORDER — ONDANSETRON 4 MG/1
4 TABLET, ORALLY DISINTEGRATING ORAL ONCE
Status: COMPLETED | OUTPATIENT
Start: 2020-11-01 | End: 2020-11-01

## 2020-11-01 RX ADMIN — ONDANSETRON 4 MG: 4 TABLET, ORALLY DISINTEGRATING ORAL at 18:15

## 2020-11-01 NOTE — ED PROVIDER NOTES
Subjective   This is a 59 year old female patient who presents to the ER with nausea. She says that she is taking bactrim for a dental abscess and she was outside of her home and took her bactrim and became nauseous. She says she knows better than to do that because every medication she takes makes her nauseous. She is requesting zofran. She also doesn't have a prescription at home. She denies associated abdominal pain, vomiting, and fever.           Review of Systems   Constitutional: Negative.  Negative for fever.   HENT: Negative.    Respiratory: Negative.    Cardiovascular: Negative.  Negative for chest pain.   Gastrointestinal: Positive for nausea. Negative for abdominal distention, abdominal pain, anal bleeding, blood in stool, constipation, diarrhea, rectal pain and vomiting.   Endocrine: Negative.    Genitourinary: Negative.  Negative for dysuria.   Skin: Negative.    Neurological: Negative.    Psychiatric/Behavioral: Negative.    All other systems reviewed and are negative.      Past Medical History:   Diagnosis Date   • Chronic kidney disease (CKD), stage III (moderate) (CMS/HCC) 4/27/2020   • COPD (chronic obstructive pulmonary disease) (CMS/HCC)     non-oxygen dependent   • End stage liver disease (CMS/HCC) 4/27/2020   • Esophageal varices (CMS/HCC)    • GI bleed     history of GI bleed    • Hepatitis C 4/27/2020   • History of substance abuse (CMS/HCC) 4/27/2020   • Immunocompromised patient (CMS/HCC)    • Peptic ulceration    • Portal hypertension (CMS/HCC) 4/27/2020   • Splenomegaly 4/27/2020       Allergies   Allergen Reactions   • Doxycycline Rash and GI Bleeding   • Ibuprofen Anaphylaxis     Pt reports causes bleeding   • Iodinated Diagnostic Agents Anaphylaxis   • Prednisone Unknown - Low Severity     No Steroids causes shaking   • Zithromax [Azithromycin] Nausea And Vomiting   • Haldol [Haloperidol Lactate] Rash   • Latex Rash   • Penicillins Rash       Past Surgical History:   Procedure  Laterality Date   • ESOPHAGEAL VARICES LIGATION     • UPPER GASTROINTESTINAL ENDOSCOPY         Family History   Problem Relation Age of Onset   • Hypertension Mother    • Colon cancer Mother    • Pneumonia Father    • Alzheimer's disease Father    • Stroke Father    • Lung cancer Brother    • Alzheimer's disease Maternal Grandfather    • Emphysema Paternal Grandfather        Social History     Socioeconomic History   • Marital status:      Spouse name: Not on file   • Number of children: Not on file   • Years of education: Not on file   • Highest education level: Not on file   Occupational History   • Occupation: Disabled   Tobacco Use   • Smoking status: Current Some Day Smoker     Packs/day: 0.50     Years: 35.00     Pack years: 17.50     Types: Cigarettes   • Smokeless tobacco: Never Used   Substance and Sexual Activity   • Alcohol use: No   • Drug use: Not Currently     Comment: Prescribed Suboxone    • Sexual activity: Defer           Objective   Physical Exam  Vitals signs and nursing note reviewed.   Constitutional:       General: She is not in acute distress.     Appearance: She is well-developed. She is not diaphoretic.   HENT:      Head: Normocephalic and atraumatic.      Right Ear: External ear normal.      Left Ear: External ear normal.      Nose: Nose normal.   Eyes:      Conjunctiva/sclera: Conjunctivae normal.      Pupils: Pupils are equal, round, and reactive to light.   Neck:      Musculoskeletal: Normal range of motion and neck supple.      Vascular: No JVD.      Trachea: No tracheal deviation.   Cardiovascular:      Rate and Rhythm: Normal rate and regular rhythm.      Heart sounds: Normal heart sounds. No murmur.   Pulmonary:      Effort: Pulmonary effort is normal. No respiratory distress.      Breath sounds: Normal breath sounds. No wheezing.   Abdominal:      General: Bowel sounds are normal. There is no distension.      Palpations: Abdomen is soft. There is no mass.      Tenderness:  There is no abdominal tenderness. There is no guarding or rebound.      Hernia: No hernia is present.   Musculoskeletal: Normal range of motion.         General: No deformity.   Skin:     General: Skin is warm and dry.      Coloration: Skin is not pale.      Findings: No erythema or rash.   Neurological:      Mental Status: She is alert and oriented to person, place, and time.      Cranial Nerves: No cranial nerve deficit.   Psychiatric:         Behavior: Behavior normal.         Thought Content: Thought content normal.         Procedures           ED Course  ED Course as of Nov 01 1810   Sun Nov 01, 2020 1757 Patient diagnosed with nausea. Will be d/c home with rx for zofran. Will f/u with PCP in 2 days or return to ER if symptoms worsen.     [MM]      ED Course User Index  [MM] Carly Guerrero PA                                           [facility]  Number of Diagnoses or Management Options  Nausea:       Final diagnoses:   Nausea            Carly Guerrero PA  11/01/20 1758       Carly Guerrero PA  11/01/20 1810

## 2020-11-01 NOTE — DISCHARGE INSTRUCTIONS
Please utilize your zofran at home and follow up with one of the following PCPs in 2 days or return to ER if symptoms worsen:    Call one of the offices below to establish a primary care provider.  If you are unable to get an appointment and feel it is an emergency and need to be seen immediately please return to the Emergency Department.    Call one of the office below to set up a primary care provider.    Dr. Jose Doty                                                                                                       [address] [facility] [address]  [phone]    Dr. Ferris, Dr. LAISHA Toledo, Dr. THEO Toledo ([facility])  [address] [facility] [address]  [phone]    Dr. Viera, Dr. Fernando, Dr. Raza ([facility])  [address] [facility] [address]  [phone]    Dr. Banerjee  [address] [facility] [address]  [phone]    Dr. Ware, Dr. Carmona, Dr. Olvera, Dr. Alford ([facility])  [address] [place] [address] [address]  [facility] [address]  [phone]    Dr. Ju Huang  [address] [facility] [address]  [phone]    Dr. Sabrina Arreola  [address]   [address]  [facility] [address]  [phone]    Dr. Doty  [address] [facility] [address]  [phone]    Dr. West, Dr. Parekh  [address] [facility] [address] [place] [address]  [phone]    Dr. Rocha  [address]                                                              [address]  [facility] [address]  [phone]    Dr. Wellington  [address] [facility] [address]  [phone]    Dr. Bhavna Slaughter  [address]  [address]  [facility] [address]  [phone]

## 2020-11-10 ENCOUNTER — TELEPHONE (OUTPATIENT)
Dept: ONCOLOGY | Facility: CLINIC | Age: 60
End: 2020-11-10

## 2020-11-10 NOTE — TELEPHONE ENCOUNTER
Called and spoke with patient.  Advised her of below with Amanda.  Encouraged her to call pcp office to get an appt.  Patient verbalized understanding.

## 2020-11-10 NOTE — TELEPHONE ENCOUNTER
She sees Dr. Haile for pancytopenia. She was in the ED a few days ago, who advised her to follow up with PCP if nausea has not resolved. She needs to follow up with PCP for further evaluation.      Thanks,     Carmina

## 2020-11-25 ENCOUNTER — HOSPITAL ENCOUNTER (EMERGENCY)
Facility: HOSPITAL | Age: 60
Discharge: HOME | End: 2020-11-25
Attending: EMERGENCY MEDICINE | Admitting: EMERGENCY MEDICINE

## 2020-11-25 VITALS
DIASTOLIC BLOOD PRESSURE: 83 MMHG | HEART RATE: 88 BPM | TEMPERATURE: 97.5 F | HEIGHT: 67 IN | OXYGEN SATURATION: 96 % | SYSTOLIC BLOOD PRESSURE: 165 MMHG | RESPIRATION RATE: 19 BRPM | BODY MASS INDEX: 29.82 KG/M2 | WEIGHT: 190 LBS

## 2020-11-25 DIAGNOSIS — R11.0 NAUSEA: Primary | ICD-10-CM

## 2020-11-25 PROCEDURE — 63710000001 ONDANSETRON PER 8 MG: Performed by: PHYSICIAN ASSISTANT

## 2020-11-25 PROCEDURE — 99283 EMERGENCY DEPT VISIT LOW MDM: CPT

## 2020-11-25 RX ORDER — ONDANSETRON 4 MG/1
8 TABLET, FILM COATED ORAL ONCE
Status: COMPLETED | OUTPATIENT
Start: 2020-11-25 | End: 2020-11-25

## 2020-11-25 RX ORDER — ONDANSETRON HYDROCHLORIDE 8 MG/1
8 TABLET, FILM COATED ORAL EVERY 8 HOURS PRN
Qty: 90 TABLET | Refills: 1 | Status: SHIPPED | OUTPATIENT
Start: 2020-11-25 | End: 2021-07-22

## 2020-11-25 RX ADMIN — ONDANSETRON HYDROCHLORIDE 8 MG: 4 TABLET, FILM COATED ORAL at 17:48

## 2020-12-22 ENCOUNTER — HOSPITAL ENCOUNTER (EMERGENCY)
Facility: HOSPITAL | Age: 60
Discharge: HOME | End: 2020-12-22
Attending: FAMILY MEDICINE | Admitting: STUDENT IN AN ORGANIZED HEALTH CARE EDUCATION/TRAINING PROGRAM

## 2020-12-22 ENCOUNTER — APPOINTMENT (OUTPATIENT)
Dept: GENERAL RADIOLOGY | Facility: HOSPITAL | Age: 60
End: 2020-12-22

## 2020-12-22 VITALS
TEMPERATURE: 98.7 F | RESPIRATION RATE: 18 BRPM | WEIGHT: 200 LBS | SYSTOLIC BLOOD PRESSURE: 135 MMHG | HEART RATE: 77 BPM | OXYGEN SATURATION: 97 % | HEIGHT: 67 IN | BODY MASS INDEX: 31.39 KG/M2 | DIASTOLIC BLOOD PRESSURE: 54 MMHG

## 2020-12-22 DIAGNOSIS — R09.1 PLEURISY: Primary | ICD-10-CM

## 2020-12-22 PROCEDURE — 71101 X-RAY EXAM UNILAT RIBS/CHEST: CPT | Performed by: RADIOLOGY

## 2020-12-22 PROCEDURE — 99283 EMERGENCY DEPT VISIT LOW MDM: CPT

## 2020-12-22 PROCEDURE — 71101 X-RAY EXAM UNILAT RIBS/CHEST: CPT

## 2020-12-22 RX ORDER — ACETAMINOPHEN AND CODEINE PHOSPHATE 300; 30 MG/1; MG/1
1 TABLET ORAL EVERY 6 HOURS PRN
Qty: 12 TABLET | Refills: 0 | Status: SHIPPED | OUTPATIENT
Start: 2020-12-22

## 2020-12-22 RX ORDER — HYDROCODONE BITARTRATE AND ACETAMINOPHEN 5; 325 MG/1; MG/1
1 TABLET ORAL ONCE
Status: COMPLETED | OUTPATIENT
Start: 2020-12-22 | End: 2020-12-22

## 2020-12-22 RX ADMIN — HYDROCODONE BITARTRATE AND ACETAMINOPHEN 1 TABLET: 5; 325 TABLET ORAL at 17:42

## 2020-12-22 NOTE — ED PROVIDER NOTES
Subjective     History provided by:  Patient  Chest Pain  Pain location:  L lateral chest  Pain quality: aching    Pain severity:  Moderate  Onset quality:  Sudden  Duration:  2 days  Timing:  Constant  Progression:  Worsening  Chronicity:  New  Relieved by:  Nothing  Worsened by:  Deep breathing  Ineffective treatments:  Certain positions  Associated symptoms: no abdominal pain and no fever        Review of Systems   Constitutional: Negative.  Negative for fever.   HENT: Negative.    Respiratory: Negative.    Cardiovascular: Positive for chest pain.   Gastrointestinal: Negative.  Negative for abdominal pain.   Endocrine: Negative.    Genitourinary: Negative.  Negative for dysuria.   Skin: Negative.    Neurological: Negative.    Psychiatric/Behavioral: Negative.    All other systems reviewed and are negative.      Past Medical History:   Diagnosis Date   • Chronic kidney disease (CKD), stage III (moderate) (CMS/HCC) 4/27/2020   • COPD (chronic obstructive pulmonary disease) (CMS/HCC)     non-oxygen dependent   • End stage liver disease (CMS/HCC) 4/27/2020   • Esophageal varices (CMS/HCC)    • GI bleed     history of GI bleed    • Hepatitis C 4/27/2020   • History of substance abuse (CMS/HCC) 4/27/2020   • Immunocompromised patient (CMS/HCC)    • Peptic ulceration    • Portal hypertension (CMS/HCC) 4/27/2020   • Splenomegaly 4/27/2020       Allergies   Allergen Reactions   • Doxycycline Rash and GI Bleeding   • Ibuprofen Anaphylaxis     Pt reports causes bleeding   • Iodinated Diagnostic Agents Anaphylaxis   • Prednisone Unknown - Low Severity     No Steroids causes shaking   • Zithromax [Azithromycin] Nausea And Vomiting   • Haldol [Haloperidol Lactate] Rash   • Latex Rash   • Penicillins Rash       Past Surgical History:   Procedure Laterality Date   • ESOPHAGEAL VARICES LIGATION     • UPPER GASTROINTESTINAL ENDOSCOPY         Family History   Problem Relation Age of Onset   • Hypertension Mother    • Colon cancer  Mother    • Pneumonia Father    • Alzheimer's disease Father    • Stroke Father    • Lung cancer Brother    • Alzheimer's disease Maternal Grandfather    • Emphysema Paternal Grandfather        Social History     Socioeconomic History   • Marital status:      Spouse name: Not on file   • Number of children: Not on file   • Years of education: Not on file   • Highest education level: Not on file   Occupational History   • Occupation: Disabled   Tobacco Use   • Smoking status: Current Some Day Smoker     Packs/day: 0.50     Years: 35.00     Pack years: 17.50     Types: Cigarettes   • Smokeless tobacco: Never Used   Substance and Sexual Activity   • Alcohol use: No   • Drug use: Not Currently     Comment: Prescribed Suboxone    • Sexual activity: Defer           Objective   Physical Exam  Vitals signs and nursing note reviewed.   Constitutional:       General: She is not in acute distress.     Appearance: She is well-developed. She is not diaphoretic.   HENT:      Head: Normocephalic and atraumatic.      Right Ear: External ear normal.      Left Ear: External ear normal.      Nose: Nose normal.   Eyes:      Conjunctiva/sclera: Conjunctivae normal.   Neck:      Musculoskeletal: Normal range of motion and neck supple.      Vascular: No JVD.      Trachea: No tracheal deviation.   Cardiovascular:      Rate and Rhythm: Normal rate and regular rhythm.      Heart sounds: No murmur.   Pulmonary:      Effort: Pulmonary effort is normal. No respiratory distress.      Breath sounds: No wheezing.   Chest:      Chest wall: Tenderness (left lateral chest wall. ) present.   Abdominal:      Palpations: Abdomen is soft.      Tenderness: There is no abdominal tenderness.   Musculoskeletal: Normal range of motion.         General: No deformity.   Skin:     General: Skin is warm and dry.      Coloration: Skin is not pale.      Findings: No erythema or rash.   Neurological:      Mental Status: She is alert and oriented to person,  place, and time.      Cranial Nerves: No cranial nerve deficit.   Psychiatric:         Behavior: Behavior normal.         Thought Content: Thought content normal.         Procedures           ED Course  ED Course as of Dec 22 1831   Tue Dec 22, 2020   1822 XR ribs rad interpreted:   No acute findings in the chest or left ribs.    [RB]      ED Course User Index  [RB] Nolberto Negrete II, PA                                           MDM  Number of Diagnoses or Management Options  Pleurisy: new and requires workup     Amount and/or Complexity of Data Reviewed  Tests in the radiology section of CPT®: ordered and reviewed    Risk of Complications, Morbidity, and/or Mortality  Presenting problems: low  Diagnostic procedures: low  Management options: low    Patient Progress  Patient progress: stable      Final diagnoses:   Pleurisy            Nolberto Negrete II, PA  12/22/20 1831

## 2021-07-16 ENCOUNTER — TELEPHONE (OUTPATIENT)
Dept: ONCOLOGY | Facility: CLINIC | Age: 61
End: 2021-07-16

## 2021-07-16 NOTE — TELEPHONE ENCOUNTER
Caller: PT    Relationship to patient: SELF    Best call back number: [phone]    Chief complaint: LETTER RECEIVED    Type of visit: F/U    Requested date: LAST WEEK OF MONTH    Additional notes:PT RECEIVED A LETTER FROM [facility] AND WOULD LIKE TO SCJEDULE AN APPT WITH DR CROWDER

## 2021-07-22 ENCOUNTER — OFFICE VISIT (OUTPATIENT)
Dept: FAMILY MEDICINE CLINIC | Facility: CLINIC | Age: 61
End: 2021-07-22

## 2021-07-22 VITALS
BODY MASS INDEX: 31.98 KG/M2 | TEMPERATURE: 97.6 F | OXYGEN SATURATION: 99 % | SYSTOLIC BLOOD PRESSURE: 126 MMHG | HEART RATE: 89 BPM | HEIGHT: 68 IN | WEIGHT: 211 LBS | DIASTOLIC BLOOD PRESSURE: 74 MMHG

## 2021-07-22 DIAGNOSIS — R11.0 NAUSEA: Primary | ICD-10-CM

## 2021-07-22 DIAGNOSIS — K52.9 GASTROENTERITIS: ICD-10-CM

## 2021-07-22 PROCEDURE — 99213 OFFICE O/P EST LOW 20 MIN: CPT | Performed by: NURSE PRACTITIONER

## 2021-07-22 RX ORDER — HYDROXYZINE 50 MG/1
TABLET, FILM COATED ORAL
COMMUNITY
Start: 2021-06-14

## 2021-07-22 RX ORDER — BUPRENORPHINE HYDROCHLORIDE AND NALOXONE HYDROCHLORIDE DIHYDRATE 8; 2 MG/1; MG/1
TABLET SUBLINGUAL
COMMUNITY
Start: 2021-07-14

## 2021-07-22 RX ORDER — ONDANSETRON HYDROCHLORIDE 8 MG/1
8 TABLET, FILM COATED ORAL EVERY 8 HOURS PRN
Qty: 90 TABLET | Refills: 0 | Status: SHIPPED | OUTPATIENT
Start: 2021-07-22

## 2021-07-22 NOTE — PATIENT INSTRUCTIONS
Nausea, Adult  Nausea is the feeling that you have an upset stomach or that you are about to vomit. Nausea on its own is not usually a serious concern, but it may be an early sign of a more serious medical problem. As nausea gets worse, it can lead to vomiting. If vomiting develops, or if you are not able to drink enough fluids, you are at risk of becoming dehydrated. Dehydration can make you tired and thirsty, cause you to have a dry mouth, and decrease how often you urinate. Older adults and people with other diseases or a weak disease-fighting system (immune system) are at higher risk for dehydration. The main goals of treating your nausea are:  · To relieve your nausea.  · To limit repeated nausea episodes.  · To prevent vomiting and dehydration.  Follow these instructions at home:  Watch your symptoms for any changes. Tell your health care provider about them. Follow these instructions as told by your health care provider.  Eating and drinking         · Take an oral rehydration solution (ORS). This is a drink that is sold at pharmacies and retail stores.  · Drink clear fluids slowly and in small amounts as you are able. Clear fluids include water, ice chips, low-calorie sports drinks, and fruit juice that has water added (diluted fruit juice).  · Eat bland, easy-to-digest foods in small amounts as you are able. These foods include bananas, applesauce, rice, lean meats, toast, and crackers.  · Avoid drinking fluids that contain a lot of sugar or caffeine, such as energy drinks, sports drinks, and soda.  · Avoid alcohol.  · Avoid spicy or fatty foods.  General instructions  · Take over-the-counter and prescription medicines only as told by your health care provider.  · Rest at home while you recover.  · Drink enough fluid to keep your urine pale yellow.  · Breathe slowly and deeply when you feel nauseous.  · Avoid smelling things that have strong odors.  · Wash your hands often using soap and water. If soap and  water are not available, use hand .  · Make sure that all people in your household wash their hands well and often.  · Keep all follow-up visits as told by your health care provider. This is important.  Contact a health care provider if:  · Your nausea gets worse.  · Your nausea does not go away after two days.  · You vomit.  · You cannot drink fluids without vomiting.  · You have any of the following:  ? New symptoms.  ? A fever.  ? A headache.  ? Muscle cramps.  ? A rash.  ? Pain while urinating.  · You feel light-headed or dizzy.  Get help right away if:  · You have pain in your chest, neck, arm, or jaw.  · You feel extremely weak or you faint.  · You have vomit that is bright red or looks like coffee grounds.  · You have bloody or black stools or stools that look like tar.  · You have a severe headache, a stiff neck, or both.  · You have severe pain, cramping, or bloating in your abdomen.  · You have difficulty breathing or are breathing very quickly.  · Your heart is beating very quickly.  · Your skin feels cold and clammy.  · You feel confused.  · You have signs of dehydration, such as:  ? Dark urine, very little urine, or no urine.  ? Cracked lips.  ? Dry mouth.  ? Sunken eyes.  ? Sleepiness.  ? Weakness.  These symptoms may represent a serious problem that is an emergency. Do not wait to see if the symptoms will go away. Get medical help right away. Call your local emergency services (911 in the [place] Do not drive yourself to the hospital.  Summary  · Nausea is the feeling that you have an upset stomach or that you are about to vomit. Nausea on its own is not usually a serious concern, but it may be an early sign of a more serious medical problem.  · If vomiting develops, or if you are not able to drink enough fluids, you are at risk of becoming dehydrated.  · Follow recommendations for eating and drinking and take over-the-counter and prescription medicines only as told by your health care  provider.  · Contact a health care provider right away if your symptoms worsen or you have new symptoms.  · Keep all follow-up visits as told by your health care provider. This is important.  This information is not intended to replace advice given to you by your health care provider. Make sure you discuss any questions you have with your health care provider.  Document Revised: 11/17/2020 Document Reviewed: 05/28/2019  [employer] Patient Education © 2021 [employer]    Esophageal Varices    Esophageal varices are enlarged veins in the part of the body that moves food from the mouth to the stomach (esophagus). They develop when extra blood is forced to flow through these veins because the blood's normal pathway is blocked. Without treatment, esophageal varices eventually break and bleed (hemorrhage), which can be life-threatening.  What are the causes?  This condition may be caused by:  · Scarring of the liver (cirrhosis) due to alcoholism. This is the most common cause.  · Long-term (chronic) liver disease.  · Severe heart failure.  · A blood clot in a vein that supplies the liver (portal vein).  · A disease that causes inflammation in the organs and other body areas (sarcoidosis).  · A parasitic infection that can cause liver damage (schistosomiasis).  What are the signs or symptoms?  Esophageal varices usually do not cause symptoms unless they start to bleed. Symptoms of bleeding esophageal varices include:  · Vomiting material that is bright red or that is black and looks like coffee grounds.  · Coughing up blood.  · Stools (feces) that look black and tarry.  · Dizziness or light-headedness.  · Low blood pressure.  · Loss of consciousness.  How is this diagnosed?  This condition is diagnosed with a procedure called endoscopy. During endoscopy, your health care provider uses a flexible tube with a small camera on the end of it (endoscope) to look down your throat and examine your esophagus.  You may also have  other tests, including:  · Imaging tests such as a CT scan or ultrasound.  · Blood tests.  How is this treated?  This condition may be treated with medicines or procedures that reduce pressure in the varices and reduce the risk of bleeding. Medicines are usually used for varices that are not bleeding. Procedures that may be done for bleeding varices include:  · Placing an elastic band around the varices to keep them from bleeding (variceal ligation).  · Replacing blood that you have lost due to bleeding. This may include getting a transfusion of blood or parts of blood, such as platelets or clotting factors.  · You may be given antibiotic medicine to help prevent infection.  · Getting an injection that causes the varices to shrink and close (sclerotherapy). You may also be given medicines that tighten (constrict) blood vessels or change blood flow.  · Placing a tube into your esophagus and then passing a balloon through the tube and inflating the balloon (balloon tamponade). The balloon applies pressure to the bleeding veins to help stop the bleeding.  · Placing a small tube within the veins in the liver (transjugular intrahepatic portosystemic shunt, TIPS). This decreases blood flow and pressure in the esophageal varices.  If other treatments do not work, you may need a liver transplant.  Follow these instructions at home:  · Take over-the-counter and prescription medicines only as told by your health care provider.  · If you were prescribed an antibiotic medicine, take it as told by your health care provider. Do not stop taking the antibiotic even if you start to feel better.  · Do not take any NSAIDs (such as aspirin or ibuprofen) before first getting approval from your health care provider.  · Do not drink alcohol.  · Return to your normal activities as told by your health care provider. Ask your health care provider what activities are safe for you.  · Keep all follow-up visits as told by your health care  provider. This is important.  Contact a health care provider if:  · You have abdominal pain.  · You are unable to eat or drink.  Get help right away if:  · You have blood in your stool or vomit.  · You have stools that look black or tarry.  · You have chest pain.  · You feel dizzy or have low blood pressure.  · You lose consciousness.  These symptoms may represent a serious problem that is an emergency. Do not wait to see if the symptoms will go away. Get medical help right away. Call your local emergency services (911 in the [place] Do not drive yourself to the hospital.  Summary  · Esophageal varices are enlarged veins in the esophagus, the part of your body that moves food from your mouth to your stomach.  · Without treatment, esophageal varices eventually break and bleed (hemorrhage), which can be life-threatening.  · Esophageal varices usually do not cause symptoms unless they start to bleed.  · Keep all follow-up visits as told by your health care provider. This is important.  This information is not intended to replace advice given to you by your health care provider. Make sure you discuss any questions you have with your health care provider.  Document Revised: 11/30/2018 Document Reviewed: 09/19/2018  [employer] Patient Education © 2021 [employer]    Pt informed to go to ER immediately if symptoms worsen or if she beings vomiting.  Pt also educated in the necessity to follow up with her primary care physician and gastroenterologist for further testing/care.

## 2021-07-22 NOTE — PROGRESS NOTES
"Chief Complaint  Abdominal pain, nausea      Subjective          Phuong Tuttle presents to [facility] for acute care (abd pain/nausea).    Abdominal Pain  This is a recurrent problem. The current episode started more than 1 year ago. The onset quality is gradual. The problem occurs intermittently. The problem has been waxing and waning. The pain is located in the generalized abdominal region and epigastric region. The pain is at a severity of 8/10. The pain is moderate. The quality of the pain is aching. The abdominal pain does not radiate. Associated symptoms include constipation and nausea. Pertinent negatives include no anorexia, belching, dysuria, flatus, frequency, hematochezia, hematuria or melena. Nothing aggravates the pain. Relieved by: metamucil. Treatments tried: metamucil. The treatment provided mild relief. Prior diagnostic workup includes GI consult. Her past medical history is significant for GERD, irritable bowel syndrome and ulcerative colitis. pt has a history of esophageal varicies   Nausea  This is a recurrent problem. The current episode started more than 1 year ago. The problem occurs intermittently. The problem has been waxing and waning. Associated symptoms include nausea. Pertinent negatives include no anorexia, change in bowel habit, congestion, diaphoresis, fatigue, joint swelling, neck pain, numbness, rash, sore throat, swollen glands, urinary symptoms, vertigo, visual change or weakness. Nothing aggravates the symptoms. She has tried nothing for the symptoms.       Objective   Vital Signs:   /74 (BP Location: Right arm, Patient Position: Sitting, Cuff Size: Adult)   Pulse 89   Temp 97.6 °F (36.4 °C)   Ht 172.7 cm (68\")   Wt 95.7 kg (211 lb)   SpO2 99%   BMI 32.08 kg/m²     Physical Exam  Vitals and nursing note reviewed.   Constitutional:       General: She is awake.      Appearance: Normal appearance.      Comments: Pt appears to be " intoxicated   HENT:      Head: Normocephalic.      Mouth/Throat:      Lips: Pink.      Mouth: Mucous membranes are moist.   Eyes:      Pupils: Pupils are equal, round, and reactive to light.   Cardiovascular:      Rate and Rhythm: Normal rate and regular rhythm.      Heart sounds: Normal heart sounds.      Comments: Trace edema - BLE  Pulmonary:      Effort: Pulmonary effort is normal.      Breath sounds: Normal breath sounds.   Abdominal:      General: Abdomen is protuberant. Bowel sounds are normal.      Palpations: Abdomen is soft.      Tenderness: There is generalized abdominal tenderness and tenderness in the epigastric area. There is no guarding.   Musculoskeletal:         General: Normal range of motion.      Cervical back: Normal range of motion.   Feet:      Comments: Dark brown skin noted to BLE/feet  Skin:     General: Skin is warm and dry.      Capillary Refill: Capillary refill takes less than 2 seconds.   Neurological:      Mental Status: She is oriented to person, place, and time. She is lethargic.   Psychiatric:         Attention and Perception: Attention normal.         Mood and Affect: Affect is flat.         Speech: Speech is delayed and slurred.         Behavior: Behavior is slowed.        Result Review :   The following data was reviewed by: HARIKA Lyn on 07/22/2021:              Assessment and Plan    Diagnoses and all orders for this visit:    1. Nausea (Primary)  -     ondansetron (Zofran) 8 MG tablet; Take 1 tablet by mouth Every 8 (Eight) Hours As Needed for Nausea or Vomiting.  Dispense: 90 tablet; Refill: 0    2. Gastroenteritis  -     ondansetron (Zofran) 8 MG tablet; Take 1 tablet by mouth Every 8 (Eight) Hours As Needed for Nausea or Vomiting.  Dispense: 90 tablet; Refill: 0        Follow Up   Return if symptoms worsen or fail to improve.  Patient was given instructions and counseling regarding her condition or for health maintenance advice. Please see specific information  pulled into the AVS if appropriate.       This document has been electronically signed by HARIKA Lyn  July 22, 2021 12:38 EDT     Patient is here for an urgent care/acute visit.  Patient has an established, non-S Primary Care Provider.

## 2021-08-02 DIAGNOSIS — D73.9 SPLENIC PANCYTOPENIA SYNDROME (HCC): Primary | ICD-10-CM

## 2021-08-02 DIAGNOSIS — D61.818 SPLENIC PANCYTOPENIA SYNDROME (HCC): Primary | ICD-10-CM

## 2021-08-19 ENCOUNTER — TELEPHONE (OUTPATIENT)
Dept: ONCOLOGY | Facility: CLINIC | Age: 61
End: 2021-08-19

## 2021-08-19 NOTE — TELEPHONE ENCOUNTER
Caller: KATLYN MARROQUIN    Relationship to patient: SELF    Best call back number: [phone]    Chief complaint: PT MISSED HER APPT, AND IS CALLING TO R/S    Type of visit: FOLLOW UP    Requested date: ASAP    If rescheduling, when is the original appointment: 08/04/21

## 2022-04-16 LAB — WHOLE BLOOD HOLD SPECIMEN: NORMAL

## 2023-07-11 NOTE — TELEPHONE ENCOUNTER
Patient is having stomach issue.  She is feeling very naseous.  She can't eat or take her medication.    She is wondering if Dr. Haile will call in Zofran for her.    Send to [facility] in [place]    Please call patient at- [phone]   No

## 2023-09-22 ENCOUNTER — OFFICE VISIT (OUTPATIENT)
Dept: INTERNAL MEDICINE | Facility: CLINIC | Age: 63
End: 2023-09-22
Payer: MEDICAID

## 2023-09-22 ENCOUNTER — LAB (OUTPATIENT)
Dept: INTERNAL MEDICINE | Facility: CLINIC | Age: 63
End: 2023-09-22
Payer: MEDICAID

## 2023-09-22 VITALS
SYSTOLIC BLOOD PRESSURE: 102 MMHG | DIASTOLIC BLOOD PRESSURE: 48 MMHG | WEIGHT: 177 LBS | HEART RATE: 74 BPM | TEMPERATURE: 97.8 F | BODY MASS INDEX: 30.22 KG/M2 | OXYGEN SATURATION: 94 % | HEIGHT: 64 IN

## 2023-09-22 DIAGNOSIS — R05.9 COUGH, UNSPECIFIED TYPE: Primary | ICD-10-CM

## 2023-09-22 DIAGNOSIS — I73.9 PAD (PERIPHERAL ARTERY DISEASE): ICD-10-CM

## 2023-09-22 DIAGNOSIS — Z79.899 CHRONIC PRESCRIPTION BENZODIAZEPINE USE: ICD-10-CM

## 2023-09-22 DIAGNOSIS — F41.1 GENERALIZED ANXIETY DISORDER: ICD-10-CM

## 2023-09-22 DIAGNOSIS — K74.60 CIRRHOSIS OF LIVER WITHOUT ASCITES, UNSPECIFIED HEPATIC CIRRHOSIS TYPE: ICD-10-CM

## 2023-09-22 DIAGNOSIS — R11.0 NAUSEA: ICD-10-CM

## 2023-09-22 DIAGNOSIS — G47.9 DIFFICULTY SLEEPING: ICD-10-CM

## 2023-09-22 DIAGNOSIS — F41.1 GENERALIZED ANXIETY DISORDER: Primary | ICD-10-CM

## 2023-09-22 PROCEDURE — 36415 COLL VENOUS BLD VENIPUNCTURE: CPT | Performed by: STUDENT IN AN ORGANIZED HEALTH CARE EDUCATION/TRAINING PROGRAM

## 2023-09-22 PROCEDURE — 99214 OFFICE O/P EST MOD 30 MIN: CPT | Performed by: STUDENT IN AN ORGANIZED HEALTH CARE EDUCATION/TRAINING PROGRAM

## 2023-09-22 RX ORDER — BUDESONIDE AND FORMOTEROL FUMARATE DIHYDRATE 160; 4.5 UG/1; UG/1
2 AEROSOL RESPIRATORY (INHALATION)
Qty: 10.2 G | Refills: 0 | Status: SHIPPED | OUTPATIENT
Start: 2023-09-22

## 2023-09-22 RX ORDER — METHOCARBAMOL 750 MG/1
750 TABLET, FILM COATED ORAL 3 TIMES DAILY
Qty: 30 TABLET | Refills: 0 | Status: SHIPPED | OUTPATIENT
Start: 2023-09-22

## 2023-09-22 RX ORDER — ONDANSETRON 4 MG/1
4 TABLET, ORALLY DISINTEGRATING ORAL EVERY 8 HOURS PRN
Qty: 30 TABLET | Refills: 0 | Status: SHIPPED | OUTPATIENT
Start: 2023-09-22

## 2023-09-22 RX ORDER — POTASSIUM CHLORIDE 750 MG/1
10 TABLET, FILM COATED, EXTENDED RELEASE ORAL 2 TIMES DAILY
Qty: 90 TABLET | Refills: 1 | Status: SHIPPED | OUTPATIENT
Start: 2023-09-22

## 2023-09-22 RX ORDER — POTASSIUM CHLORIDE 750 MG/1
10 TABLET, FILM COATED, EXTENDED RELEASE ORAL 2 TIMES DAILY
COMMUNITY
Start: 2023-09-18 | End: 2023-09-22 | Stop reason: SDUPTHER

## 2023-09-22 RX ORDER — CALCIUM CARBONATE/VITAMIN D3 500 MG-10
TABLET ORAL
COMMUNITY
Start: 2023-06-10 | End: 2023-09-22

## 2023-09-22 RX ORDER — IPRATROPIUM BROMIDE AND ALBUTEROL 20; 100 UG/1; UG/1
1 SPRAY, METERED RESPIRATORY (INHALATION) 4 TIMES DAILY PRN
Qty: 12 G | Refills: 1 | Status: SHIPPED | OUTPATIENT
Start: 2023-09-22

## 2023-09-22 RX ORDER — HYDROXYZINE PAMOATE 50 MG/1
50 CAPSULE ORAL 3 TIMES DAILY PRN
COMMUNITY
Start: 2023-09-13 | End: 2023-09-22

## 2023-09-22 RX ORDER — ACETAMINOPHEN 500 MG
500 TABLET ORAL DAILY PRN
COMMUNITY
End: 2023-09-24 | Stop reason: SDUPTHER

## 2023-09-22 RX ORDER — MULTIPLE VITAMINS W/ MINERALS TAB 9MG-400MCG
1 TAB ORAL DAILY
Qty: 90 EACH | Refills: 0 | Status: SHIPPED | OUTPATIENT
Start: 2023-09-22

## 2023-09-22 RX ORDER — METHOCARBAMOL 750 MG/1
750 TABLET, FILM COATED ORAL 3 TIMES DAILY
COMMUNITY
End: 2023-09-22 | Stop reason: SDUPTHER

## 2023-09-22 RX ORDER — FUROSEMIDE 20 MG/1
20 TABLET ORAL AS NEEDED
Qty: 90 TABLET | Refills: 0 | Status: SHIPPED | OUTPATIENT
Start: 2023-09-22

## 2023-09-22 RX ORDER — HYDROXYZINE PAMOATE 25 MG/1
25 CAPSULE ORAL 3 TIMES DAILY PRN
Qty: 90 CAPSULE | Refills: 0 | Status: SHIPPED | OUTPATIENT
Start: 2023-09-22

## 2023-09-22 NOTE — TELEPHONE ENCOUNTER
Caller: Phuong Tuttle    Relationship: Self    Best call back number: [phone]    Requested Prescriptions:   Requested Prescriptions     Pending Prescriptions Disp Refills    ALPRAZolam (XANAX) 0.5 MG tablet          Pharmacy where request should be sent: [facility] [place], [place] - [address] - [phone]  - [phone] FX     Last office visit with prescribing clinician: 9/22/2023   Last telemedicine visit with prescribing clinician: Visit date not found   Next office visit with prescribing clinician: Visit date not found     Additional details provided by patient: OUT OF MEDICATION     Does the patient have less than a 3 day supply:  [x] Yes  [] No    Would you like a call back once the refill request has been completed: [] Yes [x] No    If the office needs to give you a call back, can they leave a voicemail: [] Yes [x] No    Jorge Martinez Rep   09/22/23 15:56 EDT

## 2023-09-22 NOTE — PROGRESS NOTES
Office Note     Name: Phuong Tuttle    : 1960     MRN: [id number]     Chief Complaint  Establish Care, Med Refill, and Pneumonia (F/u stated wants chesk x ray to make sure o2 is ok )    Subjective     History of Present Illness:  Phuong Tuttle is a 62 y.o. female who presents today for     COPD excerebration and PNA  2 weeks patient had PNA, and was in [facility] for 2 days. She was treated with antibiotics.   She was treated outpatient with Levaquin, steroid and cough medicine. She was given albuterol.  No cough,no shortness of breath.      Subuxone clinic at [facility]  Been on it     History of anxiety and panic disorder  Michael has been taking it for long time  Has not seen psychiatrist.    Peripheral artery disease  Reports she was told she had poor circulation in her lower legs. She has a chronic hx of smoking, and does not feel the bottom of her foot.  She has had a prior ultrasound done a few years ago which said she had arthrosclerosis in her lower leg arteria vessel.     Review of Systems:   Review of Systems   All other systems reviewed and are negative.    Past Medical History:   Past Medical History:   Diagnosis Date   • Chronic kidney disease (CKD), stage III (moderate) 2020   • COPD (chronic obstructive pulmonary disease)     non-oxygen dependent   • End stage liver disease 2020   • Esophageal varices    • GI bleed     history of GI bleed    • Hepatitis C 2020   • History of substance abuse 2020   • Immunocompromised patient    • Peptic ulceration    • Portal hypertension 2020   • Splenomegaly 2020       Past Surgical History:   Past Surgical History:   Procedure Laterality Date   • ESOPHAGEAL VARICES LIGATION     • UPPER GASTROINTESTINAL ENDOSCOPY         Family History:   Family History   Problem Relation Age of Onset   • Hypertension Mother    • Colon cancer Mother    • Pneumonia Father    • Alzheimer's disease Father    • Stroke Father    • Lung  cancer Brother    • Alzheimer's disease Maternal Grandfather    • Emphysema Paternal Grandfather        Social History:   Social History     Socioeconomic History   • Marital status:    Tobacco Use   • Smoking status: Some Days     Packs/day: 0.50     Years: 35.00     Pack years: 17.50     Types: Cigarettes   • Smokeless tobacco: Never   Vaping Use   • Vaping Use: Never used   Substance and Sexual Activity   • Alcohol use: No   • Drug use: Not Currently     Comment: Prescribed Suboxone    • Sexual activity: Defer       Immunizations:   Immunization History   Administered Date(s) Administered   • COVID-19 (PFIZER) Purple Cap Monovalent 10/10/2021, 10/31/2021   • Td (TDVAX) 05/26/2012   • Tdap 02/12/2022        Medications:     Current Outpatient Medications:   •  ALPRAZolam (XANAX) 0.5 MG tablet, TAKE 1 Tablet BY MOUTH EVERY DAY AS NEEDED FOR ANXIETY must last 30 days, Disp: , Rfl:   •  B Complex Vitamins (VITAMIN B COMPLEX) capsule capsule, Take  by mouth Daily., Disp: , Rfl:   •  buprenorphine-naloxone (SUBOXONE) 8-2 MG per SL tablet, , Disp: , Rfl:   •  fluticasone (FLONASE) 50 MCG/ACT nasal spray, 2 sprays into each nostril Daily., Disp: 1 bottle, Rfl: 0  •  furosemide (LASIX) 20 MG tablet, Take 1 tablet by mouth As Needed (chf)., Disp: 90 tablet, Rfl: 0  •  methocarbamol (ROBAXIN) 750 MG tablet, Take 1 tablet by mouth 3 (Three) Times a Day., Disp: 30 tablet, Rfl: 0  •  ondansetron (Zofran) 8 MG tablet, Take 1 tablet by mouth Every 8 (Eight) Hours As Needed for Nausea or Vomiting., Disp: 90 tablet, Rfl: 0  •  potassium chloride 10 MEQ CR tablet, Take 1 tablet by mouth 2 (Two) Times a Day., Disp: 90 tablet, Rfl: 1  •  sulfamethoxazole-trimethoprim (BACTRIM DS,SEPTRA DS) 800-160 MG per tablet, , Disp: , Rfl:   •  vitamin D (ERGOCALCIFEROL) 12330 units capsule capsule, Take 1 capsule by mouth 1 (One) Time Per Week., Disp: , Rfl:   •  acetaminophen (TYLENOL) 500 MG tablet, Take 1 tablet by mouth Daily As  "Needed for Mild Pain (xa)., Disp: 90 tablet, Rfl: 0  •  ALPRAZolam (Xanax) 0.5 MG tablet, Take 1 tablet by mouth Daily As Needed for Anxiety., Disp: 30 tablet, Rfl: 0  •  budesonide-formoterol (Symbicort) 160-4.5 MCG/ACT inhaler, Inhale 2 puffs 2 (Two) Times a Day., Disp: 10.2 g, Rfl: 0  •  hydrOXYzine pamoate (Vistaril) 25 MG capsule, Take 1 capsule by mouth 3 (Three) Times a Day As Needed for Itching., Disp: 90 capsule, Rfl: 0  •  ipratropium-albuterol (Combivent Respimat)  MCG/ACT inhaler, Inhale 1 puff 4 (Four) Times a Day As Needed for Wheezing or Shortness of Air., Disp: 12 g, Rfl: 1  •  multivitamin with minerals tablet tablet, Take 1 tablet by mouth Daily., Disp: 90 each, Rfl: 0  •  ondansetron ODT (ZOFRAN-ODT) 4 MG disintegrating tablet, Place 1 tablet on the tongue Every 8 (Eight) Hours As Needed for Nausea or Vomiting., Disp: 30 tablet, Rfl: 0  •  pantoprazole (PROTONIX) 40 MG EC tablet, Take 40 mg by mouth Daily. (Patient not taking: Reported on 9/22/2023), Disp: , Rfl:     Allergies:   Allergies   Allergen Reactions   • Doxycycline Rash and GI Bleeding   • Ibuprofen Anaphylaxis     Pt reports causes bleeding   • Iodinated Contrast Media Anaphylaxis   • Prednisone Unknown - Low Severity     No Steroids causes shaking   • Zithromax [Azithromycin] Nausea And Vomiting   • Haldol [Haloperidol Lactate] Rash   • Latex Rash   • Penicillins Rash       Objective     Vital Signs  /48   Pulse 74   Temp 97.8 °F (36.6 °C) (Temporal)   Ht 163 cm (64.17\")   Wt 80.3 kg (177 lb)   SpO2 94%   BMI 30.22 kg/m²   Estimated body mass index is 30.22 kg/m² as calculated from the following:    Height as of this encounter: 163 cm (64.17\").    Weight as of this encounter: 80.3 kg (177 lb).    BMI is >= 30 and <35. (Class 1 Obesity). The following options were offered after discussion;: weight loss educational material (shared in after visit summary), exercise counseling/recommendations, and nutrition " counseling/recommendations      Physical Exam  Constitutional:       Appearance: Normal appearance. She is obese.   Cardiovascular:      Rate and Rhythm: Normal rate and regular rhythm.      Pulses: Normal pulses.      Heart sounds: Normal heart sounds.   Pulmonary:      Effort: Pulmonary effort is normal.      Breath sounds: Normal breath sounds.   Neurological:      General: No focal deficit present.      Mental Status: She is alert and oriented to person, place, and time.        Result Review :            CT Chest Abdomen Pelvis WO IV Contrast  Order: [id number]  Addendum    Addendum by Jairo Diaz MD on 06/14/2023  1:29 PM EDT      There is a nondisplaced fracture of the right pubic bone and adjacent  right pubic rami. There is also a nondisplaced fracture of the anterior  cortex of right sacral ala.     Old healed stress fractures of the left sacral ala.   Findings were discussed with Dr. Amanda.     Created by: Jairo Diaz   Signed by: Jairo Diaz   Signed on: 6/14/2023 1:29 PM   Location: [id number]            Impression           1.   Right-sided nondisplaced rib fractures as described above.       2.   Stable cirrhotic changes of the liver with portal hypertension.   3. No CT evidence of significant trauma to the abdomen and pelvis.         Created by: Saeid Francis     Signed by: Saeid Francis   Signed on: 6/12/2023 4:05 PM   Location: [id number]            Narrative        EXAM: CT CHEST, ABDOMEN, PELVIS WITHOUT IV CONTRAST       INDICATION: Trauma     COMPARISON: CT chest 1/22/2021, CT abdomen and pelvis 10/16/2020     TECHNIQUE: Contiguous axial images were obtained from the thoracic inlet to the pelvic floor without intravenous or oral contrast. Coronal and sagittal reformations are provided. Up-to-date CT equipment and radiation dose reduction techniques were employed.     FINDINGS:     LUNGS AND PLEURA: Lungs clear. No effusions.     MEDIASTINUM: No masses.     THORACIC LYMPH NODES: No  adenopathy.     HEART: Stable cardiomegaly. No pericardial effusion.     THORACIC AORTA AND GREAT VESSELS: No aneurysm.     LIVER: Stable cirrhotic changes with portal hypertension. Stable prominent portal and splenic vein as well as collaterals. Numerous abdominal varices. Paraesophageal varices are stable. Collateral vessels in the subcutaneous abdominal space also stable. No intrahepatic biliary dilatation.     GALLBLADDER: No wall thickening. No radiopaque stones.       COMMON BILE DUCT: Normal caliber. No radiopaque stones.     SPLEEN: Splenomegaly.     PANCREAS: No mass. No pancreatic fluid collections.     ADRENALS: No masses.     KIDNEYS: No masses. No hydronephrosis.     ABDOMINAL/PELVIC LYMPH NODES: No adenopathy.     STOMACH, SMALL BOWEL AND COLON: No bowel wall thickening or obstruction. Appendix is normal.     PERITONEAL CAVITY: No mesenteric stranding or free fluid. No free air.     ABDOMINAL AORTA: No aneurysm.     PELVIC ORGANS: Normal.     SOFT TISSUE: Normal.     OSSEOUS STRUCTURES: There is a buckle deformity of the fourth right anterior rib. Similar findings in the fifth, 6, 7 lateral ribs. And atrophic changes of the thoracic spine. Remote sternal fracture.  Exam End: 06/12/23 12:28 Last Resulted: 06/12/23 16:05   Received From: [facility]  Result Received: 09/22/23 13:19         US Arterial Doppler Lower Extremity Bilateral  Performed 7/15/2020  Final result      S Arterial Doppler Lower Extremity Bilateral [[id number]] (Order [id number])  Order  Status: Final result     Appointment Information    Display Notes    pt  coming in she was  too hot in her car, we  will send her to imaging PT WILL HAVE ORDER                  PACS Images     Radiology Images    Study Result    Narrative & Impression   PROCEDURE: US ARTERIAL DOPPLER LOWER EXTREMITY  BILATERAL-     HISTORY: EDEMA; L03.119-Cellulitis of unspecified part of limb     PROCEDURE: Doppler waveform evaluation of the lower extremities  were  performed bilaterally.     FINDINGS:      RIGHT LOWER EXTREMITY.      Velocities cm/sec :      CFA: 181  SFA Mid: 124   SFA Dist: 130  POP: 92  PT: 140  HA: 79  Waveforms are monophasic.           LEFT LOWER EXTREMITY.      Velocities cm/sec :      CFA: 242  SFA Mid: 120   SFA Dist: 140  POP: 125  PT: 134  HA: 87  Waveforms are monophasic.        IMPRESSION:  Diffuse bilateral atherosclerotic disease with no focal  stenosis identified.     This report was finalized on 7/15/2020 1:31 PM by Deon Wong M.D..       Imaging    US Arterial Doppler Lower Extremity Bilateral (Order: [id number]) - 7/15/2020    Result History    US Arterial Doppler Lower Extremity Bilateral (Order #[id number]) on 7/15/2020 - Order Result History Report      Assessment and Plan     1. Cough, unspecified type    - XR Chest PA & Lateral; Future    2. Cirrhosis of liver without ascites, unspecified hepatic cirrhosis type  Reviewed Prior CT abdomen: reports stable cirrhosis appearance of the liver    - Basic metabolic panel; Future  - CBC No Differential; Future  - Lipid panel; Future  - HCV Antibody Rfx To Qnt PCR; Future  - PATTON Fibrosure; Future  - Protime-INR    3. PAD (peripheral artery disease)    - Ambulatory Referral to Vascular Surgery    4. Chronic prescription benzodiazepine use    5. Generalized anxiety disorder  Takes xanax, will refill medication today, and will refer to psych for med management.  - Ambulatory Referral to Psychiatry    6. Nausea    - ondansetron ODT (ZOFRAN-ODT) 4 MG disintegrating tablet; Place 1 tablet on the tongue Every 8 (Eight) Hours As Needed for Nausea or Vomiting.  Dispense: 30 tablet; Refill: 0    7. Difficulty sleeping         Follow Up  No follow-ups on file.    Pancho Boyce MD  [facility]  [facility]  [address] [facility]  [address] [address] [phone]

## 2023-09-24 DIAGNOSIS — F41.1 GENERALIZED ANXIETY DISORDER: Primary | ICD-10-CM

## 2023-09-24 LAB
INR PPP: 1.3 (ref 0.9–1.2)
PROTHROMBIN TIME: 13.4 SEC (ref 9.1–12)

## 2023-09-24 RX ORDER — ACETAMINOPHEN 500 MG
500 TABLET ORAL DAILY PRN
Qty: 90 TABLET | Refills: 0 | Status: SHIPPED | OUTPATIENT
Start: 2023-09-24

## 2023-09-24 RX ORDER — ALPRAZOLAM 0.5 MG/1
0.5 TABLET ORAL DAILY PRN
Qty: 30 TABLET | Refills: 0 | Status: SHIPPED | OUTPATIENT
Start: 2023-09-24

## 2023-09-25 RX ORDER — ALPRAZOLAM 0.5 MG/1
TABLET ORAL
Qty: 30 TABLET | Refills: 0 | OUTPATIENT
Start: 2023-09-25

## 2023-09-26 LAB
DIAGNOSTIC IMP SPEC-IMP: NORMAL
HCV IGG SERPL QL IA: REACTIVE
HCV RNA SERPL NAA+PROBE-ACNC: NORMAL IU/ML
REF LAB TEST REF RANGE: NORMAL

## 2023-09-27 LAB
A2 MACROGLOB SERPL-MCNC: 224 MG/DL (ref 110–276)
ALT SERPL W P-5'-P-CCNC: 14 IU/L (ref 0–40)
APO A-I SERPL-MCNC: 89 MG/DL (ref 116–209)
AST SERPL W P-5'-P-CCNC: 22 IU/L (ref 0–40)
BILIRUB SERPL-MCNC: 0.3 MG/DL (ref 0–1.2)
BUN SERPL-MCNC: 13 MG/DL (ref 8–27)
BUN/CREAT SERPL: 11 (ref 12–28)
CALCIUM SERPL-MCNC: 8.8 MG/DL (ref 8.7–10.3)
CHLORIDE SERPL-SCNC: 101 MMOL/L (ref 96–106)
CHOLEST SERPL-MCNC: 117 MG/DL (ref 100–199)
CHOLEST SERPL-MCNC: 127 MG/DL (ref 100–199)
CO2 SERPL-SCNC: 22 MMOL/L (ref 20–29)
CREAT SERPL-MCNC: 1.17 MG/DL (ref 0.57–1)
EGFRCR SERPLBLD CKD-EPI 2021: 53 ML/MIN/1.73
ERYTHROCYTE [DISTWIDTH] IN BLOOD BY AUTOMATED COUNT: 13.6 % (ref 11.7–15.4)
FIBROSIS SCORING:: ABNORMAL
FIBROSIS STAGE SERPL QL: ABNORMAL
GGT SERPL-CCNC: 32 IU/L (ref 0–60)
GLUCOSE SERPL-MCNC: 83 MG/DL (ref 70–99)
GLUCOSE SERPL-MCNC: 85 MG/DL (ref 70–99)
HAPTOGLOB SERPL-MCNC: 111 MG/DL (ref 33–346)
HBA1C MFR BLD: 5 % (ref 4.8–5.6)
HCT VFR BLD AUTO: 33.9 % (ref 34–46.6)
HDLC SERPL-MCNC: 39 MG/DL
HGB BLD-MCNC: 11.3 G/DL (ref 11.1–15.9)
LABORATORY COMMENT REPORT: ABNORMAL
LDLC SERPL CALC-MCNC: 62 MG/DL (ref 0–99)
LIVER FIBR SCORE SERPL CALC.FIBROSURE: 0.35 (ref 0–0.21)
LIVER STEATOSIS GRADE SERPL QL: ABNORMAL
LIVER STEATOSIS SCORE SERPL: 0.44 (ref 0–0.4)
MCH RBC QN AUTO: 28.3 PG (ref 26.6–33)
MCHC RBC AUTO-ENTMCNC: 33.3 G/DL (ref 31.5–35.7)
MCV RBC AUTO: 85 FL (ref 79–97)
MORPHOLOGY BLD-IMP: ABNORMAL
NASH GRADE SERPL QL: ABNORMAL
NASH INTERPRETATION SERPL-IMP: ABNORMAL
NASH SCORE SERPL: 0.39 (ref 0–0.25)
NASH SCORING: ABNORMAL
PLATELET # BLD AUTO: 78 X10E3/UL (ref 150–450)
POTASSIUM SERPL-SCNC: 4.3 MMOL/L (ref 3.5–5.2)
RBC # BLD AUTO: 4 X10E6/UL (ref 3.77–5.28)
SODIUM SERPL-SCNC: 139 MMOL/L (ref 134–144)
STEATOSIS SCORING: ABNORMAL
TEST PERFORMANCE INFO SPEC: ABNORMAL
TEST PERFORMANCE INFO SPEC: ABNORMAL
TRIGL SERPL-MCNC: 100 MG/DL (ref 0–149)
TRIGL SERPL-MCNC: 82 MG/DL (ref 0–149)
VLDLC SERPL CALC-MCNC: 16 MG/DL (ref 5–40)
WBC # BLD AUTO: 3.8 X10E3/UL (ref 3.4–10.8)

## 2023-10-03 ENCOUNTER — TELEPHONE (OUTPATIENT)
Dept: INTERNAL MEDICINE | Facility: CLINIC | Age: 63
End: 2023-10-03
Payer: MEDICAID

## 2023-10-03 NOTE — TELEPHONE ENCOUNTER
----- Message from Pancho Boyce MD sent at 10/2/2023 10:16 PM EDT -----  Please let patient know   That her blood work shows that she has scarring of liver. I am placing an order for her to see the Liver specialist so she can be monitored    Her A1c blood sugar was normal range,  Her cholesterol panel was within acceptable limits,Her HDL cholesterol (this is the good cholesterol that helps transport cholesterol plaques away from her arteries) is slightly low, and recommend she try to incorporate healthy fats in her diet. This include fish such as salmon, healthy fats are in Avocados, nuts, almonds, and eating lean protein.    She was negative for active Hep C.

## 2023-10-03 NOTE — TELEPHONE ENCOUNTER
Called and lvm for patient to return call, office number provided.      HUB TO RELAY:     ----- Message from Pancho Boyce MD sent at 10/2/2023 10:16 PM EDT -----  Please let patient know   That her blood work shows that she has scarring of liver. I am placing an order for her to see the Liver specialist so she can be monitored     Her A1c blood sugar was normal range,  Her cholesterol panel was within acceptable limits,Her HDL cholesterol (this is the good cholesterol that helps transport cholesterol plaques away from her arteries) is slightly low, and recommend she try to incorporate healthy fats in her diet. This include fish such as salmon, healthy fats are in Avocados, nuts, almonds, and eating lean protein.     She was negative for active Hep C.       Rupinder, CMA

## 2023-10-04 NOTE — TELEPHONE ENCOUNTER
Called and was informed that number is wrong and is not pt's number      called pt's brother elias per BLANCHE AND HE STATED PTS' NUMBER [phone] LFT MESSAGE FOR PT TO CONTACT THE OFFICE WITH. ELIAS  STATED HE WILL LET PT KNOW. CALLED NUMBER PROVIDED AND MESSAGE STATED TO TRY CALL AGAIN LATER.       HUB TO RELAY:      ----- Message from Pacnho Boyce MD sent at 10/2/2023 10:16 PM EDT -----  Please let patient know   That her blood work shows that she has scarring of liver. I am placing an order for her to see the Liver specialist so she can be monitored     Her A1c blood sugar was normal range,  Her cholesterol panel was within acceptable limits,Her HDL cholesterol (this is the good cholesterol that helps transport cholesterol plaques away from her arteries) is slightly low, and recommend she try to incorporate healthy fats in her diet. This include fish such as salmon, healthy fats are in Avocados, nuts, almonds, and eating lean protein.     She was negative for active Hep C.

## 2023-10-06 NOTE — TELEPHONE ENCOUNTER
2ND ATTEMPTED TO CALL PT MESSAGE STATED TO TRY CALL AGAIN LATER.        HUB TO RELAY:      ----- Message from Pancho Boyce MD sent at 10/2/2023 10:16 PM EDT -----  Please let patient know   That her blood work shows that she has scarring of liver. I am placing an order for her to see the Liver specialist so she can be monitored     Her A1c blood sugar was normal range,  Her cholesterol panel was within acceptable limits,Her HDL cholesterol (this is the good cholesterol that helps transport cholesterol plaques away from her arteries) is slightly low, and recommend she try to incorporate healthy fats in her diet. This include fish such as salmon, healthy fats are in Avocados, nuts, almonds, and eating lean protein.     She was negative for active Hep C.

## 2024-01-09 ENCOUNTER — HOSPITAL ENCOUNTER (INPATIENT)
Facility: HOSPITAL | Age: 64
DRG: 560 | End: 2024-01-09
Attending: FAMILY MEDICINE | Admitting: FAMILY MEDICINE
Payer: MEDICAID

## 2024-01-09 ENCOUNTER — PREP FOR SURGERY (OUTPATIENT)
Dept: OTHER | Facility: HOSPITAL | Age: 64
End: 2024-01-09
Payer: MEDICAID

## 2024-01-09 DIAGNOSIS — Z87.81 S/P LEFT HIP FRACTURE: Primary | ICD-10-CM

## 2024-01-09 DIAGNOSIS — F41.1 GAD (GENERALIZED ANXIETY DISORDER): ICD-10-CM

## 2024-01-09 PROCEDURE — 94664 DEMO&/EVAL PT USE INHALER: CPT

## 2024-01-09 PROCEDURE — 94799 UNLISTED PULMONARY SVC/PX: CPT

## 2024-01-09 PROCEDURE — 63710000001 ONDANSETRON PER 8 MG: Performed by: FAMILY MEDICINE

## 2024-01-09 PROCEDURE — 94761 N-INVAS EAR/PLS OXIMETRY MLT: CPT

## 2024-01-09 PROCEDURE — 94640 AIRWAY INHALATION TREATMENT: CPT

## 2024-01-09 RX ORDER — DIPHENOXYLATE HYDROCHLORIDE AND ATROPINE SULFATE 2.5; .025 MG/1; MG/1
1 TABLET ORAL DAILY
Status: DISCONTINUED | OUTPATIENT
Start: 2024-01-10 | End: 2024-01-22 | Stop reason: HOSPADM

## 2024-01-09 RX ORDER — BUPRENORPHINE HYDROCHLORIDE AND NALOXONE HYDROCHLORIDE DIHYDRATE 8; 2 MG/1; MG/1
2 TABLET SUBLINGUAL DAILY
Qty: 10 TABLET | Refills: 0 | Status: DISCONTINUED | OUTPATIENT
Start: 2024-01-10 | End: 2024-01-10

## 2024-01-09 RX ORDER — PANTOPRAZOLE SODIUM 40 MG/1
40 TABLET, DELAYED RELEASE ORAL
Status: DISCONTINUED | OUTPATIENT
Start: 2024-01-10 | End: 2024-01-22 | Stop reason: HOSPADM

## 2024-01-09 RX ORDER — DIPHENOXYLATE HYDROCHLORIDE AND ATROPINE SULFATE 2.5; .025 MG/1; MG/1
1 TABLET ORAL DAILY
Status: CANCELLED | OUTPATIENT
Start: 2024-01-09

## 2024-01-09 RX ORDER — PANTOPRAZOLE SODIUM 40 MG/1
40 TABLET, DELAYED RELEASE ORAL DAILY
COMMUNITY

## 2024-01-09 RX ORDER — BUDESONIDE AND FORMOTEROL FUMARATE DIHYDRATE 160; 4.5 UG/1; UG/1
2 AEROSOL RESPIRATORY (INHALATION)
COMMUNITY

## 2024-01-09 RX ORDER — ACETAMINOPHEN 325 MG/1
650 TABLET ORAL EVERY 4 HOURS PRN
Status: CANCELLED | OUTPATIENT
Start: 2024-01-09

## 2024-01-09 RX ORDER — ERGOCALCIFEROL 1.25 MG/1
50000 CAPSULE ORAL WEEKLY
COMMUNITY

## 2024-01-09 RX ORDER — HYDROCODONE BITARTRATE AND ACETAMINOPHEN 7.5; 325 MG/1; MG/1
1 TABLET ORAL EVERY 6 HOURS PRN
Status: DISCONTINUED | OUTPATIENT
Start: 2024-01-09 | End: 2024-01-10

## 2024-01-09 RX ORDER — LACTULOSE 10 G/15ML
10 SOLUTION ORAL EVERY OTHER DAY
Status: DISCONTINUED | OUTPATIENT
Start: 2024-01-10 | End: 2024-01-20

## 2024-01-09 RX ORDER — ACETAMINOPHEN 325 MG/1
650 TABLET ORAL EVERY 4 HOURS PRN
Status: DISCONTINUED | OUTPATIENT
Start: 2024-01-09 | End: 2024-01-22 | Stop reason: HOSPADM

## 2024-01-09 RX ORDER — NALOXONE HCL 0.4 MG/ML
0.4 VIAL (ML) INJECTION
Status: DISCONTINUED | OUTPATIENT
Start: 2024-01-09 | End: 2024-01-22 | Stop reason: HOSPADM

## 2024-01-09 RX ORDER — LEVETIRACETAM 500 MG/1
500 TABLET ORAL 2 TIMES DAILY
Status: CANCELLED | OUTPATIENT
Start: 2024-01-09

## 2024-01-09 RX ORDER — ALBUTEROL SULFATE 90 UG/1
2 AEROSOL, METERED RESPIRATORY (INHALATION)
Status: DISCONTINUED | OUTPATIENT
Start: 2024-01-09 | End: 2024-01-09 | Stop reason: SDUPTHER

## 2024-01-09 RX ORDER — LACTULOSE 10 G/15ML
10 SOLUTION ORAL EVERY OTHER DAY
Status: CANCELLED | OUTPATIENT
Start: 2024-01-09

## 2024-01-09 RX ORDER — ALBUTEROL SULFATE 90 UG/1
2 AEROSOL, METERED RESPIRATORY (INHALATION) EVERY 4 HOURS PRN
COMMUNITY

## 2024-01-09 RX ORDER — MONTELUKAST SODIUM 10 MG/1
10 TABLET ORAL NIGHTLY
Status: DISCONTINUED | OUTPATIENT
Start: 2024-01-09 | End: 2024-01-22 | Stop reason: HOSPADM

## 2024-01-09 RX ORDER — ONDANSETRON HYDROCHLORIDE 8 MG/1
8 TABLET, FILM COATED ORAL 3 TIMES DAILY PRN
COMMUNITY

## 2024-01-09 RX ORDER — IPRATROPIUM BROMIDE AND ALBUTEROL SULFATE 2.5; .5 MG/3ML; MG/3ML
3 SOLUTION RESPIRATORY (INHALATION)
Status: DISCONTINUED | OUTPATIENT
Start: 2024-01-09 | End: 2024-01-10

## 2024-01-09 RX ORDER — LEVETIRACETAM 500 MG/1
500 TABLET ORAL 2 TIMES DAILY
Status: DISCONTINUED | OUTPATIENT
Start: 2024-01-09 | End: 2024-01-10

## 2024-01-09 RX ORDER — BUPRENORPHINE HYDROCHLORIDE AND NALOXONE HYDROCHLORIDE DIHYDRATE 8; 2 MG/1; MG/1
2.5 TABLET SUBLINGUAL DAILY
COMMUNITY
End: 2024-01-22 | Stop reason: HOSPADM

## 2024-01-09 RX ORDER — POLYETHYLENE GLYCOL 3350 17 G/17G
17 POWDER, FOR SOLUTION ORAL DAILY PRN
Status: CANCELLED | OUTPATIENT
Start: 2024-01-09

## 2024-01-09 RX ORDER — BUPRENORPHINE HYDROCHLORIDE AND NALOXONE HYDROCHLORIDE DIHYDRATE 8; 2 MG/1; MG/1
2 TABLET SUBLINGUAL DAILY
Status: CANCELLED | OUTPATIENT
Start: 2024-01-09 | End: 2024-01-14

## 2024-01-09 RX ORDER — MONTELUKAST SODIUM 10 MG/1
10 TABLET ORAL NIGHTLY
Status: CANCELLED | OUTPATIENT
Start: 2024-01-09

## 2024-01-09 RX ORDER — ALPRAZOLAM 0.5 MG/1
0.5 TABLET ORAL 3 TIMES DAILY PRN
Status: CANCELLED | OUTPATIENT
Start: 2024-01-09 | End: 2024-01-14

## 2024-01-09 RX ORDER — ONDANSETRON 4 MG/1
8 TABLET, FILM COATED ORAL EVERY 6 HOURS PRN
Status: DISCONTINUED | OUTPATIENT
Start: 2024-01-09 | End: 2024-01-22 | Stop reason: HOSPADM

## 2024-01-09 RX ORDER — MONTELUKAST SODIUM 10 MG/1
10 TABLET ORAL NIGHTLY
COMMUNITY

## 2024-01-09 RX ORDER — LACTULOSE 10 G/15ML
10 SOLUTION ORAL EVERY OTHER DAY
COMMUNITY
End: 2024-01-22

## 2024-01-09 RX ORDER — BUDESONIDE AND FORMOTEROL FUMARATE DIHYDRATE 160; 4.5 UG/1; UG/1
2 AEROSOL RESPIRATORY (INHALATION)
Status: CANCELLED | OUTPATIENT
Start: 2024-01-09

## 2024-01-09 RX ORDER — CALCIUM CARBONATE 500 MG/1
2 TABLET, CHEWABLE ORAL 3 TIMES DAILY PRN
Status: DISCONTINUED | OUTPATIENT
Start: 2024-01-09 | End: 2024-01-22 | Stop reason: HOSPADM

## 2024-01-09 RX ORDER — BUDESONIDE AND FORMOTEROL FUMARATE DIHYDRATE 160; 4.5 UG/1; UG/1
2 AEROSOL RESPIRATORY (INHALATION)
Status: DISCONTINUED | OUTPATIENT
Start: 2024-01-09 | End: 2024-01-14

## 2024-01-09 RX ORDER — CALCIUM CARBONATE 500 MG/1
2 TABLET, CHEWABLE ORAL 3 TIMES DAILY PRN
Status: CANCELLED | OUTPATIENT
Start: 2024-01-09

## 2024-01-09 RX ORDER — CEFDINIR 300 MG/1
300 CAPSULE ORAL EVERY 12 HOURS SCHEDULED
Status: DISCONTINUED | OUTPATIENT
Start: 2024-01-09 | End: 2024-01-10

## 2024-01-09 RX ORDER — ALBUTEROL SULFATE 90 UG/1
2 AEROSOL, METERED RESPIRATORY (INHALATION)
Status: CANCELLED | OUTPATIENT
Start: 2024-01-09

## 2024-01-09 RX ORDER — PANTOPRAZOLE SODIUM 40 MG/1
40 TABLET, DELAYED RELEASE ORAL
Status: CANCELLED | OUTPATIENT
Start: 2024-01-10

## 2024-01-09 RX ORDER — POLYETHYLENE GLYCOL 3350 17 G/17G
17 POWDER, FOR SOLUTION ORAL DAILY PRN
Status: DISCONTINUED | OUTPATIENT
Start: 2024-01-09 | End: 2024-01-22 | Stop reason: HOSPADM

## 2024-01-09 RX ORDER — FUROSEMIDE 20 MG/1
20 TABLET ORAL DAILY
Status: DISCONTINUED | OUTPATIENT
Start: 2024-01-10 | End: 2024-01-14

## 2024-01-09 RX ORDER — FUROSEMIDE 20 MG/1
20 TABLET ORAL DAILY
Status: CANCELLED | OUTPATIENT
Start: 2024-01-09

## 2024-01-09 RX ORDER — ALPRAZOLAM 0.5 MG/1
0.5 TABLET ORAL 3 TIMES DAILY PRN
Status: DISCONTINUED | OUTPATIENT
Start: 2024-01-09 | End: 2024-01-11

## 2024-01-09 RX ORDER — IPRATROPIUM BROMIDE AND ALBUTEROL SULFATE 2.5; .5 MG/3ML; MG/3ML
3 SOLUTION RESPIRATORY (INHALATION)
Status: CANCELLED | OUTPATIENT
Start: 2024-01-09

## 2024-01-09 RX ORDER — ONDANSETRON 4 MG/1
8 TABLET, FILM COATED ORAL EVERY 6 HOURS PRN
Status: CANCELLED | OUTPATIENT
Start: 2024-01-09

## 2024-01-09 RX ORDER — CEFDINIR 300 MG/1
300 CAPSULE ORAL EVERY 12 HOURS SCHEDULED
Status: CANCELLED | OUTPATIENT
Start: 2024-01-09 | End: 2024-01-13

## 2024-01-09 RX ADMIN — HYDROCODONE BITARTRATE AND ACETAMINOPHEN 1 TABLET: 7.5; 325 TABLET ORAL at 21:01

## 2024-01-09 RX ADMIN — ALPRAZOLAM 0.5 MG: 0.5 TABLET ORAL at 21:01

## 2024-01-09 RX ADMIN — ONDANSETRON HYDROCHLORIDE 8 MG: 4 TABLET, FILM COATED ORAL at 21:01

## 2024-01-09 RX ADMIN — IPRATROPIUM BROMIDE AND ALBUTEROL SULFATE 3 ML: 2.5; .5 SOLUTION RESPIRATORY (INHALATION) at 19:02

## 2024-01-09 NOTE — PROGRESS NOTES
Rehabilitation Nursing  Inpatient Rehabilitation Plan of Care Note    Plan of Care  Copy from POC    Pain    Pain Management (Active)  Current Status (1/15/2024 12:00:00 AM): pt will be free of pain with meds  Weekly Goal: decrease in pain meds  Discharge Goal: pain free    Safety    Potential for Injury (Active)  Current Status (1/12/2024 12:00:00 AM): patient will ring for needs  Weekly Goal: patient uses proper safety with hip  Discharge Goal: injury free      RN Interventions    Pain -  RN: meds given as ordered by md [RN]  RN: pillow support for positioning [RN]    Safety -  RN: falls prevention protocol and education  [RN]  Team: nonskid socks, call light and bedside items within in reach  [RN]    Signed by: Nurse Derrek

## 2024-01-09 NOTE — PROGRESS NOTES
"Patient Assessment Instrument  Quality Indicators - Admission FY 2023    Section A. Ethnicity/ Race/Language  Ethnicity:  Not of , /a, Syriac Origin  Race:  White  Preferred Language:  English  Requests  to Communicate:   No    Section A. Transportation      Section B. Hearing and Vision  Expression of Ideas and Wants: Expresses complex messages without difficulty and  with speech that is clear and easy to understand.  Understanding Verbal and Non-Verbal Content: Understands: Clear comprehension  without cues or repetitions.  Ability to Hear:  Adequate - no difficulty in normal conversation, social  interaction, listening to TV  Ability to See in Adequate Light:  Adequate - sees fine detail, such as regular  print in newspapers/books    Section B. Health Literacy  Frequency of Needing Assistance Reading:  Never      Section C. Cognitive Patterns  Brief Interview for Mental Status (BIMS) was conducted.  Repetition of Three Words: Three words  Able to report correct year: Correct  Able to report correct month: Accurate within 5 days  Able to report correct day of the week: Correct  Able to recall \"sock\": Yes, no cue required  Able to recall \"blue\": Yes, no cue required  Able to recall \"bed\": Yes, no cue required    BIMS SUMMARY SCORE: 15 Cognitively intact Patient was able to complete the Brief  Interview for Mental Status    Section C. Signs and Symptoms of Delirium (from CAM)  Evidence of Acute Change in Mental Status:   No  Inattention: Behavior not present  Thinking Disorganized or Incoherent:   Behavior not present  Altered Level of Consciousness:   Behavior not present    Section D. Mood  Presence of little interest or pleasure in doing things:   Yes  Frequency of having little interest or pleasure in doing things:   7-11 days  (half or more of the days)  Presence of feeling down, depressed, or hopeless:   Yes  Frequency of feeling down, depressed, or hopeless:   7-11 days (half or more " of  the days)   Continue Interview  Presence of trouble falling or staying asleep, or sleeping too much:   Yes  Frequency of trouble falling or staying asleep, or sleeping too much:   12-14  days (nearly every day)  Presence of feeling tired or having little energy:   Yes  Frequency of feeling tired or having little energy:   12-14 days (nearly every  day)  Presence of poor appetite or overeating:   No  Frequency of poor appetite or overeating:   Never or 1 day  Presence of feeling bad about yourself-or that you are a failure or have let  yourself or your family down:   No  Frequency of feeling bad about yourself-or that you are a failure or have let  yourself or your family down:  Never or 1 day  Presence of trouble concentrating on things:   No  Frequency of trouble concentrating on things:   Never or 1 day  Presence of Moving/Speaking Slowly or Being Fidgety:   No  Frequency of Moving/Speaking Slowly or Being Fidgety:  Never or 1 day  Presence of Thoughts of Self Harm:  No  Frequency of Thoughts of Self Harm:   Never or 1 day  Total Severity Score:   010    Section D. Social Isolation  Frequecy of Feeling Lonely or Isolated:  Never    Section [id number]. Prior Functioning      Section BK7012. Prior Device Use      Section [id number]. Self Care Performance      Section [id number]. Self Care Discharge Goals      Section PG6694. Mobility Performance      Section MB0439. Mobility Discharge Goals      Section H. Bladder and Bowel      Section I. Active Diagnosis      Section J. Health Conditions      Section J. Health Conditions (Pain)  Pain Effect on Sleep:   Almost constantly  Pain Interference with Therapy Activities:   Almost constantly  Pain Interference with Day-to-Day Activities:   Almost constantly    Section K. Swallowing/Nutritional Status    Nutritional Approaches on Admission:    Section M. Skin Conditions  Unhealed Pressure Ulcer/Injuries at Stage 1 or Higher on Admission:  No.    Section N. Medication        Section  O. Special Treatments, Procedures, and Programs    Signed by: Jackie Gan, Nurse

## 2024-01-09 NOTE — PLAN OF CARE
Goal Outcome Evaluation:  Plan of Care Reviewed With: patient        Progress: improving  Outcome Evaluation: patient admitted to rehab and will begin therapies in the am. begin plan of care.

## 2024-01-10 LAB
ALBUMIN SERPL-MCNC: 2.6 G/DL (ref 3.5–5.2)
ALBUMIN/GLOB SERPL: 1.2 G/DL
ALP SERPL-CCNC: 102 U/L (ref 39–117)
ALT SERPL W P-5'-P-CCNC: 13 U/L (ref 1–33)
ANION GAP SERPL CALCULATED.3IONS-SCNC: 6.8 MMOL/L (ref 5–15)
AST SERPL-CCNC: 25 U/L (ref 1–32)
BASOPHILS # BLD AUTO: 0.03 10*3/MM3 (ref 0–0.2)
BASOPHILS NFR BLD AUTO: 1.3 % (ref 0–1.5)
BILIRUB SERPL-MCNC: 0.6 MG/DL (ref 0–1.2)
BUN SERPL-MCNC: 16 MG/DL (ref 8–23)
BUN/CREAT SERPL: 16.5 (ref 7–25)
CALCIUM SPEC-SCNC: 8 MG/DL (ref 8.6–10.5)
CHLORIDE SERPL-SCNC: 106 MMOL/L (ref 98–107)
CO2 SERPL-SCNC: 26.2 MMOL/L (ref 22–29)
CREAT SERPL-MCNC: 0.97 MG/DL (ref 0.57–1)
DEPRECATED RDW RBC AUTO: 51.1 FL (ref 37–54)
EGFRCR SERPLBLD CKD-EPI 2021: 66.6 ML/MIN/1.73
EOSINOPHIL # BLD AUTO: 0.11 10*3/MM3 (ref 0–0.4)
EOSINOPHIL NFR BLD AUTO: 4.7 % (ref 0.3–6.2)
ERYTHROCYTE [DISTWIDTH] IN BLOOD BY AUTOMATED COUNT: 15.5 % (ref 12.3–15.4)
GLOBULIN UR ELPH-MCNC: 2.2 GM/DL
GLUCOSE SERPL-MCNC: 116 MG/DL (ref 65–99)
HCT VFR BLD AUTO: 25.4 % (ref 34–46.6)
HGB BLD-MCNC: 8 G/DL (ref 12–15.9)
IMM GRANULOCYTES # BLD AUTO: 0.01 10*3/MM3 (ref 0–0.05)
IMM GRANULOCYTES NFR BLD AUTO: 0.4 % (ref 0–0.5)
LYMPHOCYTES # BLD AUTO: 0.55 10*3/MM3 (ref 0.7–3.1)
LYMPHOCYTES NFR BLD AUTO: 23.6 % (ref 19.6–45.3)
MAGNESIUM SERPL-MCNC: 1.8 MG/DL (ref 1.6–2.4)
MCH RBC QN AUTO: 29.3 PG (ref 26.6–33)
MCHC RBC AUTO-ENTMCNC: 31.5 G/DL (ref 31.5–35.7)
MCV RBC AUTO: 93 FL (ref 79–97)
MONOCYTES # BLD AUTO: 0.29 10*3/MM3 (ref 0.1–0.9)
MONOCYTES NFR BLD AUTO: 12.4 % (ref 5–12)
NEUTROPHILS NFR BLD AUTO: 1.34 10*3/MM3 (ref 1.7–7)
NEUTROPHILS NFR BLD AUTO: 57.6 % (ref 42.7–76)
NRBC BLD AUTO-RTO: 0 /100 WBC (ref 0–0.2)
PLATELET # BLD AUTO: 84 10*3/MM3 (ref 140–450)
PMV BLD AUTO: 10.4 FL (ref 6–12)
POTASSIUM SERPL-SCNC: 3.9 MMOL/L (ref 3.5–5.2)
PROT SERPL-MCNC: 4.8 G/DL (ref 6–8.5)
RBC # BLD AUTO: 2.73 10*6/MM3 (ref 3.77–5.28)
SODIUM SERPL-SCNC: 139 MMOL/L (ref 136–145)
TSH SERPL DL<=0.05 MIU/L-ACNC: 9.88 UIU/ML (ref 0.27–4.2)
WBC NRBC COR # BLD AUTO: 2.33 10*3/MM3 (ref 3.4–10.8)

## 2024-01-10 PROCEDURE — 94799 UNLISTED PULMONARY SVC/PX: CPT

## 2024-01-10 PROCEDURE — 83735 ASSAY OF MAGNESIUM: CPT | Performed by: FAMILY MEDICINE

## 2024-01-10 PROCEDURE — 80053 COMPREHEN METABOLIC PANEL: CPT | Performed by: FAMILY MEDICINE

## 2024-01-10 PROCEDURE — 63710000001 ONDANSETRON PER 8 MG: Performed by: FAMILY MEDICINE

## 2024-01-10 PROCEDURE — 99223 1ST HOSP IP/OBS HIGH 75: CPT | Performed by: FAMILY MEDICINE

## 2024-01-10 PROCEDURE — 97530 THERAPEUTIC ACTIVITIES: CPT

## 2024-01-10 PROCEDURE — 97161 PT EVAL LOW COMPLEX 20 MIN: CPT

## 2024-01-10 PROCEDURE — 85025 COMPLETE CBC W/AUTO DIFF WBC: CPT | Performed by: FAMILY MEDICINE

## 2024-01-10 PROCEDURE — 97110 THERAPEUTIC EXERCISES: CPT

## 2024-01-10 PROCEDURE — 84443 ASSAY THYROID STIM HORMONE: CPT | Performed by: FAMILY MEDICINE

## 2024-01-10 PROCEDURE — 94761 N-INVAS EAR/PLS OXIMETRY MLT: CPT

## 2024-01-10 PROCEDURE — 97116 GAIT TRAINING THERAPY: CPT

## 2024-01-10 PROCEDURE — 97535 SELF CARE MNGMENT TRAINING: CPT

## 2024-01-10 PROCEDURE — 97167 OT EVAL HIGH COMPLEX 60 MIN: CPT

## 2024-01-10 RX ORDER — HYDROCODONE BITARTRATE AND ACETAMINOPHEN 7.5; 325 MG/1; MG/1
1 TABLET ORAL EVERY 4 HOURS PRN
Status: DISCONTINUED | OUTPATIENT
Start: 2024-01-10 | End: 2024-01-10

## 2024-01-10 RX ORDER — OXYCODONE HYDROCHLORIDE 10 MG/1
10 TABLET ORAL EVERY 4 HOURS PRN
Status: DISCONTINUED | OUTPATIENT
Start: 2024-01-10 | End: 2024-01-17

## 2024-01-10 RX ORDER — IPRATROPIUM BROMIDE AND ALBUTEROL SULFATE 2.5; .5 MG/3ML; MG/3ML
3 SOLUTION RESPIRATORY (INHALATION) EVERY 6 HOURS PRN
Status: DISCONTINUED | OUTPATIENT
Start: 2024-01-10 | End: 2024-01-20

## 2024-01-10 RX ADMIN — LACTULOSE 10 G: 20 SOLUTION ORAL at 08:44

## 2024-01-10 RX ADMIN — ONDANSETRON HYDROCHLORIDE 8 MG: 4 TABLET, FILM COATED ORAL at 20:12

## 2024-01-10 RX ADMIN — IPRATROPIUM BROMIDE AND ALBUTEROL SULFATE 3 ML: 2.5; .5 SOLUTION RESPIRATORY (INHALATION) at 00:34

## 2024-01-10 RX ADMIN — ONDANSETRON HYDROCHLORIDE 8 MG: 4 TABLET, FILM COATED ORAL at 12:54

## 2024-01-10 RX ADMIN — OXYCODONE HYDROCHLORIDE 10 MG: 10 TABLET ORAL at 12:53

## 2024-01-10 RX ADMIN — ALPRAZOLAM 0.5 MG: 0.5 TABLET ORAL at 21:03

## 2024-01-10 RX ADMIN — OXYCODONE HYDROCHLORIDE 10 MG: 10 TABLET ORAL at 17:02

## 2024-01-10 RX ADMIN — ALPRAZOLAM 0.5 MG: 0.5 TABLET ORAL at 05:19

## 2024-01-10 RX ADMIN — OXYCODONE HYDROCHLORIDE 10 MG: 10 TABLET ORAL at 08:42

## 2024-01-10 RX ADMIN — HYDROCODONE BITARTRATE AND ACETAMINOPHEN 1 TABLET: 7.5; 325 TABLET ORAL at 03:29

## 2024-01-10 RX ADMIN — OXYCODONE HYDROCHLORIDE 10 MG: 10 TABLET ORAL at 21:03

## 2024-01-10 RX ADMIN — Medication 1 TABLET: at 08:44

## 2024-01-10 RX ADMIN — ONDANSETRON HYDROCHLORIDE 8 MG: 4 TABLET, FILM COATED ORAL at 05:19

## 2024-01-10 RX ADMIN — PANTOPRAZOLE SODIUM 40 MG: 40 TABLET, DELAYED RELEASE ORAL at 05:15

## 2024-01-10 RX ADMIN — MONTELUKAST SODIUM 10 MG: 10 TABLET, COATED ORAL at 20:12

## 2024-01-10 RX ADMIN — ALPRAZOLAM 0.5 MG: 0.5 TABLET ORAL at 13:33

## 2024-01-10 RX ADMIN — Medication 5000 UNITS: at 08:44

## 2024-01-10 NOTE — THERAPY EVALUATION
Inpatient Rehabilitation - Occupational Therapy Initial Evaluation and Treatment Note    ALBERTA Herrera     Patient Name: Phuong Tuttle  : 1960  MRN: [id number]    Today's Date: 1/10/2024                 Admit Date: 2024       No diagnosis found.    Patient Active Problem List   Diagnosis    COPD (chronic obstructive pulmonary disease)    Current smoker    Iron deficiency anemia, unspecified    Chest pain    End stage liver disease    Obesity (BMI 30-39.9)    Portal hypertension    Hepatitis C    History of substance abuse    Esophageal varices    Splenomegaly    Chronic kidney disease (CKD), stage III (moderate)    Leukopenia    Splenic pancytopenia syndrome    Iron deficiency anemia    Iron malabsorption    S/p left hip fracture       Past Medical History:   Diagnosis Date    Chronic kidney disease (CKD), stage III (moderate) 2020    COPD (chronic obstructive pulmonary disease)     non-oxygen dependent    End stage liver disease 2020    Esophageal varices     GI bleed     history of GI bleed     Hepatitis C 2020    History of substance abuse 2020    Immunocompromised patient     Peptic ulceration     Portal hypertension 2020    Splenomegaly 2020       Past Surgical History:   Procedure Laterality Date    ESOPHAGEAL VARICES LIGATION      UPPER GASTROINTESTINAL ENDOSCOPY               IRF OT ASSESSMENT FLOWSHEET (last 12 hours)       IRF OT Evaluation and Treatment       Row Name 01/10/24 1404          OT Time and Intention    Document Type initial evaluation;daily treatment  -HB     Mode of Treatment occupational therapy;individual therapy  -HB     Total Minutes, Occupational Therapy 90  -HB     Patient Effort adequate  -HB       Row Name 01/10/24 1408          General Information    Patient Profile Reviewed yes  -HB     Patient/Family/Caregiver Comments/Observations Patient and RN oksolomon OT this date.  -HB     General Observations of Patient Patient tolerated OT with no adverse  reactions. Pt presents with left hip fx s/p fall. Pt had ORIF at outside hospital and transferred to [facility] IRF 1-9-24. Pt is WBAT.  -HB     Existing Precautions/Restrictions fall;left;hip  WBAT LLE  -       Row Name 01/10/24 1404          Previous Level of Function/Home Environm    Bathing/Grooming, Premorbid Functional Level independent  -HB     Dressing, Premorbid Functional Level independent  -HB     Eating/Feeding, Premorbid Functional Level independent  -HB     Toileting, Premorbid Functional Level independent  -HB     BADLs, Premorbid Functional Level independent  -HB     IADLs, Premorbid Functional Level independent  -HB     Additional Documentation --  pt has brother to assists her with IADLs  -       Row Name 01/10/24 1404          Living Environment    Current Living Arrangements apartment  -       Row Name 01/10/24 1404          Pain Assessment    Pretreatment Pain Rating 4/10  -HB     Posttreatment Pain Rating 5/10  -HB     Pain Location - Side/Orientation --  LLE  -       Row Name 01/10/24 1404          Cognition/Psychosocial    Affect/Mental Status (Cognition) WFL  -HB     Orientation Status (Cognition) oriented x 3  -HB     Follows Commands (Cognition) WFL;verbal cues/prompting required;physical/tactile prompts required  -       Row Name 01/10/24 1404          Range of Motion (ROM)    Range of Motion bilateral upper extremities;ROM is WFL  -       Row Name 01/10/24 1404          Strength (Manual Muscle Testing)    Strength (Manual Muscle Testing) bilateral upper extremities  3+/5 grossly  -       Row Name 01/10/24 1404          Basic Activities of Daily Living (BADLs)    Basic Activities of Daily Living bathing;upper body dressing;lower body dressing;grooming;toileting;self-feeding  -       Row Name 01/10/24 1404          Bathing    [place] Level (Bathing) bathing skills;maximum assist (25% patient effort)  -       Row Name 01/10/24 1404          Upper Body Dressing     [place] Level (Upper Body Dressing) upper body dressing skills;minimum assist (75% or more patient effort)  -[facility] Name 01/10/24 1404          Lower Body Dressing    [place] Level (Lower Body Dressing) maximum assist (25% patient effort)  -[facility] Name 01/10/24 1404          Grooming    [place] Level (Grooming) grooming skills;minimum assist (75% patient effort)  -[facility] Name 01/10/24 1404          Toileting    [place] Level (Toileting) toileting skills;maximum assist (25% patient effort)  -[facility] Name 01/10/24 1404          Self-Feeding    [place] Level (Self-Feeding) feeding skills;set up  -[facility] Name 01/10/24 1404          Sit-Stand Transfer    Sit-Stand [place] (Transfers) minimum assist (75% patient effort);nonverbal cues (demo/gesture);verbal cues  -[facility] Name 01/10/24 1404          Stand-Sit Transfer    Stand-Sit [place] (Transfers) minimum assist (75% patient effort);nonverbal cues (demo/gesture);verbal cues  -[facility] Name 01/10/24 1404          Motor Skills    Motor Skills coordination;functional endurance;motor control/coordination interventions  -     Motor Control/Coordination Interventions fine motor manipulation/dexterity activities;therapeutic exercise/ROM;gross motor coordination activities  -     Therapeutic Exercise shoulder;elbow/forearm;wrist;hand  -     Additional Documentation --  PRE 2x 3 min; BUE TA to increase ADL status/ endurance; rest between tasks  -[facility] Name 01/10/24 1404          Positioning and Restraints    Pre-Treatment Position sitting in chair/recliner  -     Post Treatment Position wheelchair  -     In Wheelchair --  at second session pt in chair and left in in hallway at end of session- RN notified of pts disposition  -[facility] Name 01/10/24 1404          Therapy Assessment/Plan (OT)    Patient's Goals For Discharge return home;take care of myself at home  -[facility] Name  01/10/24 1404          Therapy Assessment/Plan (OT)    OT Diagnosis Decreased I/ADL status/ endurance; functional mobility; safety awareness  -     Rehab Potential/Prognosis (OT) adequate, monitor progress closely  -     Frequency of Treatment (OT) 5 times per week;90 minutes per session  -     Estimated Duration of Therapy (OT) 2 weeks  -     Planned Therapy Interventions (OT) activity tolerance training;adaptive equipment training;BADL retraining;IADL retraining;functional balance retraining;strengthening exercise;ROM/therapeutic exercise;patient/caregiver education/training;transfer/mobility retraining  -       Row Name 01/10/24 1404          Therapy Plan Review/Discharge Plan (OT)    Therapy Plan Review (OT) evaluation/treatment results reviewed;care plan/treatment goals reviewed;patient  -HB     Anticipated Equipment Needs At Discharge (OT) --  TBD  -HB     Anticipated Discharge Disposition (OT) home with assist;home with home health  -       Row Name 01/10/24 1404          IRF OT Goals    Transfer Goal Selection (OT-IRF) transfers, OT goal 1;transfers, OT goal 2  -HB     LB Dressing Goal Selection (OT-IRF) LB dressing, OT goal 1;LB dressing, OT goal 2  -HB     Toileting Goal Selection (OT-IRF) toileting, OT goal 1;toileting, OT goal 2  -       Row Name 01/10/24 1404          Transfer Goal 1 (OT-IRF)    Activity/Assistive Device (Transfer Goal 1, OT-IRF) all transfers  -HB     [place] Level (Transfer Goal 1, OT-IRF) contact guard required  -HB     Time Frame (Transfer Goal 1, OT-IRF) short-term goal (STG)  -HB     Progress/Outcomes (Transfer Goal 1, OT-IRF) new goal  -       Row Name 01/10/24 1404          Transfer Goal 2 (OT-IRF)    Activity/Assistive Device (Transfer Goal 2, OT-IRF) all transfers  -HB     [place] Level (Transfer Goal 2, OT-IRF) supervision required  -HB     Time Frame (Transfer Goal 2, OT-IRF) long-term goal (LTG)  -HB     Progress/Outcomes (Transfer Goal 2, OT-IRF)  new goal  -HB       Row Name 01/10/24 1404          LB Dressing Goal 1 (OT-IRF)    Activity/Device (LB Dressing Goal 1, OT-IRF) lower body dressing  -HB     [place] (LB Dressing Goal 1, OT-IRF) moderate assist (50-74% patient effort)  -HB     Time Frame (LB Dressing Goal 1, OT-IRF) short-term goal (STG)  -HB     Progress/Outcomes (LB Dressing Goal 1, OT-IRF) new goal  -HB       Row Name 01/10/24 1404          LB Dressing Goal 2 (OT-IRF)    Activity/Device (LB Dressing Goal 2, OT-IRF) lower body dressing  -HB     [place] (LB Dressing Goal 2, OT-IRF) minimum assist (75% or more patient effort)  -HB     Time Frame (LB Dressing Goal 2, OT-IRF) long-term goal (LTG)  -HB     Progress/Outcomes (LB Dressing Goal 2, OT-IRF) new goal  -HB       Row Name 01/10/24 1404          Toileting Goal 1 (OT-IRF)    Activity/Device (Toileting Goal 1, OT-IRF) toileting skills, all  -HB     [place] Level (Toileting Goal 1, OT-IRF) moderate assist (50-74% patient effort)  -HB     Progress/Outcomes (Toileting Goal 1, OT-IRF) new goal  -HB     Time Frame (Toileting Goal 1, OT-IRF) short-term goal (STG)  -HB       Row Name 01/10/24 1404          Toileting Goal 2 (OT-IRF)    Activity/Device (Toileting Goal 2, OT-IRF) toileting skills, all  -HB     [place] Level (Toileting Goal 2, OT-IRF) minimum assist (75% or more patient effort)  -HB     Progress/Outcomes (Toileting Goal 2, OT-IRF) new goal  -HB     Time Frame (Toileting Goal 2, OT-IRF) long-term goal (LTG)  -HB               User Key  (r) = Recorded By, (t) = Taken By, (c) = Cosigned By      Initials Name Effective Dates    KWAME Julia Quintana, OT 05/25/21 -                      Occupational Therapy Education       Title: PT OT SLP Therapies (Done)       Topic: Occupational Therapy (Done)       Point: ADL training (Done)       Description:   Instruct learner(s) on proper safety adaptation and remediation techniques during self care or transfers.   Instruct in proper use of  assistive devices.                  Learning Progress Summary             Patient Acceptance, E, VU,NR by  at 1/10/2024 1413                         Point: Home exercise program (Done)       Description:   Instruct learner(s) on appropriate technique for monitoring, assisting and/or progressing therapeutic exercises/activities.                  Learning Progress Summary             Patient Acceptance, E, VU,NR by  at 1/10/2024 1413                         Point: Precautions (Done)       Description:   Instruct learner(s) on prescribed precautions during self-care and functional transfers.                  Learning Progress Summary             Patient Acceptance, E, VU,NR by  at 1/10/2024 1413                         Point: Body mechanics (Done)       Description:   Instruct learner(s) on proper positioning and spine alignment during self-care, functional mobility activities and/or exercises.                  Learning Progress Summary             Patient Acceptance, E, VU,NR by  at 1/10/2024 1413                                         User Key       Initials Effective Dates Name Provider Type Discipline     05/25/21 -  Julia Quintana, OT Occupational Therapist OT                        OT Recommendation and Plan    Planned Therapy Interventions (OT): activity tolerance training, adaptive equipment training, BADL retraining, IADL retraining, functional balance retraining, strengthening exercise, ROM/therapeutic exercise, patient/caregiver education/training, transfer/mobility retraining                    Time Calculation:      Time Calculation- OT       Row Name 01/10/24 1414 01/10/24 1413          Time Calculation- OT    OT Start Time 1245  -HB 1045  -HB     OT Stop Time 1330  -HB 1130  -HB     OT Time Calculation (min) 45 min  -HB 45 min  -HB     Total Timed Code Minutes- OT 45 minute(s)  -HB 45 minute(s)  -HB               User Key  (r) = Recorded By, (t) = Taken By, (c) = Cosigned By      Initials Name  Provider Type     Julia Quintana OT Occupational Therapist                  Therapy Charges for Today       Code Description Service Date Service Provider Modifiers Qty    03125695987 HC OT EVAL HIGH COMPLEXITY 3 1/10/2024 Julia Quintana OT GO 1    39607372901 HC OT SELF CARE/MGMT/TRAIN EA 15 MIN 1/10/2024 Julia Quintana OT GO 1    78956651458 HC OT THERAPEUTIC ACT EA 15 MIN 1/10/2024 Julia Quintana OT GO 2                     Julia Quintana OT  1/10/2024

## 2024-01-10 NOTE — PROGRESS NOTES
Occupational Therapy: Individual: 90 minutes.    Physical Therapy:    Speech Language Pathology:    Signed by: Julia Quintana OT

## 2024-01-10 NOTE — PROGRESS NOTES
Patient Assessment Instrument  Quality Indicators - Admission FY 2023    Section A. Ethnicity/ Race/Language  Ethnicity:  Race:  Preferred Language:    Section A. Transportation      Section B. Hearing and Vision        Section B. Health Literacy        Section C. Cognitive Patterns      Section C. Signs and Symptoms of Delirium (from CAM)      Section D. Mood      Section D. Social Isolation      Section [id number]. Prior Functioning      Section [id number]. Prior Device Use      Section [id number]. Self Care Performance     Eating: [place] sets up or cleans up; patient completes activity. [place] assists  only prior to or following the activity.   Oral Hygiene: [place] sets up or cleans up; patient completes activity. [place]  assists only prior to or following the activity.   Toileting Hygiene: [place] does more than half the effort. [place] lifts or holds  trunk or limbs and provides more than half the effort.   Shower/Bathe Self: [place] does more than half the effort. [place] lifts or holds  trunk or limbs and provides more than half the effort.   Upper Body Dressing: [place] provides verbal cues and/or touching/steadying  and/or contact guard assistance as patient completes activity.   Lower Body Dressing: [place] does more than half the effort. [place] lifts or  holds trunk or limbs and provides more than half the effort.   Putting On/Taking Off Footwear: [place] does all of the effort. Patient does  none of the effort to complete the activity. Or, the assistance of 2 or more  helpers is required for the patient to complete the activity.    Section [id number]. Self Care Discharge Goals     Eating: [place] sets up or cleans up; patient completes activity. [place] assists  only prior to or following the activity.   Oral Hygiene: [place] sets up or cleans up; patient completes activity. [place]  assists only prior to or following the activity.   Toileting Hygiene: [place] does less than half the effort. [place] lifts, holds  or supports trunk  or limbs but provides less than half the effort.   Shower/Bathe Self: [place] does less than half the effort. [place] lifts, holds  or supports trunk or limbs but provides less than half the effort.   Upper Body Dressing: [place] sets up or cleans up; patient completes activity.  [place] assists only prior to or following the activity.   Lower Body Dressing: [place] does less than half the effort. [place] lifts, holds  or supports trunk or limbs but provides less than half the effort.   Putting On/Taking Off Footwear: [place] does more than half the effort. [place]  lifts or holds trunk or limbs and provides more than half the effort.    Section [id number]. Mobility Performance      Section [id number]. Mobility Discharge Goals      Section H. Bladder and Bowel      Section I. Active Diagnosis      Section J. Health Conditions      Section J. Health Conditions (Pain)      Section K. Swallowing/Nutritional Status    Nutritional Approaches on Admission:    Section M. Skin Conditions      Section N. Medication        Section O. Special Treatments, Procedures, and Programs    Signed by: Julia Quintana OT

## 2024-01-10 NOTE — PROGRESS NOTES
Inpatient Rehabilitation Functional Measures Assessment and Plan of Care    Plan of Care   Self Care    [OT] Dressing (Lower) (Active)  Current Status (1/10/2024 12:00:00 AM): max a  Weekly Goal: mod a  Discharge Goal: min a    Pain    [RN] Pain Management (Active)  Current Status (1/15/2024 12:00:00 AM): pt will be free of pain with meds  Weekly Goal: decrease in pain meds  Discharge Goal: pain free    Safety    [RN] Potential for Injury (Active)  Current Status (1/12/2024 12:00:00 AM): patient will ring for needs  Weekly Goal: patient uses proper safety with hip  Discharge Goal: injury free    Functional Measures  EMILY Eating:  EMILY Grooming:  EMILY Bathing:  EMILY Upper Body Dressing:  EMILY Lower Body Dressing:  EMILY Toileting:    EMILY Bladder Management  Level of Assistance:  Frequency/Number of Accidents this Shift:    EMILY Bowel Management  Level of Assistance:  Frequency/Number of Accidents this Shift:    EMILY Bed/Chair/Wheelchair Transfer:  EMILY Toilet Transfer:  EMILY Tub/Shower Transfer:    Previously Documented Mode of Locomotion at Discharge:  EMILY Expected Mode of Locomotion at Discharge:  EMILY Walk/Wheelchair:  EMILY Stairs:    EMILY Comprehension:  EMILY Expression:  EMILY Social Interaction:  EMILY Problem Solving:  EMILY Memory:    Therapy Mode Minutes  Occupational Therapy:  Physical Therapy:  Speech Language Pathology:    Discharge Functional Goals:    Signed by: Julia Quintana OT

## 2024-01-10 NOTE — H&P
[facility] HOSPITALIST HISTORY AND PHYSICAL    Patient Identification:  Name:  Phuong Tuttle  Age:  63 y.o.  Sex:  female  :  1960  MRN:  [id number]   Visit Number:  [id number]  Room number:  107/1S  Primary Care Physician:  Calin Boston NP     Subjective     2024   Chief complaint:  No chief complaint on file.      History of presenting illness:  63 y.o. female  This pt is seen today for post admission physician evaluation to the inpatient rehabilitation facility.  Patient is a 63-year-old female with a history of cirrhosis, portal hypertension, esophageal varices with history of banding, GERD, CHF, anxiety, opioid use disorder.  Patient states that she had a fall at home and fractured her left hip.  Patient was brought into the hospital at [facility] in [place] and was thought to have acute metabolic encephalopathy as well patient did have acute blood loss anemia and did require 2 units of packed red blood cells.  Patient did have open reduction internal fixation performed with total hip arthroplasty.  Patient has had issues with pain control and was on IV Dilaudid, [place] during her admission.  Patient apparently had been on Suboxone as well.  In any event patient having difficulty with ambulation and was thought to be a good candidate for inpatient physical rehab specially in the setting of her chronic medical condition.      Patient continued to progress medically.  Physical therapy and Occupational therapy evaluations were completed with recommended acute inpatient rehabilitation referral for continued functional mobility intervention and reeducation.  Acute rehab referral ordered for continued medical monitoring and management post prolonged hospitalization, continued respiratory status monitoring, lab monitoring, pain mgt needs, bowel/bladder care with new medication education, skin monitoring and breakdown prevention along with ongoing medical comorbidities that require  ongoing care.    The preadmission mini-FIM score as assessed by the referring facility as follows: Eating 4; grooming 4; bathing 2; dressing upper body 3; dressing lower body 1; transfers to bed chair and wheelchair for; transfers to toilet for; locomotion 4; bladder management 5; bowel management 5; comprehension 6; expression 6; substractions 6; problem solving 6; memory 6.    ---------------------------------------------------------------------------------------------------------------------   Review of Systems:  General: Denies fever denies chills  HEENT: Denies headaches denies vision changes  Heart: Denies chest pain denies palpitations.  Patient states she has had history of CHF  Lungs: Denies cough denies wheezing.  Abdomen: History of esophageal varices.  History of cirrhosis.  : Negative  Musculoskeletal: Left hip pain.  Patient had prior history of pelvic fracture in the past  Neuro: Negative  Skin: Negative  ---------------------------------------------------------------------------------------------------------------------   Past Medical History:   Diagnosis Date    Chronic kidney disease (CKD), stage III (moderate) 4/27/2020    COPD (chronic obstructive pulmonary disease)     non-oxygen dependent    End stage liver disease 4/27/2020    Esophageal varices     GI bleed     history of GI bleed     Hepatitis C 4/27/2020    History of substance abuse 4/27/2020    Immunocompromised patient     Peptic ulceration     Portal hypertension 4/27/2020    Splenomegaly 4/27/2020     Past Surgical History:   Procedure Laterality Date    ESOPHAGEAL VARICES LIGATION      UPPER GASTROINTESTINAL ENDOSCOPY       Family History   Problem Relation Age of Onset    Hypertension Mother     Colon cancer Mother     Pneumonia Father     Alzheimer's disease Father     Stroke Father     Lung cancer Brother     Alzheimer's disease Maternal Grandfather     Emphysema Paternal Grandfather      Social History     Socioeconomic History     Marital status:    Tobacco Use    Smoking status: Some Days     Packs/day: 0.50     Years: 35.00     Additional pack years: 0.00     Total pack years: 17.50     Types: Cigarettes    Smokeless tobacco: Never   Vaping Use    Vaping Use: Never used   Substance and Sexual Activity    Alcohol use: No    Drug use: Not Currently     Comment: Prescribed Suboxone     Sexual activity: Defer     ---------------------------------------------------------------------------------------------------------------------   Allergies:  Doxycycline, Ibuprofen, Iodinated contrast media, Prednisone, Zithromax [azithromycin], Haldol [haloperidol lactate], Latex, and Penicillins  ---------------------------------------------------------------------------------------------------------------------   Medications below are reported home medications pulling from within the system; at this time, these medications have not been reconciled unless otherwise specified and are in the verification process for further verifcation as current home medications.    Prior to Admission Medications       Prescriptions Last Dose Informant Patient Reported? Taking?    albuterol sulfate  (90 Base) MCG/ACT inhaler Unknown Self, Pharmacy Yes No    Inhale 2 puffs Every 4 (Four) Hours As Needed for Wheezing.    budesonide-formoterol (SYMBICORT) 160-4.5 MCG/ACT inhaler Unknown Self, Pharmacy Yes No    Inhale 2 puffs 2 (Two) Times a Day.    buprenorphine-naloxone (SUBOXONE) 8-2 MG per SL tablet Unknown Self, Pharmacy Yes No    Place 2.5 tablets under the tongue Daily.    furosemide (LASIX) 20 MG tablet Unknown  No No    Take 1 tablet by mouth As Needed (chf).    ipratropium-albuterol (Combivent Respimat)  MCG/ACT inhaler Unknown  No No    Inhale 1 puff 4 (Four) Times a Day As Needed for Wheezing or Shortness of Air.    lactulose (CHRONULAC) 10 GM/15ML solution Unknown Self, Pharmacy Yes No    Take 15 mL by mouth Every Other Day.    montelukast  (SINGULAIR) 10 MG tablet Unknown Self, Pharmacy Yes No    Take 1 tablet by mouth Every Night.    multivitamin with minerals tablet tablet Unknown  No No    Take 1 tablet by mouth Daily.    ondansetron (ZOFRAN) 8 MG tablet Unknown Self, Pharmacy Yes No    Take 1 tablet by mouth 3 (Three) Times a Day As Needed for Nausea or Vomiting.    pantoprazole (PROTONIX) 40 MG EC tablet Unknown Self, Pharmacy Yes No    Take 1 tablet by mouth Daily.    vitamin D (ERGOCALCIFEROL) 1.25 MG (75738 UT) capsule capsule Unknown Self, Pharmacy Yes No    Take 1 capsule by mouth 1 (One) Time Per Week.          Objective     Vital Signs:  Temp:  [98.6 °F (37 °C)] 98.6 °F (37 °C)  Heart Rate:  [77-92] 90  Resp:  [16-20] 20  BP: ()/(53-68) 107/68    Mean Arterial Pressure (Non-Invasive) for the past 24 hrs (Last 3 readings):   Noninvasive MAP (mmHg)   01/09/24 1630 68     SpO2:  [96 %-97 %] 96 %  on   ;   Device (Oxygen Therapy): room air  Body mass index is 25.82 kg/m².    Wt Readings from Last 3 Encounters:   01/09/24 72.6 kg (160 lb)   09/22/23 80.3 kg (177 lb)   07/22/21 95.7 kg (211 lb)      ---------------------------------------------------------------------------------------------------------------------     Physical exam:  Constitutional: Chronically ill-appearing female in no distress  HEENT: Normocephalic atraumatic  Neck:   Supple  Cardiovascular: Regular rate and rhythm  Pulmonary/Chest: Clear to auscultation  Abdominal:  .  Positive bowel sounds soft, slightly distended  Musculoskeletal: Status post left hip repair  Neurological: No focal deficits  Skin: No rash; incision site at left hip staples are in place with some minimal edema no erythema noted, no drainage noted.  Peripheral vascular: 1+ edema  Genitourinary::  ---------------------------------------------------------------------------------------------------------------------    No orders to display           Last echocardiogram:  Results for orders placed during  "the hospital encounter of 05/16/19    Adult Transthoracic Echo Complete W/ Cont if Necessary Per Protocol    Interpretation Summary  · Normal left ventricular cavity size and wall thickness noted.  · Left ventricular systolic function is normal. Estimated EF appears to be in the range of 61 - 65%.  · Left ventricular diastolic function is normal.  · Mild tricuspid valve regurgitation is present.  · . No evidence of pulmonary hypertension  · No significant valvular heart disease  · There is no evidence of pericardial effusion.    --------------------------------------------------------------------------------------------------------------------  Labs:  Results from last 7 days   Lab Units 01/10/24  0012   WBC 10*3/mm3 2.33*   HEMOGLOBIN g/dL 8.0*   HEMATOCRIT % 25.4*   MCV fL 93.0   MCHC g/dL 31.5   PLATELETS 10*3/mm3 84*         Results from last 7 days   Lab Units 01/10/24  0012 01/06/24  0254   SODIUM mmol/L 139  --    POTASSIUM mmol/L 3.9  --    MAGNESIUM mg/dL 1.8 1.8   CHLORIDE mmol/L 106  --    CO2 mmol/L 26.2  --    BUN mg/dL 16  --    CREATININE mg/dL 0.97  --    CALCIUM mg/dL 8.0*  --    GLUCOSE mg/dL 116*  --    ALBUMIN g/dL 2.6*  --    BILIRUBIN mg/dL 0.6  --    ALK PHOS U/L 102  --    AST (SGOT) U/L 25  --    ALT (SGPT) U/L 13  --    Estimated Creatinine Clearance: 62.1 mL/min (by C-G formula based on SCr of 0.97 mg/dL).  No results found for: \"AMMONIA\"          No results found for: \"HGBA1C\", \"POCGLU\"  Lab Results   Component Value Date    TSH 9.880 (H) 01/10/2024     No results found for: \"PREGTESTUR\", \"PREGSERUM\", \"HCG\", \"HCGQUANT\"  Pain Management Panel  More data may exist         Latest Ref Rng & Units 4/27/2020 10/29/2018   Pain Management Panel   Amphetamine, Urine Qual Negative - Negative    Barbiturates Screen, Urine Negative Negative  Negative    Benzodiazepine Screen, Urine Negative Positive  Positive    Buprenorphine, Screen, Urine Negative - Positive    Cocaine Screen, Urine Negative " "Negative  Negative    Methadone Screen , Urine Negative Negative  Negative      Brief Urine Lab Results       None          No results found for: \"BLOODCX\"  No results found for: \"URINECX\"  No results found for: \"WOUNDCX\"  No results found for: \"STOOLCX\"    Last Urine Toxicity  More data may exist         Latest Ref Rng & Units 4/27/2020 10/29/2018   LAST URINE TOXICITY RESULTS   Amphetamine, Urine Qual Negative - Negative    Barbiturates Screen, Urine Negative Negative  Negative    Benzodiazepine Screen, Urine Negative Positive  Positive    Buprenorphine, Screen, Urine Negative - Positive    Cocaine Screen, Urine Negative Negative  Negative    Methadone Screen , Urine Negative Negative  Negative        I have personally looked at the labs and they are summarized above.  ----------------------------------------------------------------------------------------------------------------------  Detailed radiology reports for the last 24 hours:    Imaging Results (Last 24 Hours)       ** No results found for the last 24 hours. **          Final impressions for the last 30 days of radiology reports:    XR CLAVICLE LEFT    Result Date: 1/5/2024  No acute fracture. Images reviewed, interpreted, and dictated by Dr. DARIUS Farias. Transcribed by Chapincito Tyler PA-C.    XR Pelvis 1 or 2 View    Result Date: 1/1/2024  Surgical changes of left hip arthroplasty without hardware complication. Images reviewed, interpreted, and dictated by Dr. Jez Hallman. Transcribed by Janette Salinas PA-C.    XR femur left    Result Date: 12/31/2023  No additional abnormality. Images reviewed, interpreted, and dictated by Dr. Mark Sal. Transcribed by Gamaliel Light.    XR Chest 1 View    Result Date: 12/31/2023  Cardiomegaly with mild pulmonary edema.    XR hip 2 views left    Result Date: 12/31/2023  Acute left hip fracture as above. Irregularity of the right pubic body. Consider CT for further evaluation. Images reviewed, interpreted, and " dictated by Dr. Jez Hallman. Transcribed by Chapincito Tyler PA-C.    CT Head Without Contrast    Result Date: 12/31/2023  No acute intracranial abnormality. CRITICAL RESULT: No. COMMUNICATION: Per this written report. Images personally reviewed, interpreted, and dictated by SANTHOSH Perera.   I have personally looked at the radiology images and read the final radiology report.    Assessment & Plan    Status post left hip fracture with left total hip arthroplasty--patient will require 1 to 2 hours of physical therapy 5 to 6 days/week for therapeutic exercise, range of motion, endurance, gait training, safety, stair navigation and strengthening.  Will require occupational therapy 90 minutes/day 5 to 6 days/week with dressing, ADLs, feeding, home skills, safety and toileting techniques.  Anticipate patient being able to safely perform activities of daily living using adaptive equipment for improved functional mobility.  Independent and safe bowel bladder function.  Able to navigate home and community territory safe without injury or falls.  Dissipate patient going home with assistance.  May require home health with PT and nursing services.  May require wheelchair, bedside commode and walker at discharge.  Patient has a history of being on Suboxone and thus her pain has been difficult to control.  At this time we will discontinue Suboxone and placed on oxycodone 10 mg every 4 hours as needed and will follow and may have to make further adjustments in analgesic regimen.  Incision site is clean with some minimal surrounding edema    Cirrhosis with pancytopenia and history of esophageal varices--will continue Lasix 20 mg daily, Zofran 8 mg 3 times daily, Protonix 40 mg daily; lactulose.  Patient may have some degree of ascites but at this time does not have tight abdomen.    COPD continue singular, Symbicort and Ventolin.    Anxiety neurosis Xanax 0.5 mg 3 times daily as needed    Pancytopenia secondary to  cirrhosis.      VTE Prophylaxis:   Mechanical Order History:        Ordered        01/09/24 1648  Place Sequential Compression Device  Once            01/09/24 1648  Maintain Sequential Compression Device  Continuous                          Pharmalogical Order History:       None            Falls Risk Assessment high risk secondary to recent left hip fracture and history of falls at home    The patient is high risk due to the following diagnoses/reasons: Cirrhosis  Sylvester Tamayo MD  [facility] Hospitalist  01/10/24  07:47 EST

## 2024-01-10 NOTE — PLAN OF CARE
Problem: Rehabilitation (IRF) Plan of Care  Goal: Plan of Care Review  Outcome: Ongoing, Progressing  Flowsheets (Taken 1/10/2024 1042)  Progress: improving  Plan of Care Reviewed With: patient  Outcome Evaluation: Patient progressing with current therapies. Patient alert and oriented. Call light and items within reach.No acute signs or symptoms of distress noted. PRN pain meds administered for pain. Continue current plan of care.  Goal: Patient-Specific Goal (Individualized)  Outcome: Ongoing, Progressing  Goal: Absence of New-Onset Illness or Injury  Outcome: Ongoing, Progressing  Intervention: Prevent Fall and Fall Injury  Recent Flowsheet Documentation  Taken 1/10/2024 0842 by Sobia Slaughter, RN  Safety Promotion/Fall Prevention:   safety round/check completed   assistive device/personal items within reach   nonskid shoes/slippers when out of bed  Goal: Optimal Comfort and Wellbeing  Outcome: Ongoing, Progressing  Goal: Home and Community Transition Plan Established  Outcome: Ongoing, Progressing   Goal Outcome Evaluation:  Plan of Care Reviewed With: patient        Progress: improving  Outcome Evaluation: Patient progressing with current therapies. Patient alert and oriented. Call light and items within reach.No acute signs or symptoms of distress noted. PRN pain meds administered for pain. Continue current plan of care.

## 2024-01-10 NOTE — PROGRESS NOTES
Rehabilitation Nursing  Inpatient Rehabilitation Plan of Care Note    Plan of Care  Pain    Pain Management (Active)  Current Status (1/15/2024 12:00:00 AM): pt will be free of pain with meds  Weekly Goal: decrease in pain meds  Discharge Goal: pain free    Safety    Potential for Injury (Active)  Current Status (1/12/2024 12:00:00 AM): patient will ring for needs  Weekly Goal: patient uses proper safety with hip  Discharge Goal: injury free    Signed by: Sobia Slaughter RN

## 2024-01-10 NOTE — SIGNIFICANT NOTE
01/10/24 0835   Living Environment   People in Home alone   Current Living Arrangements apartment   Potentially Unsafe Housing Conditions none   In the past 12 months has the electric, gas, oil, or water company threatened to shut off services in your home? Yes  (Pt says [facility] was working on paying her electric bill.)   Primary Care Provided by self   Provides Primary Care For no one   Caregiving Concerns Pt does not have a full-time caregiver.   Quality of Family Relationships supportive   Able to Return to Prior Arrangements yes   Living Arrangement Comments Spoke to pt who states being a  for 20 years and having no children.  Pt's brother Alejandro Tuttle is her primary support person who calls her daily and provides transportation in her car at times.  Pt receives SSI disability and [employer] Medicaid.  PCP is HARIKA Corcoran in [place].  Pt uses [facility].  Pt does not have POA or living will.  Pt says she will independent with mobility and ADL's prior to hospital admission.   Resource/Environmental Concerns   Resource/Environmental Concerns none   Transportation Concerns none   Transition Planning   Patient/Family Anticipates Transition to home   Patient/Family Anticipated Services at Transition   (To be recommended closer to discharge date.)   Transportation Anticipated family or friend will provide   Discharge Needs Assessment (IRF)   Current Outpatient/Agency/Support Group   (Pt did not receive home health or outpatient therapy prior to admission.)   Concerns to be Addressed adjustment to diagnosis/illness   Equipment Currently Used at Home none   Current Discharge Risk lives alone   Discharge Coordination/Progress Pt plans to return to her apartment alone at discharge.  Pt would like to be evaluated for home and community based waiver program.  Brothemmanuel Allen can check on her and make sure she has groceries.  Team conference will be held on  1-11-24.  SS will follow and assist with discharge planning.

## 2024-01-10 NOTE — PROGRESS NOTES
Patient Assessment Instrument  Quality Indicators - Admission FY 2023    Section A. Ethnicity/ Race/Language  Ethnicity:  Race:  Preferred Language:    Section A. Transportation  Issues Due to Lack of Transportation:   No    Section B. Hearing and Vision        Section B. Health Literacy        Section C. Cognitive Patterns      Section C. Signs and Symptoms of Delirium (from CAM)      Section D. Mood      Section D. Social Isolation      Section MB0281. Prior Functioning      Section [id number]. Prior Device Use  Patient does not use manual or motorized wheelchair or scooter, mechanical lift,  walker, or an orthotic/prosthesis.    Section [id number]. Self Care Performance      Section [id number]. Self Care Discharge Goals      Section IL5082. Mobility Performance      Section [id number]. Mobility Discharge Goals      Section H. Bladder and Bowel      Section I. Active Diagnosis      Section J. Health Conditions      Section J. Health Conditions (Pain)      Section K. Swallowing/Nutritional Status    Nutritional Approaches on Admission:    Section M. Skin Conditions      Section N. Medication        Section O. Special Treatments, Procedures, and Programs    Signed by: WILFREDO Momin

## 2024-01-10 NOTE — PROGRESS NOTES
Case Management  Inpatient Rehabilitation Plan of Care and Discharge Plan Note    Rehabilitation Diagnosis:  left hip replacement  Date of Onset:  12-31-23    Medical Summary:  left hip fx, HTN, acute blood loss anemia    Plan of Care      Expected Intensity:  Average of 3 hours of therapy 5 days/week.  Interdisciplinary Team:  Interdisciplinary Team: Medical Supervision and 24 Hour Rehabilitation Nursing.,  Physical Therapy:, Occupational Therapy:, Social Work, Therapeutic Recreation.  Physical Therapy Intensity/Duration: PT 1.5 hours per day/5 days per week  Occupational Therapy Intensity/Duration: OT 1.5 hours per day/5 days per week  Estimated Length of Stay/Anticipated Discharge Date: 14 days  Anticipated Discharge Destination:  Anticipated discharge destination from inpatient rehabilitation is community  discharge with assistance. Pt wants to return to her apartment alone at  discharge.      Based on the patient's medical and functional status, their prognosis and  expected level of functional improvement is:  fair-good    Signed by: WILFREDO Momin

## 2024-01-10 NOTE — PROGRESS NOTES
Inpatient Rehabilitation Functional Measures Assessment and Plan of Care    Plan of Care      Functional Measures  EMILY Eating:  EMILY Grooming:  EMILY Bathing:  EMILY Upper Body Dressing:  EMILY Lower Body Dressing:  EMILY Toileting:    EMILY Bladder Management  Level of Assistance:  Frequency/Number of Accidents this Shift:    EMILY Bowel Management  Level of Assistance:  Frequency/Number of Accidents this Shift:    EMILY Bed/Chair/Wheelchair Transfer:  Bed/chair/wheelchair Transfer Score = 2.  Patient performs 25-49% of effort and requires maximal assistance (most of the  lifting) for transferring to and from the bed/chair/wheelchair. Patient requires  the following assistive device(s): Walker. Seating system of wheelchair.  EMILY Toilet Transfer:  EMILY Tub/Shower Transfer:    Previously Documented Mode of Locomotion at Discharge:  EMILY Expected Mode of Locomotion at Discharge: The expected mode of most  frequently used locomotion, at discharge, is expected to be walking.  EMILY Walk/Wheelchair:  WHEELCHAIR OBSERVATION   Wheelchair did not occur.    WALK OBSERVATION   Walk Distance Scale = 2.  Distance walked is 50 -149 feet. Walk Score = 2.  Patient performs 75% or more of effort and requires minimal assistance.  Incidental assistance, contact guard or steadying was provided. Patient walked a  distance of 60 feet. Patient requires the following assistive device(s): Rolling  walker.  EMILY Stairs:  Stairs did not occur.    EMILY Comprehension:  EMILY Expression:  EMILY Social Interaction:  EMILY Problem Solving:  EMILY Memory:    Therapy Mode Minutes  Occupational Therapy:  Physical Therapy: Individual: 90 minutes.  Speech Language Pathology:    Discharge Functional Goals:  Bed, Chair, Wheelchair Transfers: 6  Walk: 6    Signed by: Caryn Rosen, Physical Therapist

## 2024-01-10 NOTE — THERAPY EVALUATION
Inpatient Rehabilitation - Physical Therapy Initial Evaluation       ALBERTA Herrera     Patient Name: Phuong Tuttle  : 1960  MRN: [id number]    Today's Date: 1/10/2024                    Admit Date: 2024      Visit Dx:   No diagnosis found.    Patient Active Problem List   Diagnosis    COPD (chronic obstructive pulmonary disease)    Current smoker    Iron deficiency anemia, unspecified    Chest pain    End stage liver disease    Obesity (BMI 30-39.9)    Portal hypertension    Hepatitis C    History of substance abuse    Esophageal varices    Splenomegaly    Chronic kidney disease (CKD), stage III (moderate)    Leukopenia    Splenic pancytopenia syndrome    Iron deficiency anemia    Iron malabsorption    S/p left hip fracture       Past Medical History:   Diagnosis Date    Chronic kidney disease (CKD), stage III (moderate) 2020    COPD (chronic obstructive pulmonary disease)     non-oxygen dependent    End stage liver disease 2020    Esophageal varices     GI bleed     history of GI bleed     Hepatitis C 2020    History of substance abuse 2020    Immunocompromised patient     Peptic ulceration     Portal hypertension 2020    Splenomegaly 2020       Past Surgical History:   Procedure Laterality Date    ESOPHAGEAL VARICES LIGATION      UPPER GASTROINTESTINAL ENDOSCOPY         PT ASSESSMENT (last 12 hours)       IRF PT Evaluation and Treatment       Row Name 01/10/24 1038          PT Time and Intention    Document Type initial evaluation;daily treatment  -LB     Mode of Treatment individual therapy;physical therapy  -LB       Row Name 01/10/24 1038          General Information    Existing Precautions/Restrictions fall;left;hip  -LB       Row Name 01/10/24 1038          Living Environment    Current Living Arrangements apartment  -LB     People in Home alone  -LB       Row Name 01/10/24 1038          Pain Scale: FACES Pre/Post-Treatment    Pain: FACES Scale, Pretreatment 6-->hurts  even more  -LB     Posttreatment Pain Rating 6-->hurts even more  -LB     Pain Location - --  L hip  -LB     Pre/Posttreatment Pain Comment rest, repositioned  -LB       Row Name 01/10/24 1038          Cognition/Psychosocial    Affect/Mental Status (Cognition) WFL  -LB     Follows Commands (Cognition) WFL;verbal cues/prompting required;physical/tactile prompts required  -LB     Personal Safety Interventions gait belt;nonskid shoes/slippers when out of bed;supervised activity  -LB       Row Name 01/10/24 1038          Mobility    Left Lower Extremity (Weight-bearing Status) weight-bearing as tolerated (WBAT)  -LB     Right Lower Extremity (Weight-bearing Status) weight-bearing as tolerated (WBAT)  -LB       Row Name 01/10/24 1038          Bed Mobility    Supine-Sit [place] (Bed Mobility) maximum assist (25% patient effort);verbal cues;nonverbal cues (demo/gesture)  -LB     Assistive Device (Bed Mobility) bed rails;draw sheet;head of bed elevated  -LB       Row Name 01/10/24 1038          Bed-Chair Transfer    Bed-Chair [place] (Transfers) contact guard;minimum assist (75% patient effort);verbal cues;nonverbal cues (demo/gesture)  -LB     Assistive Device (Bed-Chair Transfers) walker, front-wheeled  -LB       Row Name 01/10/24 1038          Chair-Bed Transfer    Chair-Bed [place] (Transfers) contact guard;minimum assist (75% patient effort);verbal cues;nonverbal cues (demo/gesture)  -LB     Assistive Device (Chair-Bed Transfers) walker, front-wheeled  -LB       Row Name 01/10/24 1038          Sit-Stand Transfer    Sit-Stand [place] (Transfers) contact guard;minimum assist (75% patient effort);verbal cues;nonverbal cues (demo/gesture)  -LB     Assistive Device (Sit-Stand Transfers) wheelchair;walker, front-wheeled  -LB       Row Name 01/10/24 1038          Stand-Sit Transfer    Stand-Sit [place] (Transfers) contact guard;minimum assist (75% patient effort);verbal cues;nonverbal cues  (demo/gesture)  -LB     Assistive Device (Stand-Sit Transfers) wheelchair;walker, front-wheeled  -LB       Row Name 01/10/24 1038          Gait/Stairs (Locomotion)    [place] Level (Gait) contact guard;verbal cues;nonverbal cues (demo/gesture)  -LB     Assistive Device (Gait) walker, front-wheeled  -LB     Distance in Feet (Gait) 60' 40'  -LB     Deviations/Abnormal Patterns (Gait) antalgic;claire decreased;gait speed decreased;stride length decreased;weight shifting decreased  -LB     Bilateral Gait Deviations forward flexed posture;heel strike decreased  -LB       Row Name 01/10/24 1038          Safety Issues, Functional Mobility    Safety Issues Affecting Function (Mobility) --  reviewed hip precautions  -LB     Impairments Affecting Function (Mobility) balance;endurance/activity tolerance;pain;range of motion (ROM);strength  -LB       Row Name 01/10/24 1038          Balance    Static Sitting Balance supervision  dressing at EOB  -LB     Static Standing Balance --  CGA with RW  -LB       Row Name 01/10/24 1038          Motor Skills    Therapeutic Exercise --  sitting ex in w/c, 2 sets  -LB       Row Name 01/10/24 1038          Positioning and Restraints    Pre-Treatment Position in bed  -LB     In Wheelchair with OT  -LB       Row Name 01/10/24 1038          Therapy Assessment/Plan (PT)    Patient's Goals For Discharge return home  -LB       Row Name 01/10/24 1038          Therapy Assessment/Plan (PT)    PT Diagnosis (PT) s/p L THR  -LB     Rehab Potential/Prognosis (PT) adequate, monitor progress closely  -LB     Frequency of Treatment (PT) 5 times per week  -LB     Estimated Duration of Therapy (PT) 2 weeks  -LB     Problem List (PT) balance;mobility;range of motion (ROM);strength;pain  -LB     Activity Limitations Related to Problem List (PT) unable to ambulate safely;unable to transfer safely  -LB     Comment, Therapy Assessment/Plan (PT) she lives alone and has limited assistance  -LB       Row Name  01/10/24 1038          Therapy Plan Review/Discharge Plan (PT)    Therapy Plan Review (PT) evaluation/treatment results reviewed;care plan/treatment goals reviewed;risks/benefits reviewed;participants voiced agreement with care plan  -LB     Anticipated Equipment Needs at Discharge (PT Eval) --  tbd  -LB     Anticipated Discharge Disposition (PT) home;home with home health  -LB       Row Name 01/10/24 1038          IRF PT Goals    Bed Mobility Goal Selection (PT-IRF) bed mobility, PT goal 1  -LB     Transfer Goal Selection (PT-IRF) transfers, PT goal 1  -LB     Gait (Walking Locomotion) Goal Selection (PT-IRF) gait, PT goal 1  -LB       Row Name 01/10/24 1038          Bed Mobility Goal 1 (PT-IRF)    Activity/Assistive Device (Bed Mobility Goal 1, PT-IRF) sit to supine/supine to sit  -LB     [place] Level (Bed Mobility Goal 1, PT-IRF) modified independence  -LB     Time Frame (Bed Mobility Goal 1, PT-IRF) by discharge  -LB       Row Name 01/10/24 1038          Transfer Goal 1 (PT-IRF)    Activity/Assistive Device (Transfer Goal 1, PT-IRF) sit-to-stand/stand-to-sit;bed-to-chair/chair-to-bed  -LB     [place] Level (Transfer Goal 1, PT-IRF) modified independence  -LB     Time Frame (Transfer Goal 1, PT-IRF) by discharge  -LB       Row Name 01/10/24 1038          Gait/Walking Locomotion Goal 1 (PT-IRF)    Activity/Assistive Device (Gait/Walking Locomotion Goal 1, PT-IRF) walker, rolling  -LB     Gait/Walking Locomotion Distance Goal 1 (PT-IRF) 300'  -LB     [place] Level (Gait/Walking Locomotion Goal 1, PT-IRF) modified independence  -LB     Time Frame (Gait/Walking Locomotion Goal 1, PT-IRF) by discharge  -LB               User Key  (r) = Recorded By, (t) = Taken By, (c) = Cosigned By      Initials Name Provider Type    LB Caryn Rosen, PT Physical Therapist                  Wound 01/09/24 1655 Left anterior hip Incision (Active)   Dressing Appearance dry;intact 01/09/24 2002   Closure Staples  01/10/24 0842   Base clean;dry 01/10/24 0842   Periwound dry;redness;other (see comments) 01/10/24 0842   Drainage Amount none 01/10/24 0842   Care, Wound other (see comments) 01/10/24 0842   Dressing Care dressing changed 01/10/24 0842     Physical Therapy Education       Title: PT OT SLP Therapies (In Progress)       Topic: Physical Therapy (Not Started)       Point: Mobility training (Not Started)       Learner Progress:  Not documented in this visit.              Point: Home exercise program (Not Started)       Learner Progress:  Not documented in this visit.              Point: Body mechanics (Not Started)       Learner Progress:  Not documented in this visit.              Point: Precautions (Not Started)       Learner Progress:  Not documented in this visit.                                    PT Recommendation and Plan    Planned Therapy Interventions (PT): balance training, bed mobility training, gait training, home exercise program, ROM (range of motion), strengthening, transfer training  Frequency of Treatment (PT): 5 times per week  Anticipated Equipment Needs at Discharge (PT Eval):  (tbd)                  Time Calculation:      PT Charges       Row Name 01/10/24 1444             Time Calculation    Start Time 0915  -LB      Stop Time 1045  -LB      Time Calculation (min) 90 min  -LB      PT Received On 01/10/24  -LB      PT Goal Re-Cert Due Date 01/17/24  -LB                User Key  (r) = Recorded By, (t) = Taken By, (c) = Cosigned By      Initials Name Provider Type    LB Caryn Rosen, PT Physical Therapist                    Therapy Charges for Today       Code Description Service Date Service Provider Modifiers Qty    02650014416 HC PT EVAL LOW COMPLEXITY 1 1/10/2024 Caryn Rosen, PT GP 1    83896157871 HC GAIT TRAINING EA 15 MIN 1/10/2024 Caryn Rosen, PT GP 1    23570867549 HC PT THER PROC EA 15 MIN 1/10/2024 Caryn Rosen, PT GP 3    98872526636 HC PT THERAPEUTIC  ACT EA 15 MIN 1/10/2024 Caryn Rosen, PT GP 1              PT G-Codes  AM-PAC 6 Clicks Score (PT): 12      Caryn Rosen, PT  1/10/2024

## 2024-01-10 NOTE — NURSING NOTE
Notified MD patient refused all ordered routine meds, stated she didn't have seizure disorder, she wouldn't take her antibiotic, all meds were refused with the exception of her pain med, xanax and zofran. I educated her on risk of not taking meds as prescribed by MD. She stated she understood the risk.

## 2024-01-10 NOTE — PROGRESS NOTES
Patient Assessment Instrument  Quality Indicators - Admission FY 2023    Section A. Ethnicity/ Race/Language  Ethnicity:  Race:  Preferred Language:    Section A. Transportation      Section B. Hearing and Vision        Section B. Health Literacy        Section C. Cognitive Patterns      Section C. Signs and Symptoms of Delirium (from CAM)      Section D. Mood      Section D. Social Isolation      Section [id number]. Prior Functioning      Section [id number]. Prior Device Use      Section [id number]. Self Care Performance      Section [id number]. Self Care Discharge Goals      Section AW1679. Mobility Performance     Roll Left and Right: [place] does more than half the effort. [place] lifts or  holds trunk or limbs and provides more than half the effort.   Sit to Lying: [place] does more than half the effort. [place] lifts or holds  trunk or limbs and provides more than half the effort.   Lying to Sitting on Side of Bed: [place] does more than half the effort. [place]  lifts or holds trunk or limbs and provides more than half the effort.   Sit to Stand: [place] does less than half the effort. [place] lifts, holds or  supports trunk or limbs but provides less than half the effort.   Chair/Bed to Chair Transfer: [place] does less than half the effort. [place]  lifts, holds or supports trunk or limbs but provides less than half the effort.   Toilet Transfer [place] does more than half the effort. [place] lifts or holds  trunk or limbs and provides more than half the effort.   Car Transfer: [place] does more than half the effort. [place] lifts or holds  trunk or limbs and provides more than half the effort.   Walk 10 Feet:   [place] provides verbal cues and/or touching/steadying and/or  contact guard assistance as patient completes activity. Assistance may be  provided throughout the activity or intermittently.  Walk 50 Feet with 2 Turns:   [place] provides verbal cues and/or  touching/steadying and/or contact guard assistance as patient  completes  activity. Assistance may be provided throughout the activity or intermittently.  Walk 150 Feet:   Not attempted due to medical or safety concerns.  Walking 10 Feet on Uneven Surfaces:   Not attempted due to medical or safety  concerns.  1 Step Over Curb or Up/Down Stair:   Not attempted due to medical or safety  concerns.  Picking up an Object:   Not attempted due to medical or safety concerns.  Uses Wheelchair/Scooter: No    Section PU2520. Mobility Discharge Goals     Sit to Stand: Patient completed the activities by themself with no assistance  from a helper.   Chair/Bed to Chair Transfer: Patient completed the activities by themself with  no assistance from a helper.   Walk Discharge Goals:   Walk 150 Feet: Patient completed the activities by themself with no assistance  from a helper.    Section H. Bladder and Bowel      Section I. Active Diagnosis      Section J. Health Conditions      Section J. Health Conditions (Pain)      Section K. Swallowing/Nutritional Status    Nutritional Approaches on Admission:    Section M. Skin Conditions      Section N. Medication        Section O. Special Treatments, Procedures, and Programs    Signed by: Caryn Rosen, Physical Therapist

## 2024-01-10 NOTE — NURSING NOTE
Patient has and is continuing to refuse turns. Educated on risk of skin breakdown. Will continue to monitor.

## 2024-01-10 NOTE — PLAN OF CARE
Goal Outcome Evaluation: Patient resting in bed, c/o pain to left hip. Repositioned for comfort. Will continue to monitor.

## 2024-01-11 LAB
ALBUMIN SERPL-MCNC: 3.1 G/DL (ref 3.5–5.2)
ALBUMIN/GLOB SERPL: 1.2 G/DL
ALP SERPL-CCNC: 128 U/L (ref 39–117)
ALT SERPL W P-5'-P-CCNC: 20 U/L (ref 1–33)
ANION GAP SERPL CALCULATED.3IONS-SCNC: 4.6 MMOL/L (ref 5–15)
AST SERPL-CCNC: 42 U/L (ref 1–32)
BILIRUB SERPL-MCNC: 0.9 MG/DL (ref 0–1.2)
BUN SERPL-MCNC: 17 MG/DL (ref 8–23)
BUN/CREAT SERPL: 13.1 (ref 7–25)
CA-I SERPL ISE-MCNC: 1.11 MMOL/L (ref 1.12–1.32)
CALCIUM SPEC-SCNC: 8.3 MG/DL (ref 8.6–10.5)
CHLORIDE SERPL-SCNC: 103 MMOL/L (ref 98–107)
CO2 SERPL-SCNC: 30.4 MMOL/L (ref 22–29)
CREAT SERPL-MCNC: 1.3 MG/DL (ref 0.57–1)
EGFRCR SERPLBLD CKD-EPI 2021: 46.9 ML/MIN/1.73
GLOBULIN UR ELPH-MCNC: 2.6 GM/DL
GLUCOSE SERPL-MCNC: 88 MG/DL (ref 65–99)
POTASSIUM SERPL-SCNC: 4.2 MMOL/L (ref 3.5–5.2)
PROT SERPL-MCNC: 5.7 G/DL (ref 6–8.5)
SODIUM SERPL-SCNC: 138 MMOL/L (ref 136–145)

## 2024-01-11 PROCEDURE — 82330 ASSAY OF CALCIUM: CPT | Performed by: FAMILY MEDICINE

## 2024-01-11 PROCEDURE — 97535 SELF CARE MNGMENT TRAINING: CPT

## 2024-01-11 PROCEDURE — 94761 N-INVAS EAR/PLS OXIMETRY MLT: CPT

## 2024-01-11 PROCEDURE — 99231 SBSQ HOSP IP/OBS SF/LOW 25: CPT | Performed by: FAMILY MEDICINE

## 2024-01-11 PROCEDURE — 80053 COMPREHEN METABOLIC PANEL: CPT | Performed by: FAMILY MEDICINE

## 2024-01-11 PROCEDURE — 97530 THERAPEUTIC ACTIVITIES: CPT

## 2024-01-11 PROCEDURE — 97110 THERAPEUTIC EXERCISES: CPT

## 2024-01-11 PROCEDURE — 63710000001 ONDANSETRON PER 8 MG: Performed by: FAMILY MEDICINE

## 2024-01-11 PROCEDURE — 94799 UNLISTED PULMONARY SVC/PX: CPT

## 2024-01-11 PROCEDURE — 97116 GAIT TRAINING THERAPY: CPT

## 2024-01-11 PROCEDURE — 94664 DEMO&/EVAL PT USE INHALER: CPT

## 2024-01-11 RX ORDER — ALPRAZOLAM 0.5 MG/1
0.5 TABLET ORAL 4 TIMES DAILY PRN
Status: DISCONTINUED | OUTPATIENT
Start: 2024-01-11 | End: 2024-01-17

## 2024-01-11 RX ORDER — POTASSIUM CHLORIDE 20 MEQ/1
20 TABLET, EXTENDED RELEASE ORAL DAILY
Status: DISCONTINUED | OUTPATIENT
Start: 2024-01-11 | End: 2024-01-16

## 2024-01-11 RX ADMIN — OXYCODONE HYDROCHLORIDE 10 MG: 10 TABLET ORAL at 11:38

## 2024-01-11 RX ADMIN — ALPRAZOLAM 0.5 MG: 0.5 TABLET ORAL at 11:38

## 2024-01-11 RX ADMIN — ALPRAZOLAM 0.5 MG: 0.5 TABLET ORAL at 05:56

## 2024-01-11 RX ADMIN — ALPRAZOLAM 0.5 MG: 0.5 TABLET ORAL at 17:31

## 2024-01-11 RX ADMIN — ONDANSETRON HYDROCHLORIDE 8 MG: 4 TABLET, FILM COATED ORAL at 05:56

## 2024-01-11 RX ADMIN — ACETAMINOPHEN 650 MG: 325 TABLET ORAL at 13:24

## 2024-01-11 RX ADMIN — ACETAMINOPHEN 650 MG: 325 TABLET ORAL at 09:30

## 2024-01-11 RX ADMIN — OXYCODONE HYDROCHLORIDE 10 MG: 10 TABLET ORAL at 23:24

## 2024-01-11 RX ADMIN — ACETAMINOPHEN 650 MG: 325 TABLET ORAL at 05:56

## 2024-01-11 RX ADMIN — OXYCODONE HYDROCHLORIDE 10 MG: 10 TABLET ORAL at 15:22

## 2024-01-11 RX ADMIN — PANTOPRAZOLE SODIUM 40 MG: 40 TABLET, DELAYED RELEASE ORAL at 05:56

## 2024-01-11 RX ADMIN — FUROSEMIDE 20 MG: 20 TABLET ORAL at 07:31

## 2024-01-11 RX ADMIN — IPRATROPIUM BROMIDE AND ALBUTEROL SULFATE 3 ML: 2.5; .5 SOLUTION RESPIRATORY (INHALATION) at 07:00

## 2024-01-11 RX ADMIN — ONDANSETRON HYDROCHLORIDE 8 MG: 4 TABLET, FILM COATED ORAL at 23:24

## 2024-01-11 RX ADMIN — Medication 5000 UNITS: at 07:31

## 2024-01-11 RX ADMIN — ONDANSETRON HYDROCHLORIDE 8 MG: 4 TABLET, FILM COATED ORAL at 11:42

## 2024-01-11 RX ADMIN — OXYCODONE HYDROCHLORIDE 10 MG: 10 TABLET ORAL at 19:07

## 2024-01-11 RX ADMIN — POTASSIUM CHLORIDE 20 MEQ: 20 TABLET, EXTENDED RELEASE ORAL at 13:23

## 2024-01-11 RX ADMIN — POLYETHYLENE GLYCOL (3350) 17 G: 17 POWDER, FOR SOLUTION ORAL at 13:23

## 2024-01-11 RX ADMIN — ALPRAZOLAM 0.5 MG: 0.5 TABLET ORAL at 23:24

## 2024-01-11 RX ADMIN — MONTELUKAST SODIUM 10 MG: 10 TABLET, COATED ORAL at 20:48

## 2024-01-11 RX ADMIN — ONDANSETRON HYDROCHLORIDE 8 MG: 4 TABLET, FILM COATED ORAL at 17:32

## 2024-01-11 RX ADMIN — OXYCODONE HYDROCHLORIDE 10 MG: 10 TABLET ORAL at 07:31

## 2024-01-11 RX ADMIN — Medication 1 TABLET: at 07:31

## 2024-01-11 RX ADMIN — OXYCODONE HYDROCHLORIDE 10 MG: 10 TABLET ORAL at 03:43

## 2024-01-11 RX ADMIN — ACETAMINOPHEN 650 MG: 325 TABLET ORAL at 17:31

## 2024-01-11 NOTE — THERAPY TREATMENT NOTE
Inpatient Rehabilitation - Occupational Therapy Treatment Note    ALBERTA Herrera     Patient Name: Phuong Tuttle  : 1960  MRN: [id number]    Today's Date: 2024                 Admit Date: 2024       No diagnosis found.    Patient Active Problem List   Diagnosis    COPD (chronic obstructive pulmonary disease)    Current smoker    Iron deficiency anemia, unspecified    Chest pain    End stage liver disease    Obesity (BMI 30-39.9)    Portal hypertension    Hepatitis C    History of substance abuse    Esophageal varices    Splenomegaly    Chronic kidney disease (CKD), stage III (moderate)    Leukopenia    Splenic pancytopenia syndrome    Iron deficiency anemia    Iron malabsorption    S/p left hip fracture       Past Medical History:   Diagnosis Date    Chronic kidney disease (CKD), stage III (moderate) 2020    COPD (chronic obstructive pulmonary disease)     non-oxygen dependent    End stage liver disease 2020    Esophageal varices     GI bleed     history of GI bleed     Hepatitis C 2020    History of substance abuse 2020    Immunocompromised patient     Peptic ulceration     Portal hypertension 2020    Splenomegaly 2020       Past Surgical History:   Procedure Laterality Date    ESOPHAGEAL VARICES LIGATION      UPPER GASTROINTESTINAL ENDOSCOPY               IRF OT ASSESSMENT FLOWSHEET (last 12 hours)       IRF OT Evaluation and Treatment       Row Name 24 [id number]          OT Time and Intention    Document Type daily treatment  -HB     Mode of Treatment individual therapy;occupational therapy  -HB     Total Minutes, Occupational Therapy 100  -HB     Patient Effort good  -HB     Symptoms Noted During/After Treatment none  -HB       Row Name 24 6000          General Information    Patient Profile Reviewed yes  -HB     Patient/Family/Caregiver Comments/Observations Patient and RN oksolomon OT this date.  -HB     General Observations of Patient Patient tolerated OT well  with no adverse reactions.  -     Existing Precautions/Restrictions fall;left;hip  -       Row Name 01/11/24 1347          Pain Assessment    Pretreatment Pain Rating 3/10  -HB     Posttreatment Pain Rating 3/10  -     Pain Location - Side/Orientation --  LLE  -[facility] Name 01/11/24 1347          Cognition/Psychosocial    Affect/Mental Status (Cognition) WFL  -     Orientation Status (Cognition) oriented x 3  -HB     Follows Commands (Cognition) WFL;verbal cues/prompting required;physical/tactile prompts required  -       Row Name 01/11/24 1347          Upper Body Dressing    [place] Level (Upper Body Dressing) upper body dressing skills;don;front opening garment  -     Position (Upper Body Dressing) edge of bed sitting  -       Row Name 01/11/24 1347          Lower Body Dressing    [place] Level (Lower Body Dressing) don;pants/bottoms;socks;dependent (less than 25% patient effort)  -     Position (Lower Body Dressing) edge of bed sitting  -       Row Name 01/11/24 1347          Toileting    [place] Level (Toileting) toileting skills;dependent (less than 25% patient effort)  -     Position (Toileting) unsupported sitting;supported standing  -[facility] Name 01/11/24 1347          Bed Mobility    Supine-Sit [place] (Bed Mobility) moderate assist (50% patient effort);verbal cues;nonverbal cues (demo/gesture)  -[facility] Name 01/11/24 1347          Bed-Chair Transfer    Bed-Chair [place] (Transfers) minimum assist (75% patient effort);nonverbal cues (demo/gesture);verbal cues  -     Assistive Device (Bed-Chair Transfers) wheelchair;walker, front-wheeled  -       Row Name 01/11/24 1347          Sit-Stand Transfer    Sit-Stand [place] (Transfers) minimum assist (75% patient effort);moderate assist (50% patient effort);verbal cues;nonverbal cues (demo/gesture)  -[facility] Name 01/11/24 1347          Stand-Sit Transfer    Stand-Sit [place] (Transfers)  moderate assist (50% patient effort);minimum assist (75% patient effort);verbal cues;nonverbal cues (demo/gesture)  -       Row Name 01/11/24 1347          Motor Skills    Motor Skills coordination;functional endurance;motor control/coordination interventions  -     Motor Control/Coordination Interventions fine motor manipulation/dexterity activities;gross motor coordination activities;therapeutic exercise/ROM  -HB     Therapeutic Exercise shoulder;elbow/forearm;wrist;hand  -HB     Additional Documentation --  PRE 2x 5 min; BUE TA to increase ADL status/ endurance; rest between tasks  -       Row Name 01/11/24 1347          Positioning and Restraints    Pre-Treatment Position in bed  -HB     Post Treatment Position wheelchair  -HB     In Wheelchair with PT  -       Row Name 01/11/24 1347          Transfer Goal 1 (OT-IRF)    Activity/Assistive Device (Transfer Goal 1, OT-IRF) all transfers  -HB     [place] Level (Transfer Goal 1, OT-IRF) contact guard required  -HB     Time Frame (Transfer Goal 1, OT-IRF) short-term goal (STG)  -HB     Progress/Outcomes (Transfer Goal 1, OT-IRF) goal ongoing  -       Row Name 01/11/24 1347          Transfer Goal 2 (OT-IRF)    Activity/Assistive Device (Transfer Goal 2, OT-IRF) all transfers  -HB     [place] Level (Transfer Goal 2, OT-IRF) supervision required  -HB     Time Frame (Transfer Goal 2, OT-IRF) long-term goal (LTG)  -HB     Progress/Outcomes (Transfer Goal 2, OT-IRF) goal ongoing  -[facility] Name 01/11/24 1347          LB Dressing Goal 1 (OT-IRF)    Activity/Device (LB Dressing Goal 1, OT-IRF) lower body dressing  -HB     [place] (LB Dressing Goal 1, OT-IRF) moderate assist (50-74% patient effort)  -HB     Time Frame (LB Dressing Goal 1, OT-IRF) short-term goal (STG)  -HB     Progress/Outcomes (LB Dressing Goal 1, OT-IRF) goal ongoing  -       Row Name 01/11/24 1347          LB Dressing Goal 2 (OT-IRF)    Activity/Device (LB Dressing Goal 2,  OT-IRF) lower body dressing  -HB     [place] (LB Dressing Goal 2, OT-IRF) minimum assist (75% or more patient effort)  -HB     Time Frame (LB Dressing Goal 2, OT-IRF) long-term goal (LTG)  -HB     Progress/Outcomes (LB Dressing Goal 2, OT-IRF) goal ongoing  -HB       Row Name 01/11/24 1347          Toileting Goal 1 (OT-IRF)    Activity/Device (Toileting Goal 1, OT-IRF) toileting skills, all  -HB     [place] Level (Toileting Goal 1, OT-IRF) moderate assist (50-74% patient effort)  -HB     Progress/Outcomes (Toileting Goal 1, OT-IRF) goal ongoing  -HB     Time Frame (Toileting Goal 1, OT-IRF) short-term goal (STG)  -HB       Row Name 01/11/24 1347          Toileting Goal 2 (OT-IRF)    Activity/Device (Toileting Goal 2, OT-IRF) toileting skills, all  -HB     [place] Level (Toileting Goal 2, OT-IRF) minimum assist (75% or more patient effort)  -HB     Progress/Outcomes (Toileting Goal 2, OT-IRF) goal ongoing  -HB     Time Frame (Toileting Goal 2, OT-IRF) long-term goal (LTG)  -HB               User Key  (r) = Recorded By, (t) = Taken By, (c) = Cosigned By      Initials Name Effective Dates    HB Julia Quintana, OT 05/25/21 -                      Occupational Therapy Education       Title: PT OT SLP Therapies (In Progress)       Topic: Occupational Therapy (Done)       Point: ADL training (Done)       Description:   Instruct learner(s) on proper safety adaptation and remediation techniques during self care or transfers.   Instruct in proper use of assistive devices.                  Learning Progress Summary             Patient Acceptance, E, VU,NR by  at 1/11/2024 1354    Acceptance, E, VU,NR by  at 1/10/2024 1413                         Point: Home exercise program (Done)       Description:   Instruct learner(s) on appropriate technique for monitoring, assisting and/or progressing therapeutic exercises/activities.                  Learning Progress Summary             Patient Acceptance, E, VU,NR by   at 1/11/2024 1354    Acceptance, E, VU,NR by  at 1/10/2024 1413                         Point: Precautions (Done)       Description:   Instruct learner(s) on prescribed precautions during self-care and functional transfers.                  Learning Progress Summary             Patient Acceptance, E, VU,NR by  at 1/11/2024 1354    Acceptance, E, VU,NR by  at 1/10/2024 1413                         Point: Body mechanics (Done)       Description:   Instruct learner(s) on proper positioning and spine alignment during self-care, functional mobility activities and/or exercises.                  Learning Progress Summary             Patient Acceptance, E, VU,NR by  at 1/11/2024 1354    Acceptance, E, VU,NR by  at 1/10/2024 1413                                         User Key       Initials Effective Dates Name Provider Type [facility] 05/25/21 -  Julia Quintana OT Occupational Therapist OT                        OT Recommendation and Plan    Planned Therapy Interventions (OT): activity tolerance training, adaptive equipment training, BADL retraining, IADL retraining, functional balance retraining, strengthening exercise, ROM/therapeutic exercise, patient/caregiver education/training, transfer/mobility retraining                    Time Calculation:      Time Calculation- OT       Row Name 01/11/24 1355             Time Calculation- OT    OT Start Time 0735  -      OT Stop Time 0915  -      OT Time Calculation (min) 100 min  -      Total Timed Code Minutes-  minute(s)  -                User Key  (r) = Recorded By, (t) = Taken By, (c) = Cosigned By      Initials Name Provider Type     Julia Quintana OT Occupational Therapist                  Therapy Charges for Today       Code Description Service Date Service Provider Modifiers Qty    80836690321  OT EVAL HIGH COMPLEXITY 3 1/10/2024 Julia Quintana OT GO 1    [id number]  OT SELF CARE/MGMT/TRAIN EA 15 MIN 1/10/2024 Julia Quintana OT GO  1    23827329731 HC OT THERAPEUTIC ACT EA 15 MIN 1/10/2024 Julia Quintana OT GO 2    89308008763 HC OT SELF CARE/MGMT/TRAIN EA 15 MIN 1/11/2024 Julia Quintana OT GO 2    43578428603 HC OT THER PROC EA 15 MIN 1/11/2024 Julia Quintana OT GO 2    46969543082 HC OT THERAPEUTIC ACT EA 15 MIN 1/11/2024 Julia Quintana OT GO 3                     Julia Quintana OT  1/11/2024

## 2024-01-11 NOTE — PLAN OF CARE
Goal Outcome Evaluation:  Plan of Care Reviewed With: patient        Progress: improving       Problem: Rehabilitation (IRF) Plan of Care  Goal: Plan of Care Review  Outcome: Ongoing, Progressing  Flowsheets (Taken 1/11/2024 0854)  Progress: improving  Plan of Care Reviewed With: patient  Goal: Patient-Specific Goal (Individualized)  Outcome: Ongoing, Progressing  Goal: Absence of New-Onset Illness or Injury  Outcome: Ongoing, Progressing  Intervention: Prevent Fall and Fall Injury  Recent Flowsheet Documentation  Taken 1/11/2024 0801 by Braydon Caballero RN  Safety Promotion/Fall Prevention: safety round/check completed  Intervention: Prevent Infection  Recent Flowsheet Documentation  Taken 1/11/2024 0801 by Braydon Caballero RN  Infection Prevention:   hand hygiene promoted   rest/sleep promoted  Intervention: Prevent VTE (Venous Thromboembolism)  Recent Flowsheet Documentation  Taken 1/11/2024 0801 by Braydon Caballero RN  VTE Prevention/Management:   sequential compression devices off   patient refused intervention  Goal: Optimal Comfort and Wellbeing  Outcome: Ongoing, Progressing  Goal: Home and Community Transition Plan Established  Outcome: Ongoing, Progressing     Problem: Skin Injury Risk Increased  Goal: Skin Health and Integrity  Outcome: Ongoing, Progressing  Intervention: Optimize Skin Protection  Recent Flowsheet Documentation  Taken 1/11/2024 0801 by Braydon Caballero RN  Pressure Reduction Techniques:   frequent weight shift encouraged   heels elevated off bed   weight shift assistance provided  Pressure Reduction Devices:   pressure-redistributing mattress utilized   heel offloading device utilized   positioning supports utilized  Skin Protection:   adhesive use limited   incontinence pads utilized     Problem: Fall Injury Risk  Goal: Absence of Fall and Fall-Related Injury  Outcome: Ongoing, Progressing  Intervention: Identify and Manage Contributors  Recent Flowsheet Documentation  Taken 1/11/2024 0801 by  Braydon Caballero, RN  Medication Review/Management: medications reviewed  Intervention: Promote Injury-Free Environment  Recent Flowsheet Documentation  Taken 1/11/2024 0801 by Braydon Caballero RN  Safety Promotion/Fall Prevention: safety round/check completed     Problem: Impaired Wound Healing  Goal: Optimal Wound Healing  Outcome: Ongoing, Progressing  Intervention: Promote Wound Healing  Recent Flowsheet Documentation  Taken 1/11/2024 0801 by Braydon Caballero, RN  Sleep/Rest Enhancement: regular sleep/rest pattern promoted

## 2024-01-11 NOTE — PROGRESS NOTES
Occupational Therapy:    Physical Therapy: Individual: 90 minutes.    Speech Language Pathology:    Signed by: Caryn Rosen, Physical Therapist

## 2024-01-11 NOTE — PROGRESS NOTES
[facility]  PROGRESS NOTE     Patient Identification:  Name:  Phuong Tuttle  Age:  63 y.o.  Sex:  female  :  1960  MRN:  [id number]  Visit Number:  [id number]  ROOM: [facility]     Primary Care Provider:  Grace Keith     Length of stay in inpatient status:  2    Subjective     Chief Compliant:  No chief complaint on file.      History of Presenting Illness: 63-year-old female who is status post left hip fracture and total left hip arthroplasty.  Has history of cirrhosis with pancytopenia and esophageal varices, COPD, anxiety neurosis.  Patient states she is doing reasonably well has some difficulty with anxiety but otherwise states the pain is better controlled.  Patient is participating in therapy.    Objective     Current Hospital Meds:budesonide-formoterol, 2 puff, Inhalation, BID - RT  furosemide, 20 mg, Oral, Daily  lactulose, 10 g, Oral, Every Other Day  montelukast, 10 mg, Oral, Nightly  multivitamin, 1 tablet, Oral, Daily  pantoprazole, 40 mg, Oral, Q AM  vitamin D3, 5,000 Units, Oral, Daily       ----------------------------------------------------------------------------------------------------------------------  Vital Signs:  Temp:  [98.2 °F (36.8 °C)-99.4 °F (37.4 °C)] 98.2 °F (36.8 °C)  Heart Rate:  [76-86] 79  Resp:  [18] 18  BP: (101)/(54-63) 101/54  SpO2:  [93 %-99 %] 95 %  on   ;   Device (Oxygen Therapy): room air  Body mass index is 25.82 kg/m².    Wt Readings from Last 3 Encounters:   24 72.6 kg (160 lb)   23 80.3 kg (177 lb)   21 95.7 kg (211 lb)     Intake & Output (last 3 days)          0701   0700 01/09 0701  01/10 0700 01/10 0701  01/11 0700 01/11 0701  01/12 0700    P.O.  60 1200 360    Total Intake(mL/kg)  60 (0.8) 1200 (16.5) 360 (5)    Urine (mL/kg/hr)  600 1250 (0.7) 600 (2.2)    Total Output  600 1250 600    Net  -540 -50 -240            Urine Unmeasured Occurrence   1 x           Diet: Regular/House Diet; Texture:  Regular Texture (IDDSI 7); Fluid Consistency: Thin (IDDSI 0)  ----------------------------------------------------------------------------------------------------------------------  Physical exam:  Constitutional: Chronically ill-appearing female in no distress  HEENT: Normocephalic atraumatic  Neck:   Supple  Cardiovascular: Regular rate and rhythm  Pulmonary/Chest: Clear to auscultation  Abdominal: Positive bowel sounds soft.   Musculoskeletal: Status post left hip fracture, incision site is clean appearing with staples in place  Neurological: Generalized weakness  Skin: No rash  Peripheral vascular: No edema  Genitourinary:  ----------------------------------------------------------------------------------------------------------------------    Last echocardiogram:  Results for orders placed during the hospital encounter of 05/16/19    Adult Transthoracic Echo Complete W/ Cont if Necessary Per Protocol    Interpretation Summary  · Normal left ventricular cavity size and wall thickness noted.  · Left ventricular systolic function is normal. Estimated EF appears to be in the range of 61 - 65%.  · Left ventricular diastolic function is normal.  · Mild tricuspid valve regurgitation is present.  · . No evidence of pulmonary hypertension  · No significant valvular heart disease  · There is no evidence of pericardial effusion.    ----------------------------------------------------------------------------------------------------------------------  Results from last 7 days   Lab Units 01/10/24  0012   WBC 10*3/mm3 2.33*   HEMOGLOBIN g/dL 8.0*   HEMATOCRIT % 25.4*   MCV fL 93.0   MCHC g/dL 31.5   PLATELETS 10*3/mm3 84*         Results from last 7 days   Lab Units 01/10/24  0012 01/06/24  0254   SODIUM mmol/L 139  --    POTASSIUM mmol/L 3.9  --    MAGNESIUM mg/dL 1.8 1.8   CHLORIDE mmol/L 106  --    CO2 mmol/L 26.2  --    BUN mg/dL 16  --    CREATININE mg/dL 0.97  --    CALCIUM mg/dL 8.0*  --    GLUCOSE mg/dL 116*  --   "  ALBUMIN g/dL 2.6*  --    BILIRUBIN mg/dL 0.6  --    ALK PHOS U/L 102  --    AST (SGOT) U/L 25  --    ALT (SGPT) U/L 13  --    Estimated Creatinine Clearance: 62.1 mL/min (by C-G formula based on SCr of 0.97 mg/dL).  No results found for: \"AMMONIA\"              No results found for: \"HGBA1C\", \"POCGLU\"  Lab Results   Component Value Date    TSH 9.880 (H) 01/10/2024     No results found for: \"PREGTESTUR\", \"PREGSERUM\", \"HCG\", \"HCGQUANT\"  Pain Management Panel  More data may exist         Latest Ref Rng & Units 4/27/2020 10/29/2018   Pain Management Panel   Amphetamine, Urine Qual Negative - Negative    Barbiturates Screen, Urine Negative Negative  Negative    Benzodiazepine Screen, Urine Negative Positive  Positive    Buprenorphine, Screen, Urine Negative - Positive    Cocaine Screen, Urine Negative Negative  Negative    Methadone Screen , Urine Negative Negative  Negative      Brief Urine Lab Results       None          No results found for: \"BLOODCX\"      No results found for: \"URINECX\"  No results found for: \"WOUNDCX\"  No results found for: \"STOOLCX\"        I have personally looked at the labs and they are summarized above.  ----------------------------------------------------------------------------------------------------------------------  Detailed radiology reports for the last 24 hours:    Imaging Results (Last 24 Hours)       ** No results found for the last 24 hours. **          Final impressions for the last 30 days of radiology reports:    XR CLAVICLE LEFT    Result Date: 1/5/2024  No acute fracture. Images reviewed, interpreted, and dictated by Dr. DARIUS Farias. Transcribed by Chapincito Leugers, PA-C.    XR Pelvis 1 or 2 View    Result Date: 1/1/2024  Surgical changes of left hip arthroplasty without hardware complication. Images reviewed, interpreted, and dictated by Dr. Jez Hallman. Transcribed by Janette Salinas PA-C.    XR femur left    Result Date: 12/31/2023  No additional abnormality. " Images reviewed, interpreted, and dictated by Dr. Mark Sal. Transcribed by Gamaliel Light.    XR Chest 1 View    Result Date: 12/31/2023  Cardiomegaly with mild pulmonary edema.    XR hip 2 views left    Result Date: 12/31/2023  Acute left hip fracture as above. Irregularity of the right pubic body. Consider CT for further evaluation. Images reviewed, interpreted, and dictated by Dr. Jez Hallman. Transcribed by Chapincito Tyler PA-C.    CT Head Without Contrast    Result Date: 12/31/2023  No acute intracranial abnormality. CRITICAL RESULT: No. COMMUNICATION: Per this written report. Images personally reviewed, interpreted, and dictated by SANTHOSH Perera.   I have personally looked at the radiology images and read the final radiology report.    Assessment & Plan    Status post left hip fracture of left total hip arthroplasty.  Patient requiring maximum assistance for up with bed mobility; minimum assist for transfers; able to ambulate 60 feet and 40 feet with rolling walker and contact-guard assistance yesterday.  Patient is requiring set up assistance for upper body ADLs and is requiring total assistance for lower body ADLs and maximum to total assistance for toileting.  Continue pain management    Cirrhosis with pancytopenia/esophageal varices continue Lasix, Zofran and Protonix.  Patient is also on lactulose.  Otherwise seems to be stable    COPD continue Ventolin, Symbicort and Singulair    Anxiety neurosis Xanax 0.5 mg every 6 hours as needed for anxiety    Pancytopenia secondary to cirrhosis.    VTE Prophylaxis:   Mechanical Order History:        Ordered        01/09/24 1648  Place Sequential Compression Device  Once            01/09/24 1648  Maintain Sequential Compression Device  Continuous                          Pharmalogical Order History:       None                Sylvester Tamayo MD  [facility]  01/11/24  10:51 EST

## 2024-01-11 NOTE — THERAPY TREATMENT NOTE
Inpatient Rehabilitation - Physical Therapy Treatment Note       ALBERTA Herrera     Patient Name: Phuong Tuttle  : 1960  MRN: [id number]    Today's Date: 2024                    Admit Date: 2024      Visit Dx:   No diagnosis found.    Patient Active Problem List   Diagnosis    COPD (chronic obstructive pulmonary disease)    Current smoker    Iron deficiency anemia, unspecified    Chest pain    End stage liver disease    Obesity (BMI 30-39.9)    Portal hypertension    Hepatitis C    History of substance abuse    Esophageal varices    Splenomegaly    Chronic kidney disease (CKD), stage III (moderate)    Leukopenia    Splenic pancytopenia syndrome    Iron deficiency anemia    Iron malabsorption    S/p left hip fracture       Past Medical History:   Diagnosis Date    Chronic kidney disease (CKD), stage III (moderate) 2020    COPD (chronic obstructive pulmonary disease)     non-oxygen dependent    End stage liver disease 2020    Esophageal varices     GI bleed     history of GI bleed     Hepatitis C 2020    History of substance abuse 2020    Immunocompromised patient     Peptic ulceration     Portal hypertension 2020    Splenomegaly 2020       Past Surgical History:   Procedure Laterality Date    ESOPHAGEAL VARICES LIGATION      UPPER GASTROINTESTINAL ENDOSCOPY         PT ASSESSMENT (last 12 hours)       IRF PT Evaluation and Treatment       Row Name 24 1543          PT Time and Intention    Document Type daily treatment  -LB     Mode of Treatment individual therapy;physical therapy  -LB       Row Name 24 1543          General Information    Existing Precautions/Restrictions fall;left;hip  -LB       Row Name 24 1543          Pain Scale: FACES Pre/Post-Treatment    Pain: FACES Scale, Pretreatment 6-->hurts even more  -LB     Posttreatment Pain Rating 6-->hurts even more  -LB     Pain Location - --  L hip  -LB     Pre/Posttreatment Pain Comment rest,  repositioned  -LB       Row Name 01/11/24 1543          Cognition/Psychosocial    Affect/Mental Status (Cognition) WFL  -LB     Follows Commands (Cognition) WFL;verbal cues/prompting required;physical/tactile prompts required  -LB     Personal Safety Interventions gait belt;nonskid shoes/slippers when out of bed;supervised activity  -LB       Row Name 01/11/24 1543          Mobility    Left Lower Extremity (Weight-bearing Status) weight-bearing as tolerated (WBAT)  -LB     Right Lower Extremity (Weight-bearing Status) weight-bearing as tolerated (WBAT)  -LB       Row Name 01/11/24 1543          Sit-Stand Transfer    Sit-Stand [place] (Transfers) contact guard;verbal cues;nonverbal cues (demo/gesture)  -LB     Assistive Device (Sit-Stand Transfers) wheelchair;walker, front-wheeled  -LB       Row Name 01/11/24 1543          Stand-Sit Transfer    Stand-Sit [place] (Transfers) contact guard;verbal cues;nonverbal cues (demo/gesture)  -LB     Assistive Device (Stand-Sit Transfers) wheelchair;walker, front-wheeled  -LB       Row Name 01/11/24 1543          Gait/Stairs (Locomotion)    [place] Level (Gait) contact guard;verbal cues;nonverbal cues (demo/gesture)  -LB     Assistive Device (Gait) walker, front-wheeled  -LB     Distance in Feet (Gait) 120'  -LB     Deviations/Abnormal Patterns (Gait) antalgic;claire decreased;gait speed decreased;stride length decreased;weight shifting decreased  -LB     Bilateral Gait Deviations forward flexed posture;heel strike decreased  -LB       Row Name 01/11/24 1543          Balance    Dynamic Sitting Balance --  sitting bag toss and  with reacher, to encourage weight shifting to facilitate transfers and wb on L hip  -LB       Row Name 01/11/24 1543          Motor Skills    Therapeutic Exercise --  sitting ex, 2 sets with rest breaks prn  -LB       Row Name 01/11/24 1543          Positioning and Restraints    Pre-Treatment Position sitting in chair/recliner  -LB      In Wheelchair call light within reach;encouraged to call for assist;with family/caregiver  -       Row Name 01/11/24 1543          Therapy Assessment/Plan (PT)    Patient's Goals For Discharge return home  -       Row Name 01/11/24 1543          Therapy Assessment/Plan (PT)    Rehab Potential/Prognosis (PT) adequate, monitor progress closely  -LB     Frequency of Treatment (PT) 5 times per week  -LB     Estimated Duration of Therapy (PT) 2 weeks  -LB     Problem List (PT) balance;mobility;range of motion (ROM);strength;pain  -LB     Activity Limitations Related to Problem List (PT) unable to ambulate safely;unable to transfer safely  -LB       Row Name 01/11/24 1543          Daily Progress Summary (PT)    Recommendations (PT) continue PT  -LB       Row Name 01/11/24 1543          Therapy Plan Review/Discharge Plan (PT)    Anticipated Equipment Needs at Discharge (PT Eval) --  tbd  -LB     Anticipated Discharge Disposition (PT) home;home with home health  -       Row Name 01/11/24 1543          IRF PT Goals    Bed Mobility Goal Selection (PT-IRF) bed mobility, PT goal 1  -LB     Transfer Goal Selection (PT-IRF) transfers, PT goal 1  -LB     Gait (Walking Locomotion) Goal Selection (PT-IRF) gait, PT goal 1  -LB       Row Name 01/11/24 1543          Bed Mobility Goal 1 (PT-IRF)    Activity/Assistive Device (Bed Mobility Goal 1, PT-IRF) sit to supine/supine to sit  -LB     [place] Level (Bed Mobility Goal 1, PT-IRF) modified independence  -LB     Time Frame (Bed Mobility Goal 1, PT-IRF) by discharge  -LB       Row Name 01/11/24 1543          Transfer Goal 1 (PT-IRF)    Activity/Assistive Device (Transfer Goal 1, PT-IRF) sit-to-stand/stand-to-sit;bed-to-chair/chair-to-bed  -LB     [place] Level (Transfer Goal 1, PT-IRF) modified independence  -LB     Time Frame (Transfer Goal 1, PT-IRF) by discharge  -LB       Row Name 01/11/24 1543          Gait/Walking Locomotion Goal 1 (PT-IRF)    Activity/Assistive  Device (Gait/Walking Locomotion Goal 1, PT-IRF) walker, rolling  -LB     Gait/Walking Locomotion Distance Goal 1 (PT-IRF) 300'  -LB     [place] Level (Gait/Walking Locomotion Goal 1, PT-IRF) modified independence  -LB     Time Frame (Gait/Walking Locomotion Goal 1, PT-IRF) by discharge  -LB               User Key  (r) = Recorded By, (t) = Taken By, (c) = Cosigned By      Initials Name Provider Type    LB Caryn Rosen, PT Physical Therapist                  Wound 01/09/24 1655 Left anterior hip Incision (Active)   Dressing Appearance dry;intact 01/10/24 1930   Closure Staples;Adhesive bandage 01/11/24 0801   Base clean;dry;pink 01/11/24 0801   Periwound dry;redness;other (see comments) 01/10/24 1930   Drainage Amount none 01/11/24 0801   Dressing Care dressing changed 01/11/24 0801     Physical Therapy Education       Title: PT OT SLP Therapies (Done)       Topic: Physical Therapy (Done)       Point: Mobility training (Done)       Learning Progress Summary             Patient Acceptance, E, VU,NR by  at 1/11/2024 1547                         Point: Home exercise program (Done)       Learning Progress Summary             Patient Acceptance, E, VU,NR by LB at 1/11/2024 1547                         Point: Body mechanics (Done)       Learning Progress Summary             Patient Acceptance, E, VU,NR by  at 1/11/2024 1547                         Point: Precautions (Done)       Learning Progress Summary             Patient Acceptance, E, VU,NR by  at 1/11/2024 1547                                         User Key       Initials Effective Dates Name Provider Type Discipline     06/16/21 -  Caryn Rosen, PT Physical Therapist PT                    PT Recommendation and Plan    Planned Therapy Interventions (PT): balance training, bed mobility training, gait training, home exercise program, ROM (range of motion), strengthening, transfer training  Frequency of Treatment (PT): 5 times per  week  Anticipated Equipment Needs at Discharge (PT Eval):  (tbd)  Daily Progress Summary (PT)  Recommendations (PT): continue PT               Time Calculation:      PT Charges       Row Name 01/11/24 1547             Time Calculation    Start Time 1000  -LB      Stop Time 1130  -LB      Time Calculation (min) 90 min  -LB      PT Received On 01/11/24  -LB                User Key  (r) = Recorded By, (t) = Taken By, (c) = Cosigned By      Initials Name Provider Type    Caryn Hernandez, PT Physical Therapist                    Therapy Charges for Today       Code Description Service Date Service Provider Modifiers Qty    37662653038 HC PT EVAL LOW COMPLEXITY 1 1/10/2024 Caryn Rosen, PT GP 1    16142459548 HC GAIT TRAINING EA 15 MIN 1/10/2024 Caryn Rosen, PT GP 1    41816387037 HC PT THER PROC EA 15 MIN 1/10/2024 Caryn Rosen, PT GP 3    17403528787 HC PT THERAPEUTIC ACT EA 15 MIN 1/10/2024 Caryn Rosen, PT GP 1    20383138028 HC GAIT TRAINING EA 15 MIN 1/11/2024 Caryn Rosen, PT GP 1    49346300019 HC PT THER PROC EA 15 MIN 1/11/2024 Caryn Rosen, PT GP 3    76829786752 HC PT THERAPEUTIC ACT EA 15 MIN 1/11/2024 Caryn Rosen, PT GP 2              PT G-Codes  AM-PAC 6 Clicks Score (PT): 14      Caryn Rosen, PT  1/11/2024

## 2024-01-11 NOTE — PROGRESS NOTES
Occupational Therapy: Individual: 100 minutes.    Physical Therapy:    Speech Language Pathology:    Signed by: Julia Quintana OT

## 2024-01-11 NOTE — PLAN OF CARE
Goal Outcome Evaluation:              Outcome Evaluation: kaylie VALE

## 2024-01-11 NOTE — SIGNIFICANT NOTE
01/11/24 0930   Plan   Plan Team conference held today. Spoke to pt about how pt is doing in therapy and plans for discharge on 1-24-24.  Pt plans to return to her apartment alone.  Pt does not want SS to contact brother about her discharge date; she can discuss this with him.  Pt says her brother Alejandro is not always dependable but is suppose to be coming to visit her today.  Will follow.   Patient/Family in Agreement with Plan yes

## 2024-01-11 NOTE — PROGRESS NOTES
Rehabilitation Nursing  Inpatient Rehabilitation Plan of Care Note    Plan of Care  Copy from Leonid    Pain Management (Active)  Current Status (1/15/2024 12:00:00 AM): pt will be free of pain with meds  Weekly Goal: decrease in pain meds  Discharge Goal: pain free    Safety    Potential for Injury (Active)  Current Status (1/12/2024 12:00:00 AM): patient will ring for needs  Weekly Goal: patient uses proper safety with hip  Discharge Goal: injury free    Signed by: Clarita Mark RN

## 2024-01-11 NOTE — PROGRESS NOTES
PPS CMG Coordinator  Inpatient Rehabilitation Admission    Ethnic Group: White.  Marital Status:  Marital Status: .    IRF Admission Date:  01/09/2024  Admission Class: Initial Rehab.  Admit From:  [facility]    Pre-Hospital Living: Home. Pre-Hospital Living  With: (1) Alone.    Payment Sources: Primary: Not Listed.  Secondary: Not Listed.  Impairment Group: 08.11 Status Post Unilateral Hip Fracture  Date of Onset of Impairment: 12/31/2023    Etiologic Diagnosis Code(s):  Rank Code      Description  1    S72.002A  Fracture of unspecified part of neck of left                 femur, initial encounter for closed fracture    Comorbidities:      Height on Admission: 66 inches.  Weight on Admission: 160 pounds.    Are there any arthritis conditions recorded for Impairment Group, Etiologic  Diagnosis, or Comorbid Conditions that meet all of the regulatory requirements  for IRF classification (in 42 .29(b)(2)(x), (xi), and xii))?    EMILY Bladder Accidents:  0 - Accidents.  Bladder Score = 6. Patient has not had an accident, but uses a  device/medication. external catheter  EMILY Bowel Accident: 0 -Accidents.  Bowel Score = 6. Patient has no accidents, but uses a device/medications.  medication    Signed by: Amanda Nicholas, Supervisor

## 2024-01-11 NOTE — PROGRESS NOTES
Patient Assessment Instrument  Quality Indicators - Admission FY 2023    Section A. Ethnicity/ Race/Language  Ethnicity:  Race:  Preferred Language:    Section A. Transportation      Section B. Hearing and Vision        Section B. Health Literacy        Section C. Cognitive Patterns      Section C. Signs and Symptoms of Delirium (from CAM)      Section D. Mood      Section D. Social Isolation      Section [id number]. Prior Functioning    Self Care: Patient completed all the activities by themself, with or without an  assistive device, with no assistance from a helper.  Indoor Mobility: Patient completed the activities by themself, with or without  an assistive device, with no assistance from a helper.  Stairs: Patient completed the activities by themself, with or without an  assistive device, with no assistance from a helper.  Functional Cognition: Patient completed the activities by themself, with or  without an assistive device, with no assistance from a helper.    Section [id number]. Prior Device Use      Section TF7013. Self Care Performance      Section [id number]. Self Care Discharge Goals      Section GA6261. Mobility Performance      Section [id number]. Mobility Discharge Goals      Section H. Bladder and Bowel  Bladder Continence: Always continent (no documented incontinence).  Bowel Continence: Not rated (patient had an ostomy or did not have a bowel  movement for the entire 3 days).    Section I. Active Diagnosis  Comorbidities and Co-existing Conditions:   Patient does not have PAD, PVD, or  Diabetes Mellitus    Section J. Health Conditions  Patient has had two or more falls, or a fall with injury, in the past year.  Patient has had major surgery during the 100 days prior to admission.    Section J. Health Conditions (Pain)      Section K. Swallowing/Nutritional Status  Regular food (solids and liquids swallowed safely without supervision or  modified food or liquid consistency).  Nutritional Approaches on Admission:   Nutritional Approaches on Admission:  None    Section M. Skin Conditions      Section N. Medication    Potential Clinically Significant Medication Issues: No issues found during  review  Patient is taking medications in the following pharmacological classification:  H. Opioid An indication is noted for all medications in the Opiod drug class.  Potential Clinically Significant Medication Issues: No issues found during  review    Section O. Special Treatments, Procedures, and Programs  None    Signed by: Amanda Nicholas, Supervisor

## 2024-01-12 LAB
ALBUMIN SERPL-MCNC: 2.6 G/DL (ref 3.5–5.2)
ALBUMIN/GLOB SERPL: 1.1 G/DL
ALP SERPL-CCNC: 108 U/L (ref 39–117)
ALT SERPL W P-5'-P-CCNC: 17 U/L (ref 1–33)
ANION GAP SERPL CALCULATED.3IONS-SCNC: 5.6 MMOL/L (ref 5–15)
AST SERPL-CCNC: 33 U/L (ref 1–32)
BILIRUB SERPL-MCNC: 0.8 MG/DL (ref 0–1.2)
BUN SERPL-MCNC: 19 MG/DL (ref 8–23)
BUN/CREAT SERPL: 17.3 (ref 7–25)
CA-I SERPL ISE-MCNC: 1.12 MMOL/L (ref 1.12–1.32)
CALCIUM SPEC-SCNC: 8.1 MG/DL (ref 8.6–10.5)
CHLORIDE SERPL-SCNC: 106 MMOL/L (ref 98–107)
CO2 SERPL-SCNC: 28.4 MMOL/L (ref 22–29)
CREAT SERPL-MCNC: 1.1 MG/DL (ref 0.57–1)
EGFRCR SERPLBLD CKD-EPI 2021: 57.3 ML/MIN/1.73
GLOBULIN UR ELPH-MCNC: 2.4 GM/DL
GLUCOSE SERPL-MCNC: 109 MG/DL (ref 65–99)
MAGNESIUM SERPL-MCNC: 1.9 MG/DL (ref 1.6–2.4)
POTASSIUM SERPL-SCNC: 4.2 MMOL/L (ref 3.5–5.2)
PROT SERPL-MCNC: 5 G/DL (ref 6–8.5)
SODIUM SERPL-SCNC: 140 MMOL/L (ref 136–145)

## 2024-01-12 PROCEDURE — 99231 SBSQ HOSP IP/OBS SF/LOW 25: CPT | Performed by: FAMILY MEDICINE

## 2024-01-12 PROCEDURE — 97535 SELF CARE MNGMENT TRAINING: CPT

## 2024-01-12 PROCEDURE — 94799 UNLISTED PULMONARY SVC/PX: CPT

## 2024-01-12 PROCEDURE — 97110 THERAPEUTIC EXERCISES: CPT

## 2024-01-12 PROCEDURE — 80053 COMPREHEN METABOLIC PANEL: CPT | Performed by: FAMILY MEDICINE

## 2024-01-12 PROCEDURE — 94760 N-INVAS EAR/PLS OXIMETRY 1: CPT

## 2024-01-12 PROCEDURE — 63710000001 ONDANSETRON PER 8 MG: Performed by: FAMILY MEDICINE

## 2024-01-12 PROCEDURE — 83735 ASSAY OF MAGNESIUM: CPT | Performed by: FAMILY MEDICINE

## 2024-01-12 PROCEDURE — 97530 THERAPEUTIC ACTIVITIES: CPT

## 2024-01-12 PROCEDURE — 97116 GAIT TRAINING THERAPY: CPT

## 2024-01-12 PROCEDURE — 94664 DEMO&/EVAL PT USE INHALER: CPT

## 2024-01-12 PROCEDURE — 82330 ASSAY OF CALCIUM: CPT | Performed by: FAMILY MEDICINE

## 2024-01-12 RX ORDER — BISACODYL 10 MG
10 SUPPOSITORY, RECTAL RECTAL DAILY PRN
Status: DISCONTINUED | OUTPATIENT
Start: 2024-01-12 | End: 2024-01-22 | Stop reason: HOSPADM

## 2024-01-12 RX ORDER — POLYETHYLENE GLYCOL 3350 17 G/17G
17 POWDER, FOR SOLUTION ORAL DAILY
Status: DISCONTINUED | OUTPATIENT
Start: 2024-01-12 | End: 2024-01-22 | Stop reason: HOSPADM

## 2024-01-12 RX ADMIN — IPRATROPIUM BROMIDE AND ALBUTEROL SULFATE 3 ML: 2.5; .5 SOLUTION RESPIRATORY (INHALATION) at 07:05

## 2024-01-12 RX ADMIN — OXYCODONE HYDROCHLORIDE 10 MG: 10 TABLET ORAL at 21:13

## 2024-01-12 RX ADMIN — ALPRAZOLAM 0.5 MG: 0.5 TABLET ORAL at 23:34

## 2024-01-12 RX ADMIN — OXYCODONE HYDROCHLORIDE 10 MG: 10 TABLET ORAL at 04:38

## 2024-01-12 RX ADMIN — MONTELUKAST SODIUM 10 MG: 10 TABLET, COATED ORAL at 21:13

## 2024-01-12 RX ADMIN — Medication 5000 UNITS: at 08:38

## 2024-01-12 RX ADMIN — ONDANSETRON HYDROCHLORIDE 8 MG: 4 TABLET, FILM COATED ORAL at 11:44

## 2024-01-12 RX ADMIN — ACETAMINOPHEN 650 MG: 325 TABLET ORAL at 10:13

## 2024-01-12 RX ADMIN — PANTOPRAZOLE SODIUM 40 MG: 40 TABLET, DELAYED RELEASE ORAL at 05:42

## 2024-01-12 RX ADMIN — Medication 1 TABLET: at 08:38

## 2024-01-12 RX ADMIN — OXYCODONE HYDROCHLORIDE 10 MG: 10 TABLET ORAL at 13:01

## 2024-01-12 RX ADMIN — POTASSIUM CHLORIDE 20 MEQ: 20 TABLET, EXTENDED RELEASE ORAL at 08:37

## 2024-01-12 RX ADMIN — IPRATROPIUM BROMIDE AND ALBUTEROL SULFATE 3 ML: 2.5; .5 SOLUTION RESPIRATORY (INHALATION) at 19:19

## 2024-01-12 RX ADMIN — ALPRAZOLAM 0.5 MG: 0.5 TABLET ORAL at 05:42

## 2024-01-12 RX ADMIN — ONDANSETRON HYDROCHLORIDE 8 MG: 4 TABLET, FILM COATED ORAL at 23:34

## 2024-01-12 RX ADMIN — ONDANSETRON HYDROCHLORIDE 8 MG: 4 TABLET, FILM COATED ORAL at 05:42

## 2024-01-12 RX ADMIN — LACTULOSE 10 G: 20 SOLUTION ORAL at 08:37

## 2024-01-12 RX ADMIN — ALPRAZOLAM 0.5 MG: 0.5 TABLET ORAL at 17:28

## 2024-01-12 RX ADMIN — OXYCODONE HYDROCHLORIDE 10 MG: 10 TABLET ORAL at 08:37

## 2024-01-12 RX ADMIN — OXYCODONE HYDROCHLORIDE 10 MG: 10 TABLET ORAL at 17:28

## 2024-01-12 RX ADMIN — ALPRAZOLAM 0.5 MG: 0.5 TABLET ORAL at 11:44

## 2024-01-12 RX ADMIN — POLYETHYLENE GLYCOL (3350) 17 G: 17 POWDER, FOR SOLUTION ORAL at 13:16

## 2024-01-12 RX ADMIN — ONDANSETRON HYDROCHLORIDE 8 MG: 4 TABLET, FILM COATED ORAL at 17:28

## 2024-01-12 NOTE — PLAN OF CARE
Goal Outcome Evaluation:  Plan of Care Reviewed With: patient        Progress: improving       Problem: Rehabilitation (IRF) Plan of Care  Goal: Plan of Care Review  Outcome: Ongoing, Progressing  Flowsheets (Taken 1/12/2024 0901)  Progress: improving  Plan of Care Reviewed With: patient  Goal: Patient-Specific Goal (Individualized)  Outcome: Ongoing, Progressing  Goal: Absence of New-Onset Illness or Injury  Outcome: Ongoing, Progressing  Intervention: Prevent Fall and Fall Injury  Recent Flowsheet Documentation  Taken 1/12/2024 0800 by Braydon Caballero RN  Safety Promotion/Fall Prevention: safety round/check completed  Intervention: Prevent Infection  Recent Flowsheet Documentation  Taken 1/12/2024 0800 by Braydon Caballero RN  Infection Prevention:   hand hygiene promoted   rest/sleep promoted  Intervention: Prevent VTE (Venous Thromboembolism)  Recent Flowsheet Documentation  Taken 1/12/2024 0800 by Braydon Caballero RN  VTE Prevention/Management:   bilateral   sequential compression devices off   patient refused intervention  Goal: Optimal Comfort and Wellbeing  Outcome: Ongoing, Progressing  Goal: Home and Community Transition Plan Established  Outcome: Ongoing, Progressing     Problem: Skin Injury Risk Increased  Goal: Skin Health and Integrity  Outcome: Ongoing, Progressing  Intervention: Promote and Optimize Oral Intake  Recent Flowsheet Documentation  Taken 1/12/2024 0800 by Braydon Caballero RN  Oral Nutrition Promotion: physical activity promoted  Intervention: Optimize Skin Protection  Recent Flowsheet Documentation  Taken 1/12/2024 0800 by Braydon Caballero RN  Pressure Reduction Techniques:   frequent weight shift encouraged   heels elevated off bed   weight shift assistance provided  Pressure Reduction Devices:   pressure-redistributing mattress utilized   heel offloading device utilized   positioning supports utilized  Skin Protection:   adhesive use limited   incontinence pads utilized     Problem: Fall Injury  Risk  Goal: Absence of Fall and Fall-Related Injury  Outcome: Ongoing, Progressing  Intervention: Identify and Manage Contributors  Recent Flowsheet Documentation  Taken 1/12/2024 0800 by Braydon Caballero RN  Medication Review/Management: medications reviewed  Intervention: Promote Injury-Free Environment  Recent Flowsheet Documentation  Taken 1/12/2024 0800 by Braydon Caballero RN  Safety Promotion/Fall Prevention: safety round/check completed     Problem: Impaired Wound Healing  Goal: Optimal Wound Healing  Outcome: Ongoing, Progressing  Intervention: Promote Wound Healing  Recent Flowsheet Documentation  Taken 1/12/2024 0800 by Braydon Caballero RN  Oral Nutrition Promotion: physical activity promoted  Pain Management Interventions: see MAR  Sleep/Rest Enhancement: regular sleep/rest pattern promoted

## 2024-01-12 NOTE — PROGRESS NOTES
Occupational Therapy:    Physical Therapy: Individual: 90 minutes.    Speech Language Pathology:    Signed by: Juliana Echeverria PTA

## 2024-01-12 NOTE — PLAN OF CARE
Goal Outcome Evaluation:      Pt is progressing medically and physically with all therapies, continue with current plan of care.

## 2024-01-12 NOTE — PROGRESS NOTES
Rehabilitation Nursing  Inpatient Rehabilitation Plan of Care Note    Plan of Care  Copy from Leonid    Pain Management (Active)  Current Status (1/15/2024 12:00:00 AM): pt will be free of pain with meds  Weekly Goal: decrease in pain meds  Discharge Goal: pain free    Safety    Potential for Injury (Active)  Current Status (1/12/2024 12:00:00 AM): patient will ring for needs  Weekly Goal: patient uses proper safety with hip  Discharge Goal: injury free    Signed by: Shai Velazquez RN

## 2024-01-12 NOTE — THERAPY TREATMENT NOTE
Inpatient Rehabilitation - Physical Therapy Treatment Note       ALBERTA Herrera     Patient Name: Phuong Tuttle  : 1960  MRN: [id number]    Today's Date: 2024                    Admit Date: 2024      Visit Dx:   No diagnosis found.    Patient Active Problem List   Diagnosis    COPD (chronic obstructive pulmonary disease)    Current smoker    Iron deficiency anemia, unspecified    Chest pain    End stage liver disease    Obesity (BMI 30-39.9)    Portal hypertension    Hepatitis C    History of substance abuse    Esophageal varices    Splenomegaly    Chronic kidney disease (CKD), stage III (moderate)    Leukopenia    Splenic pancytopenia syndrome    Iron deficiency anemia    Iron malabsorption    S/p left hip fracture       Past Medical History:   Diagnosis Date    Chronic kidney disease (CKD), stage III (moderate) 2020    COPD (chronic obstructive pulmonary disease)     non-oxygen dependent    End stage liver disease 2020    Esophageal varices     GI bleed     history of GI bleed     Hepatitis C 2020    History of substance abuse 2020    Immunocompromised patient     Peptic ulceration     Portal hypertension 2020    Splenomegaly 2020       Past Surgical History:   Procedure Laterality Date    ESOPHAGEAL VARICES LIGATION      UPPER GASTROINTESTINAL ENDOSCOPY         PT ASSESSMENT (last 12 hours)       IRF PT Evaluation and Treatment       Row Name 24 3908          PT Time and Intention    Document Type daily treatment  -LL     Mode of Treatment individual therapy;physical therapy  -LL     Patient/Family/Caregiver Comments/Observations Patient had no new c/o this date and was agreeable to PT participation.  -LL       Row Name 24 1353          General Information    Existing Precautions/Restrictions fall;left;hip  -LL       Row Name 24 4955          Pain Scale: FACES Pre/Post-Treatment    Pain: FACES Scale, Pretreatment 4-->hurts little more  -LL      Posttreatment Pain Rating 4-->hurts little more  -       Row Name 01/12/24 1350          Cognition/Psychosocial    Affect/Mental Status (Cognition) WFL  -LL     Orientation Status (Cognition) oriented x 3  -LL     Follows Commands (Cognition) WFL;verbal cues/prompting required;physical/tactile prompts required  -LL     Personal Safety Interventions gait belt;nonskid shoes/slippers when out of bed;supervised activity  -       Row Name 01/12/24 1350          Mobility    Left Lower Extremity (Weight-bearing Status) weight-bearing as tolerated (WBAT)  -LL     Right Lower Extremity (Weight-bearing Status) weight-bearing as tolerated (WBAT)  -       Row Name 01/12/24 1350          Bed Mobility    Comment, (Bed Mobility) Patient UIC  -       Row Name 01/12/24 1350          Sit-Stand Transfer    Sit-Stand [place] (Transfers) contact guard;verbal cues;nonverbal cues (demo/gesture);minimum assist (75% patient effort)  -     Assistive Device (Sit-Stand Transfers) wheelchair;walker, front-wheeled  -       Row Name 01/12/24 1350          Stand-Sit Transfer    Stand-Sit [place] (Transfers) contact guard;verbal cues;nonverbal cues (demo/gesture);minimum assist (75% patient effort)  -     Assistive Device (Stand-Sit Transfers) wheelchair;walker, front-wheeled  -       Row Name 01/12/24 1350          Gait/Stairs (Locomotion)    [place] Level (Gait) contact guard;verbal cues;nonverbal cues (demo/gesture)  -     Assistive Device (Gait) walker, front-wheeled  -     Distance in Feet (Gait) 80' x 2  -LL     Deviations/Abnormal Patterns (Gait) antalgic;claire decreased;gait speed decreased;stride length decreased;weight shifting decreased  -     Bilateral Gait Deviations forward flexed posture;heel strike decreased  -       Row Name 01/12/24 1350          Balance    Comment, Balance Sitting ballon tap reaching outside ROOPA and crossing midline  -       Row Name 01/12/24 1350          Motor Skills     Therapeutic Exercise hip;knee;ankle  -       Row Name 01/12/24 1350          Hip (Therapeutic Exercise)    Hip (Therapeutic Exercise) strengthening exercise  -LL     Hip Strengthening (Therapeutic Exercise) bilateral;flexion;extension;aBduction;aDduction;marching while seated;sitting;1 lb free weight;resistance band;green  -       Row Name 01/12/24 1350          Knee (Therapeutic Exercise)    Knee (Therapeutic Exercise) strengthening exercise  -LL     Knee Strengthening (Therapeutic Exercise) bilateral;flexion;extension;marching while seated;LAQ (long arc quad);hamstring curls;sitting;1 lb free weight;resistance band;green  -LL       Row Name 01/12/24 1350          Ankle (Therapeutic Exercise)    Ankle (Therapeutic Exercise) strengthening exercise  -LL     Ankle Strengthening (Therapeutic Exercise) bilateral;dorsiflexion;plantarflexion;sitting;1 lb free weight  -       Row Name 01/12/24 5420          Positioning and Restraints    Pre-Treatment Position --  WC w/ OT  -LL     Post Treatment Position wheelchair  -LL     In Wheelchair sitting;call light within reach;with other staff;legs elevated  -[facility] Name 01/12/24 1060          Therapy Assessment/Plan (PT)    Patient's Goals For Discharge return home  -       Row Name 01/12/24 3320          Therapy Assessment/Plan (PT)    Rehab Potential/Prognosis (PT) adequate, monitor progress closely  -     Frequency of Treatment (PT) 5 times per week  -LL     Estimated Duration of Therapy (PT) 2 weeks  -     Problem List (PT) balance;mobility;range of motion (ROM);strength;pain  -LL     Activity Limitations Related to Problem List (PT) unable to ambulate safely;unable to transfer safely  -       Row Name 01/12/24 3690          Therapy Plan Review/Discharge Plan (PT)    Anticipated Equipment Needs at Discharge (PT Eval) --  tbd  -LL     Anticipated Discharge Disposition (PT) home;home with home health  -[facility] Name 01/12/24 6330          IRF PT Goals     Bed Mobility Goal Selection (PT-IRF) bed mobility, PT goal 1  -LL     Transfer Goal Selection (PT-IRF) transfers, PT goal 1  -LL     Gait (Walking Locomotion) Goal Selection (PT-IRF) gait, PT goal 1  -LL       Row Name 01/12/24 1350          Bed Mobility Goal 1 (PT-IRF)    Activity/Assistive Device (Bed Mobility Goal 1, PT-IRF) sit to supine/supine to sit  -LL     [place] Level (Bed Mobility Goal 1, PT-IRF) modified independence  -LL     Time Frame (Bed Mobility Goal 1, PT-IRF) by discharge  -LL       Row Name 01/12/24 1350          Transfer Goal 1 (PT-IRF)    Activity/Assistive Device (Transfer Goal 1, PT-IRF) sit-to-stand/stand-to-sit;bed-to-chair/chair-to-bed  -LL     [place] Level (Transfer Goal 1, PT-IRF) modified independence  -LL     Time Frame (Transfer Goal 1, PT-IRF) by discharge  -LL       Row Name 01/12/24 1350          Gait/Walking Locomotion Goal 1 (PT-IRF)    Activity/Assistive Device (Gait/Walking Locomotion Goal 1, PT-IRF) walker, rolling  -LL     Gait/Walking Locomotion Distance Goal 1 (PT-IRF) 300'  -LL     [place] Level (Gait/Walking Locomotion Goal 1, PT-IRF) modified independence  -LL     Time Frame (Gait/Walking Locomotion Goal 1, PT-IRF) by discharge  -               User Key  (r) = Recorded By, (t) = Taken By, (c) = Cosigned By      Initials Name Provider Type    LL Juliana Echeverria PTA Physical Therapist Assistant                  Wound 01/09/24 1655 Left anterior hip Incision (Active)   Dressing Appearance dry;intact 01/12/24 0800   Closure Staples;Adhesive bandage 01/12/24 0800   Base clean;dry;pink 01/12/24 0800   Periwound dry;intact;blanchable 01/12/24 0800   Edges rolled/closed 01/12/24 0800   Drainage Amount none 01/12/24 0800   Care, Wound cleansed with 01/12/24 0800   Dressing Care dressing changed 01/12/24 0800     Physical Therapy Education       Title: PT OT SLP Therapies (Done)       Topic: Physical Therapy (Done)       Point: Mobility training (Done)        Learning Progress Summary             Patient Acceptance, E,D, VU,NR by LL at 1/12/2024 1356    Acceptance, E, VU,NR by LB at 1/11/2024 1547                         Point: Home exercise program (Done)       Learning Progress Summary             Patient Acceptance, E,D, VU,NR by LL at 1/12/2024 1356    Acceptance, E, VU,NR by LB at 1/11/2024 1547                         Point: Body mechanics (Done)       Learning Progress Summary             Patient Acceptance, E,D, VU,NR by LL at 1/12/2024 1356    Acceptance, E, VU,NR by LB at 1/11/2024 1547                         Point: Precautions (Done)       Learning Progress Summary             Patient Acceptance, E,D, VU,NR by LL at 1/12/2024 1356    Acceptance, E, VU,NR by LB at 1/11/2024 1547                                         User Key       Initials Effective Dates Name Provider Type Discipline     06/16/21 -  Caryn Rosen, PT Physical Therapist PT     05/02/16 -  Juliana Echveerria PTA Physical Therapist Assistant PT                    PT Recommendation and Plan    Frequency of Treatment (PT): 5 times per week  Anticipated Equipment Needs at Discharge (PT Eval):  (tbd)                  Time Calculation:      PT Charges       Row Name 01/12/24 1356             Time Calculation    Start Time 0915  -LL      Stop Time 1045  -LL      Time Calculation (min) 90 min  -LL      PT Received On 01/12/24  -LL         Time Calculation- PT    Total Timed Code Minutes- PT 90 minute(s)  -LL                User Key  (r) = Recorded By, (t) = Taken By, (c) = Cosigned By      Initials Name Provider Type     Juliana Echeverria PTA Physical Therapist Assistant                    Therapy Charges for Today       Code Description Service Date Service Provider Modifiers Qty    67248824709 HC GAIT TRAINING EA 15 MIN 1/12/2024 Juliana Echeverria PTA GP, CQ 2    86247517493 HC PT THERAPEUTIC ACT EA 15 MIN 1/12/2024 Juliana Echeverria PTA GP, CQ 1    12968005162 HC PT THER PROC EA 15 MIN  1/12/2024 Juliana Echeverria, ROSEMARIE GP, CQ 3              PT G-Codes  AM-PAC 6 Clicks Score (PT): 18      Juliana. ROSEMARIE Echveerria  1/12/2024

## 2024-01-12 NOTE — THERAPY TREATMENT NOTE
Inpatient Rehabilitation - Occupational Therapy Treatment Note    ALBERTA Herrera     Patient Name: Phuong Tuttle  : 1960  MRN: [id number]    Today's Date: 2024                 Admit Date: 2024       No diagnosis found.    Patient Active Problem List   Diagnosis    COPD (chronic obstructive pulmonary disease)    Current smoker    Iron deficiency anemia, unspecified    Chest pain    End stage liver disease    Obesity (BMI 30-39.9)    Portal hypertension    Hepatitis C    History of substance abuse    Esophageal varices    Splenomegaly    Chronic kidney disease (CKD), stage III (moderate)    Leukopenia    Splenic pancytopenia syndrome    Iron deficiency anemia    Iron malabsorption    S/p left hip fracture       Past Medical History:   Diagnosis Date    Chronic kidney disease (CKD), stage III (moderate) 2020    COPD (chronic obstructive pulmonary disease)     non-oxygen dependent    End stage liver disease 2020    Esophageal varices     GI bleed     history of GI bleed     Hepatitis C 2020    History of substance abuse 2020    Immunocompromised patient     Peptic ulceration     Portal hypertension 2020    Splenomegaly 2020       Past Surgical History:   Procedure Laterality Date    ESOPHAGEAL VARICES LIGATION      UPPER GASTROINTESTINAL ENDOSCOPY               IRF OT ASSESSMENT FLOWSHEET (last 12 hours)       IRF OT Evaluation and Treatment       Row Name 24 1143          OT Time and Intention    Document Type daily treatment  -HB     Mode of Treatment individual therapy;occupational therapy  -HB     Total Minutes, Occupational Therapy 100  -HB     Patient Effort good  -HB       Row Name 24 1145          General Information    Patient Profile Reviewed yes  -HB     Patient/Family/Caregiver Comments/Observations Patient and RN okd OT this date.  -HB     General Observations of Patient Patient tolerated OT with no adverse reactions.  -HB     Existing  Precautions/Restrictions fall;left;hip  -       Row Name 01/12/24 1143          Pain Assessment    Pretreatment Pain Rating 4/10  -     Posttreatment Pain Rating 4/10  -     Pain Location - Side/Orientation --  LLE  -       Row Name 01/12/24 1143          Cognition/Psychosocial    Affect/Mental Status (Cognition) WFL  -     Orientation Status (Cognition) oriented x 3  -     Follows Commands (Cognition) WFL;verbal cues/prompting required;physical/tactile prompts required  -       Row Name 01/12/24 1143          Upper Body Dressing    Centre Level (Upper Body Dressing) upper body dressing skills;minimum assist (75% or more patient effort);pull over garment  -     Position (Upper Body Dressing) edge of bed sitting  -       Row Name 01/12/24 1143          Lower Body Dressing    Centre Level (Lower Body Dressing) don;pants/bottoms;socks;dependent (less than 25% patient effort)  -     Position (Lower Body Dressing) edge of bed sitting;supported standing  -[facility] Name 01/12/24 1143          Grooming    Centre Level (Grooming) grooming skills;minimum assist (75% patient effort)  -     Position (Grooming) sink side  -[facility] Name 01/12/24 1143          Toileting    Centre Level (Toileting) toileting skills;dependent (less than 25% patient effort)  -     Position (Toileting) edge of bed sitting;supported standing  -[facility] Name 01/12/24 1143          Bed-Chair Transfer    Bed-Chair Centre (Transfers) minimum assist (75% patient effort);nonverbal cues (demo/gesture);verbal cues  -     Assistive Device (Bed-Chair Transfers) wheelchair;walker, front-wheeled  -       Row Name 01/12/24 1143          Sit-Stand Transfer    Sit-Stand Centre (Transfers) minimum assist (75% patient effort);nonverbal cues (demo/gesture);verbal cues  -[facility] Name 01/12/24 1143          Stand-Sit Transfer    Stand-Sit Centre (Transfers) minimum assist (75% patient  effort);nonverbal cues (demo/gesture);verbal cues  -       Row Name 01/12/24 1143          Motor Skills    Motor Skills functional endurance;motor control/coordination interventions;coordination  -     Motor Control/Coordination Interventions fine motor manipulation/dexterity activities;therapeutic exercise/ROM;gross motor coordination activities  -     Therapeutic Exercise shoulder;elbow/forearm;wrist;hand  -     Additional Documentation --  PRE 2x 5 min; BUE TA to increase ADL status/ endurance  -       Row Name 01/12/24 1143          Positioning and Restraints    Pre-Treatment Position in bed  -     Post Treatment Position wheelchair  -     In Wheelchair with PT  -HB               User Key  (r) = Recorded By, (t) = Taken By, (c) = Cosigned By      Initials Name Effective Dates    HB Julia Quintana, DYLAN 05/25/21 -                      Occupational Therapy Education       Title: PT OT SLP Therapies (Done)       Topic: Occupational Therapy (Done)       Point: ADL training (Done)       Description:   Instruct learner(s) on proper safety adaptation and remediation techniques during self care or transfers.   Instruct in proper use of assistive devices.                  Learning Progress Summary             Patient Acceptance, E, VU,NR by  at 1/12/2024 1154    Acceptance, E, VU,NR by  at 1/11/2024 1354    Acceptance, E, VU,NR by  at 1/10/2024 1413                         Point: Home exercise program (Done)       Description:   Instruct learner(s) on appropriate technique for monitoring, assisting and/or progressing therapeutic exercises/activities.                  Learning Progress Summary             Patient Acceptance, E, VU,NR by  at 1/12/2024 1154    Acceptance, E, VU,NR by  at 1/11/2024 1354    Acceptance, E, VU,NR by  at 1/10/2024 1413                         Point: Precautions (Done)       Description:   Instruct learner(s) on prescribed precautions during self-care and functional  transfers.                  Learning Progress Summary             Patient Acceptance, E, VU,NR by  at 1/12/2024 1154    Acceptance, E, VU,NR by  at 1/11/2024 1354    Acceptance, E, VU,NR by  at 1/10/2024 1413                         Point: Body mechanics (Done)       Description:   Instruct learner(s) on proper positioning and spine alignment during self-care, functional mobility activities and/or exercises.                  Learning Progress Summary             Patient Acceptance, E, VU,NR by  at 1/12/2024 1154    Acceptance, E, VU,NR by  at 1/11/2024 1354    Acceptance, E, VU,NR by  at 1/10/2024 1413                                         User Key       Initials Effective Dates Name Provider Type [facility] 05/25/21 -  Julia Quintana OT Occupational Therapist OT                        OT Recommendation and Plan    Planned Therapy Interventions (OT): activity tolerance training, adaptive equipment training, BADL retraining, IADL retraining, functional balance retraining, strengthening exercise, ROM/therapeutic exercise, patient/caregiver education/training, transfer/mobility retraining                    Time Calculation:      Time Calculation- OT       Row Name 01/12/24 1154             Time Calculation- OT    OT Start Time 0735  -HB      OT Stop Time 0915  -HB      OT Time Calculation (min) 100 min  -      Total Timed Code Minutes-  minute(s)  -                User Key  (r) = Recorded By, (t) = Taken By, (c) = Cosigned By      Initials Name Provider Type     Julia Quintana OT Occupational Therapist                  Therapy Charges for Today       Code Description Service Date Service Provider Modifiers Qty    52554701300 HC OT SELF CARE/MGMT/TRAIN EA 15 MIN 1/11/2024 Julia Quintana OT GO 2    49912696427 HC OT THER PROC EA 15 MIN 1/11/2024 Julia Quintana OT GO 2    10414046154 HC OT THERAPEUTIC ACT EA 15 MIN 1/11/2024 Julia Quintana OT GO 3    46944556679 HC OT SELF CARE/MGMT/TRAIN  EA 15 MIN 1/12/2024 Julia Quintana, OT GO 3    53303191846 HC OT THER PROC EA 15 MIN 1/12/2024 Julia Quintana OT GO 2    76380545214 HC OT THERAPEUTIC ACT EA 15 MIN 1/12/2024 Julia Quintana OT GO 2                     Julia Quintana OT  1/12/2024

## 2024-01-12 NOTE — PROGRESS NOTES
[facility]  PROGRESS NOTE     Patient Identification:  Name:  Phuong Tuttle  Age:  63 y.o.  Sex:  female  :  1960  MRN:  [id number]  Visit Number:  [id number]  ROOM: 107/     Primary Care Provider:  Grace Keith     Length of stay in inpatient status:  3    Subjective     Chief Compliant:  No chief complaint on file.      History of Presenting Illness: 63-year-old female who is status post left hip fracture with total left hip arthroplasty.  Currently has history of cirrhosis with pancytopenia/esophageal varices/COPD and anxiety neurosis.  Patient doing reasonably well at this time does complain of some mild constipation issues    Objective     Current Hospital Meds:budesonide-formoterol, 2 puff, Inhalation, BID - RT  [Held by provider] furosemide, 20 mg, Oral, Daily  lactulose, 10 g, Oral, Every Other Day  montelukast, 10 mg, Oral, Nightly  multivitamin, 1 tablet, Oral, Daily  pantoprazole, 40 mg, Oral, Q AM  potassium chloride, 20 mEq, Oral, Daily  vitamin D3, 5,000 Units, Oral, Daily       ----------------------------------------------------------------------------------------------------------------------  Vital Signs:  Temp:  [97.8 °F (36.6 °C)-97.9 °F (36.6 °C)] 97.9 °F (36.6 °C)  Heart Rate:  [71-86] 71  Resp:  [16-18] 16  BP: ()/(60-62) 99/62  SpO2:  [95 %-100 %] 100 %  on   ;   Device (Oxygen Therapy): room air  Body mass index is 25.82 kg/m².    Wt Readings from Last 3 Encounters:   24 72.6 kg (160 lb)   23 80.3 kg (177 lb)   21 95.7 kg (211 lb)     Intake & Output (last 3 days)         01/09 0701  01/10 0700 01/10 0701  01/11 0700 01/11 0701  01/12 0700 01/12 0701  01/13 0700    P.O. 60 1200 1080 360    Total Intake(mL/kg) 60 (0.8) 1200 (16.5) 1080 (14.9) 360 (5)    Urine (mL/kg/hr) 600 1250 (0.7) 1525 (0.9)     Stool   0     Total Output 600 1250 1525     Net -540 -50 -445 +360            Urine Unmeasured Occurrence  1 x 1 x 1 x    Stool  Unmeasured Occurrence   1 x           Diet: Regular/House Diet; Texture: Regular Texture (IDDSI 7); Fluid Consistency: Thin (IDDSI 0)  ----------------------------------------------------------------------------------------------------------------------  Physical exam:  Constitutional: No acute distress  HEENT: Normocephalic atraumatic  Neck: Supple  Cardiovascular: Regular rate and rhythm  Pulmonary/Chest: Clear to auscultation  Abdominal: Positive bowel sounds soft.   Musculoskeletal: Status post left hip repair--incision site is clean  Neurological: No focal deficits  Skin: No rash  Peripheral vascular: Trace edema  Genitourinary:  ----------------------------------------------------------------------------------------------------------------------    Last echocardiogram:  Results for orders placed during the hospital encounter of 05/16/19    Adult Transthoracic Echo Complete W/ Cont if Necessary Per Protocol    Interpretation Summary  · Normal left ventricular cavity size and wall thickness noted.  · Left ventricular systolic function is normal. Estimated EF appears to be in the range of 61 - 65%.  · Left ventricular diastolic function is normal.  · Mild tricuspid valve regurgitation is present.  · . No evidence of pulmonary hypertension  · No significant valvular heart disease  · There is no evidence of pericardial effusion.    ----------------------------------------------------------------------------------------------------------------------  Results from last 7 days   Lab Units 01/10/24  0012   WBC 10*3/mm3 2.33*   HEMOGLOBIN g/dL 8.0*   HEMATOCRIT % 25.4*   MCV fL 93.0   MCHC g/dL 31.5   PLATELETS 10*3/mm3 84*         Results from last 7 days   Lab Units 01/12/24  0027 01/11/24  1857 01/10/24  0012 01/06/24  0254   SODIUM mmol/L 140 138 139  --    POTASSIUM mmol/L 4.2 4.2 3.9  --    MAGNESIUM mg/dL 1.9  --  1.8 1.8   CHLORIDE mmol/L 106 103 106  --    CO2 mmol/L 28.4 30.4* 26.2  --    BUN mg/dL 19 17 16   "--    CREATININE mg/dL 1.10* 1.30* 0.97  --    CALCIUM mg/dL 8.1* 8.3* 8.0*  --    IONIZED CALCIUM mmol/L 1.12 1.11*  --   --    GLUCOSE mg/dL 109* 88 116*  --    ALBUMIN g/dL 2.6* 3.1* 2.6*  --    BILIRUBIN mg/dL 0.8 0.9 0.6  --    ALK PHOS U/L 108 128* 102  --    AST (SGOT) U/L 33* 42* 25  --    ALT (SGPT) U/L 17 20 13  --    Estimated Creatinine Clearance: 54.8 mL/min (A) (by C-G formula based on SCr of 1.1 mg/dL (H)).  No results found for: \"AMMONIA\"              No results found for: \"HGBA1C\", \"POCGLU\"  Lab Results   Component Value Date    TSH 9.880 (H) 01/10/2024     No results found for: \"PREGTESTUR\", \"PREGSERUM\", \"HCG\", \"HCGQUANT\"  Pain Management Panel  More data may exist         Latest Ref Rng & Units 4/27/2020 10/29/2018   Pain Management Panel   Amphetamine, Urine Qual Negative - Negative    Barbiturates Screen, Urine Negative Negative  Negative    Benzodiazepine Screen, Urine Negative Positive  Positive    Buprenorphine, Screen, Urine Negative - Positive    Cocaine Screen, Urine Negative Negative  Negative    Methadone Screen , Urine Negative Negative  Negative      Brief Urine Lab Results       None          No results found for: \"BLOODCX\"      No results found for: \"URINECX\"  No results found for: \"WOUNDCX\"  No results found for: \"STOOLCX\"        I have personally looked at the labs and they are summarized above.  ----------------------------------------------------------------------------------------------------------------------  Detailed radiology reports for the last 24 hours:    Imaging Results (Last 24 Hours)       ** No results found for the last 24 hours. **          Final impressions for the last 30 days of radiology reports:    XR CLAVICLE LEFT    Result Date: 1/5/2024  No acute fracture. Images reviewed, interpreted, and dictated by Dr. DARIUS Farias. Transcribed by Chapincito Tyler PA-C.    XR Pelvis 1 or 2 View    Result Date: 1/1/2024  Surgical changes of left hip arthroplasty " without hardware complication. Images reviewed, interpreted, and dictated by Dr. Jez Hallman. Transcribed by Janette Salinas PA-C.    XR femur left    Result Date: 12/31/2023  No additional abnormality. Images reviewed, interpreted, and dictated by Dr. Mark Sal. Transcribed by Gamaliel Light.    XR Chest 1 View    Result Date: 12/31/2023  Cardiomegaly with mild pulmonary edema.    XR hip 2 views left    Result Date: 12/31/2023  Acute left hip fracture as above. Irregularity of the right pubic body. Consider CT for further evaluation. Images reviewed, interpreted, and dictated by Dr. Jez Hallman. Transcribed by Chapincito Tyler PA-C.    CT Head Without Contrast    Result Date: 12/31/2023  No acute intracranial abnormality. CRITICAL RESULT: No. COMMUNICATION: Per this written report. Images personally reviewed, interpreted, and dictated by SANTHOSH Perera.   I have personally looked at the radiology images and read the final radiology report.    Assessment & Plan    Status post left hip fracture with left hip arthroplasty--patient requires minimum assist for upper body dressing; dependent for lower body dressing; minimum assist for grooming; dependent for toileting.  Required minimum assistance for transfers today.  Ambulated 120 feet yesterday with front wheel walker and contact-guard.  Continue pain management    Cirrhosis--hold Lasix today.  Patient on lactulose and as needed suppositories today    COPD stable continue Symbicort    GERD Protonix 40 mg daily    History of esophageal varices    Pancytopenia likely secondary to cirrhosis    Anxiety neurosis Xanax 2.5 mg 4 times daily as needed      Hypocalcemia--did hold Lasix today.  Patient is on replacement protocol.    VTE Prophylaxis:   Mechanical Order History:        Ordered        01/09/24 1648  Place Sequential Compression Device  Once            01/09/24 1648  Maintain Sequential Compression Device  Continuous                          Pharmalogical  Order History:       None                Sylvester Tamayo MD  [facility]  01/12/24  12:32 EST

## 2024-01-13 ENCOUNTER — APPOINTMENT (OUTPATIENT)
Dept: GENERAL RADIOLOGY | Facility: HOSPITAL | Age: 64
DRG: 560 | End: 2024-01-13
Payer: MEDICAID

## 2024-01-13 LAB
ALBUMIN SERPL-MCNC: 2.9 G/DL (ref 3.5–5.2)
ALBUMIN/GLOB SERPL: 1.2 G/DL
ALP SERPL-CCNC: 133 U/L (ref 39–117)
ALT SERPL W P-5'-P-CCNC: 23 U/L (ref 1–33)
ANION GAP SERPL CALCULATED.3IONS-SCNC: 7.9 MMOL/L (ref 5–15)
AST SERPL-CCNC: 42 U/L (ref 1–32)
BILIRUB SERPL-MCNC: 0.7 MG/DL (ref 0–1.2)
BUN SERPL-MCNC: 18 MG/DL (ref 8–23)
BUN/CREAT SERPL: 16.5 (ref 7–25)
CA-I SERPL ISE-MCNC: 1.13 MMOL/L (ref 1.12–1.32)
CALCIUM SPEC-SCNC: 8.1 MG/DL (ref 8.6–10.5)
CHLORIDE SERPL-SCNC: 104 MMOL/L (ref 98–107)
CO2 SERPL-SCNC: 27.1 MMOL/L (ref 22–29)
CREAT SERPL-MCNC: 1.09 MG/DL (ref 0.57–1)
EGFRCR SERPLBLD CKD-EPI 2021: 57.9 ML/MIN/1.73
GLOBULIN UR ELPH-MCNC: 2.4 GM/DL
GLUCOSE SERPL-MCNC: 126 MG/DL (ref 65–99)
MAGNESIUM SERPL-MCNC: 2 MG/DL (ref 1.6–2.4)
POTASSIUM SERPL-SCNC: 4.2 MMOL/L (ref 3.5–5.2)
PROT SERPL-MCNC: 5.3 G/DL (ref 6–8.5)
SODIUM SERPL-SCNC: 139 MMOL/L (ref 136–145)

## 2024-01-13 PROCEDURE — 63710000001 ONDANSETRON PER 8 MG: Performed by: FAMILY MEDICINE

## 2024-01-13 PROCEDURE — 82330 ASSAY OF CALCIUM: CPT | Performed by: FAMILY MEDICINE

## 2024-01-13 PROCEDURE — 94799 UNLISTED PULMONARY SVC/PX: CPT

## 2024-01-13 PROCEDURE — 94664 DEMO&/EVAL PT USE INHALER: CPT

## 2024-01-13 PROCEDURE — 97116 GAIT TRAINING THERAPY: CPT

## 2024-01-13 PROCEDURE — 83735 ASSAY OF MAGNESIUM: CPT | Performed by: FAMILY MEDICINE

## 2024-01-13 PROCEDURE — 99231 SBSQ HOSP IP/OBS SF/LOW 25: CPT | Performed by: FAMILY MEDICINE

## 2024-01-13 PROCEDURE — 97530 THERAPEUTIC ACTIVITIES: CPT

## 2024-01-13 PROCEDURE — 97110 THERAPEUTIC EXERCISES: CPT

## 2024-01-13 PROCEDURE — 80053 COMPREHEN METABOLIC PANEL: CPT | Performed by: FAMILY MEDICINE

## 2024-01-13 PROCEDURE — 97535 SELF CARE MNGMENT TRAINING: CPT

## 2024-01-13 PROCEDURE — 73560 X-RAY EXAM OF KNEE 1 OR 2: CPT

## 2024-01-13 PROCEDURE — 73000 X-RAY EXAM OF COLLAR BONE: CPT

## 2024-01-13 RX ADMIN — ACETAMINOPHEN 650 MG: 325 TABLET ORAL at 15:33

## 2024-01-13 RX ADMIN — POLYETHYLENE GLYCOL (3350) 17 G: 17 POWDER, FOR SOLUTION ORAL at 08:18

## 2024-01-13 RX ADMIN — ONDANSETRON HYDROCHLORIDE 8 MG: 4 TABLET, FILM COATED ORAL at 05:49

## 2024-01-13 RX ADMIN — MONTELUKAST SODIUM 10 MG: 10 TABLET, COATED ORAL at 20:40

## 2024-01-13 RX ADMIN — ACETAMINOPHEN 650 MG: 325 TABLET ORAL at 08:18

## 2024-01-13 RX ADMIN — ONDANSETRON HYDROCHLORIDE 8 MG: 4 TABLET, FILM COATED ORAL at 18:43

## 2024-01-13 RX ADMIN — ALPRAZOLAM 0.5 MG: 0.5 TABLET ORAL at 05:49

## 2024-01-13 RX ADMIN — IPRATROPIUM BROMIDE AND ALBUTEROL SULFATE 3 ML: 2.5; .5 SOLUTION RESPIRATORY (INHALATION) at 19:01

## 2024-01-13 RX ADMIN — OXYCODONE HYDROCHLORIDE 10 MG: 10 TABLET ORAL at 06:19

## 2024-01-13 RX ADMIN — ONDANSETRON HYDROCHLORIDE 8 MG: 4 TABLET, FILM COATED ORAL at 12:07

## 2024-01-13 RX ADMIN — Medication 5000 UNITS: at 08:18

## 2024-01-13 RX ADMIN — OXYCODONE HYDROCHLORIDE 10 MG: 10 TABLET ORAL at 02:33

## 2024-01-13 RX ADMIN — ALPRAZOLAM 0.5 MG: 0.5 TABLET ORAL at 12:04

## 2024-01-13 RX ADMIN — OXYCODONE HYDROCHLORIDE 10 MG: 10 TABLET ORAL at 20:40

## 2024-01-13 RX ADMIN — Medication 1 TABLET: at 08:18

## 2024-01-13 RX ADMIN — ALPRAZOLAM 0.5 MG: 0.5 TABLET ORAL at 18:43

## 2024-01-13 RX ADMIN — POTASSIUM CHLORIDE 20 MEQ: 20 TABLET, EXTENDED RELEASE ORAL at 08:18

## 2024-01-13 RX ADMIN — OXYCODONE HYDROCHLORIDE 10 MG: 10 TABLET ORAL at 16:03

## 2024-01-13 RX ADMIN — PANTOPRAZOLE SODIUM 40 MG: 40 TABLET, DELAYED RELEASE ORAL at 05:49

## 2024-01-13 RX ADMIN — OXYCODONE HYDROCHLORIDE 10 MG: 10 TABLET ORAL at 10:55

## 2024-01-13 NOTE — PROGRESS NOTES
Occupational Therapy:    Physical Therapy: Individual: 90 minutes.    Speech Language Pathology:    Signed by: Cecilia Laureano PTA

## 2024-01-13 NOTE — THERAPY TREATMENT NOTE
Inpatient Rehabilitation - Physical Therapy Treatment Note       ALBERTA Herrera     Patient Name: Phuong Tuttle  : 1960  MRN: [id number]    Today's Date: 2024                    Admit Date: 2024      Visit Dx:   No diagnosis found.    Patient Active Problem List   Diagnosis    COPD (chronic obstructive pulmonary disease)    Current smoker    Iron deficiency anemia, unspecified    Chest pain    End stage liver disease    Obesity (BMI 30-39.9)    Portal hypertension    Hepatitis C    History of substance abuse    Esophageal varices    Splenomegaly    Chronic kidney disease (CKD), stage III (moderate)    Leukopenia    Splenic pancytopenia syndrome    Iron deficiency anemia    Iron malabsorption    S/p left hip fracture       Past Medical History:   Diagnosis Date    Chronic kidney disease (CKD), stage III (moderate) 2020    COPD (chronic obstructive pulmonary disease)     non-oxygen dependent    End stage liver disease 2020    Esophageal varices     GI bleed     history of GI bleed     Hepatitis C 2020    History of substance abuse 2020    Immunocompromised patient     Peptic ulceration     Portal hypertension 2020    Splenomegaly 2020       Past Surgical History:   Procedure Laterality Date    ESOPHAGEAL VARICES LIGATION      UPPER GASTROINTESTINAL ENDOSCOPY         PT ASSESSMENT (last 12 hours)       IRF PT Evaluation and Treatment       Row Name 24 1139          PT Time and Intention    Document Type daily treatment  -HC     Mode of Treatment individual therapy;physical therapy  -HC     Patient/Family/Caregiver Comments/Observations Pt and RN in agreement for PT. Pt walked 60' then 100' with RW CGA. Sitting exercises with added Wt and resistance until tolerance.Sitting dynamic balance until tolerance.  -       Row Name 24 1139          General Information    Existing Precautions/Restrictions fall;left;hip  -       Row Name 24 1139          Pain  Scale: FACES Pre/Post-Treatment    Pain: FACES Scale, Pretreatment 4-->hurts little more  -     Posttreatment Pain Rating 4-->hurts little more  -       Row Name 01/13/24 1139          Cognition/Psychosocial    Affect/Mental Status (Cognition) WFL  -     Orientation Status (Cognition) oriented x 3  -HC     Follows Commands (Cognition) WFL;verbal cues/prompting required;physical/tactile prompts required  -     Personal Safety Interventions fall prevention program maintained;gait belt;supervised activity;nonskid shoes/slippers when out of bed  -       Row Name 01/13/24 1139          Mobility    Left Lower Extremity (Weight-bearing Status) weight-bearing as tolerated (WBAT)  -     Right Lower Extremity (Weight-bearing Status) weight-bearing as tolerated (WBAT)  -       Row Name 01/13/24 1139          Bed Mobility    Comment, (Bed Mobility) up in chair  -       Row Name 01/13/24 1139          Sit-Stand Transfer    Sit-Stand [place] (Transfers) contact guard;verbal cues;nonverbal cues (demo/gesture);minimum assist (75% patient effort)  -     Assistive Device (Sit-Stand Transfers) wheelchair;walker, front-wheeled  -       Row Name 01/13/24 1139          Stand-Sit Transfer    Stand-Sit [place] (Transfers) contact guard;verbal cues;nonverbal cues (demo/gesture);minimum assist (75% patient effort)  -     Assistive Device (Stand-Sit Transfers) wheelchair;walker, front-wheeled  -[facility] Name 01/13/24 1139          Gait/Stairs (Locomotion)    [place] Level (Gait) contact guard;verbal cues;nonverbal cues (demo/gesture)  -     Assistive Device (Gait) walker, front-wheeled  -     Distance in Feet (Gait) 60', 100'  -     Deviations/Abnormal Patterns (Gait) antalgic;claire decreased;gait speed decreased;stride length decreased;weight shifting decreased  -     Bilateral Gait Deviations forward flexed posture;heel strike decreased  -       Row Name 01/13/24 1139          Balance     Comment, Balance Sitting 1#: AP, LAQ, March, Ball sq (x 2 sets). RTB: HS curls, Hip abd. Bean bag toss, [place]on tap.  -       Row Name 01/13/24 1139          Hip (Therapeutic Exercise)    Hip Strengthening (Therapeutic Exercise) bilateral;flexion;extension;aBduction;aDduction;marching while seated;sitting;1 lb free weight;resistance band;red  -       Row Name 01/13/24 1139          Knee (Therapeutic Exercise)    Knee Strengthening (Therapeutic Exercise) bilateral;flexion;extension;marching while seated;LAQ (long arc quad);hamstring curls;sitting;1 lb free weight;resistance band;red  -       Row Name 01/13/24 1139          Ankle (Therapeutic Exercise)    Ankle Strengthening (Therapeutic Exercise) bilateral;dorsiflexion;plantarflexion;sitting;1 lb free weight  -       Row Name 01/13/24 1139          Positioning and Restraints    Pre-Treatment Position other (comment)  WC  -     Post Treatment Position wheelchair  -     In Wheelchair sitting;call light within reach;encouraged to call for assist  -       Row Name 01/13/24 1139          Therapy Assessment/Plan (PT)    Patient's Goals For Discharge return home  -       Row Name 01/13/24 1139          Therapy Assessment/Plan (PT)    Rehab Potential/Prognosis (PT) adequate, monitor progress closely  -     Frequency of Treatment (PT) 5 times per week  -     Estimated Duration of Therapy (PT) 2 weeks  -     Problem List (PT) balance;mobility;range of motion (ROM);strength;pain  -     Activity Limitations Related to Problem List (PT) unable to ambulate safely;unable to transfer safely  -       Row Name 01/13/24 1139          Therapy Plan Review/Discharge Plan (PT)    Anticipated Equipment Needs at Discharge (PT Eval) --  tbd  -     Anticipated Discharge Disposition (PT) home;home with home health  -       Row Name 01/13/24 1139          IRF PT Goals    Bed Mobility Goal Selection (PT-IRF) bed mobility, PT goal 1  -     Transfer Goal Selection  (PT-IRF) transfers, PT goal 1  -     Gait (Walking Locomotion) Goal Selection (PT-IRF) gait, PT goal 1  -       Row Name 01/13/24 1139          Bed Mobility Goal 1 (PT-IRF)    Activity/Assistive Device (Bed Mobility Goal 1, PT-IRF) sit to supine/supine to sit  -HC     [place] Level (Bed Mobility Goal 1, PT-IRF) modified independence  -HC     Time Frame (Bed Mobility Goal 1, PT-IRF) by discharge  -       Row Name 01/13/24 1139          Transfer Goal 1 (PT-IRF)    Activity/Assistive Device (Transfer Goal 1, PT-IRF) sit-to-stand/stand-to-sit;bed-to-chair/chair-to-bed  -HC     [place] Level (Transfer Goal 1, PT-IRF) modified independence  -HC     Time Frame (Transfer Goal 1, PT-IRF) by discharge  -       Row Name 01/13/24 1139          Gait/Walking Locomotion Goal 1 (PT-IRF)    Activity/Assistive Device (Gait/Walking Locomotion Goal 1, PT-IRF) walker, rolling  -HC     Gait/Walking Locomotion Distance Goal 1 (PT-IRF) 300'  -HC     [place] Level (Gait/Walking Locomotion Goal 1, PT-IRF) modified independence  -HC     Time Frame (Gait/Walking Locomotion Goal 1, PT-IRF) by discharge  -               User Key  (r) = Recorded By, (t) = Taken By, (c) = Cosigned By      Initials Name Provider Type     Cecilia Laureano, PTA Physical Therapist Assistant                  Wound 01/09/24 1655 Left anterior hip Incision (Active)   Dressing Appearance dry;intact 01/12/24 1925   Closure Staples;Adhesive bandage 01/13/24 0818   Base clean;dry;pink 01/13/24 0818   Periwound dry;intact;blanchable 01/13/24 0818   Edges rolled/closed 01/13/24 0818   Drainage Amount none 01/13/24 0818   Care, Wound cleansed with 01/13/24 0818   Dressing Care dressing changed 01/13/24 0818     Physical Therapy Education       Title: PT OT SLP Therapies (Done)       Topic: Physical Therapy (Done)       Point: Mobility training (Done)       Learning Progress Summary             Patient Acceptance, E,TB, VU,DU by  at 1/13/2024  1154    Acceptance, E,D, VU,NR by LL at 1/12/2024 1356    Acceptance, E, VU,NR by LB at 1/11/2024 1547                         Point: Home exercise program (Done)       Learning Progress Summary             Patient Acceptance, E,TB, VU,DU by  at 1/13/2024 1154    Acceptance, E,D, VU,NR by LL at 1/12/2024 1356    Acceptance, E, VU,NR by LB at 1/11/2024 1547                         Point: Body mechanics (Done)       Learning Progress Summary             Patient Acceptance, E,TB, VU,DU by HC at 1/13/2024 1154    Acceptance, E,D, VU,NR by LL at 1/12/2024 1356    Acceptance, E, VU,NR by LB at 1/11/2024 1547                         Point: Precautions (Done)       Learning Progress Summary             Patient Acceptance, E,TB, VU,DU by  at 1/13/2024 1154    Acceptance, E,D, VU,NR by LL at 1/12/2024 1356    Acceptance, E, VU,NR by LB at 1/11/2024 1547                                         User Key       Initials Effective Dates Name Provider Type Discipline     06/16/21 -  Caryn Rosen, PT Physical Therapist PT     05/02/16 -  Juliana Echeverria PTA Physical Therapist Assistant PT     01/20/23 -  Cecilia Laureano PTA Physical Therapist Assistant PT                    PT Recommendation and Plan    Frequency of Treatment (PT): 5 times per week  Anticipated Equipment Needs at Discharge (PT Eval):  (tbd)                  Time Calculation:      PT Charges       Row Name 01/13/24 1154             Time Calculation    Start Time 0915  -      Stop Time 1045  -      Time Calculation (min) 90 min  -      PT Received On 01/13/24  -         Time Calculation- PT    Total Timed Code Minutes- PT 90 minute(s)  -                User Key  (r) = Recorded By, (t) = Taken By, (c) = Cosigned By      Initials Name Provider Type     Cecilia Laureano, ROSEMARIE Physical Therapist Assistant                    Therapy Charges for Today       Code Description Service Date Service Provider Modifiers Qty    66924322239  HC GAIT TRAINING EA 15 MIN 1/13/2024 Cecilia Laureano, PTA GP, CQ 2    73184466668 HC PT THER PROC EA 15 MIN 1/13/2024 Cecilia Laureano, PTA GP, CQ 2    11103917106 HC PT THERAPEUTIC ACT EA 15 MIN 1/13/2024 Cecilia Laureano, PTA GP, CQ 2              PT G-Codes  AM-PAC 6 Clicks Score (PT): 18      Cecilia Laureano PTA  1/13/2024

## 2024-01-13 NOTE — PROGRESS NOTES
Rehabilitation Nursing  Inpatient Rehabilitation Plan of Care Note    Plan of Care  Copy from Leonid    Pain Management (Active)  Current Status (1/15/2024 12:00:00 AM): pt will be free of pain with meds  Weekly Goal: decrease in pain meds  Discharge Goal: pain free    Safety    Potential for Injury (Active)  Current Status (1/12/2024 12:00:00 AM): patient will ring for needs  Weekly Goal: patient uses proper safety with hip  Discharge Goal: injury free    Signed by: Rosibel Granados Nurse

## 2024-01-13 NOTE — NURSING NOTE
Pt has peed the bed and threw her dirty vazquez in the floor. Pt is refusing to have her bed changed. [facility] notified she is going to come and talk to patient..

## 2024-01-13 NOTE — THERAPY TREATMENT NOTE
Inpatient Rehabilitation - Occupational Therapy Treatment Note    ALBERTA Herrera     Patient Name: Phuong Tuttle  : 1960  MRN: [id number]    Today's Date: 2024                 Admit Date: 2024       No diagnosis found.    Patient Active Problem List   Diagnosis    COPD (chronic obstructive pulmonary disease)    Current smoker    Iron deficiency anemia, unspecified    Chest pain    End stage liver disease    Obesity (BMI 30-39.9)    Portal hypertension    Hepatitis C    History of substance abuse    Esophageal varices    Splenomegaly    Chronic kidney disease (CKD), stage III (moderate)    Leukopenia    Splenic pancytopenia syndrome    Iron deficiency anemia    Iron malabsorption    S/p left hip fracture       Past Medical History:   Diagnosis Date    Chronic kidney disease (CKD), stage III (moderate) 2020    COPD (chronic obstructive pulmonary disease)     non-oxygen dependent    End stage liver disease 2020    Esophageal varices     GI bleed     history of GI bleed     Hepatitis C 2020    History of substance abuse 2020    Immunocompromised patient     Peptic ulceration     Portal hypertension 2020    Splenomegaly 2020       Past Surgical History:   Procedure Laterality Date    ESOPHAGEAL VARICES LIGATION      UPPER GASTROINTESTINAL ENDOSCOPY               IRF OT ASSESSMENT FLOWSHEET (last 12 hours)       IRF OT Evaluation and Treatment       Row Name 24 1208          OT Time and Intention    Document Type daily treatment  -HB     Mode of Treatment individual therapy;occupational therapy  -HB     Total Minutes, Occupational Therapy 90  -HB     Patient Effort good  -HB     Symptoms Noted During/After Treatment none  -HB       Row Name 24 1205          General Information    Patient Profile Reviewed yes  -HB     Patient/Family/Caregiver Comments/Observations Patient and RN oksolomon OT this date.  -HB     General Observations of Patient Patient tolerated OT well with  no adverse reactions.  -     Existing Precautions/Restrictions fall;left;hip  -[facility] Name 01/13/24 1203          Pain Assessment    Pretreatment Pain Rating 4/10  -HB     Posttreatment Pain Rating 4/10  -HB     Pain Location - Side/Orientation --  LLE  -[facility] Name 01/13/24 1203          Cognition/Psychosocial    Affect/Mental Status (Cognition) WFL  -     Orientation Status (Cognition) oriented x 3  -HB     Follows Commands (Cognition) WFL;verbal cues/prompting required;physical/tactile prompts required  -[facility] Name 01/13/24 1203          Upper Body Dressing    [place] Level (Upper Body Dressing) upper body dressing skills;set up assistance  -     Position (Upper Body Dressing) edge of bed sitting  -[facility] Name 01/13/24 1203          Lower Body Dressing    [place] Level (Lower Body Dressing) doff;pants/bottoms;shoes/slippers;socks;maximum assist (25% patient effort)  -     Position (Lower Body Dressing) edge of bed sitting;supported standing  -[facility] Name 01/13/24 1203          Grooming    [place] Level (Grooming) grooming skills;minimum assist (75% patient effort)  -     Position (Grooming) edge of bed sitting  -[facility] Name 01/13/24 1203          Toileting    [place] Level (Toileting) toileting skills;maximum assist (25% patient effort)  -     Position (Toileting) edge of bed sitting;supported standing  -[facility] Name 01/13/24 1203          Self-Feeding    [place] Level (Self-Feeding) feeding skills;set up  -     Position (Self-Feeding) sitting up in bed  -[facility] Name 01/13/24 1203          Bed Mobility    Supine-Sit [place] (Bed Mobility) moderate assist (50% patient effort);nonverbal cues (demo/gesture);verbal cues  -[facility] Name 01/13/24 1203          Bed-Chair Transfer    Bed-Chair [place] (Transfers) minimum assist (75% patient effort);nonverbal cues (demo/gesture);verbal cues  -     Assistive Device  (Bed-Chair Transfers) wheelchair;walker, front-wheeled  -       Row Name 01/13/24 1203          Sit-Stand Transfer    Sit-Stand [place] (Transfers) contact guard;verbal cues;nonverbal cues (demo/gesture);minimum assist (75% patient effort)  -       Row Name 01/13/24 1203          Stand-Sit Transfer    Stand-Sit [place] (Transfers) contact guard;verbal cues;nonverbal cues (demo/gesture);minimum assist (75% patient effort)  -       Row Name 01/13/24 1203          Motor Skills    Motor Skills coordination;functional endurance;motor control/coordination interventions  -     Motor Control/Coordination Interventions fine motor manipulation/dexterity activities;gross motor coordination activities;therapeutic exercise/ROM  -     Therapeutic Exercise shoulder;elbow/forearm;wrist;hand  -     Additional Documentation --  rickshaw 10 lbs 10 reps 4x; BUE TA to increase ADL status/endurance; rest between tasks  -       Row Name 01/13/24 1203          Positioning and Restraints    Pre-Treatment Position in bed  -     Post Treatment Position wheelchair  -     In Wheelchair call light within reach;encouraged to call for assist  -               User Key  (r) = Recorded By, (t) = Taken By, (c) = Cosigned By      Initials Name Effective Dates     Julia Quintana, OT 05/25/21 -                      Occupational Therapy Education       Title: PT OT SLP Therapies (Done)       Topic: Occupational Therapy (Done)       Point: ADL training (Done)       Description:   Instruct learner(s) on proper safety adaptation and remediation techniques during self care or transfers.   Instruct in proper use of assistive devices.                  Learning Progress Summary             Patient Acceptance, E, VU,NR by  at 1/13/2024 1208    Acceptance, E, VU,NR by  at 1/12/2024 1154    Acceptance, E, VU,NR by  at 1/11/2024 1354    Acceptance, E, VU,NR by  at 1/10/2024 1413                         Point: Home exercise  program (Done)       Description:   Instruct learner(s) on appropriate technique for monitoring, assisting and/or progressing therapeutic exercises/activities.                  Learning Progress Summary             Patient Acceptance, E, VU,NR by  at 1/13/2024 1208    Acceptance, E, VU,NR by  at 1/12/2024 1154    Acceptance, E, VU,NR by HB at 1/11/2024 1354    Acceptance, E, VU,NR by HB at 1/10/2024 1413                         Point: Precautions (Done)       Description:   Instruct learner(s) on prescribed precautions during self-care and functional transfers.                  Learning Progress Summary             Patient Acceptance, E, VU,NR by  at 1/13/2024 1208    Acceptance, E, VU,NR by  at 1/12/2024 1154    Acceptance, E, VU,NR by  at 1/11/2024 1354    Acceptance, E, VU,NR by  at 1/10/2024 1413                         Point: Body mechanics (Done)       Description:   Instruct learner(s) on proper positioning and spine alignment during self-care, functional mobility activities and/or exercises.                  Learning Progress Summary             Patient Acceptance, E, VU,NR by  at 1/13/2024 1208    Acceptance, E, VU,NR by  at 1/12/2024 1154    Acceptance, E, VU,NR by  at 1/11/2024 1354    Acceptance, E, VU,NR by  at 1/10/2024 1413                                         User Key       Initials Effective Dates Name Provider Type Discipline     05/25/21 -  Julia Quintana OT Occupational Therapist OT                        OT Recommendation and Plan    Planned Therapy Interventions (OT): activity tolerance training, adaptive equipment training, BADL retraining, IADL retraining, functional balance retraining, strengthening exercise, ROM/therapeutic exercise, patient/caregiver education/training, transfer/mobility retraining                    Time Calculation:      Time Calculation- OT       Row Name 01/13/24 1208             Time Calculation- OT    OT Start Time 0700  -      OT Stop Time  0830  -HB      OT Time Calculation (min) 90 min  -      Total Timed Code Minutes- OT 90 minute(s)  -HB                User Key  (r) = Recorded By, (t) = Taken By, (c) = Cosigned By      Initials Name Provider Type     Julia Quintana OT Occupational Therapist                  Therapy Charges for Today       Code Description Service Date Service Provider Modifiers Qty    17105885783 HC OT SELF CARE/MGMT/TRAIN EA 15 MIN 1/12/2024 Julia Quintana OT GO 3    57096752554 HC OT THER PROC EA 15 MIN 1/12/2024 Julia Quintana OT GO 2    13019479498 HC OT THERAPEUTIC ACT EA 15 MIN 1/12/2024 Julia Quintana OT GO 2    53394964638 HC OT SELF CARE/MGMT/TRAIN EA 15 MIN 1/13/2024 Julia Quintana OT GO 3    79954220700 HC OT THER PROC EA 15 MIN 1/13/2024 Julia Quintana OT GO 1    34883752404 HC OT THERAPEUTIC ACT EA 15 MIN 1/13/2024 Julia Quintana OT GO 2                     Julia Quintana OT  1/13/2024

## 2024-01-13 NOTE — PLAN OF CARE
Goal Outcome Evaluation:           Problem: Rehabilitation (IRF) Plan of Care  Goal: Plan of Care Review  Outcome: Ongoing, Progressing  Flowsheets (Taken 1/12/2024 0901 by Braydon Caballero RN)  Progress: improving  Plan of Care Reviewed With: patient  Goal: Patient-Specific Goal (Individualized)  Outcome: Ongoing, Progressing  Goal: Absence of New-Onset Illness or Injury  Outcome: Ongoing, Progressing  Goal: Optimal Comfort and Wellbeing  Outcome: Ongoing, Progressing  Goal: Home and Community Transition Plan Established  Outcome: Ongoing, Progressing     Problem: Skin Injury Risk Increased  Goal: Skin Health and Integrity  Outcome: Ongoing, Progressing     Problem: Fall Injury Risk  Goal: Absence of Fall and Fall-Related Injury  Outcome: Ongoing, Progressing     Problem: Impaired Wound Healing  Goal: Optimal Wound Healing  Outcome: Ongoing, Progressing

## 2024-01-13 NOTE — PROGRESS NOTES
[facility]  PROGRESS NOTE     Patient Identification:  Name:  Phuong Tuttle  Age:  63 y.o.  Sex:  female  :  1960  MRN:  [id number]  Visit Number:  [id number]  ROOM: [facility]     Primary Care Provider:  Grace Keith     Length of stay in inpatient status:  4    Subjective     Chief Compliant:  No chief complaint on file.      History of Presenting Illness: Patient is 63-year-old female who is status post left hip fracture with left hip arthroplasty.  Patient states she is doing fairly well does complain of left clavicle pain and the left knee pain at this time.    Objective     Current Hospital Meds:budesonide-formoterol, 2 puff, Inhalation, BID - RT  [Held by provider] furosemide, 20 mg, Oral, Daily  lactulose, 10 g, Oral, Every Other Day  montelukast, 10 mg, Oral, Nightly  multivitamin, 1 tablet, Oral, Daily  pantoprazole, 40 mg, Oral, Q AM  polyethylene glycol, 17 g, Oral, Daily  potassium chloride, 20 mEq, Oral, Daily  vitamin D3, 5,000 Units, Oral, Daily       ----------------------------------------------------------------------------------------------------------------------  Vital Signs:  Temp:  [97.8 °F (36.6 °C)-97.9 °F (36.6 °C)] 97.9 °F (36.6 °C)  Heart Rate:  [74-96] 96  Resp:  [16-18] 16  BP: ()/(58-61) 102/58  SpO2:  [90 %-100 %] 99 %  on   ;   Device (Oxygen Therapy): room air  Body mass index is 25.82 kg/m².    Wt Readings from Last 3 Encounters:   24 72.6 kg (160 lb)   23 80.3 kg (177 lb)   21 95.7 kg (211 lb)     Intake & Output (last 3 days)         01/10 0701  01/11 07 07 0700    P.O. 1200 1080 1440 240    Total Intake(mL/kg) 1200 (16.5) 1080 (14.9) 1440 (19.8) 240 (3.3)    Urine (mL/kg/hr) 1250 (0.7) 1525 (0.9) 400 (0.2)     Stool  0      Total Output 1250 1525 400     Net -50 -445 +1040 +240            Urine Unmeasured Occurrence 1 x 1 x 5 x     Stool Unmeasured  Occurrence  1 x 1 x           Diet: Regular/House Diet; Texture: Regular Texture (IDDSI 7); Fluid Consistency: Thin (IDDSI 0)  ----------------------------------------------------------------------------------------------------------------------  Physical exam:  Constitutional: No acute distress  HEENT: Cephalic atraumatic  Neck: Supple  Cardiovascular: Regular rate and rhythm  Pulmonary/Chest: Clear to auscultation  Abdominal: Positive bowel sounds soft.   Musculoskeletal: Has some tenderness along the left clavicle but no obvious palpable fracture.  Left knee no effusion.  Neurological: No focal deficits  Skin: No rash  Peripheral vascular: No edema  Genitourinary:  ----------------------------------------------------------------------------------------------------------------------    Last echocardiogram:  Results for orders placed during the hospital encounter of 05/16/19    Adult Transthoracic Echo Complete W/ Cont if Necessary Per Protocol    Interpretation Summary  · Normal left ventricular cavity size and wall thickness noted.  · Left ventricular systolic function is normal. Estimated EF appears to be in the range of 61 - 65%.  · Left ventricular diastolic function is normal.  · Mild tricuspid valve regurgitation is present.  · . No evidence of pulmonary hypertension  · No significant valvular heart disease  · There is no evidence of pericardial effusion.    ----------------------------------------------------------------------------------------------------------------------  Results from last 7 days   Lab Units 01/10/24  0012   WBC 10*3/mm3 2.33*   HEMOGLOBIN g/dL 8.0*   HEMATOCRIT % 25.4*   MCV fL 93.0   MCHC g/dL 31.5   PLATELETS 10*3/mm3 84*         Results from last 7 days   Lab Units 01/13/24  0035 01/12/24  0027 01/11/24  1857 01/10/24  0012   SODIUM mmol/L 139 140 138 139   POTASSIUM mmol/L 4.2 4.2 4.2 3.9   MAGNESIUM mg/dL 2.0 1.9  --  1.8   CHLORIDE mmol/L 104 106 103 106   CO2 mmol/L 27.1 28.4  "30.4* 26.2   BUN mg/dL 18 19 17 16   CREATININE mg/dL 1.09* 1.10* 1.30* 0.97   CALCIUM mg/dL 8.1* 8.1* 8.3* 8.0*   IONIZED CALCIUM mmol/L 1.13 1.12 1.11*  --    GLUCOSE mg/dL 126* 109* 88 116*   ALBUMIN g/dL 2.9* 2.6* 3.1* 2.6*   BILIRUBIN mg/dL 0.7 0.8 0.9 0.6   ALK PHOS U/L 133* 108 128* 102   AST (SGOT) U/L 42* 33* 42* 25   ALT (SGPT) U/L 23 17 20 13   Estimated Creatinine Clearance: 55.3 mL/min (A) (by C-G formula based on SCr of 1.09 mg/dL (H)).  No results found for: \"AMMONIA\"              No results found for: \"HGBA1C\", \"POCGLU\"  Lab Results   Component Value Date    TSH 9.880 (H) 01/10/2024     No results found for: \"PREGTESTUR\", \"PREGSERUM\", \"HCG\", \"HCGQUANT\"  Pain Management Panel  More data may exist         Latest Ref Rng & Units 4/27/2020 10/29/2018   Pain Management Panel   Amphetamine, Urine Qual Negative - Negative    Barbiturates Screen, Urine Negative Negative  Negative    Benzodiazepine Screen, Urine Negative Positive  Positive    Buprenorphine, Screen, Urine Negative - Positive    Cocaine Screen, Urine Negative Negative  Negative    Methadone Screen , Urine Negative Negative  Negative      Brief Urine Lab Results       None          No results found for: \"BLOODCX\"      No results found for: \"URINECX\"  No results found for: \"WOUNDCX\"  No results found for: \"STOOLCX\"        I have personally looked at the labs and they are summarized above.  ----------------------------------------------------------------------------------------------------------------------  Detailed radiology reports for the last 24 hours:    Imaging Results (Last 24 Hours)       ** No results found for the last 24 hours. **          Final impressions for the last 30 days of radiology reports:    XR CLAVICLE LEFT    Result Date: 1/5/2024  No acute fracture. Images reviewed, interpreted, and dictated by Dr. DARIUS Farias. Transcribed by Chapincito Tyler PA-C.    XR Pelvis 1 or 2 View    Result Date: 1/1/2024  Surgical changes " of left hip arthroplasty without hardware complication. Images reviewed, interpreted, and dictated by Dr. Jez Hallman. Transcribed by Janette Salinas PA-C.    XR femur left    Result Date: 12/31/2023  No additional abnormality. Images reviewed, interpreted, and dictated by Dr. Mark Sal. Transcribed by Gamaliel Light.    XR Chest 1 View    Result Date: 12/31/2023  Cardiomegaly with mild pulmonary edema.    XR hip 2 views left    Result Date: 12/31/2023  Acute left hip fracture as above. Irregularity of the right pubic body. Consider CT for further evaluation. Images reviewed, interpreted, and dictated by Dr. Jez Hallman. Transcribed by Chapincito Tyler PA-C.    CT Head Without Contrast    Result Date: 12/31/2023  No acute intracranial abnormality. CRITICAL RESULT: No. COMMUNICATION: Per this written report. Images personally reviewed, interpreted, and dictated by SANTHOSH Perera.   I have personally looked at the radiology images and read the final radiology report.    Assessment & Plan    Status post left hip fracture with left hip arthroplasty--patient requiring contact-guard assistance for transfers.  Ambulated 80 feet x 2 with front wheel walker and contact-guard yesterday.  Requiring minimum assist for upper body dressing; dependent for lower body dressing; minimum assist for grooming; dependent for toileting.  Continue pain management.  Have held Suboxone while treating acute pain.    Left clavicle pain/left knee pain will obtain x-rays at this time.    Cirrhosis we did hold Lasix yesterday.  Will continue to monitor.  Continue lactulose.    COPD continue Symbicort    GERD continue Protonix    History of esophageal varices secondary to cirrhosis    Pancytopenia likely secondary to cirrhosis    Anxiety neurosis 0.5 mg 4 times daily as needed for anxiety    Opioid use disorder--have held Suboxone while treating hip pain    VTE Prophylaxis:   Mechanical Order History:        Ordered        01/09/24 1826   Place Sequential Compression Device  Once            01/09/24 1648  Maintain Sequential Compression Device  Continuous                          Pharmalogical Order History:       None                Sylvester Tamayo MD  [facility]  01/13/24  11:19 EST

## 2024-01-14 PROCEDURE — 94799 UNLISTED PULMONARY SVC/PX: CPT

## 2024-01-14 PROCEDURE — 94664 DEMO&/EVAL PT USE INHALER: CPT

## 2024-01-14 PROCEDURE — 99231 SBSQ HOSP IP/OBS SF/LOW 25: CPT | Performed by: FAMILY MEDICINE

## 2024-01-14 PROCEDURE — 73560 X-RAY EXAM OF KNEE 1 OR 2: CPT | Performed by: RADIOLOGY

## 2024-01-14 PROCEDURE — 63710000001 ONDANSETRON PER 8 MG: Performed by: FAMILY MEDICINE

## 2024-01-14 PROCEDURE — 73000 X-RAY EXAM OF COLLAR BONE: CPT | Performed by: RADIOLOGY

## 2024-01-14 RX ORDER — FUROSEMIDE 20 MG/1
20 TABLET ORAL ONCE
Status: DISCONTINUED | OUTPATIENT
Start: 2024-01-14 | End: 2024-01-16

## 2024-01-14 RX ORDER — BUDESONIDE AND FORMOTEROL FUMARATE DIHYDRATE 160; 4.5 UG/1; UG/1
2 AEROSOL RESPIRATORY (INHALATION) 2 TIMES DAILY PRN
Status: DISCONTINUED | OUTPATIENT
Start: 2024-01-14 | End: 2024-01-20

## 2024-01-14 RX ORDER — FUROSEMIDE 20 MG/1
20 TABLET ORAL ONCE
Status: DISCONTINUED | OUTPATIENT
Start: 2024-01-14 | End: 2024-01-14

## 2024-01-14 RX ADMIN — LACTULOSE 10 G: 20 SOLUTION ORAL at 09:54

## 2024-01-14 RX ADMIN — OXYCODONE HYDROCHLORIDE 10 MG: 10 TABLET ORAL at 00:37

## 2024-01-14 RX ADMIN — OXYCODONE HYDROCHLORIDE 10 MG: 10 TABLET ORAL at 18:44

## 2024-01-14 RX ADMIN — ONDANSETRON HYDROCHLORIDE 8 MG: 4 TABLET, FILM COATED ORAL at 00:29

## 2024-01-14 RX ADMIN — ALPRAZOLAM 0.5 MG: 0.5 TABLET ORAL at 18:45

## 2024-01-14 RX ADMIN — ONDANSETRON HYDROCHLORIDE 8 MG: 4 TABLET, FILM COATED ORAL at 06:27

## 2024-01-14 RX ADMIN — IPRATROPIUM BROMIDE AND ALBUTEROL SULFATE 3 ML: 2.5; .5 SOLUTION RESPIRATORY (INHALATION) at 18:56

## 2024-01-14 RX ADMIN — ALPRAZOLAM 0.5 MG: 0.5 TABLET ORAL at 12:53

## 2024-01-14 RX ADMIN — POTASSIUM CHLORIDE 20 MEQ: 20 TABLET, EXTENDED RELEASE ORAL at 09:54

## 2024-01-14 RX ADMIN — ACETAMINOPHEN 650 MG: 325 TABLET ORAL at 06:32

## 2024-01-14 RX ADMIN — ALPRAZOLAM 0.5 MG: 0.5 TABLET ORAL at 06:27

## 2024-01-14 RX ADMIN — OXYCODONE HYDROCHLORIDE 10 MG: 10 TABLET ORAL at 09:55

## 2024-01-14 RX ADMIN — ONDANSETRON HYDROCHLORIDE 8 MG: 4 TABLET, FILM COATED ORAL at 12:54

## 2024-01-14 RX ADMIN — POLYETHYLENE GLYCOL (3350) 17 G: 17 POWDER, FOR SOLUTION ORAL at 09:54

## 2024-01-14 RX ADMIN — OXYCODONE HYDROCHLORIDE 10 MG: 10 TABLET ORAL at 05:21

## 2024-01-14 RX ADMIN — OXYCODONE HYDROCHLORIDE 10 MG: 10 TABLET ORAL at 14:52

## 2024-01-14 RX ADMIN — ALPRAZOLAM 0.5 MG: 0.5 TABLET ORAL at 00:29

## 2024-01-14 RX ADMIN — ONDANSETRON HYDROCHLORIDE 8 MG: 4 TABLET, FILM COATED ORAL at 18:44

## 2024-01-14 RX ADMIN — Medication 1 TABLET: at 09:54

## 2024-01-14 RX ADMIN — ACETAMINOPHEN 650 MG: 325 TABLET ORAL at 12:53

## 2024-01-14 RX ADMIN — Medication 5000 UNITS: at 09:54

## 2024-01-14 RX ADMIN — PANTOPRAZOLE SODIUM 40 MG: 40 TABLET, DELAYED RELEASE ORAL at 05:21

## 2024-01-14 NOTE — PROGRESS NOTES
[facility]  PROGRESS NOTE     Patient Identification:  Name:  Phuong Tuttle  Age:  63 y.o.  Sex:  female  :  1960  MRN:  [id number]  Visit Number:  [id number]  ROOM: [facility]     Primary Care Provider:  Grace Keith     Length of stay in inpatient status:  5    Subjective     Chief Compliant:  No chief complaint on file.      History of Presenting Illness: 63-year-old female who is status post left hip fracture with left hip arthroplasty.  Patient complaining of some left clavicle pain left knee pain.  Patient states that quite sore but no new complaints otherwise.    Objective     Current Hospital Meds:[Held by provider] furosemide, 20 mg, Oral, Daily  lactulose, 10 g, Oral, Every Other Day  montelukast, 10 mg, Oral, Nightly  multivitamin, 1 tablet, Oral, Daily  pantoprazole, 40 mg, Oral, Q AM  polyethylene glycol, 17 g, Oral, Daily  potassium chloride, 20 mEq, Oral, Daily  vitamin D3, 5,000 Units, Oral, Daily       ----------------------------------------------------------------------------------------------------------------------  Vital Signs:  Temp:  [98.1 °F (36.7 °C)] 98.1 °F (36.7 °C)  Heart Rate:  [75-85] 75  Resp:  [18] 18  BP: (111)/(71) 111/71  SpO2:  [95 %-99 %] 95 %  on   ;   Device (Oxygen Therapy): room air  Body mass index is 25.82 kg/m².    Wt Readings from Last 3 Encounters:   24 72.6 kg (160 lb)   23 80.3 kg (177 lb)   21 95.7 kg (211 lb)     Intake & Output (last 3 days)          0713 07 0700  0701  01/15 0700    P.O. 1080 1440 1560     Total Intake(mL/kg) 1080 (14.9) 1440 (19.8) 1560 (21.5)     Urine (mL/kg/hr) 1525 (0.9) 400 (0.2) 900 (0.5)     Stool 0       Total Output 1525 400 900     Net -445 +1040 +660             Urine Unmeasured Occurrence 1 x 5 x 2 x     Stool Unmeasured Occurrence 1 x 1 x 1 x           Diet: Regular/House Diet; Texture: Regular Texture (IDDSI 7); Fluid  Consistency: Thin (IDDSI 0)  ----------------------------------------------------------------------------------------------------------------------  Physical exam:  Constitutional: No acute distress  HEENT: Normocephalic atraumatic  Neck: Supple  Cardiovascular: Regular rate and rhythm  Pulmonary/Chest: Clear to auscultation  Abdominal:  .  Positive bowel sounds soft  Musculoskeletal: Status post left hip repair--incision site no erythema noted no drainage small amount of crusting at the staple site.  Patient has some tenderness at the sternoclavicular joint on the left.  No obvious disfigurement noted.  Neurological: No focal deficits  Skin: No rash  Peripheral vascular: No edema  Genitourinary:  ----------------------------------------------------------------------------------------------------------------------    Last echocardiogram:  Results for orders placed during the hospital encounter of 05/16/19    Adult Transthoracic Echo Complete W/ Cont if Necessary Per Protocol    Interpretation Summary  · Normal left ventricular cavity size and wall thickness noted.  · Left ventricular systolic function is normal. Estimated EF appears to be in the range of 61 - 65%.  · Left ventricular diastolic function is normal.  · Mild tricuspid valve regurgitation is present.  · . No evidence of pulmonary hypertension  · No significant valvular heart disease  · There is no evidence of pericardial effusion.    ----------------------------------------------------------------------------------------------------------------------  Results from last 7 days   Lab Units 01/10/24  0012   WBC 10*3/mm3 2.33*   HEMOGLOBIN g/dL 8.0*   HEMATOCRIT % 25.4*   MCV fL 93.0   MCHC g/dL 31.5   PLATELETS 10*3/mm3 84*         Results from last 7 days   Lab Units 01/13/24  0035 01/12/24  0027 01/11/24  1857 01/10/24  0012   SODIUM mmol/L 139 140 138 139   POTASSIUM mmol/L 4.2 4.2 4.2 3.9   MAGNESIUM mg/dL 2.0 1.9  --  1.8   CHLORIDE mmol/L 104 106 103  "106   CO2 mmol/L 27.1 28.4 30.4* 26.2   BUN mg/dL 18 19 17 16   CREATININE mg/dL 1.09* 1.10* 1.30* 0.97   CALCIUM mg/dL 8.1* 8.1* 8.3* 8.0*   IONIZED CALCIUM mmol/L 1.13 1.12 1.11*  --    GLUCOSE mg/dL 126* 109* 88 116*   ALBUMIN g/dL 2.9* 2.6* 3.1* 2.6*   BILIRUBIN mg/dL 0.7 0.8 0.9 0.6   ALK PHOS U/L 133* 108 128* 102   AST (SGOT) U/L 42* 33* 42* 25   ALT (SGPT) U/L 23 17 20 13   Estimated Creatinine Clearance: 55.3 mL/min (A) (by C-G formula based on SCr of 1.09 mg/dL (H)).  No results found for: \"AMMONIA\"              No results found for: \"HGBA1C\", \"POCGLU\"  Lab Results   Component Value Date    TSH 9.880 (H) 01/10/2024     No results found for: \"PREGTESTUR\", \"PREGSERUM\", \"HCG\", \"HCGQUANT\"  Pain Management Panel  More data may exist         Latest Ref Rng & Units 4/27/2020 10/29/2018   Pain Management Panel   Amphetamine, Urine Qual Negative - Negative    Barbiturates Screen, Urine Negative Negative  Negative    Benzodiazepine Screen, Urine Negative Positive  Positive    Buprenorphine, Screen, Urine Negative - Positive    Cocaine Screen, Urine Negative Negative  Negative    Methadone Screen , Urine Negative Negative  Negative      Brief Urine Lab Results       None          No results found for: \"BLOODCX\"      No results found for: \"URINECX\"  No results found for: \"WOUNDCX\"  No results found for: \"STOOLCX\"        I have personally looked at the labs and they are summarized above.  ----------------------------------------------------------------------------------------------------------------------  Detailed radiology reports for the last 24 hours:    Imaging Results (Last 24 Hours)       Procedure Component Value Units Date/Time    XR Clavicle Left [[id number]] Collected: 01/14/24 0927     Updated: 01/14/24 0929    Narrative:      EXAM:    XR Left Clavicle Complete, 2 or More Views     EXAM DATE:    1/13/2024 4:46 PM     CLINICAL HISTORY:    left clavicle pain     TECHNIQUE:    Frontal and lordotic views of the " left clavicle.     COMPARISON:    No relevant prior studies available.     FINDINGS:    BONES/JOINTS:  Degenerative changes are noted of the acromioclavicular  joint.  No acute fracture.  No dislocation.    SOFT TISSUES:  Unremarkable as visualized.       Impression:        No acute findings in the left clavicle.        This report was finalized on 1/14/2024 9:27 AM by Dr. Can Benjamin MD.       XR Knee 1 or 2 View Left [[id number]] Collected: 01/14/24 0926     Updated: 01/14/24 0928    Narrative:      EXAM:    XR Left Knee, 1 or 2 Views     EXAM DATE:    1/13/2024 4:47 PM     CLINICAL HISTORY:    left knee pain     TECHNIQUE:    Frontal and/or lateral views of the left knee.     COMPARISON:    No relevant prior studies available.     FINDINGS:    BONES/JOINTS:  Degenerative change of the knee.  No acute fracture.   No dislocation.    SOFT TISSUES:  Unremarkable as visualized.       Impression:        Degenerative change of the knee.        This report was finalized on 1/14/2024 9:26 AM by Dr. Can Benjamin MD.             Final impressions for the last 30 days of radiology reports:    XR Clavicle Left    Result Date: 1/14/2024    No acute findings in the left clavicle.   This report was finalized on 1/14/2024 9:27 AM by Dr. Can Benjamin MD.      XR Knee 1 or 2 View Left    Result Date: 1/14/2024    Degenerative change of the knee.   This report was finalized on 1/14/2024 9:26 AM by Dr. Can Benjamin MD.      XR CLAVICLE LEFT    Result Date: 1/5/2024  No acute fracture. Images reviewed, interpreted, and dictated by Dr. DARIUS Farias. Transcribed by Chapincito Tyler PA-C.    XR Pelvis 1 or 2 View    Result Date: 1/1/2024  Surgical changes of left hip arthroplasty without hardware complication. Images reviewed, interpreted, and dictated by Dr. Jez Hallman. Transcribed by Janette Salinas PA-C.    XR femur left    Result Date: 12/31/2023  No additional abnormality. Images reviewed, interpreted, and dictated by  Dr. Mark Sal. Transcribed by Gamaliel Light.    XR Chest 1 View    Result Date: 12/31/2023  Cardiomegaly with mild pulmonary edema.    XR hip 2 views left    Result Date: 12/31/2023  Acute left hip fracture as above. Irregularity of the right pubic body. Consider CT for further evaluation. Images reviewed, interpreted, and dictated by Dr. Jez Hallman. Transcribed by Chapincito Tyler PA-C.    CT Head Without Contrast    Result Date: 12/31/2023  No acute intracranial abnormality. CRITICAL RESULT: No. COMMUNICATION: Per this written report. Images personally reviewed, interpreted, and dictated by SANTHOSH Perera.   I have personally looked at the radiology images and read the final radiology report.    Assessment & Plan    Status post left hip fracture with left hip arthroplasty--requiring set up for upper body dressing; maximum assist for lower body dressing; minimum assist for grooming; maximum assist for toileting; minimum assist for bed to chair transfer; contact-guard for sit to stand and stand to sit transfers.  Patient ambulated 60 feet and 100 feet with front wheel walker and contact-guard.  Patient is requesting orthopedic follow-up for review of her wound site.  Wound site it looks reasonably good to me at this time there is some a little bit of swelling at the wound site but otherwise no acute abnormalities.  Will place consult for tomorrow morning    Cirrhosis--will give 1 dose of Lasix today.  Continue lactulose    Left clavicle pain/left knee pain--do not appreciate any abnormalities of the clavicle.  Patient apparently has some mild degenerative changes on x-ray    COPD continue Symbicort    GERD continue Protonix    History of esophageal varices no evidence of bleeding likely secondary to portal hypertension    Pancytopenia likely secondary to cirrhosis    Anxiety neurosis Xanax 0.5 mg 4 times daily as needed for anxiety    Opioid use disorder have held Suboxone while treating hip pain.    VTE  Prophylaxis:   Mechanical Order History:        Ordered        01/09/24 1648  Place Sequential Compression Device  Once            01/09/24 1648  Maintain Sequential Compression Device  Continuous                          Pharmalogical Order History:       None                Sylvester Tamayo MD  [facility]  01/14/24  11:02 EST

## 2024-01-14 NOTE — PLAN OF CARE
Goal Outcome Evaluation:           Progress: no change  Outcome Evaluation: monitor

## 2024-01-15 VITALS
DIASTOLIC BLOOD PRESSURE: 63 MMHG | OXYGEN SATURATION: 99 % | WEIGHT: 160 LBS | RESPIRATION RATE: 18 BRPM | HEIGHT: 66 IN | HEART RATE: 80 BPM | SYSTOLIC BLOOD PRESSURE: 97 MMHG | TEMPERATURE: 98.5 F | BODY MASS INDEX: 25.71 KG/M2

## 2024-01-15 PROCEDURE — 97530 THERAPEUTIC ACTIVITIES: CPT

## 2024-01-15 PROCEDURE — 99232 SBSQ HOSP IP/OBS MODERATE 35: CPT | Performed by: INTERNAL MEDICINE

## 2024-01-15 PROCEDURE — 97110 THERAPEUTIC EXERCISES: CPT

## 2024-01-15 PROCEDURE — 97535 SELF CARE MNGMENT TRAINING: CPT

## 2024-01-15 PROCEDURE — 94799 UNLISTED PULMONARY SVC/PX: CPT

## 2024-01-15 PROCEDURE — 94760 N-INVAS EAR/PLS OXIMETRY 1: CPT

## 2024-01-15 PROCEDURE — 63710000001 ONDANSETRON PER 8 MG: Performed by: FAMILY MEDICINE

## 2024-01-15 PROCEDURE — 97116 GAIT TRAINING THERAPY: CPT

## 2024-01-15 RX ADMIN — OXYCODONE HYDROCHLORIDE 10 MG: 10 TABLET ORAL at 00:51

## 2024-01-15 RX ADMIN — ONDANSETRON HYDROCHLORIDE 8 MG: 4 TABLET, FILM COATED ORAL at 12:22

## 2024-01-15 RX ADMIN — ONDANSETRON HYDROCHLORIDE 8 MG: 4 TABLET, FILM COATED ORAL at 06:20

## 2024-01-15 RX ADMIN — OXYCODONE HYDROCHLORIDE 10 MG: 10 TABLET ORAL at 10:07

## 2024-01-15 RX ADMIN — ACETAMINOPHEN 650 MG: 325 TABLET ORAL at 08:54

## 2024-01-15 RX ADMIN — ALPRAZOLAM 0.5 MG: 0.5 TABLET ORAL at 23:55

## 2024-01-15 RX ADMIN — Medication 1 TABLET: at 08:54

## 2024-01-15 RX ADMIN — ALPRAZOLAM 0.5 MG: 0.5 TABLET ORAL at 12:17

## 2024-01-15 RX ADMIN — OXYCODONE HYDROCHLORIDE 10 MG: 10 TABLET ORAL at 23:07

## 2024-01-15 RX ADMIN — OXYCODONE HYDROCHLORIDE 10 MG: 10 TABLET ORAL at 13:52

## 2024-01-15 RX ADMIN — ONDANSETRON HYDROCHLORIDE 8 MG: 4 TABLET, FILM COATED ORAL at 23:55

## 2024-01-15 RX ADMIN — ONDANSETRON HYDROCHLORIDE 8 MG: 4 TABLET, FILM COATED ORAL at 00:51

## 2024-01-15 RX ADMIN — ALPRAZOLAM 0.5 MG: 0.5 TABLET ORAL at 06:20

## 2024-01-15 RX ADMIN — OXYCODONE HYDROCHLORIDE 10 MG: 10 TABLET ORAL at 18:01

## 2024-01-15 RX ADMIN — PANTOPRAZOLE SODIUM 40 MG: 40 TABLET, DELAYED RELEASE ORAL at 06:01

## 2024-01-15 RX ADMIN — ALPRAZOLAM 0.5 MG: 0.5 TABLET ORAL at 18:02

## 2024-01-15 RX ADMIN — POTASSIUM CHLORIDE 20 MEQ: 20 TABLET, EXTENDED RELEASE ORAL at 08:54

## 2024-01-15 RX ADMIN — OXYCODONE HYDROCHLORIDE 10 MG: 10 TABLET ORAL at 06:00

## 2024-01-15 RX ADMIN — Medication 5000 UNITS: at 08:54

## 2024-01-15 RX ADMIN — ALPRAZOLAM 0.5 MG: 0.5 TABLET ORAL at 00:51

## 2024-01-15 RX ADMIN — ONDANSETRON HYDROCHLORIDE 8 MG: 4 TABLET, FILM COATED ORAL at 18:02

## 2024-01-15 NOTE — NURSING NOTE
Patient insisted on having a pure wick placed. When the suction was turned on it had been turn up as far as the knob would go. Instructed patient it was against policy to turn the suction up to a high level due to possible tissue injury. She continued to insisted but again restated that we could not due to possible injury.

## 2024-01-15 NOTE — THERAPY TREATMENT NOTE
Inpatient Rehabilitation - Physical Therapy Treatment Note       ALBERTA Herrera     Patient Name: Phuong Tuttle  : 1960  MRN: [id number]    Today's Date: 1/15/2024                    Admit Date: 2024      Visit Dx:   No diagnosis found.    Patient Active Problem List   Diagnosis    COPD (chronic obstructive pulmonary disease)    Current smoker    Iron deficiency anemia, unspecified    Chest pain    End stage liver disease    Obesity (BMI 30-39.9)    Portal hypertension    Hepatitis C    History of substance abuse    Esophageal varices    Splenomegaly    Chronic kidney disease (CKD), stage III (moderate)    Leukopenia    Splenic pancytopenia syndrome    Iron deficiency anemia    Iron malabsorption    S/p left hip fracture       Past Medical History:   Diagnosis Date    Chronic kidney disease (CKD), stage III (moderate) 2020    COPD (chronic obstructive pulmonary disease)     non-oxygen dependent    End stage liver disease 2020    Esophageal varices     GI bleed     history of GI bleed     Hepatitis C 2020    History of substance abuse 2020    Immunocompromised patient     Peptic ulceration     Portal hypertension 2020    Splenomegaly 2020       Past Surgical History:   Procedure Laterality Date    ESOPHAGEAL VARICES LIGATION      UPPER GASTROINTESTINAL ENDOSCOPY         PT ASSESSMENT (last 12 hours)       IRF PT Evaluation and Treatment       Row Name 01/15/24 1507          PT Time and Intention    Document Type daily treatment  -LL     Mode of Treatment individual therapy;physical therapy  -LL     Patient/Family/Caregiver Comments/Observations Patient had no new c/o this date and was agreeable to PT participation.  -LL       Row Name 01/15/24 1507          General Information    Existing Precautions/Restrictions fall;left;hip  -LL       Row Name 01/15/24 1507          Pain Scale: FACES Pre/Post-Treatment    Pain: FACES Scale, Pretreatment 4-->hurts little more  -LL      Posttreatment Pain Rating 4-->hurts little more  -LL       Row Name 01/15/24 1507          Cognition/Psychosocial    Affect/Mental Status (Cognition) WFL  -LL     Orientation Status (Cognition) oriented x 3  -LL     Follows Commands (Cognition) WFL;verbal cues/prompting required;physical/tactile prompts required  -LL     Personal Safety Interventions fall prevention program maintained;gait belt;nonskid shoes/slippers when out of bed;supervised activity  -LL     Cognitive Function WFL  -LL       Row Name 01/15/24 1507          Mobility    Left Lower Extremity (Weight-bearing Status) weight-bearing as tolerated (WBAT)  -LL     Right Lower Extremity (Weight-bearing Status) weight-bearing as tolerated (WBAT)  -LL       Row Name 01/15/24 1507          Bed Mobility    Comment, (Bed Mobility) [facility]IC  -       Row Name 01/15/24 1507          Sit-Stand Transfer    Sit-Stand [place] (Transfers) contact guard;verbal cues;nonverbal cues (demo/gesture);minimum assist (75% patient effort)  -     Assistive Device (Sit-Stand Transfers) wheelchair;walker, front-wheeled  -       Row Name 01/15/24 1507          Stand-Sit Transfer    Stand-Sit [place] (Transfers) contact guard;verbal cues;nonverbal cues (demo/gesture);minimum assist (75% patient effort)  -     Assistive Device (Stand-Sit Transfers) wheelchair;walker, front-wheeled  -[facility] Name 01/15/24 1507          Gait/Stairs (Locomotion)    [place] Level (Gait) contact guard;verbal cues;nonverbal cues (demo/gesture)  -     Assistive Device (Gait) walker, front-wheeled  -     Distance in Feet (Gait) 160' x 2  -LL     Deviations/Abnormal Patterns (Gait) antalgic;claire decreased;gait speed decreased;stride length decreased;weight shifting decreased  -LL     Bilateral Gait Deviations forward flexed posture;heel strike decreased  -LL       Row Name 01/15/24 1507          Hip (Therapeutic Exercise)    Hip Strengthening (Therapeutic Exercise)  bilateral;flexion;extension;aBduction;aDduction;marching while seated;marching while standing;sitting;standing;resistance band;green  1# w/ sitting ex's  -LL       Row Name 01/15/24 1507          Knee (Therapeutic Exercise)    Knee Strengthening (Therapeutic Exercise) bilateral;flexion;extension;marching while seated;LAQ (long arc quad);hamstring curls;sitting;standing;resistance band;green  1# w/ sitting ex's  -LL       Row Name 01/15/24 1507          Ankle (Therapeutic Exercise)    Ankle Strengthening (Therapeutic Exercise) bilateral;dorsiflexion;plantarflexion;sitting;standing  1# w/ sitting ex's  -LL       Row Name 01/15/24 1507          Positioning and Restraints    Pre-Treatment Position --  WC  -LL     Post Treatment Position wheelchair  -LL     In Wheelchair sitting;call light within reach;encouraged to call for assist;legs elevated;notified nsg  In room with tray table in front of her  -LL       Row Name 01/15/24 1503          Therapy Assessment/Plan (PT)    Patient's Goals For Discharge return home  -       Row Name 01/15/24 1500          Therapy Assessment/Plan (PT)    Rehab Potential/Prognosis (PT) adequate, monitor progress closely  -     Frequency of Treatment (PT) 5 times per week  -LL     Estimated Duration of Therapy (PT) 2 weeks  -LL     Problem List (PT) balance;mobility;range of motion (ROM);strength;pain  -LL     Activity Limitations Related to Problem List (PT) unable to ambulate safely;unable to transfer safely  -       Row Name 01/15/24 1508          Therapy Plan Review/Discharge Plan (PT)    Anticipated Equipment Needs at Discharge (PT Eval) --  tbd  -LL     Anticipated Discharge Disposition (PT) home;home with home health  -       Row Name 01/15/24 1509          IRF PT Goals    Bed Mobility Goal Selection (PT-IRF) bed mobility, PT goal 1  -LL     Transfer Goal Selection (PT-IRF) transfers, PT goal 1  -LL     Gait (Walking Locomotion) Goal Selection (PT-IRF) gait, PT goal 1  -LL        Row Name 01/15/24 1507          Bed Mobility Goal 1 (PT-IRF)    Activity/Assistive Device (Bed Mobility Goal 1, PT-IRF) sit to supine/supine to sit  -LL     [place] Level (Bed Mobility Goal 1, PT-IRF) modified independence  -LL     Time Frame (Bed Mobility Goal 1, PT-IRF) by discharge  -LL       Row Name 01/15/24 1507          Transfer Goal 1 (PT-IRF)    Activity/Assistive Device (Transfer Goal 1, PT-IRF) sit-to-stand/stand-to-sit;bed-to-chair/chair-to-bed  -LL     [place] Level (Transfer Goal 1, PT-IRF) modified independence  -LL     Time Frame (Transfer Goal 1, PT-IRF) by discharge  -LL       Row Name 01/15/24 1507          Gait/Walking Locomotion Goal 1 (PT-IRF)    Activity/Assistive Device (Gait/Walking Locomotion Goal 1, PT-IRF) walker, rolling  -LL     Gait/Walking Locomotion Distance Goal 1 (PT-IRF) 300'  -LL     [place] Level (Gait/Walking Locomotion Goal 1, PT-IRF) modified independence  -LL     Time Frame (Gait/Walking Locomotion Goal 1, PT-IRF) by discharge  -LL               User Key  (r) = Recorded By, (t) = Taken By, (c) = Cosigned By      Initials Name Provider Type    LL Juliana Echeverria PTA Physical Therapist Assistant                  Wound 01/09/24 1655 Left anterior hip Incision (Active)   Dressing Appearance dry;intact 01/15/24 0854   Closure Unable to assess 01/14/24 2130   Base pink;red 01/15/24 0854   Periwound dry;intact 01/15/24 0854   Edges rolled/closed 01/15/24 0854   Drainage Amount none 01/15/24 0854   Care, Wound other (see comments) 01/14/24 2130   Dressing Care other (see comments) 01/14/24 2130     Physical Therapy Education       Title: PT OT SLP Therapies (Done)       Topic: Physical Therapy (Done)       Point: Mobility training (Done)       Learning Progress Summary             Patient Acceptance, E,D, VU,NR by  at 1/15/2024 1511    Acceptance, E,TB, VU,DU by  at 1/13/2024 1154    Acceptance, E,D, VU,NR by  at 1/12/2024 1356    Acceptance, E, VU,NR by  LB at 1/11/2024 1547                         Point: Home exercise program (Done)       Learning Progress Summary             Patient Acceptance, E,D, VU,NR by  at 1/15/2024 1511    Acceptance, E,TB, VU,DU by HC at 1/13/2024 1154    Acceptance, E,D, VU,NR by LL at 1/12/2024 1356    Acceptance, E, VU,NR by LB at 1/11/2024 1547                         Point: Body mechanics (Done)       Learning Progress Summary             Patient Acceptance, E,D, VU,NR by LL at 1/15/2024 1511    Acceptance, E,TB, VU,DU by HC at 1/13/2024 1154    Acceptance, E,D, VU,NR by LL at 1/12/2024 1356    Acceptance, E, VU,NR by LB at 1/11/2024 1547                         Point: Precautions (Done)       Learning Progress Summary             Patient Acceptance, E,D, VU,NR by  at 1/15/2024 1511    Acceptance, E,TB, VU,DU by HC at 1/13/2024 1154    Acceptance, E,D, VU,NR by LL at 1/12/2024 1356    Acceptance, E, VU,NR by LB at 1/11/2024 1547                                         User Key       Initials Effective Dates Name Provider Type Discipline     06/16/21 -  Caryn Rosen, PT Physical Therapist PT     05/02/16 -  Juliana Echeverria PTA Physical Therapist Assistant PT     01/20/23 -  Cecilia Laureano PTA Physical Therapist Assistant PT                    PT Recommendation and Plan    Frequency of Treatment (PT): 5 times per week  Anticipated Equipment Needs at Discharge (PT Eval):  (tbd)                  Time Calculation:      PT Charges       Row Name 01/15/24 1511             Time Calculation    Start Time 1000  -LL      Stop Time 1130  -      Time Calculation (min) 90 min  -LL      PT Received On 01/15/24  -         Time Calculation- PT    Total Timed Code Minutes- PT 90 minute(s)  -                User Key  (r) = Recorded By, (t) = Taken By, (c) = Cosigned By      Initials Name Provider Type    LL Juliana Echeverria, ROSEMARIE Physical Therapist Assistant                    Therapy Charges for Today       Code  Description Service Date Service Provider Modifiers Qty    53458090786 HC GAIT TRAINING EA 15 MIN 1/15/2024 Juliana Echeverria, ROSEMARIE GP, CQ 2    83284608089 HC PT THERAPEUTIC ACT EA 15 MIN 1/15/2024 Juliana Echeverria PTA GP, CQ 1    25691605235 HC PT THER PROC EA 15 MIN 1/15/2024 Juliana Echeverria PTA GP, CQ 3              PT G-Codes  AM-PAC 6 Clicks Score (PT): 18      Juliana. ROSEMARIE Echeverria  1/15/2024

## 2024-01-15 NOTE — PROGRESS NOTES
Assisted By: Latoya DOE    CC: Follow-up on left hip fracture    Interview History/HPI: Patient is still having some left hip pain but tolerable discomfort at this time on current analgesia.  Patient has no other acute complaints.  Apparently she has been complaining she is also however of some pain that appears musculoskeletal all around her sternoclavicular joint.  X-ray of the clavicle yesterday was negative.          Current Hospital Meds:  furosemide, 20 mg, Oral, Once  lactulose, 10 g, Oral, Every Other Day  montelukast, 10 mg, Oral, Nightly  multivitamin, 1 tablet, Oral, Daily  pantoprazole, 40 mg, Oral, Q AM  polyethylene glycol, 17 g, Oral, Daily  potassium chloride, 20 mEq, Oral, Daily  vitamin D3, 5,000 Units, Oral, Daily         Vitals:    01/15/24 0745   BP: 109/53   Pulse: 94   Resp: 18   Temp: 98.2 °F (36.8 °C)   SpO2: 97%         Intake/Output Summary (Last 24 hours) at 1/15/2024 1506  Last data filed at 1/15/2024 1300  Gross per 24 hour   Intake 1320 ml   Output 850 ml   Net 470 ml       EXAM: Pleasant and in no distress, mood is good upper extremity strength is symmetric, examination of the wound, wound is well-approximated, sutures in place there is no erythema no drainage noted.  Lungs are clear, heart regular rate and rhythm, abdomen soft benign, no extremity edema is noted.      Diet: Regular/House Diet; Texture: Regular Texture (IDDSI 7); Fluid Consistency: Thin (IDDSI 0)        LABS:     Lab Results (last 48 hours)       ** No results found for the last 48 hours. **          1/13, creatinine 1.09 albumin 2.9, AST 42 ALT normal total bili normal, magnesium 2.  Last CBC done 1/10 platelets 84, hemoglobin 8, white count 2.33.  She appears to run chronically pancytopenic.      Radiology:    Imaging Results (Last 72 Hours)       Procedure Component Value Units Date/Time    XR Clavicle Left [[id number]] Collected: 01/14/24 0927     Updated: 01/14/24 0929    Narrative:      EXAM:    XR Left  Clavicle Complete, 2 or More Views     EXAM DATE:    1/13/2024 4:46 PM     CLINICAL HISTORY:    left clavicle pain     TECHNIQUE:    Frontal and lordotic views of the left clavicle.     COMPARISON:    No relevant prior studies available.     FINDINGS:    BONES/JOINTS:  Degenerative changes are noted of the acromioclavicular  joint.  No acute fracture.  No dislocation.    SOFT TISSUES:  Unremarkable as visualized.       Impression:        No acute findings in the left clavicle.        This report was finalized on 1/14/2024 9:27 AM by Dr. Can Benjamin MD.       XR Knee 1 or 2 View Left [[id number]] Collected: 01/14/24 0926     Updated: 01/14/24 0928    Narrative:      EXAM:    XR Left Knee, 1 or 2 Views     EXAM DATE:    1/13/2024 4:47 PM     CLINICAL HISTORY:    left knee pain     TECHNIQUE:    Frontal and/or lateral views of the left knee.     COMPARISON:    No relevant prior studies available.     FINDINGS:    BONES/JOINTS:  Degenerative change of the knee.  No acute fracture.   No dislocation.    SOFT TISSUES:  Unremarkable as visualized.       Impression:        Degenerative change of the knee.        This report was finalized on 1/14/2024 9:26 AM by Dr. Can Benjamin MD.               Results for orders placed during the hospital encounter of 05/16/19    Adult Transthoracic Echo Complete W/ Cont if Necessary Per Protocol    Interpretation Summary  · Normal left ventricular cavity size and wall thickness noted.  · Left ventricular systolic function is normal. Estimated EF appears to be in the range of 61 - 65%.  · Left ventricular diastolic function is normal.  · Mild tricuspid valve regurgitation is present.  · . No evidence of pulmonary hypertension  · No significant valvular heart disease  · There is no evidence of pericardial effusion.      Assessment/Plan:   Status post left hip fracture from mechanical fall at home.  Status post total hip arthroplasty.  Dr. Tamayo had consulted orthopedics to reevaluate the  wound as patient was asking for orthopedic evaluation wound looks good to my exam.  Patient is undergoing occupational and physical therapy.  With OT, patient is max assist for lower body dressing, min assist for grooming, max assist for toileting hygiene, set up for self-feeding.  Set up for upper body dressing.  With PT, patient is contact-guard to min assist sit to stand and stand to sit.  Patient walked 60 then 100 feet contact-guard front wheeled walker.  Continue current dose for oxycodone 10 mg every 4 hours as needed.  Patient was on Suboxone however at home.    Left clavicular pain imaging negative, continue to monitor.  Of note left clavicle x-ray was also done 1/5 at [facility] suggesting this is a more longer-term pain.    Cirrhosis and associated pancytopenia with history of esophageal varices and history of banding.  Patient is status post 2 units packed red blood cells at [facility].  Hemoglobin was 8 on January 10, I am going to recheck this in the a.m.  Patient is on a PPI.    Encephalopathy, patient is on lactulose and MiraLAX.  Patient is only having 1 bowel movement a day but with this she appears mentally clear, follow-up    DVT prophylaxis, due to the anemia and requirement for transfusion patient is currently on SCUDs    Chronic Lasix and potassium use, recheck electrolytes in the a.m.        Servando Nova MD

## 2024-01-15 NOTE — NURSING NOTE
Heidi from surgery returned call for Dr Salcedo. He said he saw patient everyday last week and he has 4 or 5 cases today he will not be able to see her today.

## 2024-01-15 NOTE — PROGRESS NOTES
[facility] CM[facility] Coordinator  Inpatient Rehabilitation Admission    Ethnic Group:  Marital Status:    IRF Admission Date:  01/09/2024  Admission Class:  Admit From:    Pre-Hospital Living:    Payment Sources:  Impairment Group:  Date of Onset of Impairment:    Etiologic Diagnosis Code(s):  Rank Code      Description  1    S72.002A  Fracture of unspecified part of neck of left                 femur, initial encounter for closed fracture    Comorbidities:  Rank Code      Description    1    D61.818   Other pancytopenia  2    K72.10    Chronic hepatic failure without coma  3    I13.0     Hypertensive heart and chronic kidney disease                 with heart failure and stage 1 through stage 4                 chronic kidney disease, or unspecified chronic                 kidney disease  4    I50.9     Heart failure, unspecified  5    J44.9     Chronic obstructive pulmonary disease,                 unspecified  6    K74.60    Unspecified cirrhosis of liver  7    Z96.642   Presence of left artificial hip joint  8    F41.1     Generalized anxiety disorder  9    F17.210   Nicotine dependence, cigarettes, uncomplicated  10   B19.20    Unspecified viral hepatitis C without hepatic                 coma  11   F41.9     Anxiety disorder, unspecified  12   K21.9     Gastro-esophageal reflux disease without                 esophagitis  13   Z91.81    History of falling    Height on Admission:  Weight on Admission:    Are there any arthritis conditions recorded for Impairment Group, Etiologic  Diagnosis, or Comorbid Conditions that meet all of the regulatory requirements  for IRF classification (in 42 .29(b)(2)(x), (xi), and xii))?  No    EMILY Bladder Accidents:   - Accidents.    EMILY Bowel Accident:  -Accidents.    Signed by: Nurse Lisbet

## 2024-01-15 NOTE — THERAPY TREATMENT NOTE
Inpatient Rehabilitation - Occupational Therapy Treatment Note    ALBERTA Herrera     Patient Name: Phuong Tuttle  : 1960  MRN: [id number]    Today's Date: 1/15/2024                 Admit Date: 2024       No diagnosis found.    Patient Active Problem List   Diagnosis    COPD (chronic obstructive pulmonary disease)    Current smoker    Iron deficiency anemia, unspecified    Chest pain    End stage liver disease    Obesity (BMI 30-39.9)    Portal hypertension    Hepatitis C    History of substance abuse    Esophageal varices    Splenomegaly    Chronic kidney disease (CKD), stage III (moderate)    Leukopenia    Splenic pancytopenia syndrome    Iron deficiency anemia    Iron malabsorption    S/p left hip fracture       Past Medical History:   Diagnosis Date    Chronic kidney disease (CKD), stage III (moderate) 2020    COPD (chronic obstructive pulmonary disease)     non-oxygen dependent    End stage liver disease 2020    Esophageal varices     GI bleed     history of GI bleed     Hepatitis C 2020    History of substance abuse 2020    Immunocompromised patient     Peptic ulceration     Portal hypertension 2020    Splenomegaly 2020       Past Surgical History:   Procedure Laterality Date    ESOPHAGEAL VARICES LIGATION      UPPER GASTROINTESTINAL ENDOSCOPY               IRF OT ASSESSMENT FLOWSHEET (last 12 hours)       IRF OT Evaluation and Treatment       Row Name 01/15/24 0944          OT Time and Intention    Document Type daily treatment  -HB     Mode of Treatment individual therapy;occupational therapy  -HB     Total Minutes, Occupational Therapy 100  -HB     Patient Effort good  -HB       Row Name 01/15/24 0944          General Information    Patient Profile Reviewed yes  -HB     Patient/Family/Caregiver Comments/Observations Patient and RN okd OT this date.  -HB     General Observations of Patient Patient tolerated OT well with no adverse reactions.  -HB     Existing  Precautions/Restrictions fall;left;hip  -       Row Name 01/15/24 0944          Pain Assessment    Pretreatment Pain Rating 4/10  -     Posttreatment Pain Rating 4/10  -     Pain Location - Side/Orientation --  LLE and left clavicle  -       Row Name 01/15/24 0944          Cognition/Psychosocial    Affect/Mental Status (Cognition) WFL  -     Orientation Status (Cognition) oriented x 3  -HB     Follows Commands (Cognition) WFL;verbal cues/prompting required;physical/tactile prompts required  -     Personal Safety Interventions safety round/check completed  -       Row Name 01/15/24 0944          Bathing    [place] Level (Bathing) bathing skills;upper body;set up  -HB     Position (Bathing) supine  -       Row Name 01/15/24 0944          Upper Body Dressing    [place] Level (Upper Body Dressing) upper body dressing skills;set up assistance  -     Position (Upper Body Dressing) edge of bed sitting  -       Row Name 01/15/24 0944          Lower Body Dressing    [place] Level (Lower Body Dressing) doff;pants/bottoms;shoes/slippers;socks;maximum assist (25% patient effort)  -     Position (Lower Body Dressing) edge of bed sitting;supported standing  -[facility] Name 01/15/24 0944          Toileting    [place] Level (Toileting) adjust/manage clothing;maximum assist (25% patient effort)  ana care in bed with setup  -     Position (Toileting) supine  don /doff brief  standing  -[facility] Name 01/15/24 0944          Bed Mobility    Supine-Sit [place] (Bed Mobility) moderate assist (50% patient effort);nonverbal cues (demo/gesture);verbal cues  -[facility] Name 01/15/24 0944          Bed-Chair Transfer    Bed-Chair [place] (Transfers) minimum assist (75% patient effort);nonverbal cues (demo/gesture);verbal cues  -     Assistive Device (Bed-Chair Transfers) wheelchair;walker, front-wheeled  -       Row Name 01/15/24 0944          Sit-Stand Transfer    Sit-Stand  [place] (Transfers) contact guard;verbal cues;nonverbal cues (demo/gesture);minimum assist (75% patient effort)  -       Row Name 01/15/24 0944          Stand-Sit Transfer    Stand-Sit [place] (Transfers) contact guard;verbal cues;nonverbal cues (demo/gesture);minimum assist (75% patient effort)  -       Row Name 01/15/24 0944          Motor Skills    Motor Skills coordination;functional endurance;motor control/coordination interventions  -     Motor Control/Coordination Interventions fine motor manipulation/dexterity activities;therapeutic exercise/ROM;gross motor coordination activities  -     Therapeutic Exercise shoulder;elbow/forearm;hand;wrist  -     Additional Documentation --  PRE 2x 5 min; BUE TA to increase ADL status/ endurance; rest between tasks  -       Row Name 01/15/24 0944          Positioning and Restraints    Pre-Treatment Position in bed  -HB     Post Treatment Position wheelchair  -HB     In Wheelchair call light within reach;encouraged to call for assist  -       Row Name 01/15/24 0944          Transfer Goal 1 (OT-IRF)    Activity/Assistive Device (Transfer Goal 1, OT-IRF) all transfers  -HB     [place] Level (Transfer Goal 1, OT-IRF) contact guard required  -HB     Time Frame (Transfer Goal 1, OT-IRF) short-term goal (STG)  -HB     Progress/Outcomes (Transfer Goal 1, OT-IRF) good progress toward goal  -HB       Row Name 01/15/24 0944          Transfer Goal 2 (OT-IRF)    Activity/Assistive Device (Transfer Goal 2, OT-IRF) all transfers  -HB     [place] Level (Transfer Goal 2, OT-IRF) supervision required  -HB     Time Frame (Transfer Goal 2, OT-IRF) long-term goal (LTG)  -HB     Progress/Outcomes (Transfer Goal 2, OT-IRF) good progress toward goal  -HB       Row Name 01/15/24 0944          LB Dressing Goal 1 (OT-IRF)    Activity/Device (LB Dressing Goal 1, OT-IRF) lower body dressing  -HB     [place] (LB Dressing Goal 1, OT-IRF) moderate assist (50-74%  patient effort)  -HB     Time Frame (LB Dressing Goal 1, OT-IRF) short-term goal (STG)  -HB     Progress/Outcomes (LB Dressing Goal 1, OT-IRF) good progress toward goal  -HB       Row Name 01/15/24 0944          LB Dressing Goal 2 (OT-IRF)    Activity/Device (LB Dressing Goal 2, OT-IRF) lower body dressing  -HB     [place] (LB Dressing Goal 2, OT-IRF) minimum assist (75% or more patient effort)  -HB     Time Frame (LB Dressing Goal 2, OT-IRF) long-term goal (LTG)  -HB     Progress/Outcomes (LB Dressing Goal 2, OT-IRF) good progress toward goal  -HB       Row Name 01/15/24 0944          Toileting Goal 1 (OT-IRF)    Activity/Device (Toileting Goal 1, OT-IRF) toileting skills, all  -HB     [place] Level (Toileting Goal 1, OT-IRF) moderate assist (50-74% patient effort)  -HB     Progress/Outcomes (Toileting Goal 1, OT-IRF) good progress toward goal  -HB               User Key  (r) = Recorded By, (t) = Taken By, (c) = Cosigned By      Initials Name Effective Dates    HB Julia Quintana, OT 05/25/21 -                      Occupational Therapy Education       Title: PT OT SLP Therapies (Done)       Topic: Occupational Therapy (Done)       Point: ADL training (Done)       Description:   Instruct learner(s) on proper safety adaptation and remediation techniques during self care or transfers.   Instruct in proper use of assistive devices.                  Learning Progress Summary             Patient Acceptance, E, VU,NR by  at 1/15/2024 0951    Acceptance, E, VU,NR by  at 1/13/2024 1208    Acceptance, E, VU,NR by HB at 1/12/2024 1154    Acceptance, E, VU,NR by  at 1/11/2024 1354    Acceptance, E, VU,NR by  at 1/10/2024 1413                         Point: Home exercise program (Done)       Description:   Instruct learner(s) on appropriate technique for monitoring, assisting and/or progressing therapeutic exercises/activities.                  Learning Progress Summary             Patient Acceptance, E, VU,NR  by HB at 1/15/2024 0951    Acceptance, E, VU,NR by HB at 1/13/2024 1208    Acceptance, E, VU,NR by HB at 1/12/2024 1154    Acceptance, E, VU,NR by HB at 1/11/2024 1354    Acceptance, E, VU,NR by HB at 1/10/2024 1413                         Point: Precautions (Done)       Description:   Instruct learner(s) on prescribed precautions during self-care and functional transfers.                  Learning Progress Summary             Patient Acceptance, E, VU,NR by HB at 1/15/2024 0951    Acceptance, E, VU,NR by HB at 1/13/2024 1208    Acceptance, E, VU,NR by HB at 1/12/2024 1154    Acceptance, E, VU,NR by HB at 1/11/2024 1354    Acceptance, E, VU,NR by HB at 1/10/2024 1413                         Point: Body mechanics (Done)       Description:   Instruct learner(s) on proper positioning and spine alignment during self-care, functional mobility activities and/or exercises.                  Learning Progress Summary             Patient Acceptance, E, VU,NR by HB at 1/15/2024 0951    Acceptance, E, VU,NR by HB at 1/13/2024 1208    Acceptance, E, VU,NR by HB at 1/12/2024 1154    Acceptance, E, VU,NR by HB at 1/11/2024 1354    Acceptance, E, VU,NR by HB at 1/10/2024 1413                                         User Key       Initials Effective Dates Name Provider Type Discipline     05/25/21 -  Julia Quintana OT Occupational Therapist OT                        OT Recommendation and Plan    Planned Therapy Interventions (OT): activity tolerance training, adaptive equipment training, BADL retraining, IADL retraining, functional balance retraining, strengthening exercise, ROM/therapeutic exercise, patient/caregiver education/training, transfer/mobility retraining                    Time Calculation:      Time Calculation- OT       Row Name 01/15/24 0951             Time Calculation- OT    OT Start Time 0800  -HB      OT Stop Time 0940  -HB      OT Time Calculation (min) 100 min  -HB      Total Timed Code Minutes-   minute(s)  -                User Key  (r) = Recorded By, (t) = Taken By, (c) = Cosigned By      Initials Name Provider Type     Julia Quintana OT Occupational Therapist                  Therapy Charges for Today       Code Description Service Date Service Provider Modifiers Qty    39790476722  OT SELF CARE/MGMT/TRAIN EA 15 MIN 1/15/2024 Julia Quintana OT GO 3    [id number]  OT THER PROC EA 15 MIN 1/15/2024 Julia Quintana OT GO 2    [id number]  OT THERAPEUTIC ACT EA 15 MIN 1/15/2024 Julia Quintana OT GO 2                     Julia Quintana OT  1/15/2024

## 2024-01-16 ENCOUNTER — APPOINTMENT (OUTPATIENT)
Dept: ULTRASOUND IMAGING | Facility: HOSPITAL | Age: 64
DRG: 560 | End: 2024-01-16
Payer: MEDICAID

## 2024-01-16 LAB
ALBUMIN SERPL-MCNC: 2.9 G/DL (ref 3.5–5.2)
ALBUMIN/GLOB SERPL: 1.2 G/DL
ALP SERPL-CCNC: 133 U/L (ref 39–117)
ALT SERPL W P-5'-P-CCNC: 22 U/L (ref 1–33)
ANION GAP SERPL CALCULATED.3IONS-SCNC: 4.1 MMOL/L (ref 5–15)
AST SERPL-CCNC: 40 U/L (ref 1–32)
BASOPHILS # BLD AUTO: 0.03 10*3/MM3 (ref 0–0.2)
BASOPHILS NFR BLD AUTO: 1.1 % (ref 0–1.5)
BILIRUB SERPL-MCNC: 0.7 MG/DL (ref 0–1.2)
BUN SERPL-MCNC: 20 MG/DL (ref 8–23)
BUN/CREAT SERPL: 15.9 (ref 7–25)
CALCIUM SPEC-SCNC: 8.2 MG/DL (ref 8.6–10.5)
CHLORIDE SERPL-SCNC: 106 MMOL/L (ref 98–107)
CO2 SERPL-SCNC: 29.9 MMOL/L (ref 22–29)
CREAT SERPL-MCNC: 1.26 MG/DL (ref 0.57–1)
DEPRECATED RDW RBC AUTO: 51.3 FL (ref 37–54)
EGFRCR SERPLBLD CKD-EPI 2021: 48.7 ML/MIN/1.73
EOSINOPHIL # BLD AUTO: 0.12 10*3/MM3 (ref 0–0.4)
EOSINOPHIL NFR BLD AUTO: 4.5 % (ref 0.3–6.2)
ERYTHROCYTE [DISTWIDTH] IN BLOOD BY AUTOMATED COUNT: 15.4 % (ref 12.3–15.4)
GLOBULIN UR ELPH-MCNC: 2.4 GM/DL
GLUCOSE SERPL-MCNC: 102 MG/DL (ref 65–99)
HCT VFR BLD AUTO: 26.2 % (ref 34–46.6)
HGB BLD-MCNC: 8.1 G/DL (ref 12–15.9)
IMM GRANULOCYTES # BLD AUTO: 0.01 10*3/MM3 (ref 0–0.05)
IMM GRANULOCYTES NFR BLD AUTO: 0.4 % (ref 0–0.5)
LYMPHOCYTES # BLD AUTO: 0.56 10*3/MM3 (ref 0.7–3.1)
LYMPHOCYTES NFR BLD AUTO: 20.8 % (ref 19.6–45.3)
MAGNESIUM SERPL-MCNC: 2 MG/DL (ref 1.6–2.4)
MCH RBC QN AUTO: 28.3 PG (ref 26.6–33)
MCHC RBC AUTO-ENTMCNC: 30.9 G/DL (ref 31.5–35.7)
MCV RBC AUTO: 91.6 FL (ref 79–97)
MONOCYTES # BLD AUTO: 0.28 10*3/MM3 (ref 0.1–0.9)
MONOCYTES NFR BLD AUTO: 10.4 % (ref 5–12)
NEUTROPHILS NFR BLD AUTO: 1.69 10*3/MM3 (ref 1.7–7)
NEUTROPHILS NFR BLD AUTO: 62.8 % (ref 42.7–76)
NRBC BLD AUTO-RTO: 0 /100 WBC (ref 0–0.2)
PLATELET # BLD AUTO: 79 10*3/MM3 (ref 140–450)
PMV BLD AUTO: 9.4 FL (ref 6–12)
POTASSIUM SERPL-SCNC: 4.4 MMOL/L (ref 3.5–5.2)
PROT SERPL-MCNC: 5.3 G/DL (ref 6–8.5)
RBC # BLD AUTO: 2.86 10*6/MM3 (ref 3.77–5.28)
SODIUM SERPL-SCNC: 140 MMOL/L (ref 136–145)
WBC NRBC COR # BLD AUTO: 2.69 10*3/MM3 (ref 3.4–10.8)

## 2024-01-16 PROCEDURE — 85025 COMPLETE CBC W/AUTO DIFF WBC: CPT | Performed by: INTERNAL MEDICINE

## 2024-01-16 PROCEDURE — 93971 EXTREMITY STUDY: CPT | Performed by: RADIOLOGY

## 2024-01-16 PROCEDURE — 93971 EXTREMITY STUDY: CPT

## 2024-01-16 PROCEDURE — 99231 SBSQ HOSP IP/OBS SF/LOW 25: CPT | Performed by: INTERNAL MEDICINE

## 2024-01-16 PROCEDURE — 97530 THERAPEUTIC ACTIVITIES: CPT

## 2024-01-16 PROCEDURE — 97110 THERAPEUTIC EXERCISES: CPT

## 2024-01-16 PROCEDURE — 97535 SELF CARE MNGMENT TRAINING: CPT

## 2024-01-16 PROCEDURE — 97116 GAIT TRAINING THERAPY: CPT

## 2024-01-16 PROCEDURE — 80053 COMPREHEN METABOLIC PANEL: CPT | Performed by: INTERNAL MEDICINE

## 2024-01-16 PROCEDURE — 63710000001 ONDANSETRON PER 8 MG: Performed by: FAMILY MEDICINE

## 2024-01-16 PROCEDURE — 83735 ASSAY OF MAGNESIUM: CPT | Performed by: INTERNAL MEDICINE

## 2024-01-16 RX ADMIN — OXYCODONE HYDROCHLORIDE 10 MG: 10 TABLET ORAL at 08:12

## 2024-01-16 RX ADMIN — ALPRAZOLAM 0.5 MG: 0.5 TABLET ORAL at 23:57

## 2024-01-16 RX ADMIN — POTASSIUM CHLORIDE 20 MEQ: 20 TABLET, EXTENDED RELEASE ORAL at 08:19

## 2024-01-16 RX ADMIN — POLYETHYLENE GLYCOL (3350) 17 G: 17 POWDER, FOR SOLUTION ORAL at 08:19

## 2024-01-16 RX ADMIN — ACETAMINOPHEN 650 MG: 325 TABLET ORAL at 11:04

## 2024-01-16 RX ADMIN — ALPRAZOLAM 0.5 MG: 0.5 TABLET ORAL at 05:47

## 2024-01-16 RX ADMIN — ONDANSETRON HYDROCHLORIDE 8 MG: 4 TABLET, FILM COATED ORAL at 12:01

## 2024-01-16 RX ADMIN — Medication 5000 UNITS: at 08:19

## 2024-01-16 RX ADMIN — OXYCODONE HYDROCHLORIDE 10 MG: 10 TABLET ORAL at 15:53

## 2024-01-16 RX ADMIN — PANTOPRAZOLE SODIUM 40 MG: 40 TABLET, DELAYED RELEASE ORAL at 05:47

## 2024-01-16 RX ADMIN — OXYCODONE HYDROCHLORIDE 10 MG: 10 TABLET ORAL at 03:03

## 2024-01-16 RX ADMIN — LACTULOSE 10 G: 20 SOLUTION ORAL at 08:19

## 2024-01-16 RX ADMIN — ALPRAZOLAM 0.5 MG: 0.5 TABLET ORAL at 18:00

## 2024-01-16 RX ADMIN — ONDANSETRON HYDROCHLORIDE 8 MG: 4 TABLET, FILM COATED ORAL at 18:01

## 2024-01-16 RX ADMIN — Medication 1 TABLET: at 08:19

## 2024-01-16 RX ADMIN — OXYCODONE HYDROCHLORIDE 10 MG: 10 TABLET ORAL at 20:08

## 2024-01-16 RX ADMIN — ALPRAZOLAM 0.5 MG: 0.5 TABLET ORAL at 12:01

## 2024-01-16 RX ADMIN — OXYCODONE HYDROCHLORIDE 10 MG: 10 TABLET ORAL at 23:57

## 2024-01-16 RX ADMIN — OXYCODONE HYDROCHLORIDE 10 MG: 10 TABLET ORAL at 12:01

## 2024-01-16 RX ADMIN — ONDANSETRON HYDROCHLORIDE 8 MG: 4 TABLET, FILM COATED ORAL at 23:57

## 2024-01-16 RX ADMIN — ONDANSETRON HYDROCHLORIDE 8 MG: 4 TABLET, FILM COATED ORAL at 05:47

## 2024-01-16 NOTE — THERAPY TREATMENT NOTE
Inpatient Rehabilitation - Occupational Therapy Treatment Note    ALBERTA Herrera     Patient Name: Phuong Tuttle  : 1960  MRN: [id number]    Today's Date: 2024                 Admit Date: 2024       No diagnosis found.    Patient Active Problem List   Diagnosis    COPD (chronic obstructive pulmonary disease)    Current smoker    Iron deficiency anemia, unspecified    Chest pain    End stage liver disease    Obesity (BMI 30-39.9)    Portal hypertension    Hepatitis C    History of substance abuse    Esophageal varices    Splenomegaly    Chronic kidney disease (CKD), stage III (moderate)    Leukopenia    Splenic pancytopenia syndrome    Iron deficiency anemia    Iron malabsorption    S/p left hip fracture       Past Medical History:   Diagnosis Date    Chronic kidney disease (CKD), stage III (moderate) 2020    COPD (chronic obstructive pulmonary disease)     non-oxygen dependent    End stage liver disease 2020    Esophageal varices     GI bleed     history of GI bleed     Hepatitis C 2020    History of substance abuse 2020    Immunocompromised patient     Peptic ulceration     Portal hypertension 2020    Splenomegaly 2020       Past Surgical History:   Procedure Laterality Date    ESOPHAGEAL VARICES LIGATION      UPPER GASTROINTESTINAL ENDOSCOPY               IRF OT ASSESSMENT FLOWSHEET (last 12 hours)       IRF OT Evaluation and Treatment       Row Name 24 1501          OT Time and Intention    Document Type daily treatment  -HB     Mode of Treatment individual therapy;occupational therapy  -HB     Total Minutes, Occupational Therapy 85  -HB     Patient Effort good  -HB     Symptoms Noted During/After Treatment none  -HB       Row Name 24 1501          General Information    Patient Profile Reviewed yes  -HB     Patient/Family/Caregiver Comments/Observations Patient and RN oksolomon OT this date.  -HB     General Observations of Patient Patient tolerated OT well with  no adverse reactions.  -     Existing Precautions/Restrictions fall;left;hip  -[facility] Name 01/16/24 1501          Pain Assessment    Pretreatment Pain Rating 10/10  -HB     Posttreatment Pain Rating 10/10  -HB     Pain Location - Side/Orientation --  LLE RN present and addressing  -[facility] Name 01/16/24 1501          Cognition/Psychosocial    Affect/Mental Status (Cognition) WFL  -     Orientation Status (Cognition) oriented x 3  -HB     Follows Commands (Cognition) WFL;verbal cues/prompting required;physical/tactile prompts required  -[facility] Name 01/16/24 1501          Upper Body Dressing    [place] Level (Upper Body Dressing) upper body dressing skills;minimum assist (75% or more patient effort);set up assistance  -     Position (Upper Body Dressing) edge of bed sitting  -[facility] Name 01/16/24 1501          Lower Body Dressing    [place] Level (Lower Body Dressing) doff;pants/bottoms;don;shoes/slippers;socks;maximum assist (25% patient effort)  -     Position (Lower Body Dressing) edge of bed sitting;supported standing  -[facility] Name 01/16/24 1501          Grooming    [place] Level (Grooming) grooming skills;set up  -     Position (Grooming) supported sitting  -[facility] Name 01/16/24 1501          Toileting    [place] Level (Toileting) adjust/manage clothing;maximum assist (25% patient effort)  -[facility] Name 01/16/24 1501          Bed Mobility    Supine-Sit [place] (Bed Mobility) standby assist;verbal cues;nonverbal cues (demo/gesture)  -[facility] Name 01/16/24 1501          Bed-Chair Transfer    Bed-Chair [place] (Transfers) minimum assist (75% patient effort);contact guard;verbal cues;nonverbal cues (demo/gesture)  -     Assistive Device (Bed-Chair Transfers) wheelchair;walker, front-wheeled  -[facility] Name 01/16/24 1501          Chair-Bed Transfer    Chair-Bed [place] (Transfers) contact guard;minimum assist (75% patient  effort);verbal cues;nonverbal cues (demo/gesture)  -     Assistive Device (Chair-Bed Transfers) wheelchair;walker, front-wheeled  -       Row Name 01/16/24 1501          Sit-Stand Transfer    Sit-Stand [place] (Transfers) minimum assist (75% patient effort);contact guard;nonverbal cues (demo/gesture);verbal cues  -       Row Name 01/16/24 1501          Stand-Sit Transfer    Stand-Sit [place] (Transfers) minimum assist (75% patient effort);contact guard;nonverbal cues (demo/gesture);verbal cues  -       Row Name 01/16/24 1501          Motor Skills    Motor Skills coordination;functional endurance;motor control/coordination interventions  -     Motor Control/Coordination Interventions gross motor coordination activities;therapeutic exercise/ROM  -     Therapeutic Exercise shoulder;elbow/forearm;wrist;hand  -     Additional Documentation --  rickshaw 10 lbs 4 sets 20 reps; BUE TA to increase ADL status/ endurance - rest between tasks  -       Row Name 01/16/24 1501          Positioning and Restraints    Pre-Treatment Position in bed  -     Post Treatment Position wheelchair  -     In Wheelchair encouraged to call for assist;call light within reach  -               User Key  (r) = Recorded By, (t) = Taken By, (c) = Cosigned By      Initials Name Effective Dates     Julia Quintana, DYLAN 05/25/21 -                      Occupational Therapy Education       Title: PT OT SLP Therapies (Done)       Topic: Occupational Therapy (Done)       Point: ADL training (Done)       Description:   Instruct learner(s) on proper safety adaptation and remediation techniques during self care or transfers.   Instruct in proper use of assistive devices.                  Learning Progress Summary             Patient Acceptance, E, VU,NR by  at 1/16/2024 1508    Acceptance, E, VU,NR by  at 1/15/2024 0951    Acceptance, E, VU,NR by  at 1/13/2024 1208    Acceptance, E, VU,NR by  at 1/12/2024 1151     Acceptance, E, VU,NR by  at 1/11/2024 1354    Acceptance, E, VU,NR by HB at 1/10/2024 1413                         Point: Home exercise program (Done)       Description:   Instruct learner(s) on appropriate technique for monitoring, assisting and/or progressing therapeutic exercises/activities.                  Learning Progress Summary             Patient Acceptance, E, VU,NR by  at 1/16/2024 1508    Acceptance, E, VU,NR by HB at 1/15/2024 0951    Acceptance, E, VU,NR by HB at 1/13/2024 1208    Acceptance, E, VU,NR by HB at 1/12/2024 1154    Acceptance, E, VU,NR by HB at 1/11/2024 1354    Acceptance, E, VU,NR by  at 1/10/2024 1413                         Point: Precautions (Done)       Description:   Instruct learner(s) on prescribed precautions during self-care and functional transfers.                  Learning Progress Summary             Patient Acceptance, E, VU,NR by HB at 1/16/2024 1508    Acceptance, E, VU,NR by  at 1/15/2024 0951    Acceptance, E, VU,NR by HB at 1/13/2024 1208    Acceptance, E, VU,NR by HB at 1/12/2024 1154    Acceptance, E, VU,NR by  at 1/11/2024 1354    Acceptance, E, VU,NR by HB at 1/10/2024 1413                         Point: Body mechanics (Done)       Description:   Instruct learner(s) on proper positioning and spine alignment during self-care, functional mobility activities and/or exercises.                  Learning Progress Summary             Patient Acceptance, E, VU,NR by  at 1/16/2024 1508    Acceptance, E, VU,NR by HB at 1/15/2024 0951    Acceptance, E, VU,NR by HB at 1/13/2024 1208    Acceptance, E, VU,NR by  at 1/12/2024 1154    Acceptance, E, VU,NR by  at 1/11/2024 1354    Acceptance, E, VU,NR by  at 1/10/2024 1413                                         User Key       Initials Effective Dates Name Provider Type Discipline     05/25/21 -  Julia Quintana OT Occupational Therapist OT                        OT Recommendation and Plan    Planned Therapy  Interventions (OT): activity tolerance training, adaptive equipment training, BADL retraining, IADL retraining, functional balance retraining, strengthening exercise, ROM/therapeutic exercise, patient/caregiver education/training, transfer/mobility retraining                    Time Calculation:      Time Calculation- OT       Row Name 01/16/24 1508 01/16/24 1501          Time Calculation- OT    OT Start Time 0750  -HB 0750  -HB     OT Stop Time 0915  -HB 0915  -HB     OT Time Calculation (min) 85 min  -HB 85 min  -HB     Total Timed Code Minutes- OT 85 minute(s)  -HB 85 minute(s)  -HB               User Key  (r) = Recorded By, (t) = Taken By, (c) = Cosigned By      Initials Name Provider Type     Julia Quintana OT Occupational Therapist                  Therapy Charges for Today       Code Description Service Date Service Provider Modifiers Qty    03670689958 HC OT SELF CARE/MGMT/TRAIN EA 15 MIN 1/15/2024 Julia Quintana OT GO 3    81661227887 HC OT THER PROC EA 15 MIN 1/15/2024 Julia Quintana OT GO 2    [id number] HC OT THERAPEUTIC ACT EA 15 MIN 1/15/2024 Julia Quintana OT GO 2    71220918846 HC OT SELF CARE/MGMT/TRAIN EA 15 MIN 1/16/2024 Julia Quintana OT GO 2    59407016642 HC OT THER PROC EA 15 MIN 1/16/2024 Julia Quintana OT GO 2    64389662202 HC OT THERAPEUTIC ACT EA 15 MIN 1/16/2024 Julia Quintana OT GO 2                     Julia Quintana OT  1/16/2024

## 2024-01-16 NOTE — SIGNIFICANT NOTE
01/16/24 4977   Plan   Plan Contacted brother Alejandro Tuttle [phone] about pt needing 24 hour assistance and he says he will stay with her until she no longer assistance.  Brother is willing to help pt with ADL's.  Informed him how pt is doing in therapy and possible need to be discharged sooner than 1-24-24 based on improvements in therapy.  Brother is aware pt wants to be discharged on 1-24-24 and he says he will be available to assist pt whenever she is discharged.  Discussed home health, HCBW program and DME.  Will follow.   Patient/Family in Agreement with Plan yes

## 2024-01-16 NOTE — THERAPY TREATMENT NOTE
Inpatient Rehabilitation - Physical Therapy Treatment Note       ALBERTA Herrera     Patient Name: Phuong Tuttle  : 1960  MRN: [id number]    Today's Date: 2024                    Admit Date: 2024      Visit Dx:   No diagnosis found.    Patient Active Problem List   Diagnosis    COPD (chronic obstructive pulmonary disease)    Current smoker    Iron deficiency anemia, unspecified    Chest pain    End stage liver disease    Obesity (BMI 30-39.9)    Portal hypertension    Hepatitis C    History of substance abuse    Esophageal varices    Splenomegaly    Chronic kidney disease (CKD), stage III (moderate)    Leukopenia    Splenic pancytopenia syndrome    Iron deficiency anemia    Iron malabsorption    S/p left hip fracture       Past Medical History:   Diagnosis Date    Chronic kidney disease (CKD), stage III (moderate) 2020    COPD (chronic obstructive pulmonary disease)     non-oxygen dependent    End stage liver disease 2020    Esophageal varices     GI bleed     history of GI bleed     Hepatitis C 2020    History of substance abuse 2020    Immunocompromised patient     Peptic ulceration     Portal hypertension 2020    Splenomegaly 2020       Past Surgical History:   Procedure Laterality Date    ESOPHAGEAL VARICES LIGATION      UPPER GASTROINTESTINAL ENDOSCOPY         PT ASSESSMENT (last 12 hours)       IRF PT Evaluation and Treatment       Row Name 24 1433          PT Time and Intention    Document Type daily treatment  -LL     Mode of Treatment individual therapy;physical therapy  -LL     Patient/Family/Caregiver Comments/Observations Patient had no new c/o this date and was agreeable to PT participation.  -LL       Row Name 24 1433          General Information    Existing Precautions/Restrictions fall;left;hip  -LL       Row Name 24 1433          Pain Scale: FACES Pre/Post-Treatment    Pain: FACES Scale, Pretreatment 4-->hurts little more  -LL      Posttreatment Pain Rating 4-->hurts little more  -LL       Row Name 01/16/24 1433          Cognition/Psychosocial    Affect/Mental Status (Cognition) WFL  -LL     Orientation Status (Cognition) oriented x 3  -LL     Follows Commands (Cognition) WFL;verbal cues/prompting required;physical/tactile prompts required  -LL     Personal Safety Interventions fall prevention program maintained;gait belt;nonskid shoes/slippers when out of bed;supervised activity  -LL     Cognitive Function WFL  -LL       Row Name 01/16/24 1433          Mobility    Left Lower Extremity (Weight-bearing Status) weight-bearing as tolerated (WBAT)  -LL     Right Lower Extremity (Weight-bearing Status) weight-bearing as tolerated (WBAT)  -LL       Row Name 01/16/24 1433          Bed Mobility    Comment, (Bed Mobility) [facility] Name 01/16/24 1433          Sit-Stand Transfer    Sit-Stand [place] (Transfers) contact guard;verbal cues;nonverbal cues (demo/gesture)  -     Assistive Device (Sit-Stand Transfers) wheelchair;walker, front-wheeled  -LL       [facility] Name 01/16/24 1433          Stand-Sit Transfer    Stand-Sit [place] (Transfers) contact guard;verbal cues;nonverbal cues (demo/gesture)  -     Assistive Device (Stand-Sit Transfers) wheelchair;walker, front-wheeled  -LL       [facility] Name 01/16/24 1433          Gait/Stairs (Locomotion)    [place] Level (Gait) contact guard;verbal cues;nonverbal cues (demo/gesture);standby assist  -LL     Assistive Device (Gait) walker, front-wheeled  -     Distance in Feet (Gait) 160' x 2  -LL     Deviations/Abnormal Patterns (Gait) antalgic;claire decreased;gait speed decreased;stride length decreased;weight shifting decreased  -LL     Bilateral Gait Deviations forward flexed posture;heel strike decreased  -LL       Row Name 01/16/24 1433          Hip (Therapeutic Exercise)    Hip Strengthening (Therapeutic Exercise) bilateral;flexion;extension;aBduction;aDduction;marching while  seated;sitting;standing;resistance band;green;marching while standing;mini squats  1# w/ sitting ex's  -LL       Row Name 01/16/24 1433          Knee (Therapeutic Exercise)    Knee Strengthening (Therapeutic Exercise) bilateral;flexion;extension;marching while seated;LAQ (long arc quad);hamstring curls;sitting;standing;resistance band;green;marching while standing  1# w/ sitting ex's  -LL       Row Name 01/16/24 1433          Ankle (Therapeutic Exercise)    Ankle Strengthening (Therapeutic Exercise) bilateral;dorsiflexion;plantarflexion;sitting;standing  1# w/ sitting ex's  -LL       Row Name 01/16/24 1433          Positioning and Restraints    Pre-Treatment Position --  WC  -LL     Post Treatment Position wheelchair  -LL     In Wheelchair sitting;call light within reach;encouraged to call for assist;notified nsg;legs elevated  In room with tray table in front of her  -LL       Row Name 01/16/24 1433          Therapy Assessment/Plan (PT)    Patient's Goals For Discharge return home  -       Row Name 01/16/24 1433          Therapy Assessment/Plan (PT)    Rehab Potential/Prognosis (PT) adequate, monitor progress closely  -LL     Frequency of Treatment (PT) 5 times per week  -LL     Estimated Duration of Therapy (PT) 2 weeks  -LL     Problem List (PT) balance;mobility;range of motion (ROM);strength;pain  -LL     Activity Limitations Related to Problem List (PT) unable to ambulate safely;unable to transfer safely  -       Row Name 01/16/24 1433          Therapy Plan Review/Discharge Plan (PT)    Anticipated Equipment Needs at Discharge (PT Eval) --  tbd  -LL     Anticipated Discharge Disposition (PT) home;home with home health  -       Row Name 01/16/24 1433          IRF PT Goals    Bed Mobility Goal Selection (PT-IRF) bed mobility, PT goal 1  -LL     Transfer Goal Selection (PT-IRF) transfers, PT goal 1  -LL     Gait (Walking Locomotion) Goal Selection (PT-IRF) gait, PT goal 1  -LL       Row Name 01/16/24 1433           Bed Mobility Goal 1 (PT-IRF)    Activity/Assistive Device (Bed Mobility Goal 1, PT-IRF) sit to supine/supine to sit  -LL     [place] Level (Bed Mobility Goal 1, PT-IRF) modified independence  -LL     Time Frame (Bed Mobility Goal 1, PT-IRF) by discharge  -LL       Row Name 01/16/24 1433          Transfer Goal 1 (PT-IRF)    Activity/Assistive Device (Transfer Goal 1, PT-IRF) sit-to-stand/stand-to-sit;bed-to-chair/chair-to-bed  -LL     [place] Level (Transfer Goal 1, PT-IRF) modified independence  -LL     Time Frame (Transfer Goal 1, PT-IRF) by discharge  -LL       Row Name 01/16/24 1433          Gait/Walking Locomotion Goal 1 (PT-IRF)    Activity/Assistive Device (Gait/Walking Locomotion Goal 1, PT-IRF) walker, rolling  -LL     Gait/Walking Locomotion Distance Goal 1 (PT-IRF) 300'  -LL     [place] Level (Gait/Walking Locomotion Goal 1, PT-IRF) modified independence  -LL     Time Frame (Gait/Walking Locomotion Goal 1, PT-IRF) by discharge  -               User Key  (r) = Recorded By, (t) = Taken By, (c) = Cosigned By      Initials Name Provider Type    LL Juliana Echeverria PTA Physical Therapist Assistant                  Wound 01/09/24 1655 Left anterior hip Incision (Active)   Dressing Appearance dry;intact 01/16/24 0812   Closure Unable to assess 01/15/24 1915   Base pink;red 01/16/24 0812   Periwound dry;intact 01/16/24 0812   Edges rolled/closed 01/16/24 0812   Drainage Amount none 01/16/24 0812   Dressing Care dressing changed 01/16/24 0812     Physical Therapy Education       Title: PT OT SLP Therapies (Done)       Topic: Physical Therapy (Done)       Point: Mobility training (Done)       Learning Progress Summary             Patient Acceptance, E,D, VU,NR by LL at 1/16/2024 1441    Acceptance, E,D, VU,NR by LL at 1/15/2024 1511    Acceptance, E,TB, VU,DU by HC at 1/13/2024 1154    Acceptance, E,D, VU,NR by LL at 1/12/2024 1356    Acceptance, E, VU,NR by LB at 1/11/2024 1547                          Point: Home exercise program (Done)       Learning Progress Summary             Patient Acceptance, E,D, VU,NR by LL at 1/16/2024 1441    Acceptance, E,D, VU,NR by LL at 1/15/2024 1511    Acceptance, E,TB, VU,DU by HC at 1/13/2024 1154    Acceptance, E,D, VU,NR by LL at 1/12/2024 1356    Acceptance, E, VU,NR by LB at 1/11/2024 1547                         Point: Body mechanics (Done)       Learning Progress Summary             Patient Acceptance, E,D, VU,NR by LL at 1/16/2024 1441    Acceptance, E,D, VU,NR by LL at 1/15/2024 1511    Acceptance, E,TB, VU,DU by HC at 1/13/2024 1154    Acceptance, E,D, VU,NR by LL at 1/12/2024 1356    Acceptance, E, VU,NR by LB at 1/11/2024 1547                         Point: Precautions (Done)       Learning Progress Summary             Patient Acceptance, E,D, VU,NR by LL at 1/16/2024 1441    Acceptance, E,D, VU,NR by LL at 1/15/2024 1511    Acceptance, E,TB, VU,DU by HC at 1/13/2024 1154    Acceptance, E,D, VU,NR by LL at 1/12/2024 1356    Acceptance, E, VU,NR by LB at 1/11/2024 1547                                         User Key       Initials Effective Dates Name Provider Type Discipline     06/16/21 -  Caryn Rosen, PT Physical Therapist PT     05/02/16 -  Juliana Echeverria PTA Physical Therapist Assistant PT     01/20/23 -  Cecilia Laureano PTA Physical Therapist Assistant PT                    PT Recommendation and Plan    Frequency of Treatment (PT): 5 times per week  Anticipated Equipment Needs at Discharge (PT Eval):  (tbd)                  Time Calculation:      PT Charges       Row Name 01/16/24 1441             Time Calculation    Start Time 1000  -LL      Stop Time 1130  -LL      Time Calculation (min) 90 min  -LL      PT Received On 01/16/24  -LL         Time Calculation- PT    Total Timed Code Minutes- PT 90 minute(s)  -                User Key  (r) = Recorded By, (t) = Taken By, (c) = Cosigned By      Initials Name Provider Type      Juliana Echeverria, ROSEMARIE Physical Therapist Assistant                    Therapy Charges for Today       Code Description Service Date Service Provider Modifiers Qty    31447573605 HC GAIT TRAINING EA 15 MIN 1/15/2024 Juliana Echeverria, PTA GP, CQ 2    64494149645 HC PT THERAPEUTIC ACT EA 15 MIN 1/15/2024 Juliana Echeverria, PTA GP, CQ 1    85880525860 HC PT THER PROC EA 15 MIN 1/15/2024 Juliana Echeverria, PTA GP, CQ 3    50084812458 HC GAIT TRAINING EA 15 MIN 1/16/2024 Juliana Echeverria, PTA GP, CQ 2    98222494872 HC PT THERAPEUTIC ACT EA 15 MIN 1/16/2024 Juliana Echeverria, PTA GP, CQ 1    28584101656 HC PT THER PROC EA 15 MIN 1/16/2024 Juliana Echeverria, PTA GP, CQ 3              PT G-Codes  AM-PAC 6 Clicks Score (PT): 18      Juliana. ROSEMARIE Echeverria  1/16/2024

## 2024-01-16 NOTE — PROGRESS NOTES
Assisted By: Latoya DOE    CC: Follow-up on left hip fracture    Interview History/HPI: Patient states that she is doing okay.  Still having some left hip pain.          Current Hospital Meds:  furosemide, 20 mg, Oral, Once  lactulose, 10 g, Oral, Every Other Day  montelukast, 10 mg, Oral, Nightly  multivitamin, 1 tablet, Oral, Daily  pantoprazole, 40 mg, Oral, Q AM  polyethylene glycol, 17 g, Oral, Daily  potassium chloride, 20 mEq, Oral, Daily  vitamin D3, 5,000 Units, Oral, Daily         Vitals:    01/16/24 0815   BP: 117/77   Pulse: 80   Resp: 18   Temp: 97.7 °F (36.5 °C)   SpO2: 96%         Intake/Output Summary (Last 24 hours) at 1/16/2024 1439  Last data filed at 1/16/2024 1300  Gross per 24 hour   Intake 1200 ml   Output 1200 ml   Net 0 ml       EXAM: Examination of the left hip, skin edges well-approximated, staples in place, no significant erythema or drainage noted.  1+ edema of the left lower extremity, none of the right.  Reportedly per nursing patient is not wearing her SCUDs she refused.      Diet: Regular/House Diet; Texture: Regular Texture (IDDSI 7); Fluid Consistency: Thin (IDDSI 0)        LABS:     Lab Results (last 48 hours)       Procedure Component Value Units Date/Time    Comprehensive Metabolic Panel [[id number]]  (Abnormal) Collected: 01/16/24 0044    Specimen: Blood Updated: 01/16/24 0127     Glucose 102 mg/dL      BUN 20 mg/dL      Creatinine 1.26 mg/dL      Sodium 140 mmol/L      Potassium 4.4 mmol/L      Chloride 106 mmol/L      CO2 29.9 mmol/L      Calcium 8.2 mg/dL      Total Protein 5.3 g/dL      Albumin 2.9 g/dL      ALT (SGPT) 22 U/L      AST (SGOT) 40 U/L      Alkaline Phosphatase 133 U/L      Total Bilirubin 0.7 mg/dL      Globulin 2.4 gm/dL      A/G Ratio 1.2 g/dL      BUN/Creatinine Ratio 15.9     Anion Gap 4.1 mmol/L      eGFR 48.7 mL/min/1.73     Narrative:      GFR Normal >60  Chronic Kidney Disease <60  Kidney Failure <15      Magnesium [[id number]]  (Normal) Collected:  01/16/24 0044    Specimen: Blood Updated: 01/16/24 0127     Magnesium 2.0 mg/dL     CBC & Differential [[id number]]  (Abnormal) Collected: 01/16/24 0044    Specimen: Blood Updated: 01/16/24 0102    Narrative:      The following orders were created for panel order CBC & Differential.  Procedure                               Abnormality         Status                     ---------                               -----------         ------                     CBC Auto Differential[[id number]]        Abnormal            Final result                 Please view results for these tests on the individual orders.    CBC Auto Differential [[id number]]  (Abnormal) Collected: 01/16/24 0044    Specimen: Blood Updated: 01/16/24 0102     WBC 2.69 10*3/mm3      RBC 2.86 10*6/mm3      Hemoglobin 8.1 g/dL      Hematocrit 26.2 %      MCV 91.6 fL      MCH 28.3 pg      MCHC 30.9 g/dL      RDW 15.4 %      RDW-SD 51.3 fl      MPV 9.4 fL      Platelets 79 10*3/mm3      Neutrophil % 62.8 %      Lymphocyte % 20.8 %      Monocyte % 10.4 %      Eosinophil % 4.5 %      Basophil % 1.1 %      Immature Grans % 0.4 %      Neutrophils, Absolute 1.69 10*3/mm3      Lymphocytes, Absolute 0.56 10*3/mm3      Monocytes, Absolute 0.28 10*3/mm3      Eosinophils, Absolute 0.12 10*3/mm3      Basophils, Absolute 0.03 10*3/mm3      Immature Grans, Absolute 0.01 10*3/mm3      nRBC 0.0 /100 WBC                  Radiology:    Imaging Results (Last 72 Hours)       Procedure Component Value Units Date/Time    US Venous Doppler Lower Extremity Left (duplex) [[id number]] Collected: 01/16/24 0900     Updated: 01/16/24 0902    Narrative:      US VENOUS DOPPLER LOWER EXTREMITY LEFT (DUPLEX)-     CLINICAL INDICATION: Edema        COMPARISON: None immediately available      TECHNIQUE: Color Doppler imaging was used with compression and  augmentation to evaluate the right lower extremity deep venous system.     FINDINGS:  There is patent spontaneous flow from the common femoral  vein through  the posterior tibial veins.  There was no internal clot or area of noncompressibility in the left  lower extremity.  Normal augmentation was elicited where applicable.       Impression:      No DVT in the left lower extremity on today's exam.      This report was finalized on 1/16/2024 9:00 AM by Dr. Rodri Renteria MD.       XR Clavicle Left [[id number]] Collected: 01/14/24 0927     Updated: 01/14/24 0929    Narrative:      EXAM:    XR Left Clavicle Complete, 2 or More Views     EXAM DATE:    1/13/2024 4:46 PM     CLINICAL HISTORY:    left clavicle pain     TECHNIQUE:    Frontal and lordotic views of the left clavicle.     COMPARISON:    No relevant prior studies available.     FINDINGS:    BONES/JOINTS:  Degenerative changes are noted of the acromioclavicular  joint.  No acute fracture.  No dislocation.    SOFT TISSUES:  Unremarkable as visualized.       Impression:        No acute findings in the left clavicle.        This report was finalized on 1/14/2024 9:27 AM by Dr. Can Benjamin MD.       XR Knee 1 or 2 View Left [[id number]] Collected: 01/14/24 0926     Updated: 01/14/24 0928    Narrative:      EXAM:    XR Left Knee, 1 or 2 Views     EXAM DATE:    1/13/2024 4:47 PM     CLINICAL HISTORY:    left knee pain     TECHNIQUE:    Frontal and/or lateral views of the left knee.     COMPARISON:    No relevant prior studies available.     FINDINGS:    BONES/JOINTS:  Degenerative change of the knee.  No acute fracture.   No dislocation.    SOFT TISSUES:  Unremarkable as visualized.       Impression:        Degenerative change of the knee.        This report was finalized on 1/14/2024 9:26 AM by Dr. Can Benjamin MD.               Results for orders placed during the hospital encounter of 05/16/19    Adult Transthoracic Echo Complete W/ Cont if Necessary Per Protocol    Interpretation Summary  · Normal left ventricular cavity size and wall thickness noted.  · Left ventricular systolic function is normal.  Estimated EF appears to be in the range of 61 - 65%.  · Left ventricular diastolic function is normal.  · Mild tricuspid valve regurgitation is present.  · . No evidence of pulmonary hypertension  · No significant valvular heart disease  · There is no evidence of pericardial effusion.      Assessment/Plan:   Status post left hip fracture from mechanical fall at home.  Status post total hip arthroplasty.  Dr. Tamayo had consulted orthopedics to reevaluate the wound as patient was asking for orthopedic evaluation wound looks good to my exam.  They have not seen the patient as of yet, patient is now just over 2 weeks from her surgery therefore we will leave it to Ortho discretion whether to take out her staples or not.  Patient is undergoing occupational and physical therapy.  Patient was discussed in case conference this a.m.. Patient is set up for upper body dressing, mod assist lower body dressing, mod assist for toileting, with PT yesterday, she was contact-guard to min assist with transfers, she walked 60 feet x 2 front wheeled walker contact-guard.  After case conference I discussed with her with Latoya DOE present again.  We do not think the team that she will reach the point by the end of her rehab stay that we would feel comfortable with her not having someone there with her 24 hours a day.  This may change but at this time I explained that the best solution would be short-term SNF placement.  Patient is refusing this and wishes to go home.  Dangers of this including following explained to the patient and she voiced understanding but still wants to go home.  She states she thinks she can get her brother to stay with her.   is evaluating this as well.  Patient is making progress however with her therapy sessions.     Left clavicular pain imaging negative, she did not mention this today, continue to monitor.  Of note left clavicle x-ray was also done 1/5 at [facility] suggesting this is a  more longer-term pain.     Cirrhosis and associated pancytopenia with history of esophageal varices and history of banding.  Patient is status post 2 units packed red blood cells at [facility].  Hemoglobin was 8 on January 10, I am going to recheck this in the a.m.  Patient is on a PPI.     Encephalopathy, patient is on lactulose and MiraLAX.  Patient is only having 1 bowel movement a day but with this she appears mentally clear, follow-up     DVT prophylaxis, due to the anemia and requirement for transfusion patient is at his SCUDs currently ordered but she is refusing this.     Creatinine slightly elevated over baseline, on no nephrotoxic medications, follow-up    Edema left, Doppler obtained, no DVT    Servando Nova MD

## 2024-01-16 NOTE — SIGNIFICANT NOTE
01/16/24 9727   Plan   Plan Team conference held today.   Spoke to pt about how she is doing in therapy, making improvements, need for 24 hour assistance at home for safety and option of SNF placement for continued rehab/nursing care.  Explained financial obligations and income liability with [place] Medicaid.  Pt does not want to go to SNF at discharge.  Pt says her brother Alejandro Tuttle will stay with her and assist with ADL's.  Explained to pt she may be able to discharge home sooner than 1-24-24, however, she wants to keep this date for her discharge.  Explained discharge date can change based on progress in therapy.  Pt wants to be evaluated for home and community based waiver program.  Home health  usually assesses for eligibility of HCBW program which would be after discharge.  SS asked pt about contacting brother and providing his number.  Pt wants to call brother first and talk to him and wants SS to call her back later this afternoon.   Patient/Family in Agreement with Plan yes

## 2024-01-16 NOTE — PLAN OF CARE
Goal Outcome Evaluation:  Plan of Care Reviewed With: patient        Progress: improving

## 2024-01-16 NOTE — PROGRESS NOTES
Occupational Therapy: Individual: 85 minutes.    Physical Therapy:    Speech Language Pathology:    Signed by: Julia Quintana OT

## 2024-01-16 NOTE — SIGNIFICANT NOTE
01/16/24 1410   Plan   Plan Spoke to pt who has talked to brother and she provided phone number for him [phone].  SS will call brother today.

## 2024-01-16 NOTE — PAYOR COMM NOTE
"Amanda Nicholas, RN   Utilization Review Nurse for Inpatient Rehab   Phone: [phone]  Fax: [phone]  Email: [email or web address]  [facility]  Facility NPI: [id number]    CLINICAL REVIEW FOR CONTINUED REHAB STAY APPROVAL  Member:  Phuong Tuttle  :  1960  Auth #: [id number]   ID# [id number]   Admission Date:  24  Planning for Discharge home on 24  *Requesting additional 9 days    Phuong Tuttle (63 y.o. Female)       Date of Birth   1960    Social Security Number       Address   [address] [place] [address] [address] [place] [id number]    Home Phone   [phone]    MRN   [id number]       Muslim   None    Marital Status                               Admission Date   24    Admission Type   Elective    Admitting Provider   Sylvester Tamayo MD    Attending Provider   Sylvester Tamayo MD    Department, Room/Bed   [facility], 111/1P       Discharge Date       Discharge Disposition       Discharge Destination                                 Attending Provider: Sylvester Tamayo MD    Allergies: Doxycycline, Ibuprofen, Iodinated Contrast Media, Prednisone, Zithromax [Azithromycin], Haldol [Haloperidol Lactate], Latex, Penicillins    Isolation: None   Infection: None   Code Status: CPR    Ht: 167.6 cm (66\")   Wt: 72.6 kg (160 lb)    Admission Cmt: None   Principal Problem: S/p left hip fracture [Z87.81]                 [facility] Encounter Date/Time: 2024 [facility]   Hospital Account: [id number]    MRN: [id number]   Patient:  Phuong Tuttle   Contact Serial #: [id number]   SSN:          ENCOUNTER             Patient Class: Rehab IP   Unit: [facility] Service: Physical Medicine and Rehabilitation     Bed: 111/1P   Admitting Provider: Sylvester Tamayo MD   Referring Physician: Provider, No Known   Attending Provider: Sylvester Tamayo MD   Adm Diagnosis: S/p left hip fracture [Z*               PATIENT          Name: Phuong Tuttle : " 1960 (63 yrs)   Address: [address] [place] [address]  [address] Sex: Female   City: [place] [address]   County: [place]   Marital Status:  Ethnicity: NOT                                                                              Race: WHITE   Primary Care Provider: Grace Keith,* Patients Phone: Home Phone: [phone]         EMERGENCY CONTACT   Contact Name Legal Guardian? Relationship to Patient Home Phone Work Phone   1. ALEJANDRO TUTTLE  2. *No Contact Specified*      Brother    [phone]              GUARANTOR            Guarantor: Phuong Tuttle     : 1960   Address: [address] Sex: [place] [address]     Relation to Patient: Self       Home Phone: [phone]   Guarantor ID: [id number]       Work Phone:     GUARANTOR EMPLOYER   Employer: DISABLED         Status: DISABLED   COVERAGE          PRIMARY INSURANCE   Payor: [employer] Plan: [place]   Group Number: [id number] Insurance Type: INDEMNITY   Subscriber Name: PHUONG TUTTLE Subscriber : 1960   Subscriber ID: [id number] Coverage Address: [place] [address]   Pat. Rel. to Subscriber: Self Coverage Phone: [phone]         Lauren Solis MSW     Case Management     Significant Note      Signed     Date of Service: 24 1018  Creation Time: 24 1018     Signed              24 0950   Plan   Plan Team conference held today. Spoke to pt about how pt is doing in therapy and plans for discharge on 24.  Pt plans to return to her apartment alone.  Pt does not want SS to contact brother about her discharge date; she can discuss this with him.  Pt says her brother Alejandro is not always dependable but is suppose to be coming to visit her today.  Will follow.   Patient/Family in Agreement with Plan yes                      Servando Nova MD   Physician  Medicine     Progress Notes      Signed     Date of Service:  01/15/24 1506  Creation Time: 01/15/24 1506     Signed         Assisted By: Latoya DOE     CC: Follow-up on left hip fracture     Interview History/HPI: Patient is still having some left hip pain but tolerable discomfort at this time on current analgesia.  Patient has no other acute complaints.  Apparently she has been complaining she is also however of some pain that appears musculoskeletal all around her sternoclavicular joint.  X-ray of the clavicle yesterday was negative.              Current Hospital Meds:  furosemide, 20 mg, Oral, Once  lactulose, 10 g, Oral, Every Other Day  montelukast, 10 mg, Oral, Nightly  multivitamin, 1 tablet, Oral, Daily  pantoprazole, 40 mg, Oral, Q AM  polyethylene glycol, 17 g, Oral, Daily  potassium chloride, 20 mEq, Oral, Daily  vitamin D3, 5,000 Units, Oral, Daily            Vitals:     01/15/24 0745   BP: 109/53   Pulse: 94   Resp: 18   Temp: 98.2 °F (36.8 °C)   SpO2: 97%            Intake/Output Summary (Last 24 hours) at 1/15/2024 1506  Last data filed at 1/15/2024 1300      Gross per 24 hour   Intake 1320 ml   Output 850 ml   Net 470 ml         EXAM: Pleasant and in no distress, mood is good upper extremity strength is symmetric, examination of the wound, wound is well-approximated, sutures in place there is no erythema no drainage noted.  Lungs are clear, heart regular rate and rhythm, abdomen soft benign, no extremity edema is noted.        Diet: Regular/House Diet; Texture: Regular Texture (IDDSI 7); Fluid Consistency: Thin (IDDSI 0)           LABS:      Lab Results (last 48 hours)         ** No results found for the last 48 hours. **             1/13, creatinine 1.09 albumin 2.9, AST 42 ALT normal total bili normal, magnesium 2.  Last CBC done 1/10 platelets 84, hemoglobin 8, white count 2.33.  She appears to run chronically pancytopenic.        Radiology:     Imaging Results (Last 72 Hours)         Procedure Component Value Units Date/Time     XR Clavicle Left  [[id number]] Collected: 01/14/24 0927       Updated: 01/14/24 0929     Narrative:       EXAM:    XR Left Clavicle Complete, 2 or More Views     EXAM DATE:    1/13/2024 4:46 PM     CLINICAL HISTORY:    left clavicle pain     TECHNIQUE:    Frontal and lordotic views of the left clavicle.     COMPARISON:    No relevant prior studies available.     FINDINGS:    BONES/JOINTS:  Degenerative changes are noted of the acromioclavicular  joint.  No acute fracture.  No dislocation.    SOFT TISSUES:  Unremarkable as visualized.        Impression:         No acute findings in the left clavicle.        This report was finalized on 1/14/2024 9:27 AM by Dr. Can Benjamin MD.        XR Knee 1 or 2 View Left [[id number]] Collected: 01/14/24 0926       Updated: 01/14/24 0928     Narrative:       EXAM:    XR Left Knee, 1 or 2 Views     EXAM DATE:    1/13/2024 4:47 PM     CLINICAL HISTORY:    left knee pain     TECHNIQUE:    Frontal and/or lateral views of the left knee.     COMPARISON:    No relevant prior studies available.     FINDINGS:    BONES/JOINTS:  Degenerative change of the knee.  No acute fracture.   No dislocation.    SOFT TISSUES:  Unremarkable as visualized.        Impression:         Degenerative change of the knee.        This report was finalized on 1/14/2024 9:26 AM by Dr. Can Benjamin MD.                   Results for orders placed during the hospital encounter of 05/16/19     Adult Transthoracic Echo Complete W/ Cont if Necessary Per Protocol     Interpretation Summary  · Normal left ventricular cavity size and wall thickness noted.  · Left ventricular systolic function is normal. Estimated EF appears to be in the range of 61 - 65%.  · Left ventricular diastolic function is normal.  · Mild tricuspid valve regurgitation is present.  · . No evidence of pulmonary hypertension  · No significant valvular heart disease  · There is no evidence of pericardial effusion.        Assessment/Plan:   Status post left hip fracture  from mechanical fall at home.  Status post total hip arthroplasty.  Dr. Tamayo had consulted orthopedics to reevaluate the wound as patient was asking for orthopedic evaluation wound looks good to my exam.  Patient is undergoing occupational and physical therapy.  With OT, patient is max assist for lower body dressing, min assist for grooming, max assist for toileting hygiene, set up for self-feeding.  Set up for upper body dressing.  With PT, patient is contact-guard to min assist sit to stand and stand to sit.  Patient walked 60 then 100 feet contact-guard front wheeled walker.  Continue current dose for oxycodone 10 mg every 4 hours as needed.  Patient was on Suboxone however at home.     Left clavicular pain imaging negative, continue to monitor.  Of note left clavicle x-ray was also done  at [facility] suggesting this is a more longer-term pain.     Cirrhosis and associated pancytopenia with history of esophageal varices and history of banding.  Patient is status post 2 units packed red blood cells at [facility].  Hemoglobin was 8 on January 10, I am going to recheck this in the a.m.  Patient is on a PPI.     Encephalopathy, patient is on lactulose and MiraLAX.  Patient is only having 1 bowel movement a day but with this she appears mentally clear, follow-up     DVT prophylaxis, due to the anemia and requirement for transfusion patient is currently on SCUDs     Chronic Lasix and potassium use, recheck electrolytes in the a.m.           MD Juwan Jones Lynette, PTA   Physical Therapist Assistant  Physical Therapy     Therapy Treatment Note      Signed     Date of Service: 01/15/24 1513  Creation Time: 01/15/24 1513     Signed       Expand All Collapse All    Inpatient Rehabilitation - Physical Therapy Treatment Note                                                              ALBERTA Herrera     Patient Name: Phuong Tuttle                    : 1960                     MRN: [id number]     Today's Date: 1/15/2024                                                                                            Admit Date: 1/9/2024                 Visit Dx:   Visit Diagnosis   No diagnosis found.        Problem List       Patient Active Problem List   Diagnosis    COPD (chronic obstructive pulmonary disease)    Current smoker    Iron deficiency anemia, unspecified    Chest pain    End stage liver disease    Obesity (BMI 30-39.9)    Portal hypertension    Hepatitis C    History of substance abuse    Esophageal varices    Splenomegaly    Chronic kidney disease (CKD), stage III (moderate)    Leukopenia    Splenic pancytopenia syndrome    Iron deficiency anemia    Iron malabsorption    S/p left hip fracture            Medical History        Past Medical History:   Diagnosis Date    Chronic kidney disease (CKD), stage III (moderate) 4/27/2020    COPD (chronic obstructive pulmonary disease)       non-oxygen dependent    End stage liver disease 4/27/2020    Esophageal varices      GI bleed       history of GI bleed     Hepatitis C 4/27/2020    History of substance abuse 4/27/2020    Immunocompromised patient      Peptic ulceration      Portal hypertension 4/27/2020    Splenomegaly 4/27/2020            Surgical History         Past Surgical History:   Procedure Laterality Date    ESOPHAGEAL VARICES LIGATION        UPPER GASTROINTESTINAL ENDOSCOPY                PT ASSESSMENT (last 12 hours)            IRF PT Evaluation and Treatment         Row Name 01/15/24 1507                 PT Time and Intention     Document Type daily treatment  -LL       Mode of Treatment individual therapy;physical therapy  -LL       Patient/Family/Caregiver Comments/Observations Patient had no new c/o this date and was agreeable to PT participation.  -LL          Row Name 01/15/24 1502                 General Information     Existing Precautions/Restrictions fall;left;hip  -LL          Row Name 01/15/24 1502                  Pain Scale: FACES Pre/Post-Treatment     Pain: FACES Scale, Pretreatment 4-->hurts little more  -LL       Posttreatment Pain Rating 4-->hurts little more  -LL          Row Name 01/15/24 1507                 Cognition/Psychosocial     Affect/Mental Status (Cognition) WFL  -       Orientation Status (Cognition) oriented x 3  -LL       Follows Commands (Cognition) WFL;verbal cues/prompting required;physical/tactile prompts required  -LL       Personal Safety Interventions fall prevention program maintained;gait belt;nonskid shoes/slippers when out of bed;supervised activity  -       Cognitive Function WFL  -LL          Row Name 01/15/24 1507                 Mobility     Left Lower Extremity (Weight-bearing Status) weight-bearing as tolerated (WBAT)  -LL       Right Lower Extremity (Weight-bearing Status) weight-bearing as tolerated (WBAT)  -          Row Name 01/15/24 1507                 Bed Mobility     Comment, (Bed Mobility) UIC  -[facility] Name 01/15/24 1507                 Sit-Stand Transfer     Sit-Stand [place] (Transfers) contact guard;verbal cues;nonverbal cues (demo/gesture);minimum assist (75% patient effort)  -       Assistive Device (Sit-Stand Transfers) wheelchair;walker, front-wheeled  -[facility] Name 01/15/24 1507                 Stand-Sit Transfer     Stand-Sit [place] (Transfers) contact guard;verbal cues;nonverbal cues (demo/gesture);minimum assist (75% patient effort)  -       Assistive Device (Stand-Sit Transfers) wheelchair;walker, front-wheeled  -[facility] Name 01/15/24 1507                 Gait/Stairs (Locomotion)     [place] Level (Gait) contact guard;verbal cues;nonverbal cues (demo/gesture)  -       Assistive Device (Gait) walker, front-wheeled  -       Distance in Feet (Gait) 160' x 2  -LL       Deviations/Abnormal Patterns (Gait) antalgic;claire decreased;gait speed decreased;stride length decreased;weight shifting decreased  -LL        Bilateral Gait Deviations forward flexed posture;heel strike decreased  -LL          Row Name 01/15/24 1507                 Hip (Therapeutic Exercise)     Hip Strengthening (Therapeutic Exercise) bilateral;flexion;extension;aBduction;aDduction;marching while seated;marching while standing;sitting;standing;resistance band;green  1# w/ sitting ex's  -LL          Row Name 01/15/24 1507                 Knee (Therapeutic Exercise)     Knee Strengthening (Therapeutic Exercise) bilateral;flexion;extension;marching while seated;LAQ (long arc quad);hamstring curls;sitting;standing;resistance band;green  1# w/ sitting ex's  -LL          Row Name 01/15/24 1507                 Ankle (Therapeutic Exercise)     Ankle Strengthening (Therapeutic Exercise) bilateral;dorsiflexion;plantarflexion;sitting;standing  1# w/ sitting ex's  -LL          Row Name 01/15/24 1507                 Positioning and Restraints     Pre-Treatment Position --  WC  -LL       Post Treatment Position wheelchair  -LL       In Wheelchair sitting;call light within reach;encouraged to call for assist;legs elevated;notified nsg  In room with tray table in front of her  -LL          Row Name 01/15/24 1507                 Therapy Assessment/Plan (PT)     Patient's Goals For Discharge return home  -LL          Row Name 01/15/24 1507                 Therapy Assessment/Plan (PT)     Rehab Potential/Prognosis (PT) adequate, monitor progress closely  -LL       Frequency of Treatment (PT) 5 times per week  -LL       Estimated Duration of Therapy (PT) 2 weeks  -LL       Problem List (PT) balance;mobility;range of motion (ROM);strength;pain  -LL       Activity Limitations Related to Problem List (PT) unable to ambulate safely;unable to transfer safely  -LL          Row Name 01/15/24 1507                 Therapy Plan Review/Discharge Plan (PT)     Anticipated Equipment Needs at Discharge (PT Eval) --  tbd  -LL       Anticipated Discharge Disposition (PT) home;home with  home health  -LL          Row Name 01/15/24 1507                 IRF PT Goals     Bed Mobility Goal Selection (PT-IRF) bed mobility, PT goal 1  -LL       Transfer Goal Selection (PT-IRF) transfers, PT goal 1  -LL       Gait (Walking Locomotion) Goal Selection (PT-IRF) gait, PT goal 1  -LL          Row Name 01/15/24 1507                 Bed Mobility Goal 1 (PT-IRF)     Activity/Assistive Device (Bed Mobility Goal 1, PT-IRF) sit to supine/supine to sit  -LL       [place] Level (Bed Mobility Goal 1, PT-IRF) modified independence  -LL       Time Frame (Bed Mobility Goal 1, PT-IRF) by discharge  -LL          Row Name 01/15/24 1507                 Transfer Goal 1 (PT-IRF)     Activity/Assistive Device (Transfer Goal 1, PT-IRF) sit-to-stand/stand-to-sit;bed-to-chair/chair-to-bed  -LL       [place] Level (Transfer Goal 1, PT-IRF) modified independence  -LL       Time Frame (Transfer Goal 1, PT-IRF) by discharge  -LL          Row Name 01/15/24 1507                 Gait/Walking Locomotion Goal 1 (PT-IRF)     Activity/Assistive Device (Gait/Walking Locomotion Goal 1, PT-IRF) walker, rolling  -LL       Gait/Walking Locomotion Distance Goal 1 (PT-IRF) 300'  -LL       [place] Level (Gait/Walking Locomotion Goal 1, PT-IRF) modified independence  -LL       Time Frame (Gait/Walking Locomotion Goal 1, PT-IRF) by discharge  -LL                    User Key  (r) = Recorded By, (t) = Taken By, (c) = Cosigned By        Initials Name Provider Type     LL Juliana Echeverria PTA Physical Therapist Assistant                               Wound 01/09/24 1655 Left anterior hip Incision (Active)   Dressing Appearance dry;intact 01/15/24 0854   Closure Unable to assess 01/14/24 2130   Base pink;red 01/15/24 0854   Periwound dry;intact 01/15/24 0854   Edges rolled/closed 01/15/24 0854   Drainage Amount none 01/15/24 0854   Care, Wound other (see comments) 01/14/24 2130   Dressing Care other (see comments) 01/14/24 2130                   PT Recommendation and Plan     Frequency of Treatment (PT): 5 times per week  Anticipated Equipment Needs at Discharge (PT Eval):  (tbd)           Time Calculation:        PT Charges         Row Name 01/15/24 1511                       Time Calculation     Start Time 1000  -LL         Stop Time 1130  -LL         Time Calculation (min) 90 min  -LL         PT Received On 01/15/24  -LL                 Time Calculation- PT     Total Timed Code Minutes- PT 90 minute(s)  -LL                         Julia Quintana OT   Occupational Therapist  Specialty: Occupational Therapy     Therapy Treatment Note      Signed     Date of Service: 01/15/24 0952  Creation Time: 01/15/24 0952     Signed       Expand All Collapse All    Inpatient Rehabilitation - Occupational Therapy Treatment Note     ALBERTA Javier     Patient Name: Phuong Tuttle                    : 1960                    MRN: [id number]     Today's Date: 1/15/2024                                                                             Admit Date: 2024        Visit Diagnosis   No diagnosis found.        Problem List       Patient Active Problem List   Diagnosis    COPD (chronic obstructive pulmonary disease)    Current smoker    Iron deficiency anemia, unspecified    Chest pain    End stage liver disease    Obesity (BMI 30-39.9)    Portal hypertension    Hepatitis C    History of substance abuse    Esophageal varices    Splenomegaly    Chronic kidney disease (CKD), stage III (moderate)    Leukopenia    Splenic pancytopenia syndrome    Iron deficiency anemia    Iron malabsorption    S/p left hip fracture            Medical History        Past Medical History:   Diagnosis Date    Chronic kidney disease (CKD), stage III (moderate) 2020    COPD (chronic obstructive pulmonary disease)       non-oxygen dependent    End stage liver disease 2020    Esophageal varices      GI bleed       history of GI bleed     Hepatitis C 2020    History of  substance abuse 4/27/2020    Immunocompromised patient      Peptic ulceration      Portal hypertension 4/27/2020    Splenomegaly 4/27/2020            Surgical History         Past Surgical History:   Procedure Laterality Date    ESOPHAGEAL VARICES LIGATION        UPPER GASTROINTESTINAL ENDOSCOPY                               IRF OT ASSESSMENT FLOWSHEET (last 12 hours)            IRF OT Evaluation and Treatment         Row Name 01/15/24 0944                 OT Time and Intention     Document Type daily treatment  -HB       Mode of Treatment individual therapy;occupational therapy  -HB       Total Minutes, Occupational Therapy 100  -HB       Patient Effort good  -HB          Row Name 01/15/24 0944                 General Information     Patient Profile Reviewed yes  -HB       Patient/Family/Caregiver Comments/Observations Patient and RN okd OT this date.  -HB       General Observations of Patient Patient tolerated OT well with no adverse reactions.  -HB       Existing Precautions/Restrictions fall;left;hip  -HB          Row Name 01/15/24 0944                 Pain Assessment     Pretreatment Pain Rating 4/10  -HB       Posttreatment Pain Rating 4/10  -HB       Pain Location - Side/Orientation --  LLE and left clavicle  -HB          Row Name 01/15/24 0944                 Cognition/Psychosocial     Affect/Mental Status (Cognition) WFL  -HB       Orientation Status (Cognition) oriented x 3  -HB       Follows Commands (Cognition) WFL;verbal cues/prompting required;physical/tactile prompts required  -HB       Personal Safety Interventions safety round/check completed  -          Row Name 01/15/24 0944                 Bathing     [place] Level (Bathing) bathing skills;upper body;set up  -HB       Position (Bathing) supine  -          Row Name 01/15/24 0944                 Upper Body Dressing     [place] Level (Upper Body Dressing) upper body dressing skills;set up assistance  -HB       Position (Upper Body  Dressing) edge of bed sitting  -          Row Name 01/15/24 0944                 Lower Body Dressing     [place] Level (Lower Body Dressing) doff;pants/bottoms;shoes/slippers;socks;maximum assist (25% patient effort)  -       Position (Lower Body Dressing) edge of bed sitting;supported standing  -          Row Name 01/15/24 0944                 Toileting     [place] Level (Toileting) adjust/manage clothing;maximum assist (25% patient effort)  ana care in bed with setup  -       Position (Toileting) supine  don /doff brief  standing  -          Row Name 01/15/24 0944                 Bed Mobility     Supine-Sit [place] (Bed Mobility) moderate assist (50% patient effort);nonverbal cues (demo/gesture);verbal cues  -          Row Name 01/15/24 0944                 Bed-Chair Transfer     Bed-Chair [place] (Transfers) minimum assist (75% patient effort);nonverbal cues (demo/gesture);verbal cues  -       Assistive Device (Bed-Chair Transfers) wheelchair;walker, front-wheeled  -          Row Name 01/15/24 0944                 Sit-Stand Transfer     Sit-Stand [place] (Transfers) contact guard;verbal cues;nonverbal cues (demo/gesture);minimum assist (75% patient effort)  -          Row Name 01/15/24 0944                 Stand-Sit Transfer     Stand-Sit [place] (Transfers) contact guard;verbal cues;nonverbal cues (demo/gesture);minimum assist (75% patient effort)  -          Row Name 01/15/24 0944                 Motor Skills     Motor Skills coordination;functional endurance;motor control/coordination interventions  -       Motor Control/Coordination Interventions fine motor manipulation/dexterity activities;therapeutic exercise/ROM;gross motor coordination activities  -       Therapeutic Exercise shoulder;elbow/forearm;hand;wrist  -       Additional Documentation --  PRE 2x 5 min; BUE TA to increase ADL status/ endurance; rest between tasks  -          Row Name  01/15/24 0944                 Positioning and Restraints     Pre-Treatment Position in bed  -HB       Post Treatment Position wheelchair  -HB       In Wheelchair call light within reach;encouraged to call for assist  -HB          Row Name 01/15/24 0944                 Transfer Goal 1 (OT-IRF)     Activity/Assistive Device (Transfer Goal 1, OT-IRF) all transfers  -HB       [place] Level (Transfer Goal 1, OT-IRF) contact guard required  -HB       Time Frame (Transfer Goal 1, OT-IRF) short-term goal (STG)  -HB       Progress/Outcomes (Transfer Goal 1, OT-IRF) good progress toward goal  -HB          Row Name 01/15/24 0944                 Transfer Goal 2 (OT-IRF)     Activity/Assistive Device (Transfer Goal 2, OT-IRF) all transfers  -HB       [place] Level (Transfer Goal 2, OT-IRF) supervision required  -HB       Time Frame (Transfer Goal 2, OT-IRF) long-term goal (LTG)  -HB       Progress/Outcomes (Transfer Goal 2, OT-IRF) good progress toward goal  -HB          Row Name 01/15/24 0944                 LB Dressing Goal 1 (OT-IRF)     Activity/Device (LB Dressing Goal 1, OT-IRF) lower body dressing  -HB       [place] (LB Dressing Goal 1, OT-IRF) moderate assist (50-74% patient effort)  -HB       Time Frame (LB Dressing Goal 1, OT-IRF) short-term goal (STG)  -HB       Progress/Outcomes (LB Dressing Goal 1, OT-IRF) good progress toward goal  -HB          Row Name 01/15/24 0944                 LB Dressing Goal 2 (OT-IRF)     Activity/Device (LB Dressing Goal 2, OT-IRF) lower body dressing  -HB       [place] (LB Dressing Goal 2, OT-IRF) minimum assist (75% or more patient effort)  -HB       Time Frame (LB Dressing Goal 2, OT-IRF) long-term goal (LTG)  -HB       Progress/Outcomes (LB Dressing Goal 2, OT-IRF) good progress toward goal  -HB          Row Name 01/15/24 0944                 Toileting Goal 1 (OT-IRF)     Activity/Device (Toileting Goal 1, OT-IRF) toileting skills, all  -HB       [place] Level  (Toileting Goal 1, OT-IRF) moderate assist (50-74% patient effort)  -HB       Progress/Outcomes (Toileting Goal 1, OT-IRF) good progress toward goal  -HB                    User Key  (r) = Recorded By, (t) = Taken By, (c) = Cosigned By        Initials Name Effective Dates     HB Julia Quintana, OT 05/25/21 -                                OT Recommendation and Plan     Planned Therapy Interventions (OT): activity tolerance training, adaptive equipment training, BADL retraining, IADL retraining, functional balance retraining, strengthening exercise, ROM/therapeutic exercise, patient/caregiver education/training, transfer/mobility retraining           Time Calculation:        Time Calculation- OT         Row Name 01/15/24 0951                       Time Calculation- OT     OT Start Time 0800  -HB         OT Stop Time 0940  -HB         OT Time Calculation (min) 100 min  -HB         Total Timed Code Minutes-  minute(s)  -HB

## 2024-01-16 NOTE — PROGRESS NOTES
Rehabilitation Nursing  Inpatient Rehabilitation Plan of Care Note    Plan of Care  Copy from Leonid    Pain Management (Active)  Current Status (1/15/2024 12:00:00 AM): pt will be free of pain with meds  Weekly Goal: decrease in pain meds  Discharge Goal: pain free    Safety    Potential for Injury (Active)  Current Status (1/12/2024 12:00:00 AM): patient will ring for needs  Weekly Goal: patient uses proper safety with hip  Discharge Goal: injury free    Signed by: Mony Delgado RN

## 2024-01-17 ENCOUNTER — APPOINTMENT (OUTPATIENT)
Dept: ULTRASOUND IMAGING | Facility: HOSPITAL | Age: 64
DRG: 560 | End: 2024-01-17
Payer: MEDICAID

## 2024-01-17 PROCEDURE — 97116 GAIT TRAINING THERAPY: CPT

## 2024-01-17 PROCEDURE — 97110 THERAPEUTIC EXERCISES: CPT

## 2024-01-17 PROCEDURE — 97535 SELF CARE MNGMENT TRAINING: CPT

## 2024-01-17 PROCEDURE — 99232 SBSQ HOSP IP/OBS MODERATE 35: CPT | Performed by: INTERNAL MEDICINE

## 2024-01-17 PROCEDURE — 97530 THERAPEUTIC ACTIVITIES: CPT

## 2024-01-17 PROCEDURE — 63710000001 ONDANSETRON PER 8 MG: Performed by: FAMILY MEDICINE

## 2024-01-17 RX ORDER — OXYCODONE HYDROCHLORIDE 10 MG/1
10 TABLET ORAL EVERY 8 HOURS PRN
Qty: 9 TABLET | Refills: 0 | Status: SHIPPED | OUTPATIENT
Start: 2024-01-17 | End: 2024-01-17 | Stop reason: SDUPTHER

## 2024-01-17 RX ORDER — ALPRAZOLAM 0.5 MG/1
TABLET ORAL
Qty: 8 TABLET | Refills: 0 | Status: CANCELLED | OUTPATIENT
Start: 2024-01-17

## 2024-01-17 RX ORDER — ALPRAZOLAM 0.25 MG/1
0.25 TABLET ORAL 3 TIMES DAILY PRN
Status: DISCONTINUED | OUTPATIENT
Start: 2024-01-17 | End: 2024-01-18

## 2024-01-17 RX ORDER — ALPRAZOLAM 0.5 MG/1
TABLET ORAL
Qty: 8 TABLET | Refills: 0 | Status: SHIPPED | OUTPATIENT
Start: 2024-01-17 | End: 2024-01-21 | Stop reason: HOSPADM

## 2024-01-17 RX ORDER — ALPRAZOLAM 0.5 MG/1
TABLET ORAL
Qty: 8 TABLET | Refills: 0 | Status: SHIPPED | OUTPATIENT
Start: 2024-01-17 | End: 2024-01-17 | Stop reason: SDUPTHER

## 2024-01-17 RX ORDER — OXYCODONE HYDROCHLORIDE 10 MG/1
10 TABLET ORAL EVERY 8 HOURS PRN
Qty: 9 TABLET | Refills: 0 | Status: SHIPPED | OUTPATIENT
Start: 2024-01-17 | End: 2024-01-21 | Stop reason: HOSPADM

## 2024-01-17 RX ORDER — OXYCODONE HYDROCHLORIDE 10 MG/1
10 TABLET ORAL EVERY 6 HOURS PRN
Status: DISCONTINUED | OUTPATIENT
Start: 2024-01-17 | End: 2024-01-19

## 2024-01-17 RX ORDER — NALOXONE HYDROCHLORIDE 4 MG/.1ML
SPRAY NASAL
Qty: 2 EACH | Refills: 0 | Status: SHIPPED | OUTPATIENT
Start: 2024-01-17 | End: 2024-01-17 | Stop reason: SDUPTHER

## 2024-01-17 RX ORDER — POLYETHYLENE GLYCOL 3350 17 G/17G
17 POWDER, FOR SOLUTION ORAL DAILY
Qty: 30 EACH | Refills: 0 | Status: SHIPPED | OUTPATIENT
Start: 2024-01-17 | End: 2024-01-22 | Stop reason: SDUPTHER

## 2024-01-17 RX ORDER — NALOXONE HYDROCHLORIDE 4 MG/.1ML
SPRAY NASAL
Qty: 2 EACH | Refills: 0 | Status: SHIPPED | OUTPATIENT
Start: 2024-01-17 | End: 2024-01-22 | Stop reason: SDUPTHER

## 2024-01-17 RX ORDER — POLYETHYLENE GLYCOL 3350 17 G/17G
17 POWDER, FOR SOLUTION ORAL DAILY
Qty: 30 EACH | Refills: 0 | Status: SHIPPED | OUTPATIENT
Start: 2024-01-17 | End: 2024-01-17 | Stop reason: SDUPTHER

## 2024-01-17 RX ADMIN — ACETAMINOPHEN 650 MG: 325 TABLET ORAL at 00:01

## 2024-01-17 RX ADMIN — ACETAMINOPHEN 650 MG: 325 TABLET ORAL at 18:17

## 2024-01-17 RX ADMIN — OXYCODONE HYDROCHLORIDE 10 MG: 10 TABLET ORAL at 14:17

## 2024-01-17 RX ADMIN — OXYCODONE HYDROCHLORIDE 10 MG: 10 TABLET ORAL at 21:50

## 2024-01-17 RX ADMIN — ACETAMINOPHEN 650 MG: 325 TABLET ORAL at 11:57

## 2024-01-17 RX ADMIN — ONDANSETRON HYDROCHLORIDE 8 MG: 4 TABLET, FILM COATED ORAL at 11:51

## 2024-01-17 RX ADMIN — ALPRAZOLAM 0.5 MG: 0.5 TABLET ORAL at 05:37

## 2024-01-17 RX ADMIN — ONDANSETRON HYDROCHLORIDE 8 MG: 4 TABLET, FILM COATED ORAL at 19:32

## 2024-01-17 RX ADMIN — OXYCODONE HYDROCHLORIDE 10 MG: 10 TABLET ORAL at 09:30

## 2024-01-17 RX ADMIN — ALPRAZOLAM 0.5 MG: 0.5 TABLET ORAL at 11:51

## 2024-01-17 RX ADMIN — ONDANSETRON HYDROCHLORIDE 8 MG: 4 TABLET, FILM COATED ORAL at 05:37

## 2024-01-17 RX ADMIN — OXYCODONE HYDROCHLORIDE 10 MG: 10 TABLET ORAL at 04:30

## 2024-01-17 RX ADMIN — ALPRAZOLAM 0.25 MG: 0.25 TABLET ORAL at 23:54

## 2024-01-17 RX ADMIN — MONTELUKAST SODIUM 10 MG: 10 TABLET, COATED ORAL at 21:29

## 2024-01-17 RX ADMIN — PANTOPRAZOLE SODIUM 40 MG: 40 TABLET, DELAYED RELEASE ORAL at 05:38

## 2024-01-17 RX ADMIN — Medication 5000 UNITS: at 09:30

## 2024-01-17 RX ADMIN — Medication 1 TABLET: at 09:30

## 2024-01-17 NOTE — THERAPY TREATMENT NOTE
Inpatient Rehabilitation - Physical Therapy Treatment Note       ALBERTA Herrera     Patient Name: Phuong Tuttle  : 1960  MRN: [id number]    Today's Date: 2024                    Admit Date: 2024      Visit Dx:     ICD-10-CM ICD-9-CM   1. S/p left hip fracture  Z87.81 V15.51   2. ESPERANZA (generalized anxiety disorder)  F41.1 300.02       Patient Active Problem List   Diagnosis    COPD (chronic obstructive pulmonary disease)    Current smoker    Iron deficiency anemia, unspecified    Chest pain    End stage liver disease    Obesity (BMI 30-39.9)    Portal hypertension    Hepatitis C    History of substance abuse    Esophageal varices    Splenomegaly    Chronic kidney disease (CKD), stage III (moderate)    Leukopenia    Splenic pancytopenia syndrome    Iron deficiency anemia    Iron malabsorption    S/p left hip fracture       Past Medical History:   Diagnosis Date    Chronic kidney disease (CKD), stage III (moderate) 2020    COPD (chronic obstructive pulmonary disease)     non-oxygen dependent    End stage liver disease 2020    Esophageal varices     GI bleed     history of GI bleed     Hepatitis C 2020    History of substance abuse 2020    Immunocompromised patient     Peptic ulceration     Portal hypertension 2020    Splenomegaly 2020       Past Surgical History:   Procedure Laterality Date    ESOPHAGEAL VARICES LIGATION      UPPER GASTROINTESTINAL ENDOSCOPY         PT ASSESSMENT (last 12 hours)       IRF PT Evaluation and Treatment       Row Name 24 1527          PT Time and Intention    Document Type daily treatment  -LL     Mode of Treatment individual therapy;physical therapy  -LL     Patient/Family/Caregiver Comments/Observations Patient had no new c/o this date and was agreeable to PT participation.  -LL       Row Name 24 1527          General Information    Existing Precautions/Restrictions fall;left;hip  -LL       Row Name 24 1527          Pain  Scale: FACES Pre/Post-Treatment    Pain: FACES Scale, Pretreatment 4-->hurts little more  -LL     Posttreatment Pain Rating 4-->hurts little more  -LL       Row Name 01/17/24 1527          Cognition/Psychosocial    Affect/Mental Status (Cognition) WFL  -LL     Orientation Status (Cognition) oriented x 3  -LL     Follows Commands (Cognition) WFL;verbal cues/prompting required;physical/tactile prompts required  -LL     Personal Safety Interventions fall prevention program maintained;gait belt;nonskid shoes/slippers when out of bed;supervised activity  -LL     Cognitive Function WFL  -LL       Row Name 01/17/24 1527          Mobility    Left Lower Extremity (Weight-bearing Status) weight-bearing as tolerated (WBAT)  -LL     Right Lower Extremity (Weight-bearing Status) weight-bearing as tolerated (WBAT)  -LL     Additional Documentation Wheelchair Mobility/Management (Group)  -LL       Row Name 01/17/24 1527          Bed Mobility    Comment, (Bed Mobility) [facility]  [facility] Name 01/17/24 1527          Sit-Stand Transfer    Sit-Stand [place] (Transfers) contact guard;verbal cues;nonverbal cues (demo/gesture)  -     Assistive Device (Sit-Stand Transfers) wheelchair;walker, front-wheeled  -       Row Name 01/17/24 1527          Stand-Sit Transfer    Stand-Sit [place] (Transfers) contact guard;verbal cues;nonverbal cues (demo/gesture)  -     Assistive Device (Stand-Sit Transfers) wheelchair;walker, front-wheeled  -[facility] Name 01/17/24 1527          Gait/Stairs (Locomotion)    [place] Level (Gait) contact guard;verbal cues;nonverbal cues (demo/gesture);standby assist  -     Assistive Device (Gait) walker, front-wheeled  -     Distance in Feet (Gait) 200', 160'  -LL     Deviations/Abnormal Patterns (Gait) antalgic;claire decreased;gait speed decreased;stride length decreased;weight shifting decreased  -LL     Bilateral Gait Deviations forward flexed posture;heel strike decreased  -LL      Comment, (Gait/Stairs) During ambulation, patient's knee gave away with her.  She was able to correct and then sat down in WC.  -LL       Row Name 01/17/24 1527          Wheelchair Mobility/Management    Method of Wheelchair Locomotion (Mobility) bimanual (upper extremity) propulsion  -LL     Mobility Activities (Wheelchair) mobility (all) activities  -     All Mobility, [place] (Wheelchair) independent  -LL     Distance Propelled in Feet (Wheelchair) 150'  -       Row Name 01/17/24 1527          Hip (Therapeutic Exercise)    Hip Strengthening (Therapeutic Exercise) bilateral;flexion;extension;aBduction;aDduction;marching while seated;marching while standing;sitting;standing;resistance band;green  2# w/ sitting ex's  -LL       Row Name 01/17/24 1527          Knee (Therapeutic Exercise)    Knee Strengthening (Therapeutic Exercise) bilateral;flexion;extension;marching while seated;LAQ (long arc quad);hamstring curls;sitting;resistance band;green  2# w/ sitting ex's  -LL       Row Name 01/17/24 1527          Ankle (Therapeutic Exercise)    Ankle Strengthening (Therapeutic Exercise) bilateral;dorsiflexion;plantarflexion;sitting;standing  2# w/ sitting ex's  -LL       Row Name 01/17/24 1527          Modality Intervention    Additional Documentation Modality Treatment (Group)  -       Row Name 01/17/24 1527          Positioning and Restraints    Pre-Treatment Position --  WC w/ OT  -LL     Post Treatment Position wheelchair  -LL     In Wheelchair sitting;call light within reach;encouraged to call for assist;legs elevated  In room with tray table in front of her  -LL       Row Name 01/17/24 1527          Therapy Assessment/Plan (PT)    Patient's Goals For Discharge return home  -       Row Name 01/17/24 1527          Therapy Assessment/Plan (PT)    Rehab Potential/Prognosis (PT) adequate, monitor progress closely  -LL     Frequency of Treatment (PT) 5 times per week  -LL     Estimated Duration of Therapy (PT)  2 weeks  -LL     Problem List (PT) balance;mobility;range of motion (ROM);strength;pain  -LL     Activity Limitations Related to Problem List (PT) unable to ambulate safely;unable to transfer safely  -       Row Name 01/17/24 1527          Therapy Plan Review/Discharge Plan (PT)    Anticipated Equipment Needs at Discharge (PT Eval) --  tbd  -LL     Anticipated Discharge Disposition (PT) home;home with home health  -       Row Name 01/17/24 1527          IRF PT Goals    Bed Mobility Goal Selection (PT-IRF) bed mobility, PT goal 1  -LL     Transfer Goal Selection (PT-IRF) transfers, PT goal 1  -LL     Gait (Walking Locomotion) Goal Selection (PT-IRF) gait, PT goal 1  -LL       Row Name 01/17/24 1527          Bed Mobility Goal 1 (PT-IRF)    Activity/Assistive Device (Bed Mobility Goal 1, PT-IRF) sit to supine/supine to sit  -LL     [place] (Bed Mobility Goal 1, PT-IRF) modified independence  -LL     Time Frame (Bed Mobility Goal 1, PT-IRF) by discharge  -LL       Row Name 01/17/24 1527          Transfer Goal 1 (PT-IRF)    Activity/Assistive Device (Transfer Goal 1, PT-IRF) sit-to-stand/stand-to-sit;bed-to-chair/chair-to-bed  -LL     [place] Level (Transfer Goal 1, PT-IRF) modified independence  -LL     Time Frame (Transfer Goal 1, PT-IRF) by discharge  -LL       Row Name 01/17/24 1527          Gait/Walking Locomotion Goal 1 (PT-IRF)    Activity/Assistive Device (Gait/Walking Locomotion Goal 1, PT-IRF) walker, rolling  -LL     Gait/Walking Locomotion Distance Goal 1 (PT-IRF) 300'  -LL     [place] (Gait/Walking Locomotion Goal 1, PT-IRF) modified independence  -LL     Time Frame (Gait/Walking Locomotion Goal 1, PT-IRF) by discharge  -LL               User Key  (r) = Recorded By, (t) = Taken By, (c) = Cosigned By      Initials Name Provider Type    LL Juliana Echeverria PTA Physical Therapist Assistant                  Wound 01/09/24 0157 Left anterior hip Incision (Active)   Dressing  Appearance dry;intact 01/17/24 0930   Closure Adhesive bandage;Staples 01/17/24 0930   Base clean;dry 01/17/24 0930   Periwound dry;intact 01/16/24 1920   Edges rolled/closed 01/16/24 1920   Drainage Amount none 01/17/24 0930   Care, Wound other (see comments) 01/16/24 1920   Dressing Care dressing changed 01/17/24 0930     Physical Therapy Education       Title: PT OT SLP Therapies (Done)       Topic: Physical Therapy (Done)       Point: Mobility training (Done)       Learning Progress Summary             Patient Acceptance, E,D, VU,NR by LL at 1/17/2024 1533    Acceptance, E,D, VU,NR by LL at 1/16/2024 1441    Acceptance, E,D, VU,NR by LL at 1/15/2024 1511    Acceptance, E,TB, VU,DU by HC at 1/13/2024 1154    Acceptance, E,D, VU,NR by LL at 1/12/2024 1356    Acceptance, E, VU,NR by LB at 1/11/2024 1547                         Point: Home exercise program (Done)       Learning Progress Summary             Patient Acceptance, E,D, VU,NR by LL at 1/17/2024 1533    Acceptance, E,D, VU,NR by LL at 1/16/2024 1441    Acceptance, E,D, VU,NR by LL at 1/15/2024 1511    Acceptance, E,TB, VU,DU by HC at 1/13/2024 1154    Acceptance, E,D, VU,NR by LL at 1/12/2024 1356    Acceptance, E, VU,NR by LB at 1/11/2024 1547                         Point: Body mechanics (Done)       Learning Progress Summary             Patient Acceptance, E,D, VU,NR by LL at 1/17/2024 1533    Acceptance, E,D, VU,NR by LL at 1/16/2024 1441    Acceptance, E,D, VU,NR by LL at 1/15/2024 1511    Acceptance, E,TB, VU,DU by HC at 1/13/2024 1154    Acceptance, E,D, VU,NR by LL at 1/12/2024 1356    Acceptance, E, VU,NR by LB at 1/11/2024 1547                         Point: Precautions (Done)       Learning Progress Summary             Patient Acceptance, E,D, VU,NR by LL at 1/17/2024 1533    Acceptance, E,D, VU,NR by LL at 1/16/2024 1441    Acceptance, E,D, VU,NR by LL at 1/15/2024 1511    Acceptance, E,TB, VU,DU by  at 1/13/2024 1154    Acceptance, E,D,  VU,NR by LL at 1/12/2024 1356    Acceptance, E, VU,NR by LB at 1/11/2024 1547                                         User Key       Initials Effective Dates Name Provider Type Discipline    LB 06/16/21 -  Caryn Rosen, PT Physical Therapist PT    LL 05/02/16 -  Juliana Echeverria PTA Physical Therapist Assistant PT    HC 01/20/23 -  Cecilia Laureano PTA Physical Therapist Assistant PT                    PT Recommendation and Plan    Frequency of Treatment (PT): 5 times per week  Anticipated Equipment Needs at Discharge (PT Eval):  (tbd)                  Time Calculation:      PT Charges       Row Name 01/17/24 1533             Time Calculation    Start Time 1000  -LL      Stop Time 1130  -LL      Time Calculation (min) 90 min  -LL      PT Received On 01/17/24  -LL      PT Goal Re-Cert Due Date 01/24/24  -LL         Time Calculation- PT    Total Timed Code Minutes- PT 90 minute(s)  -LL                User Key  (r) = Recorded By, (t) = Taken By, (c) = Cosigned By      Initials Name Provider Type    LL Juliana Echeverria PTA Physical Therapist Assistant                    Therapy Charges for Today       Code Description Service Date Service Provider Modifiers Qty    29672100687 HC GAIT TRAINING EA 15 MIN 1/16/2024 EcheverriaJuliana, PTA GP, CQ 2    62586794166 HC PT THERAPEUTIC ACT EA 15 MIN 1/16/2024 Caitlin Echeverriaette, PTA GP, CQ 1    04964866964 HC PT THER PROC EA 15 MIN 1/16/2024 EcheverriaJuliana, PTA GP, CQ 3    24279422436 HC GAIT TRAINING EA 15 MIN 1/17/2024 EcheverriaJuliana, PTA GP, CQ 2    54502779266 HC PT THERAPEUTIC ACT EA 15 MIN 1/17/2024 Juwan Juliana, PTA GP, CQ 1    01618129880 HC PT THER PROC EA 15 MIN 1/17/2024 Juliana Echeverria, PTA GP, CQ 3              PT G-Codes  AM-PAC 6 Clicks Score (PT): 18      Lindy Echeverria PTA  1/17/2024

## 2024-01-17 NOTE — PROGRESS NOTES
Assisted By: Multiple staff members as she has been seen multiple times through the day.    CC: Follow-up on left fractured hip    Interview History/HPI: Patient still is having some hip pain but is increasing in her ability to ambulate.  No new complaints otherwise.          Current Hospital Meds:  lactulose, 10 g, Oral, Every Other Day  montelukast, 10 mg, Oral, Nightly  multivitamin, 1 tablet, Oral, Daily  pantoprazole, 40 mg, Oral, Q AM  polyethylene glycol, 17 g, Oral, Daily  vitamin D3, 5,000 Units, Oral, Daily         Vitals:    01/17/24 0700   BP: 106/69   Pulse: 80   Resp: 18   Temp: 97.7 °F (36.5 °C)   SpO2: 99%         Intake/Output Summary (Last 24 hours) at 1/17/2024 1616  Last data filed at 1/17/2024 1300  Gross per 24 hour   Intake 1440 ml   Output 850 ml   Net 590 ml       EXAM: Incision on her hip looks excellent, staples in place, minimal edema, actually trace she has excellent palpable distal pulses moves all extremities well.  Alert and oriented lungs clear heart regular rate and rhythm.  No asterixis.      Diet: Regular/House Diet; Texture: Regular Texture (IDDSI 7); Fluid Consistency: Thin (IDDSI 0)        LABS:     Lab Results (last 48 hours)       Procedure Component Value Units Date/Time    Comprehensive Metabolic Panel [[id number]]  (Abnormal) Collected: 01/16/24 0044    Specimen: Blood Updated: 01/16/24 0127     Glucose 102 mg/dL      BUN 20 mg/dL      Creatinine 1.26 mg/dL      Sodium 140 mmol/L      Potassium 4.4 mmol/L      Chloride 106 mmol/L      CO2 29.9 mmol/L      Calcium 8.2 mg/dL      Total Protein 5.3 g/dL      Albumin 2.9 g/dL      ALT (SGPT) 22 U/L      AST (SGOT) 40 U/L      Alkaline Phosphatase 133 U/L      Total Bilirubin 0.7 mg/dL      Globulin 2.4 gm/dL      A/G Ratio 1.2 g/dL      BUN/Creatinine Ratio 15.9     Anion Gap 4.1 mmol/L      eGFR 48.7 mL/min/1.73     Narrative:      GFR Normal >60  Chronic Kidney Disease <60  Kidney Failure <15      Magnesium [[id number]]   (Normal) Collected: 01/16/24 0044    Specimen: Blood Updated: 01/16/24 0127     Magnesium 2.0 mg/dL     CBC & Differential [[id number]]  (Abnormal) Collected: 01/16/24 0044    Specimen: Blood Updated: 01/16/24 0102    Narrative:      The following orders were created for panel order CBC & Differential.  Procedure                               Abnormality         Status                     ---------                               -----------         ------                     CBC Auto Differential[[id number]]        Abnormal            Final result                 Please view results for these tests on the individual orders.    CBC Auto Differential [[id number]]  (Abnormal) Collected: 01/16/24 0044    Specimen: Blood Updated: 01/16/24 0102     WBC 2.69 10*3/mm3      RBC 2.86 10*6/mm3      Hemoglobin 8.1 g/dL      Hematocrit 26.2 %      MCV 91.6 fL      MCH 28.3 pg      MCHC 30.9 g/dL      RDW 15.4 %      RDW-SD 51.3 fl      MPV 9.4 fL      Platelets 79 10*3/mm3      Neutrophil % 62.8 %      Lymphocyte % 20.8 %      Monocyte % 10.4 %      Eosinophil % 4.5 %      Basophil % 1.1 %      Immature Grans % 0.4 %      Neutrophils, Absolute 1.69 10*3/mm3      Lymphocytes, Absolute 0.56 10*3/mm3      Monocytes, Absolute 0.28 10*3/mm3      Eosinophils, Absolute 0.12 10*3/mm3      Basophils, Absolute 0.03 10*3/mm3      Immature Grans, Absolute 0.01 10*3/mm3      nRBC 0.0 /100 WBC                  Radiology:    Imaging Results (Last 72 Hours)       Procedure Component Value Units Date/Time    US Venous Doppler Lower Extremity Left (duplex) [[id number]] Collected: 01/16/24 0900     Updated: 01/16/24 0902    Narrative:      US VENOUS DOPPLER LOWER EXTREMITY LEFT (DUPLEX)-     CLINICAL INDICATION: Edema        COMPARISON: None immediately available      TECHNIQUE: Color Doppler imaging was used with compression and  augmentation to evaluate the right lower extremity deep venous system.     FINDINGS:  There is patent spontaneous flow from  the common femoral vein through  the posterior tibial veins.  There was no internal clot or area of noncompressibility in the left  lower extremity.  Normal augmentation was elicited where applicable.       Impression:      No DVT in the left lower extremity on today's exam.      This report was finalized on 1/16/2024 9:00 AM by Dr. Rodri Renteria MD.               Results for orders placed during the hospital encounter of 05/16/19    Adult Transthoracic Echo Complete W/ Cont if Necessary Per Protocol    Interpretation Summary  · Normal left ventricular cavity size and wall thickness noted.  · Left ventricular systolic function is normal. Estimated EF appears to be in the range of 61 - 65%.  · Left ventricular diastolic function is normal.  · Mild tricuspid valve regurgitation is present.  · . No evidence of pulmonary hypertension  · No significant valvular heart disease  · There is no evidence of pericardial effusion.      Assessment/Plan:   Fractured left hip status postrepair.  We have been notified by patient's insurance company that her further inpatient rehabilitation stay has been denied.  They recommended a lower level of care.  Myself and all the team are in agreement that this could be done with her current status.  This being the case however patient is declining nursing home placement despite our recommendation.  She does know this is our recommendation.  We did not think she was safe to return home alone at this time without needed 24/7 care.  She stated that she had a brother that lives here that could stay with her.  Later however today she stated that she had a brother that was going to come in from [place] and stay with her and he is not here yet.  The insurance company denied her stay here yet they are now allowing her to appeal herself the denial.  We were only discharging her because insurance said she could not stay.  My understanding is that the patient cannot be discharged while the appeal  process is going on therefore discharge order was canceled.  I did discuss with her orthopedic surgeon Dr. Salcedo and he said her staples could be left in until he sees the patient in the office next week.  Of note she had an appointment in the morning but was going to be unable to make this appointment because of no transportation from home.  With PT, patient is contact-guard with transfers, patient is contact-guard to standby assist with ambulation and with a front wheeled walker walked 200 feet then 160 feet.  During ambulation today patient's knee did give way but she was able to correct and then sit down in wheelchair.  With OT, patient is max assist for bathing, set up for upper body dressing, max assist lower body dressing, set up for grooming, max assist for toileting hygiene.  Patient has made progress while here.     Pancytopenia secondary to cirrhosis with splenomegaly, stable    Cirrhosis with history of esophageal varices, status post ligation, hemoglobin stable yesterday.  Patient is on SCUDs for DVT prophylaxis because of the high risk of bleeding and thrombocytopenia.  No evidence of encephalopathy at this time    Chronic pain, patient has been placed on oxycodone every 4 hours here by Dr. Tamayo, this discharge has come up fairly quickly.  Patient has a history of opiate use disorder and was on Suboxone.  In anticipation of discharge in the next 24 to 48 hours I weaned her to every 6 hours and oxycodone and I have explained that when she goes home I would change her to every 8 hours and could only write 3 days.  She also states she is on chronic Xanax at home and states she has been on this for years but by her Sergio she has not had this filled since July.  I am going to begin a wean of this off as well, the dangers of Xanax and opiates mixed explained to her.    She states her lower legs itch, I was going to check into her PVD but this is not new and with her having potential discharge, she actually  has adequate pulses, I have canceled these arterial Dopplers and we will leave this to her PCP.    Probable CKD, stable    DVT prophylaxis, Leeanna Nova MD

## 2024-01-17 NOTE — PROGRESS NOTES
Case Management  Inpatient Rehabilitation Team Conference    Conference Date/Time: 1/16/2024 10:33:46 AM    Team Conference Attendees:  MD Sheila Arellano SW Jessica Bill, RN,   BROOK Taylor, PT  Julia Quintana OT    Demographics            Age: 63Y            Gender: Female    Admission Date: 1/9/2024 4:30:00 PM  Rehabilitation Diagnosis:  left hip replacement  Comorbidities: D61.818 Other pancytopenia  K72.10 Chronic hepatic failure without coma  I13.0 Hypertensive heart and chronic kidney disease with heart failure and stage  1 through stage 4 chronic kidney disease, or unspecified chronic kidney disease  I50.9 Heart failure, unspecified  J44.9 Chronic obstructive pulmonary disease, unspecified  K74.60 Unspecified cirrhosis of liver  Z96.642 Presence of left artificial hip joint  F41.1 Generalized anxiety disorder  F17.210 Nicotine dependence, cigarettes, uncomplicated  B19.20 Unspecified viral hepatitis C without hepatic coma  F41.9 Anxiety disorder, unspecified  K21.9 Gastro-esophageal reflux disease without esophagitis  Z91.81 History of falling      Plan of Care  Anticipated Discharge Date/Estimated Length of Stay: 1-24-24  Anticipated Discharge Destination: Community discharge with assistance  Discharge Plan : Pt wants to return to her apartment alone at discharge.  Medical Necessity Expected Level Rationale: good  Intensity and Duration: an average of 3 hours/5 days per week  Medical Supervision and 24 Hour Rehab Nursing: x  Physical Therapy: x  PT Intensity/Duration: PT 1.5 hours per day/5 days per week  Occupational Therapy: x  OT Intensity/Duration: OT 1.5 hours per day/5 days per week  Social Work: x  Therapeutic Recreation: x  Updated (if changes indicated)    Anticipated Discharge Date/Estimated Length of Stay:   1-24-24      Discharge Plan of Care:    Based on the patient's medical and functional status, their prognosis and  expected level of  functional improvement is: good      Interdisciplinary Problem/Goals/Status  Pain    [RN] Pain Management(Active)  Current Status(01/15/2024): pt will be free of pain with meds  Weekly Goal(01/19/2024): decrease in pain meds  Discharge Goal: pain free        Safety    [RN] Potential for Injury(Active)  Current Status(01/12/2024): patient will ring for needs  Weekly Goal(01/19/2024): patient uses proper safety with hip  Discharge Goal: injury free        Self Care    [OT] Dressing (Lower)(Active)  Current Status(01/10/2024): max a  Weekly Goal(01/17/2024): mod a  Discharge Goal: min a        Mobility    [PT] Bed/Chair/Wheelchair(Active)  Current Status(01/10/2024): max A bm, min A tf  Weekly Goal(01/17/2024): MI  Discharge Goal: MI    [PT] Walk(Active)  Current Status(01/10/2024): amb 60' RW CGA  Weekly Goal(01/17/2024): amb 300' RW MI  Discharge Goal: amb 300' RW MI    Comments: Pt plans to return to her apartment alone at discharge.  Pt needs 24  hour assistance.  SS will discuss option of SNF if she has no one to stay with  her at home.    Signed by: WILFREDO Momin    Physician CoSigned By: Servando Nova 01/17/2024 10:00:15

## 2024-01-17 NOTE — PLAN OF CARE
Problem: Rehabilitation ([facility]) Plan of Care  Goal: Plan of Care Review  Outcome: Ongoing, Progressing  Flowsheets (Taken 1/17/2024 1413)  Progress: improving  Plan of Care Reviewed With: patient  Outcome Evaluation: Patient alert and oriented x4. Call light and items within reach. No acute signs or symptoms of distress noted. PRN pain medication administered for pain. Continue current plan of care.  Goal: Patient-Specific Goal (Individualized)  Outcome: Ongoing, Progressing  Goal: Absence of New-Onset Illness or Injury  Outcome: Ongoing, Progressing  Intervention: Prevent Fall and Fall Injury  Recent Flowsheet Documentation  Taken 1/17/2024 1000 by Sobia Slaughter RN  Safety Promotion/Fall Prevention: safety round/check completed  Taken 1/17/2024 0930 by Sobia Slaughter RN  Safety Promotion/Fall Prevention:   safety round/check completed   assistive device/personal items within reach   nonskid shoes/slippers when out of bed  Taken 1/17/2024 0800 by Sobia Slaughter RN  Safety Promotion/Fall Prevention:   safety round/check completed   nonskid shoes/slippers when out of bed   assistive device/personal items within reach  Intervention: Prevent Infection  Recent Flowsheet Documentation  Taken 1/17/2024 0930 by Sobia Slaughter RN  Infection Prevention:   hand hygiene promoted   rest/sleep promoted  Taken 1/17/2024 0800 by Sobia Slaughter RN  Infection Prevention:   hand hygiene promoted   rest/sleep promoted  Intervention: Prevent VTE (Venous Thromboembolism)  Recent Flowsheet Documentation  Taken 1/17/2024 0930 by Sobia Slaughter RN  VTE Prevention/Management: (Patient refuses at times. Education provided.) sequential compression devices on  Goal: Optimal Comfort and Wellbeing  Outcome: Ongoing, Progressing  Goal: Home and Community Transition Plan Established  Outcome: Ongoing, Progressing   Goal Outcome Evaluation:  Plan of Care Reviewed With: patient        Progress: improving  Outcome Evaluation:  Patient alert and oriented x4. Call light and items within reach. No acute signs or symptoms of distress noted. PRN pain medication administered for pain. Continue current plan of care.

## 2024-01-17 NOTE — PROGRESS NOTES
Patient Assessment Instrument  Quality Indicators - Discharge FY 2023    Section A. Transportation      Section A. Medication List        Section B. Health Literacy      Section C. BIMS      Section C. Signs and Symptoms of Delirium (from CAM)      Section D. Mood      Section D. Social Isolation      Section GG. Self-Care Performance     Eating: Shalimar sets up or cleans up; patient completes activity. Shalimar assists  only prior to or following the activity.   Oral Hygiene: Shalimar sets up or cleans up; patient completes activity. Shalimar  assists only prior to or following the activity.   Toileting Hygiene: : Shalimar does more than half the effort. Shalimar lifts or  holds trunk or limbs and provides more than half the effort.   Shower/Bathe Self: Shalimar does less than half the effort. Shalimar lifts, holds  or supports trunk or limbs but provides less than half the effort.   Upper Body Dressing: Shalimar sets up or cleans up; patient completes activity.  Shalimar assists only prior to or following the activity.   Lower Body Dressing: Shalimar does more than half the effort. Shalimar lifts or  holds trunk or limbs and provides more than half the effort.   Putting On/Taking Off Footwear: Shalimar does more than half the effort. Shalimar  lifts or holds trunk or limbs and provides more than half the effort.    Section GG. Mobility Performance      Section J. Health Conditions Discharge      Section J. Health Conditions (Pain)      Section K. Swallowing/Nutritional Status  Nutritional Approaches Past 7 Days:  Nutritional Approaches at Discharge:    Section M. Skin Conditions Discharge      . Current Number of Unhealed Pressure Ulcers      Section N. Medication        Section O. Special Treatments, Procedures, and Programs    Signed by: Julia Quintana, OT

## 2024-01-17 NOTE — THERAPY DISCHARGE NOTE
Inpatient Rehabilitation - IRF Occupational Therapy Treatment Note/Discharge  ALBERTA Javier     Patient Name: Phuong Tuttle  : 1960  MRN: [id number]  Today's Date: 2024               Admit Date: 2024     No diagnosis found.  Patient Active Problem List   Diagnosis    COPD (chronic obstructive pulmonary disease)    Current smoker    Iron deficiency anemia, unspecified    Chest pain    End stage liver disease    Obesity (BMI 30-39.9)    Portal hypertension    Hepatitis C    History of substance abuse    Esophageal varices    Splenomegaly    Chronic kidney disease (CKD), stage III (moderate)    Leukopenia    Splenic pancytopenia syndrome    Iron deficiency anemia    Iron malabsorption    S/p left hip fracture     Past Medical History:   Diagnosis Date    Chronic kidney disease (CKD), stage III (moderate) 2020    COPD (chronic obstructive pulmonary disease)     non-oxygen dependent    End stage liver disease 2020    Esophageal varices     GI bleed     history of GI bleed     Hepatitis C 2020    History of substance abuse 2020    Immunocompromised patient     Peptic ulceration     Portal hypertension 2020    Splenomegaly 2020     Past Surgical History:   Procedure Laterality Date    ESOPHAGEAL VARICES LIGATION      UPPER GASTROINTESTINAL ENDOSCOPY         IRF OT ASSESSMENT FLOWSHEET (last 12 hours)       IRF OT Evaluation and Treatment       Row Name 24 1431          OT Time and Intention    Document Type daily treatment;discharge evaluation  -HB     Mode of Treatment individual therapy;occupational therapy  -HB     Total Minutes, Occupational Therapy 100  -HB     Patient Effort good  -HB     Symptoms Noted During/After Treatment none  -HB       Row Name 24 1431          General Information    Patient Profile Reviewed yes  -HB     Patient/Family/Caregiver Comments/Observations Patient and RN okd OT this date. Per SW and staff pt is being discharged this date.  -HB      General Observations of Patient Patient tolerated OT well with no adverse reactions.  -     Existing Precautions/Restrictions fall;left;hip  -[facility] Name 01/17/24 1431          Pain Assessment    Pretreatment Pain Rating 10/10  -     Posttreatment Pain Rating 10/10  -     Pain Location - Side/Orientation --  RN aware and addressing  -[facility] Name 01/17/24 1431          Cognition/Psychosocial    Affect/Mental Status (Cognition) WFL  -     Orientation Status (Cognition) oriented x 3  -     Follows Commands (Cognition) WFL;verbal cues/prompting required;physical/tactile prompts required  -[facility] Name 01/17/24 1431          Bathing    [place] Level (Bathing) bathing skills;upper body;set up;lower body;maximum assist (25% patient effort)  -     Comment (Bathing) pt given LHS  -HB       Row Name 01/17/24 1431          Upper Body Dressing    [place] Level (Upper Body Dressing) upper body dressing skills;set up assistance  -HB       Row Name 01/17/24 1431          Lower Body Dressing    [place] Level (Lower Body Dressing) maximum assist (25% patient effort)  -     Set-up Assistance (Lower Body Dressing) --  pt issued sock aid and reacher  -HB       Row Name 01/17/24 1431          Grooming    [place] Level (Grooming) grooming skills;set up  -HB       Row Name 01/17/24 1431          Toileting    [place] Level (Toileting) adjust/manage clothing;maximum assist (25% patient effort)  -HB       Row Name 01/17/24 1431          Self-Feeding    [place] Level (Self-Feeding) feeding skills;set up  -HB       Row Name 01/17/24 1431          Sit-Stand Transfer    Sit-Stand [place] (Transfers) minimum assist (75% patient effort);contact guard;nonverbal cues (demo/gesture);verbal cues  -[facility] Name 01/17/24 1431          Stand-Sit Transfer    Stand-Sit [place] (Transfers) minimum assist (75% patient effort);contact guard;nonverbal cues (demo/gesture);verbal  cues  -[facility] Name 01/17/24 1431          Motor Skills    Motor Skills functional endurance;coordination;motor control/coordination interventions  -     Motor Control/Coordination Interventions fine motor manipulation/dexterity activities;therapeutic exercise/ROM;gross motor coordination activities  -     Therapeutic Exercise elbow/forearm;wrist;shoulder;hand  -HB     Additional Documentation --  rickshaw 10 lbs 4 sets 20 reps; PRE 10 min ; BUE TA to increase ADL status/ endurance;  -[facility] Name 01/17/24 1431          Positioning and Restraints    Pre-Treatment Position sitting in chair/recliner  -     Post Treatment Position wheelchair  -HB     In Wheelchair with PT  -     Bathroom call light within reach;encouraged to call for assist  second session pt found in chair in room and left in chair at end of tx with all needs met  -[facility] Name 01/17/24 1431          Therapy Plan Review/Discharge Plan (OT)    Anticipated Discharge Disposition (OT) home with assist;home with home health  -[facility] Name 01/17/24 1431          Transfer Goal 1 (OT-IRF)    Activity/Assistive Device (Transfer Goal 1, OT-IRF) all transfers  -HB     [place] Level (Transfer Goal 1, OT-IRF) contact guard required  -HB     Time Frame (Transfer Goal 1, OT-IRF) short-term goal (STG)  -HB     Progress/Outcomes (Transfer Goal 1, OT-IRF) good progress toward goal  -HB       [facility] Name 01/17/24 1431          Transfer Goal 2 (OT-IRF)    Activity/Assistive Device (Transfer Goal 2, OT-IRF) all transfers  -HB     [place] Level (Transfer Goal 2, OT-IRF) supervision required  -HB     Time Frame (Transfer Goal 2, OT-IRF) long-term goal (LTG)  -HB     Progress/Outcomes (Transfer Goal 2, OT-IRF) good progress toward goal  -HB       [facility] Name 01/17/24 1431          LB Dressing Goal 1 (OT-IRF)    Activity/Device (LB Dressing Goal 1, OT-IRF) lower body dressing  -HB     [place] (LB Dressing Goal 1, OT-IRF) moderate assist  (50-74% patient effort)  -HB     Time Frame (LB Dressing Goal 1, OT-IRF) short-term goal (STG)  -HB     Progress/Outcomes (LB Dressing Goal 1, OT-IRF) goal partially met  -HB       Row Name 01/17/24 1431          LB Dressing Goal 2 (OT-IRF)    Activity/Device (LB Dressing Goal 2, OT-IRF) lower body dressing  -HB     [place] (LB Dressing Goal 2, OT-IRF) minimum assist (75% or more patient effort)  -HB     Time Frame (LB Dressing Goal 2, OT-IRF) long-term goal (LTG)  -HB     Progress/Outcomes (LB Dressing Goal 2, OT-IRF) goal partially met  -HB       Row Name 01/17/24 1431          Toileting Goal 1 (OT-IRF)    Activity/Device (Toileting Goal 1, OT-IRF) toileting skills, all  -HB     [place] Level (Toileting Goal 1, OT-IRF) moderate assist (50-74% patient effort)  -HB     Progress/Outcomes (Toileting Goal 1, OT-IRF) goal partially met  -HB     Time Frame (Toileting Goal 1, OT-IRF) short-term goal (STG)  -HB       Row Name 01/17/24 1431          Toileting Goal 2 (OT-IRF)    Activity/Device (Toileting Goal 2, OT-IRF) toileting skills, all  -HB     [place] Level (Toileting Goal 2, OT-IRF) minimum assist (75% or more patient effort)  -HB     Progress/Outcomes (Toileting Goal 2, OT-IRF) goal partially met  -HB     Time Frame (Toileting Goal 2, OT-IRF) long-term goal (LTG)  -HB       Row Name 01/17/24 1431          Discharge Summary (OT)    Outcomes Achieved Upon Discharge (OT) goals partially achieved prior to discharge;progress was made toward goals  -HB               User Key  (r) = Recorded By, (t) = Taken By, (c) = Cosigned By      Initials Name Effective Dates    HB Julia Quintana, OT 05/25/21 -                   Wound 01/09/24 1655 Left anterior hip Incision (Active)   Dressing Appearance dry;intact 01/17/24 0930   Closure Adhesive bandage;Staples 01/17/24 0930   Base clean;dry 01/17/24 0930   Periwound dry;intact 01/16/24 1920   Edges rolled/closed 01/16/24 1920   Drainage Amount none 01/17/24 0969    Care, Wound other (see comments) 01/16/24 1920   Dressing Care dressing changed 01/17/24 0930       Occupational Therapy Education       Title: PT OT SLP Therapies (Done)       Topic: Occupational Therapy (Done)       Point: ADL training (Done)       Description:   Instruct learner(s) on proper safety adaptation and remediation techniques during self care or transfers.   Instruct in proper use of assistive devices.                  Learning Progress Summary             Patient Acceptance, E, VU,NR by HB at 1/17/2024 1452    Acceptance, E, VU,NR by HB at 1/16/2024 1508    Acceptance, E, VU,NR by HB at 1/15/2024 0951    Acceptance, E, VU,NR by HB at 1/13/2024 1208    Acceptance, E, VU,NR by HB at 1/12/2024 1154    Acceptance, E, VU,NR by HB at 1/11/2024 1354    Acceptance, E, VU,NR by HB at 1/10/2024 1413                         Point: Home exercise program (Done)       Description:   Instruct learner(s) on appropriate technique for monitoring, assisting and/or progressing therapeutic exercises/activities.                  Learning Progress Summary             Patient Acceptance, E, VU,NR by HB at 1/17/2024 1452    Acceptance, E, VU,NR by HB at 1/16/2024 1508    Acceptance, E, VU,NR by HB at 1/15/2024 0951    Acceptance, E, VU,NR by HB at 1/13/2024 1208    Acceptance, E, VU,NR by HB at 1/12/2024 1154    Acceptance, E, VU,NR by HB at 1/11/2024 1354    Acceptance, E, VU,NR by HB at 1/10/2024 1413                         Point: Precautions (Done)       Description:   Instruct learner(s) on prescribed precautions during self-care and functional transfers.                  Learning Progress Summary             Patient Acceptance, E, VU,NR by HB at 1/17/2024 1452    Acceptance, E, VU,NR by HB at 1/16/2024 1508    Acceptance, E, VU,NR by HB at 1/15/2024 0951    Acceptance, E, VU,NR by HB at 1/13/2024 1208    Acceptance, E, VU,NR by HB at 1/12/2024 1154    Acceptance, E, VU,NR by HB at 1/11/2024 1354    Acceptance, E, KAT,NR  by  at 1/10/2024 1413                         Point: Body mechanics (Done)       Description:   Instruct learner(s) on proper positioning and spine alignment during self-care, functional mobility activities and/or exercises.                  Learning Progress Summary             Patient Acceptance, E, VU,NR by  at 1/17/2024 1452    Acceptance, E, VU,NR by  at 1/16/2024 1508    Acceptance, E, VU,NR by  at 1/15/2024 0951    Acceptance, E, VU,NR by  at 1/13/2024 1208    Acceptance, E, VU,NR by HB at 1/12/2024 1154    Acceptance, E, VU,NR by  at 1/11/2024 1354    Acceptance, E, VU,NR by  at 1/10/2024 1413                                         User Key       Initials Effective Dates Name Provider Type Discipline     05/25/21 -  Julia Quintana OT Occupational Therapist OT                    OT Recommendation and Plan  Planned Therapy Interventions (OT): activity tolerance training, adaptive equipment training, BADL retraining, IADL retraining, functional balance retraining, strengthening exercise, ROM/therapeutic exercise, patient/caregiver education/training, transfer/mobility retraining           OT IRF GOALS       Row Name 01/17/24 1431 01/15/24 0944 01/11/24 1347       Transfer Goal 1 (OT-IRF)    Activity/Assistive Device (Transfer Goal 1, OT-IRF) all transfers  -HB all transfers  -HB all transfers  -HB    [place] Level (Transfer Goal 1, OT-IRF) contact guard required  -HB contact guard required  -HB contact guard required  -HB    Time Frame (Transfer Goal 1, OT-IRF) short-term goal (STG)  -HB short-term goal (STG)  -HB short-term goal (STG)  -HB    Progress/Outcomes (Transfer Goal 1, OT-IRF) good progress toward goal  -HB good progress toward goal  -HB goal ongoing  -HB       Transfer Goal 2 (OT-IRF)    Activity/Assistive Device (Transfer Goal 2, OT-IRF) all transfers  -HB all transfers  -HB all transfers  -HB    [place] Level (Transfer Goal 2, OT-IRF) supervision required  -HB supervision  required  -HB supervision required  -HB    Time Frame (Transfer Goal 2, OT-IRF) long-term goal (LTG)  -HB long-term goal (LTG)  -HB long-term goal (LTG)  -HB    Progress/Outcomes (Transfer Goal 2, OT-IRF) good progress toward goal  -HB good progress toward goal  -HB goal ongoing  -HB       LB Dressing Goal 1 (OT-IRF)    Activity/Device (LB Dressing Goal 1, OT-IRF) lower body dressing  -HB lower body dressing  -HB lower body dressing  -HB    [place] (LB Dressing Goal 1, OT-IRF) moderate assist (50-74% patient effort)  -HB moderate assist (50-74% patient effort)  -HB moderate assist (50-74% patient effort)  -HB    Time Frame (LB Dressing Goal 1, OT-IRF) short-term goal (STG)  -HB short-term goal (STG)  -HB short-term goal (STG)  -HB    Progress/Outcomes (LB Dressing Goal 1, OT-IRF) goal partially met  -HB good progress toward goal  -HB goal ongoing  -HB       LB Dressing Goal 2 (OT-IRF)    Activity/Device (LB Dressing Goal 2, OT-IRF) lower body dressing  -HB lower body dressing  -HB lower body dressing  -HB    [place] (LB Dressing Goal 2, OT-IRF) minimum assist (75% or more patient effort)  -HB minimum assist (75% or more patient effort)  -HB minimum assist (75% or more patient effort)  -HB    Time Frame (LB Dressing Goal 2, OT-IRF) long-term goal (LTG)  -HB long-term goal (LTG)  -HB long-term goal (LTG)  -HB    Progress/Outcomes (LB Dressing Goal 2, OT-IRF) goal partially met  -HB good progress toward goal  -HB goal ongoing  -HB       Toileting Goal 1 (OT-IRF)    Activity/Device (Toileting Goal 1, OT-IRF) toileting skills, all  -HB toileting skills, all  -HB toileting skills, all  -HB    [place] Level (Toileting Goal 1, OT-IRF) moderate assist (50-74% patient effort)  -HB moderate assist (50-74% patient effort)  -HB moderate assist (50-74% patient effort)  -HB    Progress/Outcomes (Toileting Goal 1, OT-IRF) goal partially met  -HB good progress toward goal  -HB goal ongoing  -HB    Time Frame  (Toileting Goal 1, OT-IRF) short-term goal (STG)  -HB -- short-term goal (STG)  -HB       Toileting Goal 2 (OT-IRF)    Activity/Device (Toileting Goal 2, OT-IRF) toileting skills, all  -HB -- toileting skills, all  -HB    [place] Level (Toileting Goal 2, OT-IRF) minimum assist (75% or more patient effort)  -HB -- minimum assist (75% or more patient effort)  -HB    Progress/Outcomes (Toileting Goal 2, OT-IRF) goal partially met  -HB -- goal ongoing  -HB    Time Frame (Toileting Goal 2, OT-IRF) long-term goal (LTG)  -HB -- long-term goal (LTG)  -HB      Row Name 01/10/24 1404             Transfer Goal 1 (OT-IRF)    Activity/Assistive Device (Transfer Goal 1, OT-IRF) all transfers  -HB      [place] Level (Transfer Goal 1, OT-IRF) contact guard required  -HB      Time Frame (Transfer Goal 1, OT-IRF) short-term goal (STG)  -HB      Progress/Outcomes (Transfer Goal 1, OT-IRF) new goal  -HB         Transfer Goal 2 (OT-IRF)    Activity/Assistive Device (Transfer Goal 2, OT-IRF) all transfers  -HB      [place] Level (Transfer Goal 2, OT-IRF) supervision required  -HB      Time Frame (Transfer Goal 2, OT-IRF) long-term goal (LTG)  -HB      Progress/Outcomes (Transfer Goal 2, OT-IRF) new goal  -HB         LB Dressing Goal 1 (OT-IRF)    Activity/Device (LB Dressing Goal 1, OT-IRF) lower body dressing  -HB      [place] (LB Dressing Goal 1, OT-IRF) moderate assist (50-74% patient effort)  -HB      Time Frame (LB Dressing Goal 1, OT-IRF) short-term goal (STG)  -HB      Progress/Outcomes (LB Dressing Goal 1, OT-IRF) new goal  -HB         LB Dressing Goal 2 (OT-IRF)    Activity/Device (LB Dressing Goal 2, OT-IRF) lower body dressing  -HB      [place] (LB Dressing Goal 2, OT-IRF) minimum assist (75% or more patient effort)  -HB      Time Frame (LB Dressing Goal 2, OT-IRF) long-term goal (LTG)  -HB      Progress/Outcomes (LB Dressing Goal 2, OT-IRF) new goal  -HB         Toileting Goal 1 (OT-IRF)     Activity/Device (Toileting Goal 1, OT-IRF) toileting skills, all  -HB      [place] Level (Toileting Goal 1, OT-IRF) moderate assist (50-74% patient effort)  -HB      Progress/Outcomes (Toileting Goal 1, OT-IRF) new goal  -HB      Time Frame (Toileting Goal 1, OT-IRF) short-term goal (STG)  -HB         Toileting Goal 2 (OT-IRF)    Activity/Device (Toileting Goal 2, OT-IRF) toileting skills, all  -HB      [place] Level (Toileting Goal 2, OT-IRF) minimum assist (75% or more patient effort)  -HB      Progress/Outcomes (Toileting Goal 2, OT-IRF) new goal  -HB      Time Frame (Toileting Goal 2, OT-IRF) long-term goal (LTG)  -HB                User Key  (r) = Recorded By, (t) = Taken By, (c) = Cosigned By      Initials Name Provider Type    HB Julia Quintana OT Occupational Therapist                        Time Calculation:       Therapy Charges for Today       Code Description Service Date Service Provider Modifiers Qty    34392977716 HC OT SELF CARE/MGMT/TRAIN EA 15 MIN 1/16/2024 Julia Quintana OT GO 2    86343856429 HC OT THER PROC EA 15 MIN 1/16/2024 Julia Quintana OT GO 2    70422199523 HC OT THERAPEUTIC ACT EA 15 MIN 1/16/2024 Julia Quintana OT GO 2    70770438119 HC OT SELF CARE/MGMT/TRAIN EA 15 MIN 1/17/2024 Julia Quintana OT GO 2    95003031163 HC OT THER PROC EA 15 MIN 1/17/2024 Julia Quintana OT GO 2    95857001413 HC OT THERAPEUTIC ACT EA 15 MIN 1/17/2024 Julia Quintana OT GO 3                      Julia Quintana OT  1/17/2024

## 2024-01-17 NOTE — THERAPY DISCHARGE NOTE
Inpatient Rehabilitation - IRF Occupational Therapy Treatment Note/Discharge  ALBERTA Javier     Patient Name: Phuong Tuttle  : 1960  MRN: [id number]  Today's Date: 2024               Admit Date: 2024     No diagnosis found.  Patient Active Problem List   Diagnosis    COPD (chronic obstructive pulmonary disease)    Current smoker    Iron deficiency anemia, unspecified    Chest pain    End stage liver disease    Obesity (BMI 30-39.9)    Portal hypertension    Hepatitis C    History of substance abuse    Esophageal varices    Splenomegaly    Chronic kidney disease (CKD), stage III (moderate)    Leukopenia    Splenic pancytopenia syndrome    Iron deficiency anemia    Iron malabsorption    S/p left hip fracture     Past Medical History:   Diagnosis Date    Chronic kidney disease (CKD), stage III (moderate) 2020    COPD (chronic obstructive pulmonary disease)     non-oxygen dependent    End stage liver disease 2020    Esophageal varices     GI bleed     history of GI bleed     Hepatitis C 2020    History of substance abuse 2020    Immunocompromised patient     Peptic ulceration     Portal hypertension 2020    Splenomegaly 2020     Past Surgical History:   Procedure Laterality Date    ESOPHAGEAL VARICES LIGATION      UPPER GASTROINTESTINAL ENDOSCOPY         IRF OT ASSESSMENT FLOWSHEET (last 12 hours)       IRF OT Evaluation and Treatment       Row Name 24 1431          OT Time and Intention    Document Type daily treatment;discharge evaluation  -HB     Mode of Treatment individual therapy;occupational therapy  -HB     Total Minutes, Occupational Therapy 100  -HB     Patient Effort good  -HB     Symptoms Noted During/After Treatment none  -HB       Row Name 24 1431          General Information    Patient Profile Reviewed yes  -HB     Patient/Family/Caregiver Comments/Observations Patient and RN okd OT this date. Per SW and staff pt is being discharged this date.  -HB      General Observations of Patient Patient tolerated OT well with no adverse reactions.  -     Existing Precautions/Restrictions fall;left;hip  -[facility] Name 01/17/24 1431          Pain Assessment    Pretreatment Pain Rating 10/10  -     Posttreatment Pain Rating 10/10  -     Pain Location - Side/Orientation --  RN aware and addressing  -[facility] Name 01/17/24 1431          Cognition/Psychosocial    Affect/Mental Status (Cognition) WFL  -     Orientation Status (Cognition) oriented x 3  -     Follows Commands (Cognition) WFL;verbal cues/prompting required;physical/tactile prompts required  -[facility] Name 01/17/24 1431          Bathing    [place] Level (Bathing) bathing skills;upper body;set up;lower body;maximum assist (25% patient effort)  -     Comment (Bathing) pt given LHS  -HB       Row Name 01/17/24 1431          Upper Body Dressing    [place] Level (Upper Body Dressing) upper body dressing skills;set up assistance  -HB       Row Name 01/17/24 1431          Lower Body Dressing    [place] Level (Lower Body Dressing) maximum assist (25% patient effort)  -     Set-up Assistance (Lower Body Dressing) --  pt issued sock aid and reacher  -HB       Row Name 01/17/24 1431          Grooming    [place] Level (Grooming) grooming skills;set up  -HB       Row Name 01/17/24 1431          Toileting    [place] Level (Toileting) adjust/manage clothing;maximum assist (25% patient effort)  -HB       Row Name 01/17/24 1431          Self-Feeding    [place] Level (Self-Feeding) feeding skills;set up  -HB       Row Name 01/17/24 1431          Sit-Stand Transfer    Sit-Stand [place] (Transfers) minimum assist (75% patient effort);contact guard;nonverbal cues (demo/gesture);verbal cues  -[facility] Name 01/17/24 1431          Stand-Sit Transfer    Stand-Sit [place] (Transfers) minimum assist (75% patient effort);contact guard;nonverbal cues (demo/gesture);verbal  cues  -[facility] Name 01/17/24 1431          Motor Skills    Motor Skills functional endurance;coordination;motor control/coordination interventions  -     Motor Control/Coordination Interventions fine motor manipulation/dexterity activities;therapeutic exercise/ROM;gross motor coordination activities  -     Therapeutic Exercise elbow/forearm;wrist;shoulder;hand  -HB     Additional Documentation --  rickshaw 10 lbs 4 sets 20 reps; PRE 10 min ; BUE TA to increase ADL status/ endurance;  -[facility] Name 01/17/24 1431          Positioning and Restraints    Pre-Treatment Position sitting in chair/recliner  -     Post Treatment Position wheelchair  -HB     In Wheelchair with PT  -     Bathroom call light within reach;encouraged to call for assist  second session pt found in chair in room and left in chair at end of tx with all needs met  -[facility] Name 01/17/24 1431          Therapy Plan Review/Discharge Plan (OT)    Anticipated Discharge Disposition (OT) home with assist;home with home health  -[facility] Name 01/17/24 1431          Transfer Goal 1 (OT-IRF)    Activity/Assistive Device (Transfer Goal 1, OT-IRF) all transfers  -HB     [place] Level (Transfer Goal 1, OT-IRF) contact guard required  -HB     Time Frame (Transfer Goal 1, OT-IRF) short-term goal (STG)  -HB     Progress/Outcomes (Transfer Goal 1, OT-IRF) good progress toward goal  -HB       [facility] Name 01/17/24 1431          Transfer Goal 2 (OT-IRF)    Activity/Assistive Device (Transfer Goal 2, OT-IRF) all transfers  -HB     [place] Level (Transfer Goal 2, OT-IRF) supervision required  -HB     Time Frame (Transfer Goal 2, OT-IRF) long-term goal (LTG)  -HB     Progress/Outcomes (Transfer Goal 2, OT-IRF) good progress toward goal  -HB       [facility] Name 01/17/24 1431          LB Dressing Goal 1 (OT-IRF)    Activity/Device (LB Dressing Goal 1, OT-IRF) lower body dressing  -HB     [place] (LB Dressing Goal 1, OT-IRF) moderate assist  (50-74% patient effort)  -HB     Time Frame (LB Dressing Goal 1, OT-IRF) short-term goal (STG)  -HB     Progress/Outcomes (LB Dressing Goal 1, OT-IRF) goal partially met  -HB       Row Name 01/17/24 1431          LB Dressing Goal 2 (OT-IRF)    Activity/Device (LB Dressing Goal 2, OT-IRF) lower body dressing  -HB     [place] (LB Dressing Goal 2, OT-IRF) minimum assist (75% or more patient effort)  -HB     Time Frame (LB Dressing Goal 2, OT-IRF) long-term goal (LTG)  -HB     Progress/Outcomes (LB Dressing Goal 2, OT-IRF) goal partially met  -HB       Row Name 01/17/24 1431          Toileting Goal 1 (OT-IRF)    Activity/Device (Toileting Goal 1, OT-IRF) toileting skills, all  -HB     [place] Level (Toileting Goal 1, OT-IRF) moderate assist (50-74% patient effort)  -HB     Progress/Outcomes (Toileting Goal 1, OT-IRF) goal partially met  -HB     Time Frame (Toileting Goal 1, OT-IRF) short-term goal (STG)  -HB       Row Name 01/17/24 1431          Toileting Goal 2 (OT-IRF)    Activity/Device (Toileting Goal 2, OT-IRF) toileting skills, all  -HB     [place] Level (Toileting Goal 2, OT-IRF) minimum assist (75% or more patient effort)  -HB     Progress/Outcomes (Toileting Goal 2, OT-IRF) goal partially met  -HB     Time Frame (Toileting Goal 2, OT-IRF) long-term goal (LTG)  -HB       Row Name 01/17/24 1431          Discharge Summary (OT)    Outcomes Achieved Upon Discharge (OT) goals partially achieved prior to discharge;progress was made toward goals  -HB               User Key  (r) = Recorded By, (t) = Taken By, (c) = Cosigned By      Initials Name Effective Dates    HB Julia Quintana, OT 05/25/21 -                   Wound 01/09/24 1655 Left anterior hip Incision (Active)   Dressing Appearance dry;intact 01/17/24 0930   Closure Adhesive bandage;Staples 01/17/24 0930   Base clean;dry 01/17/24 0930   Periwound dry;intact 01/16/24 1920   Edges rolled/closed 01/16/24 1920   Drainage Amount none 01/17/24 0915    Care, Wound other (see comments) 01/16/24 1920   Dressing Care dressing changed 01/17/24 0930       Occupational Therapy Education       Title: PT OT SLP Therapies (Done)       Topic: Occupational Therapy (Done)       Point: ADL training (Done)       Description:   Instruct learner(s) on proper safety adaptation and remediation techniques during self care or transfers.   Instruct in proper use of assistive devices.                  Learning Progress Summary             Patient Acceptance, E, VU,NR by HB at 1/17/2024 1452    Acceptance, E, VU,NR by HB at 1/16/2024 1508    Acceptance, E, VU,NR by HB at 1/15/2024 0951    Acceptance, E, VU,NR by HB at 1/13/2024 1208    Acceptance, E, VU,NR by HB at 1/12/2024 1154    Acceptance, E, VU,NR by HB at 1/11/2024 1354    Acceptance, E, VU,NR by HB at 1/10/2024 1413                         Point: Home exercise program (Done)       Description:   Instruct learner(s) on appropriate technique for monitoring, assisting and/or progressing therapeutic exercises/activities.                  Learning Progress Summary             Patient Acceptance, E, VU,NR by HB at 1/17/2024 1452    Acceptance, E, VU,NR by HB at 1/16/2024 1508    Acceptance, E, VU,NR by HB at 1/15/2024 0951    Acceptance, E, VU,NR by HB at 1/13/2024 1208    Acceptance, E, VU,NR by HB at 1/12/2024 1154    Acceptance, E, VU,NR by HB at 1/11/2024 1354    Acceptance, E, VU,NR by HB at 1/10/2024 1413                         Point: Precautions (Done)       Description:   Instruct learner(s) on prescribed precautions during self-care and functional transfers.                  Learning Progress Summary             Patient Acceptance, E, VU,NR by HB at 1/17/2024 1452    Acceptance, E, VU,NR by HB at 1/16/2024 1508    Acceptance, E, VU,NR by HB at 1/15/2024 0951    Acceptance, E, VU,NR by HB at 1/13/2024 1208    Acceptance, E, VU,NR by HB at 1/12/2024 1154    Acceptance, E, VU,NR by HB at 1/11/2024 1354    Acceptance, E, KAT,NR  by  at 1/10/2024 1413                         Point: Body mechanics (Done)       Description:   Instruct learner(s) on proper positioning and spine alignment during self-care, functional mobility activities and/or exercises.                  Learning Progress Summary             Patient Acceptance, E, VU,NR by  at 1/17/2024 1452    Acceptance, E, VU,NR by  at 1/16/2024 1508    Acceptance, E, VU,NR by  at 1/15/2024 0951    Acceptance, E, VU,NR by  at 1/13/2024 1208    Acceptance, E, VU,NR by HB at 1/12/2024 1154    Acceptance, E, VU,NR by  at 1/11/2024 1354    Acceptance, E, VU,NR by  at 1/10/2024 1413                                         User Key       Initials Effective Dates Name Provider Type Discipline     05/25/21 -  Julia Quintana OT Occupational Therapist OT                    OT Recommendation and Plan  Planned Therapy Interventions (OT): activity tolerance training, adaptive equipment training, BADL retraining, IADL retraining, functional balance retraining, strengthening exercise, ROM/therapeutic exercise, patient/caregiver education/training, transfer/mobility retraining           OT IRF GOALS       Row Name 01/17/24 1431 01/15/24 0944 01/11/24 1347       Transfer Goal 1 (OT-IRF)    Activity/Assistive Device (Transfer Goal 1, OT-IRF) all transfers  -HB all transfers  -HB all transfers  -HB    [place] Level (Transfer Goal 1, OT-IRF) contact guard required  -HB contact guard required  -HB contact guard required  -HB    Time Frame (Transfer Goal 1, OT-IRF) short-term goal (STG)  -HB short-term goal (STG)  -HB short-term goal (STG)  -HB    Progress/Outcomes (Transfer Goal 1, OT-IRF) good progress toward goal  -HB good progress toward goal  -HB goal ongoing  -HB       Transfer Goal 2 (OT-IRF)    Activity/Assistive Device (Transfer Goal 2, OT-IRF) all transfers  -HB all transfers  -HB all transfers  -HB    [place] Level (Transfer Goal 2, OT-IRF) supervision required  -HB supervision  required  -HB supervision required  -HB    Time Frame (Transfer Goal 2, OT-IRF) long-term goal (LTG)  -HB long-term goal (LTG)  -HB long-term goal (LTG)  -HB    Progress/Outcomes (Transfer Goal 2, OT-IRF) good progress toward goal  -HB good progress toward goal  -HB goal ongoing  -HB       LB Dressing Goal 1 (OT-IRF)    Activity/Device (LB Dressing Goal 1, OT-IRF) lower body dressing  -HB lower body dressing  -HB lower body dressing  -HB    [place] (LB Dressing Goal 1, OT-IRF) moderate assist (50-74% patient effort)  -HB moderate assist (50-74% patient effort)  -HB moderate assist (50-74% patient effort)  -HB    Time Frame (LB Dressing Goal 1, OT-IRF) short-term goal (STG)  -HB short-term goal (STG)  -HB short-term goal (STG)  -HB    Progress/Outcomes (LB Dressing Goal 1, OT-IRF) goal partially met  -HB good progress toward goal  -HB goal ongoing  -HB       LB Dressing Goal 2 (OT-IRF)    Activity/Device (LB Dressing Goal 2, OT-IRF) lower body dressing  -HB lower body dressing  -HB lower body dressing  -HB    [place] (LB Dressing Goal 2, OT-IRF) minimum assist (75% or more patient effort)  -HB minimum assist (75% or more patient effort)  -HB minimum assist (75% or more patient effort)  -HB    Time Frame (LB Dressing Goal 2, OT-IRF) long-term goal (LTG)  -HB long-term goal (LTG)  -HB long-term goal (LTG)  -HB    Progress/Outcomes (LB Dressing Goal 2, OT-IRF) goal partially met  -HB good progress toward goal  -HB goal ongoing  -HB       Toileting Goal 1 (OT-IRF)    Activity/Device (Toileting Goal 1, OT-IRF) toileting skills, all  -HB toileting skills, all  -HB toileting skills, all  -HB    [place] Level (Toileting Goal 1, OT-IRF) moderate assist (50-74% patient effort)  -HB moderate assist (50-74% patient effort)  -HB moderate assist (50-74% patient effort)  -HB    Progress/Outcomes (Toileting Goal 1, OT-IRF) goal partially met  -HB good progress toward goal  -HB goal ongoing  -HB    Time Frame  (Toileting Goal 1, OT-IRF) short-term goal (STG)  -HB -- short-term goal (STG)  -HB       Toileting Goal 2 (OT-IRF)    Activity/Device (Toileting Goal 2, OT-IRF) toileting skills, all  -HB -- toileting skills, all  -HB    [place] Level (Toileting Goal 2, OT-IRF) minimum assist (75% or more patient effort)  -HB -- minimum assist (75% or more patient effort)  -HB    Progress/Outcomes (Toileting Goal 2, OT-IRF) goal partially met  -HB -- goal ongoing  -HB    Time Frame (Toileting Goal 2, OT-IRF) long-term goal (LTG)  -HB -- long-term goal (LTG)  -HB      Row Name 01/10/24 1404             Transfer Goal 1 (OT-IRF)    Activity/Assistive Device (Transfer Goal 1, OT-IRF) all transfers  -HB      [place] Level (Transfer Goal 1, OT-IRF) contact guard required  -HB      Time Frame (Transfer Goal 1, OT-IRF) short-term goal (STG)  -HB      Progress/Outcomes (Transfer Goal 1, OT-IRF) new goal  -HB         Transfer Goal 2 (OT-IRF)    Activity/Assistive Device (Transfer Goal 2, OT-IRF) all transfers  -HB      [place] Level (Transfer Goal 2, OT-IRF) supervision required  -HB      Time Frame (Transfer Goal 2, OT-IRF) long-term goal (LTG)  -HB      Progress/Outcomes (Transfer Goal 2, OT-IRF) new goal  -HB         LB Dressing Goal 1 (OT-IRF)    Activity/Device (LB Dressing Goal 1, OT-IRF) lower body dressing  -HB      [place] (LB Dressing Goal 1, OT-IRF) moderate assist (50-74% patient effort)  -HB      Time Frame (LB Dressing Goal 1, OT-IRF) short-term goal (STG)  -HB      Progress/Outcomes (LB Dressing Goal 1, OT-IRF) new goal  -HB         LB Dressing Goal 2 (OT-IRF)    Activity/Device (LB Dressing Goal 2, OT-IRF) lower body dressing  -HB      [place] (LB Dressing Goal 2, OT-IRF) minimum assist (75% or more patient effort)  -HB      Time Frame (LB Dressing Goal 2, OT-IRF) long-term goal (LTG)  -HB      Progress/Outcomes (LB Dressing Goal 2, OT-IRF) new goal  -HB         Toileting Goal 1 (OT-IRF)     Activity/Device (Toileting Goal 1, OT-IRF) toileting skills, all  -HB      [place] Level (Toileting Goal 1, OT-IRF) moderate assist (50-74% patient effort)  -HB      Progress/Outcomes (Toileting Goal 1, OT-IRF) new goal  -HB      Time Frame (Toileting Goal 1, OT-IRF) short-term goal (STG)  -HB         Toileting Goal 2 (OT-IRF)    Activity/Device (Toileting Goal 2, OT-IRF) toileting skills, all  -HB      [place] Level (Toileting Goal 2, OT-IRF) minimum assist (75% or more patient effort)  -HB      Progress/Outcomes (Toileting Goal 2, OT-IRF) new goal  -HB      Time Frame (Toileting Goal 2, OT-IRF) long-term goal (LTG)  -HB                User Key  (r) = Recorded By, (t) = Taken By, (c) = Cosigned By      Initials Name Provider Type     Julia Quintana OT Occupational Therapist                        Time Calculation:    Time Calculation- OT       Row Name 01/17/24 1456 01/17/24 1455          Time Calculation- OT    OT Start Time 1245  -HB 0915  -HB     OT Stop Time 1340  -HB 1000  -HB     OT Time Calculation (min) 55 min  -HB 45 min  -HB     Total Timed Code Minutes- OT -- 45 minute(s)  -HB               User Key  (r) = Recorded By, (t) = Taken By, (c) = Cosigned By      Initials Name Provider Type     Julia Quintana OT Occupational Therapist                    Therapy Charges for Today       Code Description Service Date Service Provider Modifiers Qty    14919498169 HC OT SELF CARE/MGMT/TRAIN EA 15 MIN 1/16/2024 Julia Quintana OT GO 2    25052498734 HC OT THER PROC EA 15 MIN 1/16/2024 Julia Quintana OT GO 2    07929557470 HC OT THERAPEUTIC ACT EA 15 MIN 1/16/2024 Julia Quintana OT GO 2    41853631113 HC OT SELF CARE/MGMT/TRAIN EA 15 MIN 1/17/2024 Julia Quintana OT GO 2    12441614210 HC OT THER PROC EA 15 MIN 1/17/2024 Julia Quintana OT GO 2    81961314693 HC OT THERAPEUTIC ACT EA 15 MIN 1/17/2024 Julia Quintana OT GO 3                      Julia Quintana OT  1/17/2024

## 2024-01-18 PROCEDURE — 97530 THERAPEUTIC ACTIVITIES: CPT

## 2024-01-18 PROCEDURE — 63710000001 ONDANSETRON PER 8 MG: Performed by: FAMILY MEDICINE

## 2024-01-18 PROCEDURE — 97110 THERAPEUTIC EXERCISES: CPT

## 2024-01-18 PROCEDURE — 97535 SELF CARE MNGMENT TRAINING: CPT

## 2024-01-18 PROCEDURE — 99232 SBSQ HOSP IP/OBS MODERATE 35: CPT | Performed by: INTERNAL MEDICINE

## 2024-01-18 PROCEDURE — 97116 GAIT TRAINING THERAPY: CPT

## 2024-01-18 RX ORDER — CLONAZEPAM 1 MG/1
1 TABLET ORAL EVERY 12 HOURS PRN
Status: DISCONTINUED | OUTPATIENT
Start: 2024-01-18 | End: 2024-01-22 | Stop reason: HOSPADM

## 2024-01-18 RX ADMIN — ALPRAZOLAM 0.25 MG: 0.25 TABLET ORAL at 07:41

## 2024-01-18 RX ADMIN — OXYCODONE HYDROCHLORIDE 10 MG: 10 TABLET ORAL at 10:52

## 2024-01-18 RX ADMIN — LACTULOSE 10 G: 20 SOLUTION ORAL at 08:44

## 2024-01-18 RX ADMIN — ACETAMINOPHEN 650 MG: 325 TABLET ORAL at 21:26

## 2024-01-18 RX ADMIN — ACETAMINOPHEN 650 MG: 325 TABLET ORAL at 16:20

## 2024-01-18 RX ADMIN — CLONAZEPAM 1 MG: 1 TABLET ORAL at 12:19

## 2024-01-18 RX ADMIN — OXYCODONE HYDROCHLORIDE 10 MG: 10 TABLET ORAL at 17:27

## 2024-01-18 RX ADMIN — ACETAMINOPHEN 650 MG: 325 TABLET ORAL at 12:19

## 2024-01-18 RX ADMIN — MONTELUKAST SODIUM 10 MG: 10 TABLET, COATED ORAL at 21:26

## 2024-01-18 RX ADMIN — ONDANSETRON HYDROCHLORIDE 8 MG: 4 TABLET, FILM COATED ORAL at 01:44

## 2024-01-18 RX ADMIN — ACETAMINOPHEN 650 MG: 325 TABLET ORAL at 07:41

## 2024-01-18 RX ADMIN — ONDANSETRON HYDROCHLORIDE 8 MG: 4 TABLET, FILM COATED ORAL at 21:31

## 2024-01-18 RX ADMIN — Medication 5000 UNITS: at 08:44

## 2024-01-18 RX ADMIN — OXYCODONE HYDROCHLORIDE 10 MG: 10 TABLET ORAL at 05:06

## 2024-01-18 RX ADMIN — Medication 1 TABLET: at 08:44

## 2024-01-18 RX ADMIN — ONDANSETRON HYDROCHLORIDE 8 MG: 4 TABLET, FILM COATED ORAL at 07:41

## 2024-01-18 RX ADMIN — ONDANSETRON HYDROCHLORIDE 8 MG: 4 TABLET, FILM COATED ORAL at 15:07

## 2024-01-18 RX ADMIN — OXYCODONE HYDROCHLORIDE 10 MG: 10 TABLET ORAL at 23:32

## 2024-01-18 RX ADMIN — PANTOPRAZOLE SODIUM 40 MG: 40 TABLET, DELAYED RELEASE ORAL at 05:06

## 2024-01-18 NOTE — SIGNIFICANT NOTE
01/17/24 2397   Plan   Plan Contacted Dr. Salcedo's office [phone] per Maria Fernanda about pt being unable to get transport to appointment on 1-18-24 and pt is not being discharged today.  Informed her about Dr. Salcedo telling Rehab MD he would see pt next week.  Maria Fernanda says Dr. Salcedo is completely booked next week and she would have to get permission from him to add pt to the schedule.  Maria Fernanda will call  back.

## 2024-01-18 NOTE — SIGNIFICANT NOTE
01/17/24 8220   Plan   Plan Faxed face sheet, H&P and MD orders for RW and BSC to [facility] via [facility].

## 2024-01-18 NOTE — SIGNIFICANT NOTE
01/17/24 6128   Plan   Plan Faxed face sheet, H&P, PT/OT notes/evaluations, and MD progress note to [facility] via [employer].  Ambulatory referral for home health and discharge summary to be faxed when available.

## 2024-01-18 NOTE — SIGNIFICANT NOTE
01/17/24 6173   Plan   Plan Faxed ambulatory referral for home health with face to face to [facility] via [employer].  MD has nursing orders for cardiopulmonary assessments and CMP/CBC/Magnesium in 2 days/call to attending.

## 2024-01-18 NOTE — PLAN OF CARE
Goal Outcome Evaluation:              Outcome Evaluation: Continue POC

## 2024-01-18 NOTE — SIGNIFICANT NOTE
01/17/24 8956   Plan   Plan Contacted Kassie with [facility] about assisting pt with rolling walker and bedside commode which she can approve. Pt can receive assistance from foundation once per year. [facility] to be contacted about providing DME. Contacted [facility] [phone] per Vani with plans for discharge today and pt needing rolling walker and bedside commode which will be paid for by [facility]. Vani is familiar with process of getting payment from [facility] and will follow-up with Kassie.  SS will fax face sheet, H&P and MD orders for DME to [facility].  DME will be delivered to rehab today.  Spoke to Julia with [facility] [phone] about plans for pt to be discharged home today and need for 20 inch standard wheelchair with swing away leg rests and anti-tippers.  Reviewed distant pt has walked and she says they will submit request to insurance for PA of W/C and will not be able to deliver to pt unless approved by insurance.  SS will fax MD orders and records to [facility].

## 2024-01-18 NOTE — PLAN OF CARE
Goal Outcome Evaluation:  Plan of Care Reviewed With: patient        Progress: improving       Problem: Rehabilitation (IRF) Plan of Care  Goal: Plan of Care Review  Outcome: Ongoing, Progressing  Flowsheets (Taken 1/18/2024 1035)  Progress: improving  Plan of Care Reviewed With: patient  Goal: Patient-Specific Goal (Individualized)  Outcome: Ongoing, Progressing  Goal: Absence of New-Onset Illness or Injury  Outcome: Ongoing, Progressing  Intervention: Prevent Fall and Fall Injury  Recent Flowsheet Documentation  Taken 1/18/2024 0800 by Braydon Caballero RN  Safety Promotion/Fall Prevention: safety round/check completed  Intervention: Prevent Infection  Recent Flowsheet Documentation  Taken 1/18/2024 0800 by Braydon Caballero RN  Infection Prevention:   hand hygiene promoted   rest/sleep promoted  Intervention: Prevent VTE (Venous Thromboembolism)  Recent Flowsheet Documentation  Taken 1/18/2024 0800 by Braydon Caballero RN  VTE Prevention/Management:   sequential compression devices off   patient refused intervention  Goal: Optimal Comfort and Wellbeing  Outcome: Ongoing, Progressing  Goal: Home and Community Transition Plan Established  Outcome: Ongoing, Progressing     Problem: Skin Injury Risk Increased  Goal: Skin Health and Integrity  Outcome: Ongoing, Progressing  Intervention: Promote and Optimize Oral Intake  Recent Flowsheet Documentation  Taken 1/18/2024 0800 by Braydon Caballero RN  Oral Nutrition Promotion: physical activity promoted  Intervention: Optimize Skin Protection  Recent Flowsheet Documentation  Taken 1/18/2024 0800 by Braydon Caballero RN  Pressure Reduction Techniques:   frequent weight shift encouraged   heels elevated off bed   weight shift assistance provided   positioned off wounds  Pressure Reduction Devices:   pressure-redistributing mattress utilized   heel offloading device utilized   positioning supports utilized  Skin Protection:   adhesive use limited   incontinence pads utilized     Problem:  Fall Injury Risk  Goal: Absence of Fall and Fall-Related Injury  Outcome: Ongoing, Progressing  Intervention: Identify and Manage Contributors  Recent Flowsheet Documentation  Taken 1/18/2024 0800 by Braydon Caballero RN  Medication Review/Management: medications reviewed  Intervention: Promote Injury-Free Environment  Recent Flowsheet Documentation  Taken 1/18/2024 0800 by Braydon Caballero RN  Safety Promotion/Fall Prevention: safety round/check completed     Problem: Impaired Wound Healing  Goal: Optimal Wound Healing  Outcome: Ongoing, Progressing  Intervention: Promote Wound Healing  Recent Flowsheet Documentation  Taken 1/18/2024 0800 by Braydon Caballero RN  Oral Nutrition Promotion: physical activity promoted  Pain Management Interventions: see MAR  Sleep/Rest Enhancement: regular sleep/rest pattern promoted

## 2024-01-18 NOTE — SIGNIFICANT NOTE
01/17/24 1510   Plan   Plan Faxed face sheet, H&P and MD orders for RW and BSC to [facility] via [employer].  Faxed face sheet, H&P, MD progress note, PT notes/evaluation and MD order for W/C to [facility] via [employer].

## 2024-01-18 NOTE — SIGNIFICANT NOTE
01/17/24 9700   Plan   Plan SS left message for R-Sanjuana [phone] that is not being discharged today.

## 2024-01-18 NOTE — SIGNIFICANT NOTE
01/17/24 7570   Plan   Plan Spoke to Ca with [facility] [phone] about pt being discharged today and need for PT, OT, and nursing for medication education, wound care to left hip: paint with betadine, apply dry dressing daily.  HH says to fax pt's information and SS will be contacted about accepting referral.

## 2024-01-18 NOTE — PROGRESS NOTES
Rehabilitation Nursing  Inpatient Rehabilitation Plan of Care Note    Plan of Care  Pain    Pain Management (Active)  Current Status (1/15/2024 12:00:00 AM): pt will be free of pain with meds  Weekly Goal: decrease in pain meds  Discharge Goal: pain free    Safety    Potential for Injury (Active)  Current Status (1/12/2024 12:00:00 AM): patient will ring for needs  Weekly Goal: patient uses proper safety with hip  Discharge Goal: injury free    Signed by: Braydon Caballero RN

## 2024-01-18 NOTE — SIGNIFICANT NOTE
01/17/24 0106   Plan   Plan SS spoke to Ca with [facility] who says they will not be able to accept referral and recommended calling [facility]. SS asked Niesha about pt getting evaluated for HCBW program and she says they do not have this service but Kristofer Aguilar. HH does. Contacted [facility] [phone] per Niesha who says they are not in-network with pt's insurance and cannot take referral. SS asked about evaluating pt for HCBW and she says they can evaluate her that program; she requests SS send face sheet to her. Contacted [facility] [phone] per Ca about [facility] being out-of-network with insurance and if she could take accept the referral. Ca is willing to see pt if she can have labs orders changed to Monday 1-22-24 due to inclement weather.  Informed her pt is appealing discharge and will not be discharging today.  HH to be contacted when pt is discharged.

## 2024-01-18 NOTE — SIGNIFICANT NOTE
01/17/24 6262   Plan   Plan SS left message for [facility] [phone] that is not being discharged today.  Contacted brother Alejandro [phone] about pt not being discharged today due to appealing insurance's decision to deny continued rehab stay.  Informed brother about pt's Ortho appointment on 1-23-24 at 8:45 am and he is agreeable to transport pt to this appointment.  Brother says he will transport pt home at discharge if she is unable to get [facility].

## 2024-01-18 NOTE — PROGRESS NOTES
Occupational Therapy:    Physical Therapy: Individual: 115 minutes.    Speech Language Pathology:    Signed by: Juliana Echeverria PTA

## 2024-01-18 NOTE — SIGNIFICANT NOTE
01/17/24 [facility]   Plan   Plan Rehab Director was present with pt when she contacted insurance to appeal denial of continued rehab stay.

## 2024-01-18 NOTE — SIGNIFICANT NOTE
01/17/24 1630   Plan   Plan SS received call from Maria Fernanda with Dr. Salcedo's office who says pt can be seen by HARIKA Burnette on 1-23-24 at 8:45 am; Dr. Salcedo will be in the building if he is needed during pt's appointment.

## 2024-01-18 NOTE — SIGNIFICANT NOTE
01/18/24 1553   Plan   Plan Contacted [facility] [phone] per Amarilys to schedule W/C transportation to Ortho appointment on 1-23-24 at 8:45 am in [place] with HARIKA Burnette.  [facility] [phone] will call pt the evening before this appointment to inform her about pick-up time and they will come back to get her after the appointment to return home.

## 2024-01-18 NOTE — SIGNIFICANT NOTE
01/17/24 1345   Plan   Plan Pt's insurance has denied continued rehab stay per rehab  effective today.  Insurance medical director says her needs can be met at a lower level of care. MD will plan to discharge pt home today.  Pt's brother informed SS yesterday that he is available to stay with pt and provide 24 hour assistance whenever she is discharged.  Pt is recommended for home health PT/OT/nursing, rolling walker, bedside commode and pt requests wheelchair (pt would need a 20 inch standard wheelchair with anti-tippers, swing away leg rests if insurance will approve).  Pt had asked for [facility] to provide DME at discharge when SS spoke to her on 1-16-24.  Contacted [facility] [phone] per Julia about insurance coverage for RW, W/C and BSC.  Pt had a BSC purchased by insurance in June 2023 and rollator in November 2023, therefore, insurance will not pay for RW and BSC until 5 years from when these items were last purchased.  Pt can rent a W/C if approved by insurance.  Contacted pt via room phone about insurance denying continued rehab stay effective today and insurance medical director saying her needs can be met at a lower level of care.  Reviewed brother telling SS he was agreeable to stay with her at discharge and provide 24 hour assistance.  Pt plans to return to her apartment with brother Alejandro staying with her and assisting with needs.  Reviewed recommendation for home health PT, OT, nursing and DME.  Explained to pt insurance paid for rollator and BSC in 2023, therefore, they cannot pay for these items again until 5 years from last purchase.  Pt says she turned the BSC back in and wants to know if she can exchange rollator for RW and BSC was not returned.  Spoke to Julia with [facility] [phone] who says pt cannot exchange rollator.  Informed pt about SS contacting [facility] about purchasing RW and BSC for her. Explained insurance may not  approve W/C rental due to the distance she is able to ambulate.  Pt says they will pay for whatever she needs.   Pt requests [facility] due to having them in the past.  Pt says brother would not be able to transport her home.  Informed pt SS can contact [facility] and if they cannot transport they can give [facility] permission to transport.  Pt says she will call her insurance about denying continued rehab stay and DME.   Pt says she will also contact [facility]; provided their phone number.

## 2024-01-18 NOTE — PROGRESS NOTES
Assisted By: Braydon DOE    CC: Follow-up on fractured hip    Interview History/HPI: Patient states that she feels very anxious because have cut down her Xanax, apparently through the night to the staff she threatened to moni me.  See my discussion below in A/P.          Current Hospital Meds:  lactulose, 10 g, Oral, Every Other Day  montelukast, 10 mg, Oral, Nightly  multivitamin, 1 tablet, Oral, Daily  pantoprazole, 40 mg, Oral, Q AM  polyethylene glycol, 17 g, Oral, Daily  vitamin D3, 5,000 Units, Oral, Daily         Vitals:    01/17/24 1900   BP: 111/56   Pulse: 78   Resp: 20   Temp: 97.6 °F (36.4 °C)   SpO2: 96%         Intake/Output Summary (Last 24 hours) at 1/18/2024 0823  Last data filed at 1/18/2024 0500  Gross per 24 hour   Intake 1200 ml   Output 1150 ml   Net 50 ml       EXAM: Patient was sitting outside in the wheelchair smoking on my arrival, she came back in, she was evaluated with Braydon DOE.  Braydon is already evaluated the wound and he states looked good.  Patient was upset over her Xanax medication change therefore no further exam done long discussion about medication.  See below.      Diet: Regular/House Diet; Texture: Regular Texture (IDDSI 7); Fluid Consistency: Thin (IDDSI 0)        LABS:     Lab Results (last 48 hours)       ** No results found for the last 48 hours. **                 Radiology:    Imaging Results (Last 72 Hours)       Procedure Component Value Units Date/Time    US Venous Doppler Lower Extremity Left (duplex) [[id number]] Collected: 01/16/24 0900     Updated: 01/16/24 0902    Narrative:      US VENOUS DOPPLER LOWER EXTREMITY LEFT (DUPLEX)-     CLINICAL INDICATION: Edema        COMPARISON: None immediately available      TECHNIQUE: Color Doppler imaging was used with compression and  augmentation to evaluate the right lower extremity deep venous system.     FINDINGS:  There is patent spontaneous flow from the common femoral vein through  the posterior tibial veins.  There was no  internal clot or area of noncompressibility in the left  lower extremity.  Normal augmentation was elicited where applicable.       Impression:      No DVT in the left lower extremity on today's exam.      This report was finalized on 1/16/2024 9:00 AM by Dr. Rodri Renteria MD.               Results for orders placed during the hospital encounter of 05/16/19    Adult Transthoracic Echo Complete W/ Cont if Necessary Per Protocol    Interpretation Summary  · Normal left ventricular cavity size and wall thickness noted.  · Left ventricular systolic function is normal. Estimated EF appears to be in the range of 61 - 65%.  · Left ventricular diastolic function is normal.  · Mild tricuspid valve regurgitation is present.  · . No evidence of pulmonary hypertension  · No significant valvular heart disease  · There is no evidence of pericardial effusion.      Assessment/Plan:   Fractured hip: See my note from yesterday, insurance did not approve further stay however she is appealing that discharge so we will continue her occupational physical therapy while here until appeals process completed.  See my note yesterday for progress so far.  She is going to see her orthopedic surgeon to get her staples out, he requested we leave these in until he sees the patient in the office.  I discussed with him yesterday.  We have begun the narcotic weaning process.    Anxiety/depression.  She told me about how she had lost her mother has she had seen her  get murdered.  She states this was 20 years ago but she states she will never get over it and this is why she takes Xanax.  She says that she apparently got into some disagreement with her previous physician because he thought she got to be off the Xanax and she apparently no longer sees them.  She says she has not missed any dosing although she is not gotten this filled by [facility] since July.  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  She does state that maybe she was without it about 2 weeks before going in with a fractured hip and thinks may be a seizure caused her to fall.  She was referring to withdrawal.  I did try to tell her that I understand her life will never be the same and she will have ongoing difficulties with the trauma that she has been through however seeking scheduled medications illicitly constitutes a problem.  She stated that she would never wish which she had been through on anybody that she was saying that if I had been through this that I would be giving her medication.  I again explained the danger between taking Xanax and narcotics or Suboxone.  She states she really did not care about that.  She really does not want to listen to any discussion about anything that leads to her getting off the Xanax.  Earlier she had mentioned to the nursing staff that she was going to moni me for cutting down her medication if she had a heart attack or stroke.  I breached this subject with her.  I did explain that certainly her smoking contributes to this risk but that I am trying to do the responsible physician thing as far as her medication goes.  My plan when she was discharged yesterday was to wean her off at home.  When she appealed her discharge I did begin the weaning here.  Also explained as far as her narcotics ago she is over 2 weeks out from her surgery and she should be able to wean off narcotics from surgical standpoint at this time.  She really did not have a problem with narcotics being weaned at this time.  Patient states she used to have a psychiatrist but apparently retired.  I did state that I would get our psychiatrist down to talk to her.  Patient is not on any antidepressants/SSRIs for depression.  I did tell her that if psychiatry feels like she does need to be on long-term benzodiazepines that I would respect his opinion as well.  Would like to get their input and " she is in agreement with this.  Psychiatry therefore has been consulted.  As per my discussion yesterday, I did tell her that getting back on Suboxone with narcotics is dangerous and also could lead to withdrawal.    Ongoing tobacco use, counseled to quit    History of pancytopenia, has been stable    DVT prophylaxis, SCUDs, due to thrombocytopenia not using anticoagulants.    History of cirrhosis without evidence of encephalopathy    CKD, has been stable to this point.    Servando Nova MD

## 2024-01-18 NOTE — DISCHARGE INSTR - OTHER ORDERS
[facility] [phone] for PT/OT/nursing.    [facility] [phone] will evaluate pt for home and community based waiver program.    [facility] [phone] for rolling walker and bariatric   bedside commode (these items were purchased by [facility]).    [facility] [phone] for 20 inch standard wheelchair with anti-tippers and swing away leg rests if approved by insurance.    [employer] [phone] to transport pt to Ortho appointment with HARIKA Burnette in [place] on 1-23-24 at 8:45 am.  They will call pt in the evening on 1-22-24 with pick-up time and will return to MD office after appointment.      [facility] [phone] does the scheduling for [employer].

## 2024-01-18 NOTE — SIGNIFICANT NOTE
01/17/24 5541   Plan   Plan Faxed face sheet, H&P, PT/OT notes/evaluations, and MD progress note to [facility] via [facility].  Ambulatory referral for home health and discharge summary to be faxed when available.  Contacted pt's brother Alejandro [phone] about insurance denying continued rehab stay, plans for pt to be discharged today, referral to [facility] for PT, OT, nursing,  Foundation to pay for RW and BSC due to insurance paying for BSC and rollator in 2023, therefore, they will not pay for these items again until 5 years have past; pt is not safe per PTA to use rollator, therefore, recommended to use rolling walker for mobility.  Explained insurance has to approve W/C before [facility] can provide.  Brother says he will stay with pt and provide 24 hour assistance. Brother says he cannot come to rehab and provide transportation home.  Informed brother SS will contact [employer] for transport and they may have to ask [facility] to transport pt home.  Informed brother about pt having appointment with Dr. Salcedo-Orthopedic Surgeon on 1-18-24 at 8:00 am for follow-up.  Brother says he is unable to transport pt this early but could later in the day.  Informed him MD may not have a later appointment due to having certain days/times he is in the office.

## 2024-01-18 NOTE — SIGNIFICANT NOTE
01/17/24 9787   Plan   Plan Contacted Dr. Salcedo's office [phone] per Maria Fernanda about MD telling Rehab MD he would see pt next week.  Maria Fernanda says MD is completely booked next week and she would have to get permission for pt to be seen so she will call SS back.

## 2024-01-18 NOTE — THERAPY TREATMENT NOTE
Inpatient Rehabilitation - Occupational Therapy Treatment Note    ALBERTA Herrera     Patient Name: Phuong Tuttle  : 1960  MRN: [id number]    Today's Date: 2024                 Admit Date: 2024         ICD-10-CM ICD-9-CM   1. S/p left hip fracture  Z87.81 V15.51   2. ESPERANZA (generalized anxiety disorder)  F41.1 300.02       Patient Active Problem List   Diagnosis    COPD (chronic obstructive pulmonary disease)    Current smoker    Iron deficiency anemia, unspecified    Chest pain    End stage liver disease    Obesity (BMI 30-39.9)    Portal hypertension    Hepatitis C    History of substance abuse    Esophageal varices    Splenomegaly    Chronic kidney disease (CKD), stage III (moderate)    Leukopenia    Splenic pancytopenia syndrome    Iron deficiency anemia    Iron malabsorption    S/p left hip fracture       Past Medical History:   Diagnosis Date    Chronic kidney disease (CKD), stage III (moderate) 2020    COPD (chronic obstructive pulmonary disease)     non-oxygen dependent    End stage liver disease 2020    Esophageal varices     GI bleed     history of GI bleed     Hepatitis C 2020    History of substance abuse 2020    Immunocompromised patient     Peptic ulceration     Portal hypertension 2020    Splenomegaly 2020       Past Surgical History:   Procedure Laterality Date    ESOPHAGEAL VARICES LIGATION      UPPER GASTROINTESTINAL ENDOSCOPY               IRF OT ASSESSMENT FLOWSHEET (last 12 hours)       IRF OT Evaluation and Treatment       Row Name 24 4549          OT Time and Intention    Document Type daily treatment  -HB     Mode of Treatment individual therapy;occupational therapy  -HB     Total Minutes, Occupational Therapy 90  -HB     Patient Effort good  -HB     Symptoms Noted During/After Treatment none  -HB       Row Name 24 5435          General Information    Patient Profile Reviewed yes  -HB     Patient/Family/Caregiver Comments/Observations  Patient and RN oksolomon OT this date.  -     General Observations of Patient Patient tolerated OT well with no adverse reactions.  -     Existing Precautions/Restrictions fall;left;hip  -       Row Name 01/18/24 1405          Cognition/Psychosocial    Affect/Mental Status (Cognition) WFL  -     Orientation Status (Cognition) oriented x 3  -HB     Follows Commands (Cognition) WFL;verbal cues/prompting required;physical/tactile prompts required  -       Row Name 01/18/24 1405          Grooming    Cross Level (Grooming) grooming skills;set up  -     Position (Grooming) supported sitting  -       Row Name 01/18/24 1405          Motor Skills    Motor Skills coordination;functional endurance;motor control/coordination interventions  -     Motor Control/Coordination Interventions fine motor manipulation/dexterity activities;therapeutic exercise/ROM;gross motor coordination activities  -     Therapeutic Exercise shoulder;elbow/forearm;wrist;hand  -     Additional Documentation --  rickshaw 10 lbs 4 sets 15 reps; PRE 10 min; BUE TA to increase ADL status/ endurance; rest between tasks  -       Row Name 01/18/24 1405          Positioning and Restraints    Pre-Treatment Position sitting in chair/recliner  -     Post Treatment Position wheelchair  -HB     In Wheelchair --  with   -               User Key  (r) = Recorded By, (t) = Taken By, (c) = Cosigned By      Initials Name Effective Dates     Julia Quintana, DYLAN 05/25/21 -                      Occupational Therapy Education       Title: PT OT SLP Therapies (Done)       Topic: Occupational Therapy (Done)       Point: ADL training (Done)       Description:   Instruct learner(s) on proper safety adaptation and remediation techniques during self care or transfers.   Instruct in proper use of assistive devices.                  Learning Progress Summary             Patient Acceptance, E, VU,NR by  at 1/18/2024 9413     Acceptance, E, VU,NR by HB at 1/17/2024 1452    Acceptance, E, VU,NR by HB at 1/16/2024 1508    Acceptance, E, VU,NR by HB at 1/15/2024 0951    Acceptance, E, VU,NR by HB at 1/13/2024 1208    Acceptance, E, VU,NR by HB at 1/12/2024 1154    Acceptance, E, VU,NR by HB at 1/11/2024 1354    Acceptance, E, VU,NR by HB at 1/10/2024 1413                         Point: Home exercise program (Done)       Description:   Instruct learner(s) on appropriate technique for monitoring, assisting and/or progressing therapeutic exercises/activities.                  Learning Progress Summary             Patient Acceptance, E, VU,NR by HB at 1/18/2024 1409    Acceptance, E, VU,NR by HB at 1/17/2024 1452    Acceptance, E, VU,NR by HB at 1/16/2024 1508    Acceptance, E, VU,NR by HB at 1/15/2024 0951    Acceptance, E, VU,NR by HB at 1/13/2024 1208    Acceptance, E, VU,NR by HB at 1/12/2024 1154    Acceptance, E, VU,NR by HB at 1/11/2024 1354    Acceptance, E, VU,NR by HB at 1/10/2024 1413                         Point: Precautions (Done)       Description:   Instruct learner(s) on prescribed precautions during self-care and functional transfers.                  Learning Progress Summary             Patient Acceptance, E, VU,NR by HB at 1/18/2024 1409    Acceptance, E, VU,NR by HB at 1/17/2024 1452    Acceptance, E, VU,NR by HB at 1/16/2024 1508    Acceptance, E, VU,NR by HB at 1/15/2024 0951    Acceptance, E, VU,NR by HB at 1/13/2024 1208    Acceptance, E, VU,NR by HB at 1/12/2024 1154    Acceptance, E, VU,NR by HB at 1/11/2024 1354    Acceptance, E, VU,NR by HB at 1/10/2024 1413                         Point: Body mechanics (Done)       Description:   Instruct learner(s) on proper positioning and spine alignment during self-care, functional mobility activities and/or exercises.                  Learning Progress Summary             Patient Acceptance, E, VU,NR by HB at 1/18/2024 1409    Acceptance, E, VU,NR by HB at 1/17/2024 1459     Acceptance, E, VU,NR by  at 1/16/2024 1508    Acceptance, E, VU,NR by  at 1/15/2024 0951    Acceptance, E, VU,NR by  at 1/13/2024 1208    Acceptance, E, VU,NR by  at 1/12/2024 1154    Acceptance, E, VU,NR by  at 1/11/2024 1354    Acceptance, E, VU,NR by  at 1/10/2024 1413                                         User Key       Initials Effective Dates Name Provider Type Discipline     05/25/21 -  Julia Quintana OT Occupational Therapist OT                        OT Recommendation and Plan    Planned Therapy Interventions (OT): activity tolerance training, adaptive equipment training, BADL retraining, IADL retraining, functional balance retraining, strengthening exercise, ROM/therapeutic exercise, patient/caregiver education/training, transfer/mobility retraining                    Time Calculation:      Time Calculation- OT       Row Name 01/18/24 1409             Time Calculation- OT    OT Start Time 0830  -HB      OT Stop Time 1000  -HB      OT Time Calculation (min) 90 min  -      Total Timed Code Minutes- OT 90 minute(s)  -                User Key  (r) = Recorded By, (t) = Taken By, (c) = Cosigned By      Initials Name Provider Type     Julia Quintana OT Occupational Therapist                  Therapy Charges for Today       Code Description Service Date Service Provider Modifiers Qty    20863017696 HC OT SELF CARE/MGMT/TRAIN EA 15 MIN 1/17/2024 Julia Quintana OT GO 2    71734496038 HC OT THER PROC EA 15 MIN 1/17/2024 Julia Quintana OT GO 2    71749397129 HC OT THERAPEUTIC ACT EA 15 MIN 1/17/2024 Julia Quintana OT GO 3    14812427218 HC OT SELF CARE/MGMT/TRAIN EA 15 MIN 1/18/2024 Julia Quintana OT GO 2    60101947780 HC OT THER PROC EA 15 MIN 1/18/2024 Julia Quintana OT GO 2    94807451977 HC OT THERAPEUTIC ACT EA 15 MIN 1/18/2024 Julia Quintana OT GO 2                     Julia Quintana OT  1/18/2024

## 2024-01-18 NOTE — SIGNIFICANT NOTE
01/17/24 1535   Plan   Plan Spoke to Maria Fernanda with Dr. Salcedo's office [phone] about scheduling pt for an appointment with MD next week instead of tomorrow at 8:00 am; brother is unable to transport this early and Medicaid requires 72 hour notice for transportation with public transit.  Maria Fernanda says to request form for MD sign to get transport less than 72 hours.  Contacted [facility] [phone] without success.  Contacted [employer] [phone] per Cecilia about another number for [facility] and he says to call [facility].  Pt is coded to [facility], therefore, [facility] cannot transport unless they get permission from [facility].  Contacted [facility] [phone] per Kimberley about pt being discharged home today and need for transport.  Informed Kimberley pt also has an appointment with Dr. Salcedo in [place] on 1-18-24 at 8:00 am.  Kimberley will contact [employer] to give permission to transport pt home and she says [facility] is only transporting to dialysis and chemotherapy on 1-18-24, therefore, they cannot transport pt to MD appointment.  SS received a message while on the phone with [facility] about pt appealing insurance denial for continued rehab stay and not leaving today.  Reviewed this with Kimberley with [facility] who says [facility] has general permission to transport pt up to 30 days, therefore, they can be contacted when she is discharged.

## 2024-01-18 NOTE — CONSULTS
Referring Provider: Dr. Nova  Reason for Consultation: anxiety, medication concerns    Chief complaint/Focus of Exam: Anxiety, medication concerns    Subjective .     History of present illness:    Patient is a 63-year-old female admitted to physical rehab on 1/9/2024 subsequent to surgery for left hip fracture following a fall at home.  Psychiatry consulted for medication concerns as patient voiced concerns about discontinuing alprazolam.  Per [facility] report, patient had not been prescribed alprazolam since 7/19/2023, but insists she has taken the medication multiple times a day for nearly 20 years, reporting she had some medication saved up and other ways to obtain it.  Patient is fairly insistent that she needs alprazolam.  She reports a history of trauma and anxiety, although has not been in psychiatric care recently, nor takes any SSRI/SNRI that are generally considered first-line for PTSD.  She is not agreeable to these medications.  Patient was explained the risks of alprazolam given her medical conditions, as well as benzodiazepine use concurrently with intermittent use of opiate medications.  Patient was eventually agreeable to change to clonazepam, although insists that it will not work.  It was explained to her that this should help manage severe anxiety, as well as provide support for any symptoms of benzodiazepine withdrawal that she may experience.  She is agreeable to the change in outpatient follow-up to discussion of ongoing medication options with an outpatient provider once she is discharged from rehab.    Review of Systems  All systems were reviewed and negative except for:  Musculoskeletal: positive for  back pain  Behavioral/Psych: positive for  anxiety    History  Past Medical History:   Diagnosis Date    Chronic kidney disease (CKD), stage III (moderate) 4/27/2020    COPD (chronic obstructive pulmonary disease)     non-oxygen dependent    End stage liver disease 4/27/2020    Esophageal  varices     GI bleed     history of GI bleed     Hepatitis C 4/27/2020    History of substance abuse 4/27/2020    Immunocompromised patient     Peptic ulceration     Portal hypertension 4/27/2020    Splenomegaly 4/27/2020   ,   Past Surgical History:   Procedure Laterality Date    ESOPHAGEAL VARICES LIGATION      UPPER GASTROINTESTINAL ENDOSCOPY     ,   Family History   Problem Relation Age of Onset    Hypertension Mother     Colon cancer Mother     Pneumonia Father     Alzheimer's disease Father     Stroke Father     Lung cancer Brother     Alzheimer's disease Maternal Grandfather     Emphysema Paternal Grandfather    ,   Social History     Socioeconomic History    Marital status:    Tobacco Use    Smoking status: Some Days     Packs/day: 0.50     Years: 35.00     Additional pack years: 0.00     Total pack years: 17.50     Types: Cigarettes    Smokeless tobacco: Never   Vaping Use    Vaping Use: Never used   Substance and Sexual Activity    Alcohol use: No    Drug use: Not Currently     Comment: Prescribed Suboxone     Sexual activity: Defer     E-cigarette/Vaping    E-cigarette/Vaping Use Never User      E-cigarette/Vaping Substances     E-cigarette/Vaping Devices         ,   Medications Prior to Admission   Medication Sig Dispense Refill Last Dose    albuterol sulfate  (90 Base) MCG/ACT inhaler Inhale 2 puffs Every 4 (Four) Hours As Needed for Wheezing.   Unknown    budesonide-formoterol (SYMBICORT) 160-4.5 MCG/ACT inhaler Inhale 2 puffs 2 (Two) Times a Day.   Unknown    buprenorphine-naloxone (SUBOXONE) 8-2 MG per SL tablet Place 2.5 tablets under the tongue Daily.   Unknown    furosemide (LASIX) 20 MG tablet Take 1 tablet by mouth As Needed (chf). 90 tablet 0 Unknown    ipratropium-albuterol (Combivent Respimat)  MCG/ACT inhaler Inhale 1 puff 4 (Four) Times a Day As Needed for Wheezing or Shortness of Air. 12 g 1 Unknown    lactulose (CHRONULAC) 10 GM/15ML solution Take 15 mL by mouth Every  Other Day.   Unknown    montelukast (SINGULAIR) 10 MG tablet Take 1 tablet by mouth Every Night.   Unknown    multivitamin with minerals tablet tablet Take 1 tablet by mouth Daily. 90 each 0 Unknown    ondansetron (ZOFRAN) 8 MG tablet Take 1 tablet by mouth 3 (Three) Times a Day As Needed for Nausea or Vomiting.   Unknown    pantoprazole (PROTONIX) 40 MG EC tablet Take 1 tablet by mouth Daily.   Unknown    vitamin D (ERGOCALCIFEROL) 1.25 MG (02107 UT) capsule capsule Take 1 capsule by mouth 1 (One) Time Per Week.   Unknown   , Scheduled Meds:  lactulose, 10 g, Oral, Every Other Day  montelukast, 10 mg, Oral, Nightly  multivitamin, 1 tablet, Oral, Daily  pantoprazole, 40 mg, Oral, Q AM  polyethylene glycol, 17 g, Oral, Daily  vitamin D3, 5,000 Units, Oral, Daily   , Continuous Infusions:   , PRN Meds:    acetaminophen    ALPRAZolam    bisacodyl    budesonide-formoterol    calcium carbonate    Calcium Replacement - Follow Nurse / BPA Driven Protocol    ipratropium-albuterol    Magnesium Standard Dose Replacement - Follow Nurse / BPA Driven Protocol    naloxone    ondansetron    oxyCODONE    Phosphorus Replacement - Follow Nurse / BPA Driven Protocol    polyethylene glycol    Potassium Replacement - Follow Nurse / BPA Driven Protocol, and Allergies:  Doxycycline, Ibuprofen, Iodinated contrast media, Prednisone, Zithromax [azithromycin], Haldol [haloperidol lactate], Latex, and Penicillins    Objective     Vital Signs   Temp:  [97.6 °F (36.4 °C)-98.3 °F (36.8 °C)] 98.3 °F (36.8 °C)  Heart Rate:  [78-89] 89  Resp:  [18-20] 18  BP: (111-112)/(56-77) 112/77    Mental Status Exam:  Hygiene:   good  Cooperation:  Guarded  Eye Contact:  Good  Psychomotor Behavior:  Appropriate  Affect:   Mildly irritable, but overall appropriate  Hopelessness: 1  Speech:  Normal  Thought Progress:  Goal directed and Linear  Thought Content:  Normal  Suicidal:  None  Homicidal:  None  Hallucinations:  None  Delusion:  None  Memory:   Intact  Orientation:  Person, Place, Time, and Situation  Reliability:  fair  Insight:  Poor  Judgement:  Fair  Impulse Control:  Fair    Results Review:   I reviewed the patient's new clinical results.  I reviewed the patient's other test results and agree with the interpretation  I personally viewed and interpreted the patient's EKG/Telemetry data  Lab Results (last 24 hours)       ** No results found for the last 24 hours. **          Imaging Results (Last 24 Hours)       ** No results found for the last 24 hours. **              Assessment & Plan     Principal Problem:    S/p left hip fracture     Unspecified anxiety disorder  -Rule out PTSD, ESPERANZA, benzodiazepine dependence  -Discontinue alprazolam  -Begin clonazepam 1 mg twice daily as needed.  Risks, benefits, adverse effects and indications explained to patient and agreed to.  This dosage can likely be continued until patient is able to see her outpatient provider.  -Refer to outpatient psychiatry.  Patient reports she will likely be living in the [facility] following discharge from rehab.    I discussed the patient's findings and my recommendations with patient, nursing staff, and consulting provider    Maxi Ny MD  01/18/24  09:42 EST

## 2024-01-18 NOTE — SIGNIFICANT NOTE
01/17/24 1615   Plan   Plan [facility] came to transport pt home, however, staff inform them pt is not leaving.

## 2024-01-18 NOTE — SIGNIFICANT NOTE
01/18/24 5964   Plan   Plan Rehab Director says pt plans to go home on Saturday 1-20-24 and MD is aware.

## 2024-01-18 NOTE — SIGNIFICANT NOTE
01/17/24 1345   Plan   Plan Pt's insurance has denied continued rehab stay per rehab  effective today.  Insurance medical director says her needs can be met at a lower level of care. MD will plan to discharge pt home today.  Pt's brother informed SS yesterday that he is available to stay with pt and provide 24 hour assistance whenever she is discharged.  Pt is recommended for home health PT/OT/nursing, rolling walker, bedside commode and pt requests wheelchair (pt would need a 20 inch standard wheelchair with anti-tippers, swing away leg rests if insurance will approve).  Pt had asked for [facility] to provide DME at discharge when SS spoke to her on 1-16-24.  Contacted [facility] [phone] per Julia about insurance coverage for RW, W/C and BSC.  Pt had a BSC purchased by insurance in June 2023 and rollator in November 2023, therefore, insurance will not pay for RW and BSC until 5 years from when these items were last purchased.  Pt can rent a W/C if approved by insurance.  Contacted pt via room phone about insurance denying continued rehab stay effective today and insurance medical director saying her needs can be met at a lower level of care.  Reviewed brother telling SS he was agreeable to stay with her at discharge and provide 24 hour assistance.  Pt plans to return to her apartment with brother Alejandro staying with her and assisting with needs.  Reviewed recommendation for home health PT, OT, nursing and DME.  Explained to pt insurance paid for rollator and BSC in 2023, therefore, they cannot pay for these items again until 5 years from last purchase.  Pt says she turned the BSC back in and wants to know if she can exchange rollator for RW.  Spoke to Julia with [facility] [phone] who says pt cannot exchange rollator.  Informed pt about SS contacting [facility] about purchasing RW and BSC for her. Explained insurance may not approve W/C rental due to the  distance she is able to ambulate.  Pt says they will pay for whatever she needs.   Pt requests [facility] due to having them in the past.  Pt says brother would not be able to transport her home.  Informed pt [facility] and if [facility] cannot [facility] they can give [facility] permission to transport.  Pt says she will call her insurance about denying continued rehab stay and DME.   Pt says she will also contact [facility]; provided their phone number.

## 2024-01-18 NOTE — SIGNIFICANT NOTE
01/17/24 7576   Plan   Plan Contacted Kassie with [facility] about assisting pt with rolling walker and bedside commode which she can approve. Pt can receive assistance from foundation once per year. [facility] to be contacted about providing DME. Contacted [facility] [phone] per Vani with plans for discharge today and pt needing rolling walker and bedside commode which will be paid for by [facility]. Vani is familiar with process of getting payment from [facility] and will follow-up with Kassie.  SS will fax face sheet, H&P and MD orders for DME to [facility].  DME will be delivered to rehab today.

## 2024-01-18 NOTE — SIGNIFICANT NOTE
01/18/24 1130   Plan   Plan Spoke to pt while she was in PT session with ROSEMARIE Andrews.  Pt asked about W/C and explained insurance has to approve PA request sent by [facility] office.  Pt says she needs W/C at home.  Informed her if insurance does not approve W/C SS will contact [facility] about assisting her with this item.  Informed her about referral sent to [facility] and [facility] to evaluate her for HCBW program.  Reviewed appointment with HARIKA Burnette who works with Dr. Salcedo on 1-23-24 at 8:45 am and brother Alejandro being agreeable to transport her.  Pt does not want brother to take her to this appointment.  Discussed getting [facility] to take her to this appointment.

## 2024-01-19 PROCEDURE — 63710000001 ONDANSETRON PER 8 MG: Performed by: FAMILY MEDICINE

## 2024-01-19 PROCEDURE — 99231 SBSQ HOSP IP/OBS SF/LOW 25: CPT | Performed by: INTERNAL MEDICINE

## 2024-01-19 PROCEDURE — 97535 SELF CARE MNGMENT TRAINING: CPT

## 2024-01-19 PROCEDURE — 97110 THERAPEUTIC EXERCISES: CPT

## 2024-01-19 PROCEDURE — 97116 GAIT TRAINING THERAPY: CPT

## 2024-01-19 PROCEDURE — 97530 THERAPEUTIC ACTIVITIES: CPT

## 2024-01-19 RX ORDER — HYDROXYZINE HYDROCHLORIDE 10 MG/1
10 TABLET, FILM COATED ORAL 3 TIMES DAILY PRN
Status: DISCONTINUED | OUTPATIENT
Start: 2024-01-19 | End: 2024-01-22 | Stop reason: HOSPADM

## 2024-01-19 RX ORDER — OXYCODONE HYDROCHLORIDE 10 MG/1
10 TABLET ORAL EVERY 6 HOURS PRN
Status: DISCONTINUED | OUTPATIENT
Start: 2024-01-19 | End: 2024-01-22 | Stop reason: HOSPADM

## 2024-01-19 RX ADMIN — CLONAZEPAM 1 MG: 1 TABLET ORAL at 11:57

## 2024-01-19 RX ADMIN — CLONAZEPAM 1 MG: 1 TABLET ORAL at 00:27

## 2024-01-19 RX ADMIN — OXYCODONE HYDROCHLORIDE 10 MG: 10 TABLET ORAL at 05:23

## 2024-01-19 RX ADMIN — Medication 1 TABLET: at 08:52

## 2024-01-19 RX ADMIN — PANTOPRAZOLE SODIUM 40 MG: 40 TABLET, DELAYED RELEASE ORAL at 05:23

## 2024-01-19 RX ADMIN — ACETAMINOPHEN 650 MG: 325 TABLET ORAL at 21:17

## 2024-01-19 RX ADMIN — OXYCODONE HYDROCHLORIDE 10 MG: 10 TABLET ORAL at 11:57

## 2024-01-19 RX ADMIN — ONDANSETRON HYDROCHLORIDE 8 MG: 4 TABLET, FILM COATED ORAL at 05:23

## 2024-01-19 RX ADMIN — OXYCODONE HYDROCHLORIDE 10 MG: 10 TABLET ORAL at 17:34

## 2024-01-19 RX ADMIN — Medication 5000 UNITS: at 08:52

## 2024-01-19 RX ADMIN — HYDROXYZINE HYDROCHLORIDE 10 MG: 10 TABLET ORAL at 09:19

## 2024-01-19 RX ADMIN — ONDANSETRON HYDROCHLORIDE 8 MG: 4 TABLET, FILM COATED ORAL at 17:34

## 2024-01-19 RX ADMIN — ONDANSETRON HYDROCHLORIDE 8 MG: 4 TABLET, FILM COATED ORAL at 11:57

## 2024-01-19 RX ADMIN — ACETAMINOPHEN 650 MG: 325 TABLET ORAL at 15:10

## 2024-01-19 RX ADMIN — HYDROXYZINE HYDROCHLORIDE 10 MG: 10 TABLET ORAL at 17:34

## 2024-01-19 RX ADMIN — MONTELUKAST SODIUM 10 MG: 10 TABLET, COATED ORAL at 21:17

## 2024-01-19 RX ADMIN — POLYETHYLENE GLYCOL (3350) 17 G: 17 POWDER, FOR SOLUTION ORAL at 06:00

## 2024-01-19 RX ADMIN — ACETAMINOPHEN 650 MG: 325 TABLET ORAL at 09:02

## 2024-01-19 NOTE — PLAN OF CARE
Goal Outcome Evaluation:  Plan of Care Reviewed With: patient        Progress: improving       Problem: Rehabilitation (IRF) Plan of Care  Goal: Plan of Care Review  Outcome: Ongoing, Progressing  Flowsheets (Taken 1/19/2024 1031)  Progress: improving  Plan of Care Reviewed With: patient  Goal: Patient-Specific Goal (Individualized)  Outcome: Ongoing, Progressing  Goal: Absence of New-Onset Illness or Injury  Outcome: Ongoing, Progressing  Intervention: Prevent Fall and Fall Injury  Recent Flowsheet Documentation  Taken 1/19/2024 0800 by Braydon Caballero RN  Safety Promotion/Fall Prevention: safety round/check completed  Intervention: Prevent Infection  Recent Flowsheet Documentation  Taken 1/19/2024 0800 by Braydon Caballero RN  Infection Prevention:   hand hygiene promoted   rest/sleep promoted  Intervention: Prevent VTE (Venous Thromboembolism)  Recent Flowsheet Documentation  Taken 1/19/2024 0800 by Braydon Caballero RN  VTE Prevention/Management:   bilateral   sequential compression devices off   patient refused intervention  Goal: Optimal Comfort and Wellbeing  Outcome: Ongoing, Progressing  Goal: Home and Community Transition Plan Established  Outcome: Ongoing, Progressing     Problem: Skin Injury Risk Increased  Goal: Skin Health and Integrity  Outcome: Ongoing, Progressing  Intervention: Promote and Optimize Oral Intake  Recent Flowsheet Documentation  Taken 1/19/2024 0800 by Braydon Caballero RN  Oral Nutrition Promotion: physical activity promoted  Intervention: Optimize Skin Protection  Recent Flowsheet Documentation  Taken 1/19/2024 0800 by Braydon Caballero RN  Pressure Reduction Techniques:   frequent weight shift encouraged   heels elevated off bed   weight shift assistance provided  Pressure Reduction Devices:   heel offloading device utilized   positioning supports utilized   pressure-redistributing mattress utilized  Skin Protection:   adhesive use limited   incontinence pads utilized     Problem: Fall Injury  Risk  Goal: Absence of Fall and Fall-Related Injury  Outcome: Ongoing, Progressing  Intervention: Identify and Manage Contributors  Recent Flowsheet Documentation  Taken 1/19/2024 0800 by Braydon Caballero RN  Medication Review/Management: medications reviewed  Intervention: Promote Injury-Free Environment  Recent Flowsheet Documentation  Taken 1/19/2024 0800 by Braydon Caballero RN  Safety Promotion/Fall Prevention: safety round/check completed     Problem: Impaired Wound Healing  Goal: Optimal Wound Healing  Outcome: Ongoing, Progressing  Intervention: Promote Wound Healing  Recent Flowsheet Documentation  Taken 1/19/2024 0800 by Braydon Caballero RN  Oral Nutrition Promotion: physical activity promoted  Pain Management Interventions: see MAR  Sleep/Rest Enhancement: regular sleep/rest pattern promoted

## 2024-01-19 NOTE — SIGNIFICANT NOTE
01/19/24 0935   Plan   Plan Contacted [facility] [phone] per Dagmar who says insurance has not faxed a determination about W/C request.  Dagmar will call SS if they get a decision.  [facility] Medicaid can take up to 72 hours or longer to make a determination and with current inclement weather it may be longer for a decision is made.  PA request for W/C was sent on 1-17-24.

## 2024-01-19 NOTE — PROGRESS NOTES
Occupational Therapy: Individual: 90 minutes.    Physical Therapy:    Speech Language Pathology:    Signed by: Carlee Carter, Occupational Therapist

## 2024-01-19 NOTE — PROGRESS NOTES
Assisted By: Braydon DOE    CC: Follow-up on left fractured hip    Interview History/HPI: Patient states that Klonopin has helped her anxiety but she states her legs itch underneath the skin from about her mid calf area down this is not a new finding.  Klonopin tends to help this.  She states she is getting anxious in between the Klonopin doses and was requesting increased frequency.  I explained that I am following psychiatrists recommendations.  She voiced understanding and then requested possibly some hydroxyzine in between times.          Current Hospital Meds:  lactulose, 10 g, Oral, Every Other Day  montelukast, 10 mg, Oral, Nightly  multivitamin, 1 tablet, Oral, Daily  pantoprazole, 40 mg, Oral, Q AM  polyethylene glycol, 17 g, Oral, Daily  vitamin D3, 5,000 Units, Oral, Daily         Vitals:    01/19/24 0700   BP: 133/81   Pulse: 91   Resp: 18   Temp: 97.6 °F (36.4 °C)   SpO2: 97%         Intake/Output Summary (Last 24 hours) at 1/19/2024 1342  Last data filed at 1/19/2024 0900  Gross per 24 hour   Intake 1080 ml   Output 900 ml   Net 180 ml       EXAM: She is pleasant today and in no distress lungs are clear, heart regular rate and rhythm, left lower extremity 2-3+ edema of the pretibial area and foot, right lower extremity trace edema, she has adequate distal pulses she has some skin discoloration of her feet consistent with some venous stasis changes most likely, strength is symmetric      Diet: Regular/House Diet; Texture: Regular Texture (IDDSI 7); Fluid Consistency: Thin (IDDSI 0)        LABS:     Lab Results (last 48 hours)       ** No results found for the last 48 hours. **                 Radiology:    Imaging Results (Last 72 Hours)       ** No results found for the last 72 hours. **            Results for orders placed during the hospital encounter of 05/16/19    Adult Transthoracic Echo Complete W/ Cont if Necessary Per Protocol    Interpretation Summary  · Normal left ventricular cavity size and  wall thickness noted.  · Left ventricular systolic function is normal. Estimated EF appears to be in the range of 61 - 65%.  · Left ventricular diastolic function is normal.  · Mild tricuspid valve regurgitation is present.  · . No evidence of pulmonary hypertension  · No significant valvular heart disease  · There is no evidence of pericardial effusion.      Assessment/Plan:   Left fractured hip, patient is participating with OT and PT.  With OT today, patient was set up for upper body dressing, set up for grooming, OT continues to work on functional endurance, therapeutic exercise, range of motion gross motor coordination activities, fine motor manipulation and dexterity activities.  Patient was doing well ambulated with PT today.  Patient was contact-guard with transfers on previous PT note, she walked 201 160 feet front wheeled walker contact-guard to standby assist.  Patient is going to have her staples out at orthopedic office, she is most likely going to go home in the next 24 hours to 48 hours depending on if she can get a brother to stay with her.    Anxiety/depression, seems to be controlled on Klonopin I have added as needed hydroxyzine.    DVT prophylaxis, despite our recommendations patient is refusing SCUDs and cannot use anticoagulants due to low platelets and history of esophageal varices and bleeds.  Patient had negative Doppler of her left leg.    Ongoing tobacco use even while in rehab, she is going out to smoke, I have counseled her to quit but she states she is not going to.    Pancytopenia, has been stable    CKD has been stable    Cirrhosis, no evidence of encephalopathy, bowels are moving    Servando Nova MD

## 2024-01-19 NOTE — THERAPY TREATMENT NOTE
Inpatient Rehabilitation - Physical Therapy Treatment Note       ALBERTA Herrera     Patient Name: Phuong Tuttle  : 1960  MRN: [id number]    Today's Date: 2024                    Admit Date: 2024      Visit Dx:     ICD-10-CM ICD-9-CM   1. S/p left hip fracture  Z87.81 V15.51   2. ESPERANZA (generalized anxiety disorder)  F41.1 300.02       Patient Active Problem List   Diagnosis    COPD (chronic obstructive pulmonary disease)    Current smoker    Iron deficiency anemia, unspecified    Chest pain    End stage liver disease    Obesity (BMI 30-39.9)    Portal hypertension    Hepatitis C    History of substance abuse    Esophageal varices    Splenomegaly    Chronic kidney disease (CKD), stage III (moderate)    Leukopenia    Splenic pancytopenia syndrome    Iron deficiency anemia    Iron malabsorption    S/p left hip fracture       Past Medical History:   Diagnosis Date    Chronic kidney disease (CKD), stage III (moderate) 2020    COPD (chronic obstructive pulmonary disease)     non-oxygen dependent    End stage liver disease 2020    Esophageal varices     GI bleed     history of GI bleed     Hepatitis C 2020    History of substance abuse 2020    Immunocompromised patient     Peptic ulceration     Portal hypertension 2020    Splenomegaly 2020       Past Surgical History:   Procedure Laterality Date    ESOPHAGEAL VARICES LIGATION      UPPER GASTROINTESTINAL ENDOSCOPY         PT ASSESSMENT (last 12 hours)       IRF PT Evaluation and Treatment       Row Name 24 1409          PT Time and Intention    Document Type daily treatment  -LL     Mode of Treatment individual therapy;physical therapy  -LL     Patient/Family/Caregiver Comments/Observations Patient had no new c/o this date and was agreeable to PT participation.  -LL       Row Name 24 1409          General Information    Existing Precautions/Restrictions fall;left;hip  -LL       Row Name 24 1403           Cognition/Psychosocial    Affect/Mental Status (Cognition) WFL  -LL     Orientation Status (Cognition) oriented x 3  -LL     Follows Commands (Cognition) WFL;verbal cues/prompting required;physical/tactile prompts required  -LL     Personal Safety Interventions gait belt;nonskid shoes/slippers when out of bed;supervised activity  -LL     Cognitive Function WFL  -LL       Row Name 01/19/24 1409          Mobility    Left Lower Extremity (Weight-bearing Status) weight-bearing as tolerated (WBAT)  -LL     Right Lower Extremity (Weight-bearing Status) weight-bearing as tolerated (WBAT)  -LL       Row Name 01/19/24 1409          Bed Mobility    Comment, (Bed Mobility) UIC  -       Row Name 01/19/24 1409          Transfer Assessment/Treatment    Transfers car transfer  -       Row Name 01/19/24 1409          Sit-Stand Transfer    Sit-Stand [place] (Transfers) contact guard;verbal cues;nonverbal cues (demo/gesture)  -     Assistive Device (Sit-Stand Transfers) wheelchair;walker, front-wheeled  -       Row Name 01/19/24 1409          Stand-Sit Transfer    Stand-Sit [place] (Transfers) contact guard;verbal cues;nonverbal cues (demo/gesture)  -     Assistive Device (Stand-Sit Transfers) wheelchair;walker, front-wheeled  -       Row Name 01/19/24 1409          Gait/Stairs (Locomotion)    [place] Level (Gait) contact guard;verbal cues;nonverbal cues (demo/gesture);standby assist  -LL     Assistive Device (Gait) walker, front-wheeled  -     Distance in Feet (Gait) 160' x 2, 100'  -LL     Deviations/Abnormal Patterns (Gait) antalgic;claire decreased;gait speed decreased;stride length decreased;weight shifting decreased  -     Bilateral Gait Deviations forward flexed posture;heel strike decreased  -       Row Name 01/19/24 1409          Wheelchair Mobility/Management    Method of Wheelchair Locomotion (Mobility) bimanual (upper extremity) propulsion  -     Mobility Activities (Wheelchair)  mobility (all) activities  -LL     All Mobility, [place] (Wheelchair) independent  -LL     Distance Propelled in Feet (Wheelchair) 150'  -LL       Row Name 01/19/24 1409          Balance    Comment, Balance Standing bean bag toss reaching outside [place] and crossing midline with 1 hand support and unsupported.  -LL       Row Name 01/19/24 1409          Hip (Therapeutic Exercise)    Hip Strengthening (Therapeutic Exercise) bilateral;flexion;extension;aBduction;aDduction;marching while seated;marching while standing;sitting;standing;resistance band;green  2# w/ sitting ex's  -LL       Row Name 01/19/24 1409          Knee (Therapeutic Exercise)    Knee Strengthening (Therapeutic Exercise) bilateral;flexion;extension;marching while seated;marching while standing;LAQ (long arc quad);hamstring curls;sitting;standing;resistance band;green  2# w/ sitting ex's  -LL       Row Name 01/19/24 1409          Ankle (Therapeutic Exercise)    Ankle Strengthening (Therapeutic Exercise) bilateral;dorsiflexion;plantarflexion;sitting;standing  2# w/ sitting ex's  -LL       Row Name 01/19/24 1409          Positioning and Restraints    Pre-Treatment Position --  WC  -LL     Post Treatment Position chair  -LL     In Chair reclined;call light within reach;encouraged to call for assist;legs elevated  On elevated cushion  -LL       Row Name 01/19/24 1409          Therapy Assessment/Plan (PT)    Patient's Goals For Discharge return home  -       Row Name 01/19/24 1409          Therapy Assessment/Plan (PT)    Rehab Potential/Prognosis (PT) adequate, monitor progress closely  -LL     Frequency of Treatment (PT) 5 times per week  -LL     Estimated Duration of Therapy (PT) 2 weeks  -LL     Problem List (PT) balance;mobility;range of motion (ROM);strength;pain  -LL     Activity Limitations Related to Problem List (PT) unable to ambulate safely;unable to transfer safely  -       Row Name 01/19/24 1409          Therapy Plan Review/Discharge Plan  (PT)    Anticipated Equipment Needs at Discharge (PT Eval) --  tbd  -LL     Anticipated Discharge Disposition (PT) home;home with home health  -LL       Row Name 01/19/24 1409          IRF PT Goals    Bed Mobility Goal Selection (PT-IRF) bed mobility, PT goal 1  -LL     Transfer Goal Selection (PT-IRF) transfers, PT goal 1  -LL     Gait (Walking Locomotion) Goal Selection (PT-IRF) gait, PT goal 1  -LL       Row Name 01/19/24 1409          Bed Mobility Goal 1 (PT-IRF)    Activity/Assistive Device (Bed Mobility Goal 1, PT-IRF) sit to supine/supine to sit  -LL     [place] Level (Bed Mobility Goal 1, PT-IRF) modified independence  -LL     Time Frame (Bed Mobility Goal 1, PT-IRF) by discharge  -LL     Progress/Outcomes (Bed Mobility Goal 1, PT-IRF) goal ongoing  -LL       Row Name 01/19/24 1409          Transfer Goal 1 (PT-IRF)    Activity/Assistive Device (Transfer Goal 1, PT-IRF) sit-to-stand/stand-to-sit;bed-to-chair/chair-to-bed  -LL     [place] Level (Transfer Goal 1, PT-IRF) modified independence  -LL     Time Frame (Transfer Goal 1, PT-IRF) by discharge  -LL     Progress/Outcomes (Transfer Goal 1, PT-IRF) goal ongoing  -LL       Row Name 01/19/24 1409          Gait/Walking Locomotion Goal 1 (PT-IRF)    Activity/Assistive Device (Gait/Walking Locomotion Goal 1, PT-IRF) walker, rolling  -LL     Gait/Walking Locomotion Distance Goal 1 (PT-IRF) 300'  -LL     [place] Level (Gait/Walking Locomotion Goal 1, PT-IRF) modified independence  -LL     Time Frame (Gait/Walking Locomotion Goal 1, PT-IRF) by discharge  -LL     Progress/Outcomes (Gait/Walking Locomotion Goal 1, PT-IRF) goal ongoing  -LL               User Key  (r) = Recorded By, (t) = Taken By, (c) = Cosigned By      Initials Name Provider Type    LL Juliana Echeverria PTA Physical Therapist Assistant                  Wound 01/09/24 1655 Left anterior hip Incision (Active)   Dressing Appearance dry;intact 01/19/24 0800   Closure Adhesive  bandage;Staples 01/19/24 0800   Base clean;dry;dressing in place, unable to visualize 01/19/24 0800   Drainage Amount none 01/19/24 0800   Care, Wound cleansed with 01/19/24 0523   Dressing Care dressing changed 01/19/24 0523     Physical Therapy Education       Title: PT OT SLP Therapies (Done)       Topic: Physical Therapy (Done)       Point: Mobility training (Done)       Learning Progress Summary             Patient Acceptance, E,D, VU,NR by LL at 1/19/2024 1414    Acceptance, E,D, VU,NR by LL at 1/18/2024 1518    Acceptance, E,D, VU,NR by LL at 1/17/2024 1533    Acceptance, E,D, VU,NR by LL at 1/16/2024 1441    Acceptance, E,D, VU,NR by LL at 1/15/2024 1511    Acceptance, E,TB, VU,DU by  at 1/13/2024 1154    Acceptance, E,D, VU,NR by LL at 1/12/2024 1356    Acceptance, E, VU,NR by LB at 1/11/2024 1547                         Point: Home exercise program (Done)       Learning Progress Summary             Patient Acceptance, E,D, VU,NR by LL at 1/19/2024 1414    Acceptance, E,D, VU,NR by LL at 1/18/2024 1518    Acceptance, E,D, VU,NR by LL at 1/17/2024 1533    Acceptance, E,D, VU,NR by LL at 1/16/2024 1441    Acceptance, E,D, VU,NR by LL at 1/15/2024 1511    Acceptance, E,TB, VU,DU by HC at 1/13/2024 1154    Acceptance, E,D, VU,NR by LL at 1/12/2024 1356    Acceptance, E, VU,NR by LB at 1/11/2024 1547                         Point: Body mechanics (Done)       Learning Progress Summary             Patient Acceptance, E,D, VU,NR by LL at 1/19/2024 1414    Acceptance, E,D, VU,NR by LL at 1/18/2024 1518    Acceptance, E,D, VU,NR by LL at 1/17/2024 1533    Acceptance, E,D, VU,NR by LL at 1/16/2024 1441    Acceptance, E,D, VU,NR by LL at 1/15/2024 1511    Acceptance, E,TB, VU,DU by HC at 1/13/2024 1154    Acceptance, E,D, VU,NR by LL at 1/12/2024 1356    Acceptance, E, VU,NR by LB at 1/11/2024 1547                         Point: Precautions (Done)       Learning Progress Summary             Patient Acceptance, E,D,  VU,NR by LL at 1/19/2024 1414    Acceptance, E,D, VU,NR by LL at 1/18/2024 1518    Acceptance, E,D, VU,NR by LL at 1/17/2024 1533    Acceptance, E,D, VU,NR by LL at 1/16/2024 1441    Acceptance, E,D, VU,NR by LL at 1/15/2024 1511    Acceptance, E,TB, VU,DU by HC at 1/13/2024 1154    Acceptance, E,D, VU,NR by LL at 1/12/2024 1356    Acceptance, E, VU,NR by LB at 1/11/2024 1547                                         User Key       Initials Effective Dates Name Provider Type Discipline    LB 06/16/21 -  Caryn Rosen, PT Physical Therapist PT     05/02/16 -  Juliana Echeverria PTA Physical Therapist Assistant PT     01/20/23 -  Cecilia Laureano PTA Physical Therapist Assistant PT                    PT Recommendation and Plan    Frequency of Treatment (PT): 5 times per week  Anticipated Equipment Needs at Discharge (PT Eval):  (tbd)                  Time Calculation:      PT Charges       Row Name 01/19/24 1416 01/19/24 1414          Time Calculation    Start Time 1230  -LL 1000  -LL     Stop Time 1240  -LL 1130  -LL     Time Calculation (min) 10 min  -LL 90 min  -LL     PT Received On -- 01/19/24  -LL        Time Calculation- PT    Total Timed Code Minutes- PT 10 minute(s)  -LL 90 minute(s)  -LL               User Key  (r) = Recorded By, (t) = Taken By, (c) = Cosigned By      Initials Name Provider Type    LL Juliana Echeverria PTA Physical Therapist Assistant                    Therapy Charges for Today       Code Description Service Date Service Provider Modifiers Qty    93173460008 HC GAIT TRAINING EA 15 MIN 1/18/2024 Juliana Echeverria, ROSEMARIE GP, CQ 3    06018920268 HC PT THERAPEUTIC ACT EA 15 MIN 1/18/2024 Juliana Echeverria, PTA GP, CQ 1    30844029031 HC PT THER PROC EA 15 MIN 1/18/2024 Juliana Echeverria, ROSEMARIE GP, CQ 4    87119854877 HC GAIT TRAINING EA 15 MIN 1/19/2024 Juliana Echeverria, PTA GP, CQ 2    14534314177 HC PT THERAPEUTIC ACT EA 15 MIN 1/19/2024 Juliana Echeverria PTA GP, CQ 1    32560639942  PT  THER PROC EA 15 MIN 1/19/2024 Juliana Echeverria PTA GP, CQ 4              PT G-Codes  AM-PAC 6 Clicks Score (PT): 18      Juliana. ROSEMARIE Echeverria  1/19/2024

## 2024-01-19 NOTE — PROGRESS NOTES
Occupational Therapy:    Physical Therapy: Individual: 100 minutes.    Speech Language Pathology:    Signed by: Juliana Echeverria PTA

## 2024-01-19 NOTE — THERAPY TREATMENT NOTE
Inpatient Rehabilitation - Occupational Therapy Treatment Note    ALBERTA Herrera     Patient Name: Phuong Tuttle  : 1960  MRN: [id number]    Today's Date: 2024                 Admit Date: 2024         ICD-10-CM ICD-9-CM   1. S/p left hip fracture  Z87.81 V15.51   2. ESPERANZA (generalized anxiety disorder)  F41.1 300.02       Patient Active Problem List   Diagnosis    COPD (chronic obstructive pulmonary disease)    Current smoker    Iron deficiency anemia, unspecified    Chest pain    End stage liver disease    Obesity (BMI 30-39.9)    Portal hypertension    Hepatitis C    History of substance abuse    Esophageal varices    Splenomegaly    Chronic kidney disease (CKD), stage III (moderate)    Leukopenia    Splenic pancytopenia syndrome    Iron deficiency anemia    Iron malabsorption    S/p left hip fracture       Past Medical History:   Diagnosis Date    Chronic kidney disease (CKD), stage III (moderate) 2020    COPD (chronic obstructive pulmonary disease)     non-oxygen dependent    End stage liver disease 2020    Esophageal varices     GI bleed     history of GI bleed     Hepatitis C 2020    History of substance abuse 2020    Immunocompromised patient     Peptic ulceration     Portal hypertension 2020    Splenomegaly 2020       Past Surgical History:   Procedure Laterality Date    ESOPHAGEAL VARICES LIGATION      UPPER GASTROINTESTINAL ENDOSCOPY               IRF OT ASSESSMENT FLOWSHEET (last 12 hours)       IRF OT Evaluation and Treatment       Row Name 24 1205          OT Time and Intention    Document Type daily treatment  -CJ     Mode of Treatment occupational therapy  -CJ     Symptoms Noted During/After Treatment none  -CJ       Row Name 24 1200          General Information    Patient/Family/Caregiver Comments/Observations agreeable to therapy  -CJ     Existing Precautions/Restrictions fall;hip  -CJ       Row Name 24 1201          Upper Body Dressing     [place] Level (Upper Body Dressing) set up assistance  -       Row Name 01/19/24 1208          Grooming    [place] Level (Grooming) set up  -       Row Name 01/19/24 1208          Motor Skills    Motor Skills functional endurance  -     Motor Control/Coordination Interventions therapeutic exercise/ROM;gross motor coordination activities;fine motor manipulation/dexterity activities  BUE ther ex/act, AROM, bilat coord ex, GMC/FMC,  strengthening  -     Therapeutic Exercise --  UBE  -     Additional Documentation --  BUE ther ex/act to increase strength/ fxl act [place] to improve ADL performance.  -       Row Name 01/19/24 1208          Positioning and Restraints    Post Treatment Position wheelchair  -     In Wheelchair sitting;call light within reach;encouraged to call for assist;notified nsg  -               User Key  (r) = Recorded By, (t) = Taken By, (c) = Cosigned By      Initials Name Effective Dates     Carlee Carter, OT 06/16/21 -                      Occupational Therapy Education       Title: PT OT SLP Therapies (Done)       Topic: Occupational Therapy (Done)       Point: ADL training (Done)       Description:   Instruct learner(s) on proper safety adaptation and remediation techniques during self care or transfers.   Instruct in proper use of assistive devices.                  Learning Progress Summary             Patient Acceptance, E,D, VU,NR by  at 1/19/2024 1217    Acceptance, E, VU,NR by  at 1/18/2024 1409    Acceptance, E, VU,NR by  at 1/17/2024 1452    Acceptance, E, VU,NR by  at 1/16/2024 1508    Acceptance, E, VU,NR by  at 1/15/2024 0951    Acceptance, E, VU,NR by  at 1/13/2024 1208    Acceptance, E, VU,NR by  at 1/12/2024 1154    Acceptance, E, VU,NR by  at 1/11/2024 1354    Acceptance, E, VU,NR by  at 1/10/2024 1413                         Point: Home exercise program (Done)       Description:   Instruct learner(s) on appropriate technique for  monitoring, assisting and/or progressing therapeutic exercises/activities.                  Learning Progress Summary             Patient Acceptance, E, VU,NR by HB at 1/18/2024 1409    Acceptance, E, VU,NR by HB at 1/17/2024 1452    Acceptance, E, VU,NR by HB at 1/16/2024 1508    Acceptance, E, VU,NR by HB at 1/15/2024 0951    Acceptance, E, VU,NR by HB at 1/13/2024 1208    Acceptance, E, VU,NR by HB at 1/12/2024 1154    Acceptance, E, VU,NR by HB at 1/11/2024 1354    Acceptance, E, VU,NR by HB at 1/10/2024 1413                         Point: Precautions (Done)       Description:   Instruct learner(s) on prescribed precautions during self-care and functional transfers.                  Learning Progress Summary             Patient Acceptance, E,D, VU,NR by CJ at 1/19/2024 1217    Acceptance, E, VU,NR by HB at 1/18/2024 1409    Acceptance, E, VU,NR by HB at 1/17/2024 1452    Acceptance, E, VU,NR by HB at 1/16/2024 1508    Acceptance, E, VU,NR by HB at 1/15/2024 0951    Acceptance, E, VU,NR by HB at 1/13/2024 1208    Acceptance, E, VU,NR by HB at 1/12/2024 1154    Acceptance, E, VU,NR by HB at 1/11/2024 1354    Acceptance, E, VU,NR by HB at 1/10/2024 1413                         Point: Body mechanics (Done)       Description:   Instruct learner(s) on proper positioning and spine alignment during self-care, functional mobility activities and/or exercises.                  Learning Progress Summary             Patient Acceptance, E, VU,NR by HB at 1/18/2024 1409    Acceptance, E, VU,NR by HB at 1/17/2024 1452    Acceptance, E, VU,NR by HB at 1/16/2024 1508    Acceptance, E, VU,NR by HB at 1/15/2024 0951    Acceptance, E, VU,NR by HB at 1/13/2024 1208    Acceptance, E, VU,NR by HB at 1/12/2024 1154    Acceptance, E, VU,NR by HB at 1/11/2024 1354    Acceptance, E, VU,NR by HB at 1/10/2024 1413                                         User Key       Initials Effective Dates Name Provider Type Discipline     06/16/21 -   Carlee Carter OT Occupational Therapist OT     05/25/21 -  Julia Quintana OT Occupational Therapist OT                        OT Recommendation and Plan                         Time Calculation:      Time Calculation- OT       Row Name 01/19/24 1217             Time Calculation- OT    OT Start Time 0825  -CJ      OT Stop Time 0955  -      OT Time Calculation (min) 90 min  -      Total Timed Code Minutes- OT 90 minute(s)  -                User Key  (r) = Recorded By, (t) = Taken By, (c) = Cosigned By      Initials Name Provider Type     Carlee Carter OT Occupational Therapist                  Therapy Charges for Today       Code Description Service Date Service Provider Modifiers Qty    24798528157 HC OT SELF CARE/MGMT/TRAIN EA 15 MIN 1/19/2024 Carlee Carter OT GO 1    40757062101 HC OT THER PROC EA 15 MIN 1/19/2024 Carlee Carter OT GO 2    01557573388 HC OT THERAPEUTIC ACT EA 15 MIN 1/19/2024 Carlee Carter OT GO 3                     Carlee Carter OT  1/19/2024

## 2024-01-19 NOTE — SIGNIFICANT NOTE
01/19/24 1800   Plan   Plan SS spoke to pt via TYLER Webster's phone via speaker.  Pt says her discharge plan is to go home on Sunday because her brother Earle and sister-in-law Virginia are flying into [place] from [place] to stay with her for 2 weeks.  Pt says sister-in-law is a nurse.  Pt says Virginia's brother will pick them up in [place] and they will come to rehab and transport her home.  Informed pt [facility] has not received an answer from insurance about PA request for W/C.  Pt says she cannot go home without a W/C.  Explained [facility] may be able to assist her with W/C if insurance does not approve request, however, they can take up to 72 hours or later and with inclement winter weather it may take longer before decision is made.  Informed pt rehab may be able to loan her a W/C until insurance makes a decision if she leaves on Sunday if approved by Rehab Director.  Informed her [facility] will be evaluating her for HCBW program and will follow-up with her.  Explained [facility] will be following up with her at home.  Informed her [facility] will notify her on 1-22-24 with pick-up time on to go to Ortho appointment on 1-23-24 at 8:45 am.  Informed her she can call [employer] [facility] after 3:00 pm on 1-22-24 with pick-up time.  Instructed pt to expect pick-up between 7:45 am-8:00 am.  RN to contact [facility] [phone] if pt is discharged on Sunday 1-21-24.  Ambulatory referral for home health with face to face and discharge summary to be faxed to  [phone].  MD is aware of pt's discharge plans and request to discharge on 1-21-24.  MD will talk to pt in the am.

## 2024-01-20 LAB
ANION GAP SERPL CALCULATED.3IONS-SCNC: 5.3 MMOL/L (ref 5–15)
BASOPHILS # BLD AUTO: 0.02 10*3/MM3 (ref 0–0.2)
BASOPHILS NFR BLD AUTO: 1.2 % (ref 0–1.5)
BUN SERPL-MCNC: 17 MG/DL (ref 8–23)
BUN/CREAT SERPL: 16.7 (ref 7–25)
CALCIUM SPEC-SCNC: 8.3 MG/DL (ref 8.6–10.5)
CHLORIDE SERPL-SCNC: 107 MMOL/L (ref 98–107)
CO2 SERPL-SCNC: 29.7 MMOL/L (ref 22–29)
CREAT SERPL-MCNC: 1.02 MG/DL (ref 0.57–1)
DEPRECATED RDW RBC AUTO: 52.2 FL (ref 37–54)
EGFRCR SERPLBLD CKD-EPI 2021: 62.7 ML/MIN/1.73
EOSINOPHIL # BLD AUTO: 0.09 10*3/MM3 (ref 0–0.4)
EOSINOPHIL NFR BLD AUTO: 5.3 % (ref 0.3–6.2)
ERYTHROCYTE [DISTWIDTH] IN BLOOD BY AUTOMATED COUNT: 15.1 % (ref 12.3–15.4)
GLUCOSE SERPL-MCNC: 88 MG/DL (ref 65–99)
HCT VFR BLD AUTO: 28 % (ref 34–46.6)
HGB BLD-MCNC: 8.4 G/DL (ref 12–15.9)
IMM GRANULOCYTES # BLD AUTO: 0.01 10*3/MM3 (ref 0–0.05)
IMM GRANULOCYTES NFR BLD AUTO: 0.6 % (ref 0–0.5)
LYMPHOCYTES # BLD AUTO: 0.43 10*3/MM3 (ref 0.7–3.1)
LYMPHOCYTES NFR BLD AUTO: 25.3 % (ref 19.6–45.3)
MCH RBC QN AUTO: 28.2 PG (ref 26.6–33)
MCHC RBC AUTO-ENTMCNC: 30 G/DL (ref 31.5–35.7)
MCV RBC AUTO: 94 FL (ref 79–97)
MONOCYTES # BLD AUTO: 0.16 10*3/MM3 (ref 0.1–0.9)
MONOCYTES NFR BLD AUTO: 9.4 % (ref 5–12)
NEUTROPHILS NFR BLD AUTO: 0.99 10*3/MM3 (ref 1.7–7)
NEUTROPHILS NFR BLD AUTO: 58.2 % (ref 42.7–76)
NRBC BLD AUTO-RTO: 0 /100 WBC (ref 0–0.2)
PLATELET # BLD AUTO: 71 10*3/MM3 (ref 140–450)
PMV BLD AUTO: 10.5 FL (ref 6–12)
POTASSIUM SERPL-SCNC: 4.3 MMOL/L (ref 3.5–5.2)
RBC # BLD AUTO: 2.98 10*6/MM3 (ref 3.77–5.28)
SODIUM SERPL-SCNC: 142 MMOL/L (ref 136–145)
WBC NRBC COR # BLD AUTO: 1.7 10*3/MM3 (ref 3.4–10.8)

## 2024-01-20 PROCEDURE — 80048 BASIC METABOLIC PNL TOTAL CA: CPT | Performed by: INTERNAL MEDICINE

## 2024-01-20 PROCEDURE — 63710000001 ONDANSETRON PER 8 MG: Performed by: FAMILY MEDICINE

## 2024-01-20 PROCEDURE — 99231 SBSQ HOSP IP/OBS SF/LOW 25: CPT | Performed by: INTERNAL MEDICINE

## 2024-01-20 PROCEDURE — 85025 COMPLETE CBC W/AUTO DIFF WBC: CPT | Performed by: INTERNAL MEDICINE

## 2024-01-20 RX ORDER — LACTULOSE 10 G/15ML
10 SOLUTION ORAL 2 TIMES DAILY
Status: DISCONTINUED | OUTPATIENT
Start: 2024-01-20 | End: 2024-01-22 | Stop reason: HOSPADM

## 2024-01-20 RX ADMIN — ONDANSETRON HYDROCHLORIDE 8 MG: 4 TABLET, FILM COATED ORAL at 08:55

## 2024-01-20 RX ADMIN — CLONAZEPAM 1 MG: 1 TABLET ORAL at 00:29

## 2024-01-20 RX ADMIN — HYDROXYZINE HYDROCHLORIDE 10 MG: 10 TABLET ORAL at 15:28

## 2024-01-20 RX ADMIN — HYDROXYZINE HYDROCHLORIDE 10 MG: 10 TABLET ORAL at 04:53

## 2024-01-20 RX ADMIN — LACTULOSE 10 G: 20 SOLUTION ORAL at 08:55

## 2024-01-20 RX ADMIN — Medication 1 TABLET: at 08:55

## 2024-01-20 RX ADMIN — OXYCODONE HYDROCHLORIDE 10 MG: 10 TABLET ORAL at 00:29

## 2024-01-20 RX ADMIN — ONDANSETRON HYDROCHLORIDE 8 MG: 4 TABLET, FILM COATED ORAL at 17:12

## 2024-01-20 RX ADMIN — ONDANSETRON HYDROCHLORIDE 8 MG: 4 TABLET, FILM COATED ORAL at 00:34

## 2024-01-20 RX ADMIN — MONTELUKAST SODIUM 10 MG: 10 TABLET, COATED ORAL at 21:22

## 2024-01-20 RX ADMIN — OXYCODONE HYDROCHLORIDE 10 MG: 10 TABLET ORAL at 21:22

## 2024-01-20 RX ADMIN — OXYCODONE HYDROCHLORIDE 10 MG: 10 TABLET ORAL at 15:01

## 2024-01-20 RX ADMIN — ACETAMINOPHEN 650 MG: 325 TABLET ORAL at 04:53

## 2024-01-20 RX ADMIN — CLONAZEPAM 1 MG: 1 TABLET ORAL at 12:13

## 2024-01-20 RX ADMIN — ACETAMINOPHEN 650 MG: 325 TABLET ORAL at 12:13

## 2024-01-20 RX ADMIN — OXYCODONE HYDROCHLORIDE 10 MG: 10 TABLET ORAL at 08:54

## 2024-01-20 RX ADMIN — ACETAMINOPHEN 650 MG: 325 TABLET ORAL at 17:12

## 2024-01-20 RX ADMIN — PANTOPRAZOLE SODIUM 40 MG: 40 TABLET, DELAYED RELEASE ORAL at 05:08

## 2024-01-20 RX ADMIN — Medication 5000 UNITS: at 08:55

## 2024-01-20 NOTE — PROGRESS NOTES
Assisted By: Braydon DOE    CC: Follow-up on left hip fracture    Interview History/HPI: Patient states she is doing okay, no acute complaints this morning.          Current Hospital Meds:  lactulose, 10 g, Oral, BID  montelukast, 10 mg, Oral, Nightly  multivitamin, 1 tablet, Oral, Daily  pantoprazole, 40 mg, Oral, Q AM  polyethylene glycol, 17 g, Oral, Daily  vitamin D3, 5,000 Units, Oral, Daily         Vitals:    01/20/24 0700   BP: 104/67   Pulse: 80   Resp: 18   Temp: 97.8 °F (36.6 °C)   SpO2: 99%         Intake/Output Summary (Last 24 hours) at 1/20/2024 1039  Last data filed at 1/20/2024 0700  Gross per 24 hour   Intake 1800 ml   Output 600 ml   Net 1200 ml       EXAM: She is pleasant and in no distress, strength is symmetric, the left lower extremity edema actually is improved today although not completely gone skin has some wrinkling consistent with resolving, she still has adequate pulses.  Her left hip wound looks the same, the upper leg has some edema but there is no drainage edges well-approximated staples in place.  Lungs are clear, heart regular rate and rhythm, no fluid wave.      Diet: Regular/House Diet; Texture: Regular Texture (IDDSI 7); Fluid Consistency: Thin (IDDSI 0)        LABS:     Lab Results (last 48 hours)       Procedure Component Value Units Date/Time    Basic Metabolic Panel [[id number]]  (Abnormal) Collected: 01/20/24 0727    Specimen: Blood Updated: 01/20/24 0846     Glucose 88 mg/dL      BUN 17 mg/dL      Creatinine 1.02 mg/dL      Sodium 142 mmol/L      Potassium 4.3 mmol/L      Comment: Slight hemolysis detected by analyzer. Result may be falsely elevated.        Chloride 107 mmol/L      CO2 29.7 mmol/L      Calcium 8.3 mg/dL      BUN/Creatinine Ratio 16.7     Anion Gap 5.3 mmol/L      eGFR 62.7 mL/min/1.73     Narrative:      GFR Normal >60  Chronic Kidney Disease <60  Kidney Failure <15      CBC & Differential [[id number]]  (Abnormal) Collected: 01/20/24 0727    Specimen: Blood  Updated: 01/20/24 0843    Narrative:      The following orders were created for panel order CBC & Differential.  Procedure                               Abnormality         Status                     ---------                               -----------         ------                     CBC Auto Differential[[id number]]        Abnormal            Final result                 Please view results for these tests on the individual orders.    CBC Auto Differential [[id number]]  (Abnormal) Collected: 01/20/24 0727    Specimen: Blood Updated: 01/20/24 0843     WBC 1.70 10*3/mm3      RBC 2.98 10*6/mm3      Hemoglobin 8.4 g/dL      Hematocrit 28.0 %      MCV 94.0 fL      MCH 28.2 pg      MCHC 30.0 g/dL      RDW 15.1 %      RDW-SD 52.2 fl      MPV 10.5 fL      Platelets 71 10*3/mm3      Neutrophil % 58.2 %      Lymphocyte % 25.3 %      Monocyte % 9.4 %      Eosinophil % 5.3 %      Basophil % 1.2 %      Immature Grans % 0.6 %      Neutrophils, Absolute 0.99 10*3/mm3      Lymphocytes, Absolute 0.43 10*3/mm3      Monocytes, Absolute 0.16 10*3/mm3      Eosinophils, Absolute 0.09 10*3/mm3      Basophils, Absolute 0.02 10*3/mm3      Immature Grans, Absolute 0.01 10*3/mm3      nRBC 0.0 /100 WBC                  Radiology:    Imaging Results (Last 72 Hours)       ** No results found for the last 72 hours. **            Results for orders placed during the hospital encounter of 05/16/19    Adult Transthoracic Echo Complete W/ Cont if Necessary Per Protocol    Interpretation Summary  · Normal left ventricular cavity size and wall thickness noted.  · Left ventricular systolic function is normal. Estimated EF appears to be in the range of 61 - 65%.  · Left ventricular diastolic function is normal.  · Mild tricuspid valve regurgitation is present.  · . No evidence of pulmonary hypertension  · No significant valvular heart disease  · There is no evidence of pericardial effusion.      Assessment/Plan:   Left fractured hip, patient is  participating with OT and PT. with PT yesterday, patient was contact-guard for sit to stand stand to sit, patient walked 160 feet x 2 then 100 feet front wheeled walker contact-guard to standby assist.  With OT, patient is set up for upper body, set up for grooming, OT working on motor skills including functional endurance, therapeutic exercise, range of motion, gross motor coordination activities, fine motor manipulation and dexterity activities.  We have not heard back from the patient's insurance but patient is planning on going home tomorrow.  Her brother is coming in from [place] tomorrow.  I not sure he will get here actually in time for her to be discharged, will wait until tomorrow to see times.  I had discussed with orthopedics earlier in the week, patient will be following up with him for staple removal, he requested staples remain in until that time.     Anxiety/depression, seems to be controlled on Klonopin and as needed hydroxyzine.     DVT prophylaxis, despite our recommendations patient is refusing SCUDs and cannot use anticoagulants due to low platelets and history of esophageal varices and bleeds.  Patient had negative Doppler of her left leg.     Ongoing tobacco use even while in rehab, she is going out to smoke, I have counseled her to quit but she states she is not going to.     Pancytopenia, secondary to splenomegaly and cirrhosis, allowing for some day-to-day variance, I feel stable.     CKD stage     Cirrhosis, no evidence of encephalopathy, bowels are moving status post esophageal bleeding and ligation she states within the last year to 2 years.  Noted she has never had a paracentesis.  She is alert and answers questions appropriately.    Reported COPD without exacerbation now.    Servando Nova MD

## 2024-01-20 NOTE — PLAN OF CARE
Goal Outcome Evaluation:  Plan of Care Reviewed With: patient        Progress: improving       Problem: Rehabilitation (IRF) Plan of Care  Goal: Plan of Care Review  Outcome: Ongoing, Progressing  Flowsheets (Taken 1/20/2024 1006)  Progress: improving  Plan of Care Reviewed With: patient  Goal: Patient-Specific Goal (Individualized)  Outcome: Ongoing, Progressing  Goal: Absence of New-Onset Illness or Injury  Outcome: Ongoing, Progressing  Intervention: Prevent Fall and Fall Injury  Recent Flowsheet Documentation  Taken 1/20/2024 0800 by Braydon Caballero RN  Safety Promotion/Fall Prevention: safety round/check completed  Intervention: Prevent Infection  Recent Flowsheet Documentation  Taken 1/20/2024 0800 by Braydon Caballero RN  Infection Prevention:   hand hygiene promoted   rest/sleep promoted  Intervention: Prevent VTE (Venous Thromboembolism)  Recent Flowsheet Documentation  Taken 1/20/2024 0800 by Braydon Caballero RN  VTE Prevention/Management:   sequential compression devices off   patient refused intervention  Goal: Optimal Comfort and Wellbeing  Outcome: Ongoing, Progressing  Goal: Home and Community Transition Plan Established  Outcome: Ongoing, Progressing     Problem: Skin Injury Risk Increased  Goal: Skin Health and Integrity  Outcome: Ongoing, Progressing  Intervention: Promote and Optimize Oral Intake  Recent Flowsheet Documentation  Taken 1/20/2024 0800 by Braydon Caballero RN  Oral Nutrition Promotion: physical activity promoted  Intervention: Optimize Skin Protection  Recent Flowsheet Documentation  Taken 1/20/2024 0800 by Braydon Caballero RN  Pressure Reduction Techniques:   frequent weight shift encouraged   heels elevated off bed   weight shift assistance provided  Pressure Reduction Devices:   heel offloading device utilized   positioning supports utilized   pressure-redistributing mattress utilized  Skin Protection:   adhesive use limited   incontinence pads utilized     Problem: Fall Injury Risk  Goal:  Absence of Fall and Fall-Related Injury  Outcome: Ongoing, Progressing  Intervention: Identify and Manage Contributors  Recent Flowsheet Documentation  Taken 1/20/2024 0800 by Braydon Caballero RN  Medication Review/Management: medications reviewed  Intervention: Promote Injury-Free Environment  Recent Flowsheet Documentation  Taken 1/20/2024 0800 by Braydon Caballero RN  Safety Promotion/Fall Prevention: safety round/check completed     Problem: Impaired Wound Healing  Goal: Optimal Wound Healing  Outcome: Ongoing, Progressing  Intervention: Promote Wound Healing  Recent Flowsheet Documentation  Taken 1/20/2024 0800 by Braydon Caballero RN  Oral Nutrition Promotion: physical activity promoted  Pain Management Interventions: see MAR  Sleep/Rest Enhancement: regular sleep/rest pattern promoted

## 2024-01-20 NOTE — PLAN OF CARE
Problem: Rehabilitation (IRF) Plan of Care  Goal: Plan of Care Review  Outcome: Ongoing, Progressing  Flowsheets (Taken 1/20/2024 0625)  Progress: no change  Plan of Care Reviewed With: patient  Outcome Evaluation:   Patient calm and cooperative   no acute distress   resting well throughout the night.  Goal: Patient-Specific Goal (Individualized)  Outcome: Ongoing, Progressing  Goal: Absence of New-Onset Illness or Injury  Outcome: Ongoing, Progressing  Intervention: Prevent Fall and Fall Injury  Recent Flowsheet Documentation  Taken 1/20/2024 0600 by Caty Thakkar, RN  Safety Promotion/Fall Prevention:   safety round/check completed   room organization consistent   nonskid shoes/slippers when out of bed   mobility aid in [facility]   fall prevention program maintained   elopement precautions   clutter free environment maintained   assistive device/personal items within reach  Taken 1/20/2024 0400 by Caty Thakkar, RN  Safety Promotion/Fall Prevention:   safety round/check completed   room organization consistent   nonskid shoes/slippers when out of bed   mobility aid in [facility]   fall prevention program maintained   elopement precautions   clutter free environment maintained   assistive device/personal items within reach  Taken 1/20/2024 0158 by Caty Thakkar, RN  Safety Promotion/Fall Prevention:   safety round/check completed   room organization consistent   nonskid shoes/slippers when out of bed   mobility aid in [facility]   fall prevention program maintained   elopement precautions   clutter free environment maintained   assistive device/personal items within reach  Taken 1/20/2024 0011 by Caty Thakkar, RN  Safety Promotion/Fall Prevention:   safety round/check completed   room organization consistent   nonskid shoes/slippers when out of bed   mobility aid in [facility]   fall prevention program maintained   elopement precautions   clutter free environment maintained   assistive  device/personal items within reach  Taken 1/19/2024 2200 by Caty Thakkar, RN  Safety Promotion/Fall Prevention:   safety round/check completed   room organization consistent   nonskid shoes/slippers when out of bed   mobility aid in [facility]   fall prevention program maintained   elopement precautions   clutter free environment maintained   assistive device/personal items within reach  Taken 1/19/2024 2000 by Caty Thakkar, RN  Safety Promotion/Fall Prevention:   safety round/check completed   room organization consistent   nonskid shoes/slippers when out of bed   mobility aid in [facility]   fall prevention program maintained   elopement precautions   clutter free environment maintained   assistive device/personal items within reach  Intervention: Prevent Infection  Recent Flowsheet Documentation  Taken 1/20/2024 0600 by Caty Thakkar RN  Infection Prevention:   personal protective equipment utilized   rest/sleep promoted   single patient room provided  Taken 1/20/2024 0400 by Caty Thakkar RN  Infection Prevention:   personal protective equipment utilized   rest/sleep promoted   single patient room provided  Taken 1/20/2024 0158 by Caty Thakkar RN  Infection Prevention:   personal protective equipment utilized   rest/sleep promoted   single patient room provided  Taken 1/20/2024 0011 by Caty Thakkar RN  Infection Prevention:   personal protective equipment utilized   rest/sleep promoted   single patient room provided  Taken 1/19/2024 2200 by Caty Thakkar RN  Infection Prevention:   personal protective equipment utilized   rest/sleep promoted   single patient room provided  Taken 1/19/2024 2000 by Caty Thakkar RN  Infection Prevention:   personal protective equipment utilized   rest/sleep promoted   single patient room provided  Intervention: Prevent VTE (Venous Thromboembolism)  Recent Flowsheet Documentation  Taken  1/20/2024 0200 by Caty Thakkar, RN  VTE Prevention/Management:   sequential compression devices off   patient refused intervention  Taken 1/19/2024 2118 by Caty Thakkar, RN  VTE Prevention/Management:   bilateral   sequential compression devices off   patient refused intervention  Goal: Optimal Comfort and Wellbeing  Outcome: Ongoing, Progressing  Goal: Home and Community Transition Plan Established  Outcome: Ongoing, Progressing   Goal Outcome Evaluation:  Plan of Care Reviewed With: patient        Progress: no change  Outcome Evaluation: Patient calm and cooperative; no acute distress; resting well throughout the night.

## 2024-01-21 VITALS
BODY MASS INDEX: 25.71 KG/M2 | TEMPERATURE: 98.5 F | HEART RATE: 89 BPM | SYSTOLIC BLOOD PRESSURE: 106 MMHG | HEIGHT: 66 IN | RESPIRATION RATE: 20 BRPM | DIASTOLIC BLOOD PRESSURE: 56 MMHG | OXYGEN SATURATION: 93 % | WEIGHT: 160 LBS

## 2024-01-21 PROCEDURE — 63710000001 ONDANSETRON PER 8 MG: Performed by: FAMILY MEDICINE

## 2024-01-21 RX ADMIN — HYDROXYZINE HYDROCHLORIDE 10 MG: 10 TABLET ORAL at 00:16

## 2024-01-21 RX ADMIN — CLONAZEPAM 1 MG: 1 TABLET ORAL at 00:15

## 2024-01-21 RX ADMIN — ONDANSETRON HYDROCHLORIDE 8 MG: 4 TABLET, FILM COATED ORAL at 06:06

## 2024-01-21 RX ADMIN — CLONAZEPAM 1 MG: 1 TABLET ORAL at 23:53

## 2024-01-21 RX ADMIN — ONDANSETRON HYDROCHLORIDE 8 MG: 4 TABLET, FILM COATED ORAL at 11:58

## 2024-01-21 RX ADMIN — OXYCODONE HYDROCHLORIDE 10 MG: 10 TABLET ORAL at 11:58

## 2024-01-21 RX ADMIN — LACTULOSE 10 G: 20 SOLUTION ORAL at 09:09

## 2024-01-21 RX ADMIN — PANTOPRAZOLE SODIUM 40 MG: 40 TABLET, DELAYED RELEASE ORAL at 06:06

## 2024-01-21 RX ADMIN — ONDANSETRON HYDROCHLORIDE 8 MG: 4 TABLET, FILM COATED ORAL at 23:53

## 2024-01-21 RX ADMIN — ONDANSETRON HYDROCHLORIDE 8 MG: 4 TABLET, FILM COATED ORAL at 17:58

## 2024-01-21 RX ADMIN — ACETAMINOPHEN 650 MG: 325 TABLET ORAL at 21:24

## 2024-01-21 RX ADMIN — ACETAMINOPHEN 650 MG: 325 TABLET ORAL at 09:13

## 2024-01-21 RX ADMIN — ACETAMINOPHEN 650 MG: 325 TABLET ORAL at 00:15

## 2024-01-21 RX ADMIN — OXYCODONE HYDROCHLORIDE 10 MG: 10 TABLET ORAL at 06:06

## 2024-01-21 RX ADMIN — POLYETHYLENE GLYCOL (3350) 17 G: 17 POWDER, FOR SOLUTION ORAL at 09:09

## 2024-01-21 RX ADMIN — Medication 1 TABLET: at 09:09

## 2024-01-21 RX ADMIN — ONDANSETRON HYDROCHLORIDE 8 MG: 4 TABLET, FILM COATED ORAL at 00:16

## 2024-01-21 RX ADMIN — OXYCODONE HYDROCHLORIDE 10 MG: 10 TABLET ORAL at 23:53

## 2024-01-21 RX ADMIN — CLONAZEPAM 1 MG: 1 TABLET ORAL at 11:58

## 2024-01-21 RX ADMIN — HYDROXYZINE HYDROCHLORIDE 10 MG: 10 TABLET ORAL at 17:05

## 2024-01-21 RX ADMIN — Medication 5000 UNITS: at 09:09

## 2024-01-21 RX ADMIN — HYDROXYZINE HYDROCHLORIDE 10 MG: 10 TABLET ORAL at 09:12

## 2024-01-21 RX ADMIN — ACETAMINOPHEN 650 MG: 325 TABLET ORAL at 15:26

## 2024-01-21 RX ADMIN — OXYCODONE HYDROCHLORIDE 10 MG: 10 TABLET ORAL at 17:58

## 2024-01-21 NOTE — PROGRESS NOTES
Patient states she is actually doing well today.  I explained why the adjustment in her lactulose dosing tried to target 3-4 bowel movements a day.  She is awake and alert pleasant, I do not think her family is going to be in from [place] till late today, we have decided to push her discharge until tomorrow because of this.  She would not have anybody to pick her up.  She is in the [place] self-propelling her wheelchair.  She does still go out and smoke.  Mood was good and she appeared calm from an anxiety standpoint.

## 2024-01-21 NOTE — PLAN OF CARE
Problem: Rehabilitation (IRF) Plan of Care  Goal: Plan of Care Review  Outcome: Ongoing, Progressing  Flowsheets (Taken 1/21/2024 0314)  Progress: no change  Plan of Care Reviewed With: patient  Outcome Evaluation:   Patient calm and cooperative   no acute distress noted   resting well throughout the night.  Goal: Patient-Specific Goal (Individualized)  Outcome: Ongoing, Progressing  Goal: Absence of New-Onset Illness or Injury  Outcome: Ongoing, Progressing  Intervention: Prevent Fall and Fall Injury  Recent Flowsheet Documentation  Taken 1/21/2024 0200 by Caty Thakkar, RN  Safety Promotion/Fall Prevention:   safety round/check completed   room organization consistent   nonskid shoes/slippers when out of bed   mobility aid in [facility]   fall prevention program maintained   elopement precautions   clutter free environment maintained   assistive device/personal items within reach  Taken 1/21/2024 0000 by Caty Thakkar, RN  Safety Promotion/Fall Prevention:   safety round/check completed   room organization consistent   nonskid shoes/slippers when out of bed   mobility aid in [facility]   fall prevention program maintained   elopement precautions   clutter free environment maintained   assistive device/personal items within reach  Taken 1/20/2024 2218 by Caty Thakkar, RN  Safety Promotion/Fall Prevention:   safety round/check completed   room organization consistent   nonskid shoes/slippers when out of bed   mobility aid in [facility]   fall prevention program maintained   elopement precautions   clutter free environment maintained   assistive device/personal items within reach  Taken 1/20/2024 2009 by Caty Thakkar, RN  Safety Promotion/Fall Prevention:   safety round/check completed   room organization consistent   nonskid shoes/slippers when out of bed   mobility aid in [facility]   fall prevention program maintained   elopement precautions   clutter free environment maintained    assistive device/personal items within reach  Intervention: Prevent Infection  Recent Flowsheet Documentation  Taken 1/21/2024 0200 by Caty Thakkar, RN  Infection Prevention:   personal protective equipment utilized   rest/sleep promoted   single patient room provided  Taken 1/21/2024 0000 by Caty Thakkar RN  Infection Prevention:   personal protective equipment utilized   rest/sleep promoted   single patient room provided  Taken 1/20/2024 2218 by Caty Thakkar, RN  Infection Prevention:   personal protective equipment utilized   rest/sleep promoted   single patient room provided  Taken 1/20/2024 2009 by Caty Thakkar, RN  Infection Prevention:   personal protective equipment utilized   rest/sleep promoted   single patient room provided  Intervention: Prevent VTE (Venous Thromboembolism)  Recent Flowsheet Documentation  Taken 1/20/2024 2218 by Caty Thakkar RN  VTE Prevention/Management:   patient refused intervention   sequential compression devices off   bilateral  Goal: Optimal Comfort and Wellbeing  Outcome: Ongoing, Progressing  Goal: Home and Community Transition Plan Established  Outcome: Ongoing, Progressing   Goal Outcome Evaluation:  Plan of Care Reviewed With: patient        Progress: no change  Outcome Evaluation: Patient calm and cooperative; no acute distress noted; resting well throughout the night.

## 2024-01-22 PROCEDURE — 97110 THERAPEUTIC EXERCISES: CPT

## 2024-01-22 PROCEDURE — 63710000001 ONDANSETRON PER 8 MG: Performed by: FAMILY MEDICINE

## 2024-01-22 PROCEDURE — 97535 SELF CARE MNGMENT TRAINING: CPT

## 2024-01-22 PROCEDURE — 97530 THERAPEUTIC ACTIVITIES: CPT

## 2024-01-22 PROCEDURE — 97116 GAIT TRAINING THERAPY: CPT

## 2024-01-22 PROCEDURE — 99238 HOSP IP/OBS DSCHRG MGMT 30/<: CPT | Performed by: FAMILY MEDICINE

## 2024-01-22 RX ORDER — HYDROXYZINE HYDROCHLORIDE 10 MG/1
10 TABLET, FILM COATED ORAL 3 TIMES DAILY PRN
Qty: 90 TABLET | Refills: 0 | Status: SHIPPED | OUTPATIENT
Start: 2024-01-22 | End: 2024-01-22 | Stop reason: SDUPTHER

## 2024-01-22 RX ORDER — OXYCODONE HYDROCHLORIDE 10 MG/1
10 TABLET ORAL EVERY 6 HOURS PRN
Qty: 20 TABLET | Refills: 0 | Status: SHIPPED | OUTPATIENT
Start: 2024-01-22 | End: 2024-01-22 | Stop reason: SDUPTHER

## 2024-01-22 RX ORDER — HYDROXYZINE HYDROCHLORIDE 10 MG/1
10 TABLET, FILM COATED ORAL 3 TIMES DAILY PRN
Qty: 90 TABLET | Refills: 0 | Status: SHIPPED | OUTPATIENT
Start: 2024-01-22 | End: 2024-02-21

## 2024-01-22 RX ORDER — LACTULOSE 10 G/15ML
10 SOLUTION ORAL 2 TIMES DAILY
Qty: 900 ML | Refills: 0 | Status: SHIPPED | OUTPATIENT
Start: 2024-01-22 | End: 2024-01-22 | Stop reason: SDUPTHER

## 2024-01-22 RX ORDER — NALOXONE HYDROCHLORIDE 4 MG/.1ML
SPRAY NASAL
Qty: 2 EACH | Refills: 0 | Status: SHIPPED | OUTPATIENT
Start: 2024-01-22

## 2024-01-22 RX ORDER — CLONAZEPAM 1 MG/1
1 TABLET ORAL EVERY 12 HOURS PRN
Qty: 28 TABLET | Refills: 0 | Status: SHIPPED | OUTPATIENT
Start: 2024-01-22 | End: 2024-01-22 | Stop reason: SDUPTHER

## 2024-01-22 RX ORDER — OXYCODONE HYDROCHLORIDE 10 MG/1
10 TABLET ORAL EVERY 6 HOURS PRN
Qty: 20 TABLET | Refills: 0 | Status: CANCELLED | OUTPATIENT
Start: 2024-01-22 | End: 2024-01-27

## 2024-01-22 RX ORDER — LACTULOSE 10 G/15ML
10 SOLUTION ORAL 2 TIMES DAILY
Qty: 900 ML | Refills: 0 | Status: SHIPPED | OUTPATIENT
Start: 2024-01-22 | End: 2024-02-21

## 2024-01-22 RX ORDER — OXYCODONE HYDROCHLORIDE 10 MG/1
10 TABLET ORAL EVERY 6 HOURS PRN
Qty: 20 TABLET | Refills: 0 | Status: SHIPPED | OUTPATIENT
Start: 2024-01-22 | End: 2024-01-27

## 2024-01-22 RX ORDER — POLYETHYLENE GLYCOL 3350 17 G/17G
17 POWDER, FOR SOLUTION ORAL DAILY
Qty: 30 EACH | Refills: 0 | Status: SHIPPED | OUTPATIENT
Start: 2024-01-22

## 2024-01-22 RX ORDER — CLONAZEPAM 1 MG/1
1 TABLET ORAL EVERY 12 HOURS PRN
Qty: 28 TABLET | Refills: 0 | Status: CANCELLED | OUTPATIENT
Start: 2024-01-22 | End: 2024-02-05

## 2024-01-22 RX ORDER — CLONAZEPAM 1 MG/1
1 TABLET ORAL EVERY 12 HOURS PRN
Qty: 28 TABLET | Refills: 0 | Status: SHIPPED | OUTPATIENT
Start: 2024-01-22 | End: 2024-02-05

## 2024-01-22 RX ADMIN — CLONAZEPAM 1 MG: 1 TABLET ORAL at 12:11

## 2024-01-22 RX ADMIN — HYDROXYZINE HYDROCHLORIDE 10 MG: 10 TABLET ORAL at 12:04

## 2024-01-22 RX ADMIN — OXYCODONE HYDROCHLORIDE 10 MG: 10 TABLET ORAL at 05:43

## 2024-01-22 RX ADMIN — ACETAMINOPHEN 650 MG: 325 TABLET ORAL at 09:08

## 2024-01-22 RX ADMIN — LACTULOSE 10 G: 20 SOLUTION ORAL at 09:54

## 2024-01-22 RX ADMIN — Medication 1 TABLET: at 09:06

## 2024-01-22 RX ADMIN — Medication 5000 UNITS: at 09:05

## 2024-01-22 RX ADMIN — ONDANSETRON HYDROCHLORIDE 8 MG: 4 TABLET, FILM COATED ORAL at 12:11

## 2024-01-22 RX ADMIN — ONDANSETRON HYDROCHLORIDE 8 MG: 4 TABLET, FILM COATED ORAL at 05:43

## 2024-01-22 RX ADMIN — OXYCODONE HYDROCHLORIDE 10 MG: 10 TABLET ORAL at 12:11

## 2024-01-22 RX ADMIN — ACETAMINOPHEN 650 MG: 325 TABLET ORAL at 03:41

## 2024-01-22 RX ADMIN — PANTOPRAZOLE SODIUM 40 MG: 40 TABLET, DELAYED RELEASE ORAL at 05:43

## 2024-01-22 RX ADMIN — HYDROXYZINE HYDROCHLORIDE 10 MG: 10 TABLET ORAL at 03:41

## 2024-01-22 NOTE — SIGNIFICANT NOTE
01/22/24 5846   Plan   Plan Contacted brother Alejandro [phone] who says he is in rehab for pt's discharge.  Brother will transport pt to her home today.

## 2024-01-22 NOTE — PROGRESS NOTES
Patient Assessment Instrument  Quality Indicators - Discharge FY 2023    Section A. Transportation  Issues Due to Lack of Transportation:  No    Section A. Medication List        Section B. Health Literacy      Section C. BIMS      Section C. Signs and Symptoms of Delirium (from CAM)      Section D. Mood      Section D. Social Isolation      Section GG. Self-Care Performance      Section GG. Mobility Performance      Section J. Health Conditions Discharge      Section J. Health Conditions (Pain)      Section K. Swallowing/Nutritional Status  Nutritional Approaches Past 7 Days:  Nutritional Approaches at Discharge:    Section M. Skin Conditions Discharge      . Current Number of Unhealed Pressure Ulcers      Section N. Medication        Section O. Special Treatments, Procedures, and Programs    Signed by: WILFREDO Momin

## 2024-01-22 NOTE — DISCHARGE SUMMARY
[facility] HOSPITALISTS DISCHARGE SUMMARY    Patient Identification:  Name:  Phuong Tuttle  Age:  63 y.o.  Sex:  female  :  1960  MRN:  [id number]  Visit Number:  [id number]    Date of Admission: 2024  Date of Discharge:  2024     PCP: Grace Keith N    DISCHARGE DIAGNOSIS  Status post left total hip arthroplasty after hip fracture  Cirrhosis with pancytopenia  History of esophageal varices  COPD  Anxiety neurosis  Pancytopenia likely secondary to cirrhosis    CONSULTS   Dr. Ny, psychiatry    PROCEDURES PERFORMED      HOSPITAL COURSE  Patient is a 63 y.o. female presented to [facility]   inpatient physical rehab in transfer from [facility] in [place].  Patient 63-year-old female with a history of cirrhosis, portal hypertension, esophageal varices with history of banding, GERD, CHF, anxiety, opioid use disorder.  Patient had recent fall at home and fractured her left hip.  Patient was brought to [facility] in [place] and did have acute metabolic encephalopathy as well as acute blood loss anemia and required 2 units of packed red blood cells.  Did require open reduction internal fixation of hip fracture with total hip arthroplasty.  Patient did have issues with pain control in the postoperative course and eventually was weaned off the IV Dilaudid and was thought to be a good candidate for inpatient physical rehab.    Status post hip fracture with left total hip arthroplasty--at the time of admission, was requiring maximum assistance for bathing; minimum assist for upper body dressing; maximum assist for lower body dressing; minimum assist for grooming; maximum assist for toileting.  Patient also required maximum assistance for bed mobility; contact-guard to minimum assist for bed to chair and chair to bed transfer; contact-guard to minimal assistance for sit to stand stand to sit transfer; was able to ambulate 60 feet and 40 feet with front wheel walker  and contact-guard.  At the time of discharge, requiring contact-guard for transfers; ambulated 160 feet x 2 and 100 feet with front wheel walker and contact-guard to standby assist.  Patient still requiring assistance for lower body dressing and minimum assist for upper body dressing.  Patient did have issues with pain management and had had been treated with Suboxone prior to recent fracture.  Will discontinue his Suboxone during admission and patient was treated with oxycodone.  Will continue oxycodone and she will follow-up with orthopedist in the next 24 hours.  Arrange for home health for continued PT and OT.    Anxiety neurosis patient had been on Xanax for treatment of anxiety.  During the admission we did obtain consultation with psychiatry who recommended transitioning patient to Klonopin twice daily.  Patient is also requiring as needed Vistaril.  After discussion with patient we will continue this at this time will give 2-week supply of clonazepam and arrange for outpatient follow-up with psychiatry.    Cirrhosis with pancytopenia/history of esophageal varices with banding--stable throughout the admission.  Continue patient on lactulose.    COPD stable throughout the admission    Anemia stable      VITAL SIGNS:  Temp:  [98.5 °F (36.9 °C)] 98.5 °F (36.9 °C)  Heart Rate:  [89] 89  Resp:  [20] 20  BP: (106)/(56) 106/56  SpO2:  [93 %] 93 %  on   ;   Device (Oxygen Therapy): room air    Body mass index is 25.82 kg/m².  Wt Readings from Last 3 Encounters:   01/09/24 72.6 kg (160 lb)   09/22/23 80.3 kg (177 lb)   07/22/21 95.7 kg (211 lb)       PHYSICAL EXAM:  Constitutional: No acute distress  HEENT: Normocephalic atraumatic  Neck:   Supple  Cardiovascular: Regular rate and rhythm  Pulmonary/Chest: Clear to auscultation  Abdominal: Positive bowel sounds soft.   Musculoskeletal: Status post left hip repair  Neurological: No focal deficits  Skin: No rash  Peripheral vascular: No  edema  Genitourinary::    DISCHARGE DISPOSITION   Stable    DISCHARGE MEDICATIONS:     Discharge Medications        New Medications        Instructions Start Date   clonazePAM 1 MG tablet  Commonly known as: KlonoPIN   1 mg, Oral, Every 12 Hours PRN      hydrOXYzine 10 MG tablet  Commonly known as: ATARAX   10 mg, Oral, 3 Times Daily PRN      naloxone 4 MG/0.1ML nasal spray  Commonly known as: NARCAN   Call 911. Don't prime. [place] in 1 nostril for overdose. Repeat in 2-3 minutes in other nostril if no or minimal breathing/responsiveness.      oxyCODONE 10 MG tablet  Commonly known as: ROXICODONE   10 mg, Oral, Every 6 Hours PRN      polyethylene glycol 17 g packet  Commonly known as: MIRALAX   Mix 17 grams of powder in 4 to 8 ounces of liquid and take  by mouth Daily.             Changes to Medications        Instructions Start Date   lactulose 10 GM/15ML solution  Commonly known as: CHRONULAC  What changed: when to take this   10 g, Oral, 2 Times Daily             Continue These Medications        Instructions Start Date   albuterol sulfate  (90 Base) MCG/ACT inhaler  Commonly known as: PROVENTIL HFA;VENTOLIN HFA;PROAIR HFA   2 puffs, Inhalation, Every 4 Hours PRN      budesonide-formoterol 160-4.5 MCG/ACT inhaler  Commonly known as: SYMBICORT   2 puffs, Inhalation, 2 Times Daily - RT      Combivent Respimat  MCG/ACT inhaler  Generic drug: ipratropium-albuterol   1 puff, Inhalation, 4 Times Daily PRN      montelukast 10 MG tablet  Commonly known as: SINGULAIR   10 mg, Oral, Nightly      multivitamin with minerals tablet tablet   1 tablet, Oral, Daily      ondansetron 8 MG tablet  Commonly known as: ZOFRAN   8 mg, Oral, 3 Times Daily PRN      pantoprazole 40 MG EC tablet  Commonly known as: PROTONIX   40 mg, Oral, Daily      vitamin D 1.25 MG (34338 UT) capsule capsule  Commonly known as: ERGOCALCIFEROL   50,000 Units, Oral, Weekly             Stop These Medications      buprenorphine-naloxone 8-2 MG  per SL tablet  Commonly known as: SUBOXONE     furosemide 20 MG tablet  Commonly known as: LASIX               Your medication list        START taking these medications        Instructions Last Dose Given Next Dose Due   clonazePAM 1 MG tablet  Commonly known as: KlonoPIN      Take 1 tablet by mouth Every 12 (Twelve) Hours As Needed for Anxiety for up to 14 days.       hydrOXYzine 10 MG tablet  Commonly known as: ATARAX      Take 1 tablet by mouth 3 (Three) Times a Day As Needed for Itching or Anxiety for up to 30 days.       naloxone 4 MG/0.1ML nasal spray  Commonly known as: NARCAN      Call 911. Don't prime. [place] in 1 nostril for overdose. Repeat in 2-3 minutes in other nostril if no or minimal breathing/responsiveness.       oxyCODONE 10 MG tablet  Commonly known as: ROXICODONE      Take 1 tablet by mouth Every 6 (Six) Hours As Needed for Moderate Pain for up to 5 days.       polyethylene glycol 17 g packet  Commonly known as: MIRALAX      Mix 17 grams of powder in 4 to 8 ounces of liquid and take  by mouth Daily.              CHANGE how you take these medications        Instructions Last Dose Given Next Dose Due   lactulose 10 GM/15ML solution  Commonly known as: CHRONULAC  What changed: when to take this      Take 15 mL by mouth 2 (Two) Times a Day for 30 days.              CONTINUE taking these medications        Instructions Last Dose Given Next Dose Due   albuterol sulfate  (90 Base) MCG/ACT inhaler  Commonly known as: PROVENTIL HFA;VENTOLIN HFA;PROAIR HFA      Inhale 2 puffs Every 4 (Four) Hours As Needed for Wheezing.       budesonide-formoterol 160-4.5 MCG/ACT inhaler  Commonly known as: SYMBICORT      Inhale 2 puffs 2 (Two) Times a Day.       Combivent Respimat  MCG/ACT inhaler  Generic drug: ipratropium-albuterol      Inhale 1 puff 4 (Four) Times a Day As Needed for Wheezing or Shortness of Air.       montelukast 10 MG tablet  Commonly known as: SINGULAIR      Take 1 tablet by mouth  Every Night.       multivitamin with minerals tablet tablet      Take 1 tablet by mouth Daily.       ondansetron 8 MG tablet  Commonly known as: ZOFRAN      Take 1 tablet by mouth 3 (Three) Times a Day As Needed for Nausea or Vomiting.       pantoprazole 40 MG EC tablet  Commonly known as: PROTONIX      Take 1 tablet by mouth Daily.       vitamin D 1.25 MG (25865 UT) capsule capsule  Commonly known as: ERGOCALCIFEROL      Take 1 capsule by mouth 1 (One) Time Per Week.              STOP taking these medications      buprenorphine-naloxone 8-2 MG per SL tablet  Commonly known as: SUBOXONE        furosemide 20 MG tablet  Commonly known as: LASIX                  Where to Get Your Medications        These medications were sent to [facility] - [place], [place] - [facility] [address] - [phone] [facility] [phone]   [address], [facility] [address]      Phone: [phone]   clonazePAM 1 MG tablet  hydrOXYzine 10 MG tablet  lactulose 10 GM/15ML solution  naloxone 4 MG/0.1ML nasal spray  oxyCODONE 10 MG tablet  polyethylene glycol 17 g packet         Diet Instructions       Diet: Regular/House Diet; Regular Texture (IDDSI 7); Thin (IDDSI 0)      Discharge Diet: Regular/House Diet    Texture: Regular Texture (IDDSI 7)    Fluid Consistency: Thin (IDDSI 0)          No future appointments.  Additional Instructions for the Follow-ups that You Need to Schedule       Ambulatory Referral to Home Health   As directed      PT/OT should be 2-3 times a week for 8 weeks    Face to Face Visit Date: 1/17/2024   Follow-up provider for Plan of Care?: I treated the patient in an acute care facility and will not continue treatment after discharge.   Follow-up provider: BRUNO TERRY APRIL [[id number]]   Reason/Clinical Findings: Fractured hip   Describe mobility limitations that make leaving home difficult: Fractured hip   Nursing/Therapeutic Services Requested: Skilled Nursing Physical Therapy Occupational Therapy    Skilled nursing orders: Medication education (CMP/CBC/magnesium in 2 days and call to attending) Cardiopulmonary assessments Wound care dressing/changes   Instructions: Paint wound with Betadine and cover with dressing daily   PT orders: Home safety assessment (Endurance, balance, bed mobility) Strengthening Gait Training Transfer training Therapeutic exercise   Weight Bearing Status: As Tolerated   Occupational orders: Home safety assessment (ADL retraining, functional mobility) Strengthening   Frequency: 1 Week 1        Discharge Follow-up with PCP   As directed       Currently Documented PCP:    Grace Keith APRN    PCP Phone Number:    [phone]     Follow Up Details: Within 1 week        Discharge Follow-up with Specified Provider: [facility]; 2 Weeks   As directed      To: [facility]   Follow Up: 2 Weeks   Follow Up Details: anxiety neurosis with hx of opioid use disorder        Discharge Follow-up with Specified Provider: Orthopedics tomorrow as scheduled   As directed      To: Orthopedics tomorrow as scheduled               Contact information for follow-up providers       Grace Keith APRN. Go on 1/22/2024.    Specialty: Nurse Practitioner  Why: 2:30 pm for hospital follow up  Contact information:  [address] [facility] [address]  [phone]               Robbie Salcedo MD. Go on 1/23/2024.    Specialty: Orthopedic Surgery  Why: at 8:45 am for left hip fracture follow up; appointment will be with HARIKA Burnette  Contact information:  [address] [place] [address]  [phone]               Grace Keith APRN .    Specialty: Nurse Practitioner  Why: Within 1 week  Contact information:  [address] [facility] [address]  [phone]                       Contact information for after-discharge care       Home Medical Care       [facility] .    Services: Home  Nursing, Home Rehabilitation  Contact information:  [address] [place] [address], [address]  [facility] [address] [phone]                                    TEST  RESULTS PENDING AT DISCHARGE       CODE STATUS  Code Status and Medical Interventions:   Ordered at: 01/09/24 1648     Level Of Support Discussed With:    Patient     Code Status (Patient has no pulse and is not breathing):    CPR (Attempt to Resuscitate)     Medical Interventions (Patient has pulse or is breathing):    Full Support       Sylvester Tamayo MD  [facility]  01/22/24  11:12 EST    Please note that this discharge summary required less than 30 minutes to complete.

## 2024-01-22 NOTE — PROGRESS NOTES
Patient Assessment Instrument  Quality Indicators - Discharge FY 2023    Section A. Transportation      Section A. Medication List        Section B. Health Literacy      Section C. BIMS      Section C. Signs and Symptoms of Delirium (from CAM)      Section D. Mood      Section D. Social Isolation      Section GG. Self-Care Performance      Section GG. Mobility Performance     Roll Left and Right: Patient completed the activities by themself with no  assistance from a helper.   Sit to Lying: Patient completed the activities by themself with no assistance  from a helper.   Lying to Sitting on Side of Bed: Patient completed the activities by themself  with no assistance from a helper.   Sit to Stand: [place] provides verbal cues and/or touching/steadying and/or  contact guard assistance as patient completes activity. Assistance may be  provided throughout the activity or intermittently.   Chair/Bed to Chair Transfer: [place] provides verbal cues and/or  touching/steadying and/or contact guard assistance as patient completes  activity. Assistance may be provided throughout the activity or intermittently.   Toilet Transfer [place] provides verbal cues and/or touching/steadying and/or  contact guard assistance as patient completes activity. Assistance may be  provided throughout the activity or intermittently.   Car Transfer: [place] provides verbal cues and/or touching/steadying and/or  contact guard assistance as patient completes activity. Assistance may be  provided throughout the activity or intermittently.   Walk 10 Feet:   [place] provides verbal cues and/or touching/steadying and/or  contact guard assistance as patient completes activity. Assistance may be  provided throughout the activity or intermittently.  Walk 50 Feet with 2 Turns:   [place] provides verbal cues and/or  touching/steadying and/or contact guard assistance as patient completes  activity. Assistance may be provided throughout the activity or  intermittently.  Walk 150 Feet:   [place] provides verbal cues and/or touching/steadying and/or  contact guard assistance as patient completes activity. Assistance may be  provided throughout the activity or intermittently.  Walking 10 Feet on Uneven Surfaces:   Not attempted due to medical or safety  concerns.  1 Step Over Curb or Up/Down Stair:   Not attempted due to medical or safety  concerns.  Picking up an Object:   Not attempted due to medical or safety concerns. Uses  Wheelchair and/or Scooter: Yes  Wheel 50 Feet with Two Turns: Patient completed the activities by themself with  no assistance from a helper.  Type of Wheelchair or Scooter Used: Manual  Wheel 150 Feet: Patient completed the activities by themself with no assistance  from a helper.  Type of Wheelchair or Scooter Used: Manual    Section J. Health Conditions Discharge      Section J. Health Conditions (Pain)      Section K. Swallowing/Nutritional Status  Nutritional Approaches Past 7 Days:  Nutritional Approaches at Discharge:    Section M. Skin Conditions Discharge      . Current Number of Unhealed Pressure Ulcers      Section N. Medication        Section O. Special Treatments, Procedures, and Programs    Signed by: Juliana Echeverria PTA

## 2024-01-22 NOTE — SIGNIFICANT NOTE
01/22/24 1150   Plan   Plan Contacted [facility] [phone] per Clarita about pt being discharged home today and Ortho follow-up in the am and she may have staples removed from left hip.   nurse to visit pt on 1-24-24 and PT/OT will visit another day.  Faxed discharge summary to  via epic.  SSreviewed this information with pt and brothemmanuel Allen, insurance approval for W/C and picking this item up at [facility] after pt leaves rehab; provided address.  Brother is agreeable to pick-up W/C.  Reviewed transportation via W/C van with [employer] on 1-23-24 to go to Ortho appointment in [place] at 8:45 am.  Explained pt can call [employer] around 3:30 pm for pick-up time on 1-23-24.  Informed pt [facility] will evaluate her for HCBW program.  Phone numbers provided for agencies and DME company.  Pt is returning to her home today.  Brother Alejandro will be staying with pt along with brother Earle when he arrives.   Final Discharge Disposition Code 06 - home with home health care

## 2024-01-22 NOTE — PROGRESS NOTES
"Patient Assessment Instrument  Quality Indicators - Discharge FY 2023    Section A. Transportation      Section A. Medication List  Subsequent Provider:  06 - Home under care of organized home health service  organization  Medication List to Subsequent Provider at Discharge:  Yes - Current reconciled  medication list provided to the subsequent provider  Route(s) of Medication List Transmission to Subsequent Provider:  Electronic  Health Record      Section B. Health Literacy  Frequency of Needing Assistance Reading:  Rarely    Section C. BIMS  Brief Interview for Mental Status (BIMS) was conducted.  Repetition of Three Words: Three words  Able to report correct year: Correct  Able to report correct month: Accurate within 5 days  Able to report correct day of the week: Correct  Able to recall \"sock\": Yes, no cue required  Able to recall \"blue\": Yes, no cue required  Able to recall \"bed\": Yes, no cue required    BIMS SUMMARY SCORE: 15 Cognitively intact    Section C. Signs and Symptoms of Delirium (from CAM)  Acute Change in Mental Status:   No  Inattention:   Behavior not present  Disorganized Thinking:   Behavior not present  Altered Level of Consciousness:   Behavior not present    Section D. Mood  Presence of little interest or pleasure in doing things:   No  Frequency of having little interest or pleasure in doing things:   Never or 1  day  Presence of feeling down, depressed, or hopeless:   No  Frequency of feeling down, depressed, or hopeless:   Never or 1 day   Interview Ended. Above responses do not meet criteria to continue  Total Severity Score:   00    Section D. Social Isolation  Frequency of Feeling Lonely or Isolated:  Never    Section GG. Self-Care Performance      Section GG. Mobility Performance      Section J. Health Conditions Discharge  Fall(s) Since Admission:  No    Section J. Health Conditions (Pain)  Pain Effect on Sleep:   Rarely or not at all  Pain Interference with Therapy Activities:   " Rarely or not at all  Pain Interference with Day-to-Day Activities:   Occasionally    Section K. Swallowing/Nutritional Status  Nutritional Approaches Past 7 Days:  Nutritional Approaches at Discharge:    Section M. Skin Conditions Discharge  Unhealed Pressure Ulcer(s)/Injurie(s) at Stage 1 or Higher:  No    . Current Number of Unhealed Pressure Ulcers    Number of Unhealed Stage 2: 0  Number of Unhealed Stage 3: 0  Number of Unhealed Stage 4: 0    Section N. Medication        Section O. Special Treatments, Procedures, and Programs    Signed by: Latoya Murphy Nurse

## 2024-01-22 NOTE — SIGNIFICANT NOTE
01/22/24 9069   Plan   Plan Contacted [facility] [phone] per Mario who says insurance has approved by W/C stephanietal.  SS asked if W/C can be delivered to rehab today since pt is being discharged home.  Delivery techs are out making deliveries and do not have pt's W/C on their van.  Pt's brother can come to their office and pick-up W/C today.

## 2024-01-22 NOTE — SIGNIFICANT NOTE
01/22/24 0920   Plan   Plan Pt says her bedside commode is not wide enough for her to use.  SS looked at bedside commode which looks very narrow.  Explained weight capacity criteria for bariatric equipment.  Contacted [facility] [phone] per Vani about pt getting a bariatric bedside commode; she says the cost will be $50 more and they can exchange standard BSC for bariatric BSC.  Reviewed this information with Kassie with [facility] ext. [phone] who is agreeable.  Informed her insurance has not made a decision about W/C and if denied could pt receive assistance with a W/C and she says yes, however, denial from insurance is needed first.  SS reviewed this information with pt.

## 2024-01-22 NOTE — DISCHARGE INSTR - LAB
Wound care to hip: remove dressing and paint with betadine  cover with dry dressing. Apply barrier cream to upper hip

## 2024-01-22 NOTE — PROGRESS NOTES
Occupational Therapy: Individual: 55 minutes.    Physical Therapy:    Speech Language Pathology:    Signed by: Carlee Carter, Occupational Therapist

## 2024-01-22 NOTE — PLAN OF CARE
Goal Outcome Evaluation:  Plan of Care Reviewed With: patient        Progress: no change  Outcome Evaluation: CONTINUE PLAN OF CARE; MONITOR

## 2024-01-22 NOTE — SIGNIFICANT NOTE
01/22/24 0830   Plan   Plan Spoke to pt who says brother Alejandro will be coming to transport her home today.  Brother Earle is suppose to come in today to stay with her.  Informed pt SS will contact [facility] about W/C.  Rehab Director says pt cannot borrow a W/C.  Explained if insurance denies W/C PA then SS can ask [facility] to assist her with W/C.  Explained insurance can take up to 72 hours or longer to make a decision.

## 2024-01-22 NOTE — PAYOR COMM NOTE
"Amanda Nicholas, RN  Utilization Review Nurse for Inpatient Rehab  Phone: [phone]  Fax: [phone]  Email: [email or web address]  [facility] NPI: [id number]    DISCHARGE  NOTIFICATION WITH DISCHARGE SUMMARY  Member: Phuong Tuttle  : 1960  Auth #: [id number]  ID# [id number]  Admission Date: 24  Discharge Date:  24  Disposition:  Home with family; Home Health PT & OT from [facility].    Phuong Tuttle (63 y.o. Female)       Date of Birth   1960    Social Security Number       Address   [address] [place] [id number]    Home Phone   [phone]    MRN   [id number]       Alevism   None    Marital Status                               Admission Date   24    Admission Type   Elective    Admitting Provider   Sylvester Tamayo MD    Attending Provider       Department, Room/Bed   [facility] REHABILITATION, 111/       Discharge Date   2024    Discharge Disposition   Home-Health Care [facility]    Discharge Destination                                 Attending Provider: (none)   Allergies: Doxycycline, Ibuprofen, Iodinated Contrast Media, Prednisone, Zithromax [Azithromycin], Haldol [Haloperidol Lactate], Latex, Penicillins    Isolation: None   Infection: None   Code Status: CPR    Ht: 167.6 cm (66\")   Wt: 72.6 kg (160 lb)    Admission Cmt: None   Principal Problem: S/p left hip fracture [Z87.81]                 Lauren Solis MSW     Case Management  Significant Note      Signed  Date of Service:  24 1500  Creation Time:  24 1511     Signed             24 1150   Plan   Plan Contacted [facility] [phone] per Clarita about pt being discharged home today and Ortho follow-up in the am and she may have staples removed from left hip.   nurse to visit pt on 24 and PT/OT will visit another day.  Faxed discharge summary to  via epic.  SSreviewed this information with pt " and brother Alejandro, insurance approval for W/C and picking this item up at [facility] after pt leaves rehab; provided address.  Brother is agreeable to pick-up W/C.  Reviewed transportation via W/C van with [email or web address] on 1-23-24 to go to Ortho appointment in [place] at 8:45 am.  Explained pt can call [email or web address] around 3:30 pm for pick-up time on 1-23-24.  Informed pt [facility] will evaluate her for HCBW program.  Phone numbers provided for agencies and DME company.  Pt is returning to her home today.  Brother Alejandro will be staying with pt along with brother Earle when he arrives.   Final Discharge Disposition Code 06 - home with [place] health care                    Lauren Solis, FLAQUITO     Case Management  Significant Note      Signed  Date of Service:  01/22/24 1500  Creation Time:  01/22/24 1510     Signed             01/22/24 1430   Plan   Plan Contacted [facility] [phone] per Julia who says delivery techs are still out and suggests brother pick-up W/C today. Reviewed this with pt and brother.  Pt is waiting on medications from  Pharmacy.                    Lauren Solis, FLAQUITO     Case Management  Significant Note      Signed  Date of Service:  01/22/24 1500  Creation Time:  01/22/24 1508     Signed             01/22/24 1150   Plan   Plan Contacted [facility] [phone] per Clarita about pt being discharged home today and Ortho follow-up in the am and she may have staples removed from left hip.   nurse to visit pt on 1-24-24 and PT/OT will visit another day.  Faxed discharge summary to  via epic.  SSreviewed this information with pt and brother Alejandro, insurance approval for W/C and picking this item up at [facility] after pt leaves rehab; provided address.  Brother is agreeable to pick-up W/C.  Reviewed transportation via W/C van with [email or web address] on 1-23-24 to go to Ortho  appointment in [place] at 8:45 am.  Explained pt can call [employer] around 3:30 pm for pick-up time on 1-23-24.  Informed pt [facility] will evaluate her for HCBW program.  Phone numbers provided for agencies and DME company.  Pt is returning to her home today.  Brother Alejandro will be staying with pt along with brother Earle when he arrives.   Final Discharge Disposition Code 06 - home with home health care                    Lauren Solis, FLAQUITO     Case Management  Significant Note      Signed  Date of Service:  01/22/24 1500  Creation Time:  01/22/24 1502     Signed             01/22/24 1115   Plan   Plan Contacted [place] [address] per Mario who says insurance has approved by W/C rental.  SS asked if W/C can be delivered to rehab today since pt is being discharged home.  Delivery techs are out making deliveries and do not have pt's W/C on their van.  Pt's brother can come to their office and pick-up W/C today.                   Lauren Solis, FLAQUITO     Case Management  Significant Note      Signed  Date of Service:  01/22/24 1500  Creation Time:  01/22/24 1500     Signed             01/22/24 1105   Plan   Plan Contacted brother Alejandro [phone] who says he is in rehab for pt's discharge.  Brother will transport pt to her home today.                       Lauren Solis, FLAQUITO     Case Management  Significant Note      Signed  Date of Service:  01/22/24 1458  Creation Time:  01/22/24 1458     Signed             01/22/24 0920   Plan   Plan Pt says her bedside commode is not wide enough for her to use.  SS looked at bedside commode which looks very narrow.  Explained weight capacity criteria for bariatric equipment.  Contacted [facility] [phone] per Vani about pt getting a bariatric bedside commode; she says the cost will be $50 more and they can exchange standard BSC for bariatric BSC.  Reviewed this  information with Kassie with [facility] ext. [phone] who is agreeable.  Informed her insurance has not made a decision about W/C and if denied could pt receive assistance with a W/C and she says yes, however, denial from insurance is needed first.  SS reviewed this information with pt.                   Lauren Solis, FLAQUITO     Case Management  Significant Note      Signed  Date of Service:  01/22/24 1451  Creation Time:  01/22/24 1451     Signed             01/22/24 0830   Plan   Plan Spoke to pt who says brother Alejandro will be coming to transport her home today.  Brother Earle is suppose to come in today to stay with her.  Informed pt SS will contact [facility] about W/C.  Rehab Director says pt cannot borrow a W/C.  Explained if insurance denies W/C PA then SS can ask [facility] to assist her with W/C.  Explained insurance can take up to 72 hours or longer to make a decision.  Pt wanted to know about paying to rent W/C until insurance makes a decision.  Informed her SS will ask [facility] about this option.                    Lauren Solis, FLAQUITO     Case Management  Significant Note      Signed  Date of Service:  01/22/24 1450  Creation Time:  01/22/24 1450     Signed             01/22/24 0845   Plan   Plan Contacted [facility] [phone] per Mario who says they have not received a decision from insurance.  SS asked her about pt renting this item until decision is made by insurance and she says this would have to be approved by the owner since it is submitted to insurance and he is not available at this time.  Informed her pt is being discharged today and she wants to have a W/C to use at home.  Mario will try to call owner about renting W/C and call SS back.                    Lauren Solis, FLAQUITO     Case Management  Significant Note      Signed  Date of Service:  01/22/24 1447  Creation Time:  01/22/24  1447     Signed             24 0830   Plan   Plan Spoke to pt who says brother Alejandro will be coming to transport her home today.  Brother Earle is suppose to come in today to stay with her.  Informed pt SS will contact [facility] about W/C.  Rehab Director says pt cannot borrow a W/C.  Explained if insurance denies W/C PA then SS can ask [facility] to assist her with W/C.  Explained insurance can take up to 72 hours or longer to make a decision.                   Sylvester Tamayo MD   Physician  Hospitalist     Discharge Summary      Signed     Date of Service: 24  Creation Time: 24     Signed              [facility] HOSPITALISTS DISCHARGE SUMMARY     Patient Identification:  Name:  Phuong Tuttle  Age:  63 y.o.  Sex:  female  :  1960  MRN:  [id number]  Visit Number:  [id number]     Date of Admission: 2024  Date of Discharge:  2024      PCP: Grace Keith N     DISCHARGE DIAGNOSIS  Status post left total hip arthroplasty after hip fracture  Cirrhosis with pancytopenia  History of esophageal varices  COPD  Anxiety neurosis  Pancytopenia likely secondary to cirrhosis     CONSULTS   Dr. Ny, psychiatry     PROCEDURES PERFORMED        HOSPITAL COURSE  Patient is a 63 y.o. female presented to [facility]   inpatient physical rehab in transfer from [facility] in [place].  Patient 63-year-old female with a history of cirrhosis, portal hypertension, esophageal varices with history of banding, GERD, CHF, anxiety, opioid use disorder.  Patient had recent fall at home and fractured her left hip.  Patient was brought to [facility] in [place] and did have acute metabolic encephalopathy as well as acute blood loss anemia and required 2 units of packed red blood cells.  Did require open reduction internal fixation of hip fracture with total hip arthroplasty.  Patient did have issues with pain control in the postoperative course and  eventually was weaned off the IV Dilaudid and was thought to be a good candidate for inpatient physical rehab.     Status post hip fracture with left total hip arthroplasty--at the time of admission, was requiring maximum assistance for bathing; minimum assist for upper body dressing; maximum assist for lower body dressing; minimum assist for grooming; maximum assist for toileting.  Patient also required maximum assistance for bed mobility; contact-guard to minimum assist for bed to chair and chair to bed transfer; contact-guard to minimal assistance for sit to stand stand to sit transfer; was able to ambulate 60 feet and 40 feet with front wheel walker and contact-guard.  At the time of discharge, requiring contact-guard for transfers; ambulated 160 feet x 2 and 100 feet with front wheel walker and contact-guard to standby assist.  Patient still requiring assistance for lower body dressing and minimum assist for upper body dressing.  Patient did have issues with pain management and had had been treated with Suboxone prior to recent fracture.  Will discontinue his Suboxone during admission and patient was treated with oxycodone.  Will continue oxycodone and she will follow-up with orthopedist in the next 24 hours.  Arrange for home health for continued PT and OT.     Anxiety neurosis patient had been on Xanax for treatment of anxiety.  During the admission we did obtain consultation with psychiatry who recommended transitioning patient to Klonopin twice daily.  Patient is also requiring as needed Vistaril.  After discussion with patient we will continue this at this time will give 2-week supply of clonazepam and arrange for outpatient follow-up with psychiatry.     Cirrhosis with pancytopenia/history of esophageal varices with banding--stable throughout the admission.  Continue patient on lactulose.     COPD stable throughout the admission     Anemia stable        VITAL SIGNS:  Temp:  [98.5 °F (36.9 °C)] 98.5 °F  (36.9 °C)  Heart Rate:  [89] 89  Resp:  [20] 20  BP: (106)/(56) 106/56  SpO2:  [93 %] 93 %  on   ;   Device (Oxygen Therapy): room air     Body mass index is 25.82 kg/m².      Wt Readings from Last 3 Encounters:   01/09/24 72.6 kg (160 lb)   09/22/23 80.3 kg (177 lb)   07/22/21 95.7 kg (211 lb)         PHYSICAL EXAM:  Constitutional: No acute distress  HEENT: Normocephalic atraumatic  Neck:   Supple  Cardiovascular: Regular rate and rhythm  Pulmonary/Chest: Clear to auscultation  Abdominal: Positive bowel sounds soft.   Musculoskeletal: Status post left hip repair  Neurological: No focal deficits  Skin: No rash  Peripheral vascular: No edema  Genitourinary::     DISCHARGE DISPOSITION   Stable     DISCHARGE MEDICATIONS:      Discharge Medications          New Medications         Instructions Start Date   clonazePAM 1 MG tablet  Commonly known as: KlonoPIN    1 mg, Oral, Every 12 Hours PRN        hydrOXYzine 10 MG tablet  Commonly known as: ATARAX    10 mg, Oral, 3 Times Daily PRN        naloxone 4 MG/0.1ML nasal spray  Commonly known as: NARCAN    Call 911. Don't prime. [place] in 1 nostril for overdose. Repeat in 2-3 minutes in other nostril if no or minimal breathing/responsiveness.        oxyCODONE 10 MG tablet  Commonly known as: ROXICODONE    10 mg, Oral, Every 6 Hours PRN        polyethylene glycol 17 g packet  Commonly known as: MIRALAX    Mix 17 grams of powder in 4 to 8 ounces of liquid and take  by mouth Daily.                  Changes to Medications         Instructions Start Date   lactulose 10 GM/15ML solution  Commonly known as: CHRONULAC  What changed: when to take this    10 g, Oral, 2 Times Daily                  Continue These Medications         Instructions Start Date   albuterol sulfate  (90 Base) MCG/ACT inhaler  Commonly known as: PROVENTIL HFA;VENTOLIN HFA;PROAIR HFA    2 puffs, Inhalation, Every 4 Hours PRN        budesonide-formoterol 160-4.5 MCG/ACT inhaler  Commonly known as:  SYMBICORT    2 puffs, Inhalation, 2 Times Daily - RT        Combivent Respimat  MCG/ACT inhaler  Generic drug: ipratropium-albuterol    1 puff, Inhalation, 4 Times Daily PRN        montelukast 10 MG tablet  Commonly known as: SINGULAIR    10 mg, Oral, Nightly        multivitamin with minerals tablet tablet    1 tablet, Oral, Daily        ondansetron 8 MG tablet  Commonly known as: ZOFRAN    8 mg, Oral, 3 Times Daily PRN        pantoprazole 40 MG EC tablet  Commonly known as: PROTONIX    40 mg, Oral, Daily        vitamin D 1.25 MG (61567 UT) capsule capsule  Commonly known as: ERGOCALCIFEROL    50,000 Units, Oral, Weekly                  Stop These Medications       buprenorphine-naloxone 8-2 MG per SL tablet  Commonly known as: SUBOXONE      furosemide 20 MG tablet  Commonly known as: LASIX                    Your medication list          START taking these medications         Instructions Last Dose Given Next Dose Due   clonazePAM 1 MG tablet  Commonly known as: KlonoPIN       Take 1 tablet by mouth Every 12 (Twelve) Hours As Needed for Anxiety for up to 14 days.          hydrOXYzine 10 MG tablet  Commonly known as: ATARAX       Take 1 tablet by mouth 3 (Three) Times a Day As Needed for Itching or Anxiety for up to 30 days.          naloxone 4 MG/0.1ML nasal spray  Commonly known as: NARCAN       Call 911. Don't prime. [place] in 1 nostril for overdose. Repeat in 2-3 minutes in other nostril if no or minimal breathing/responsiveness.          oxyCODONE 10 MG tablet  Commonly known as: ROXICODONE       Take 1 tablet by mouth Every 6 (Six) Hours As Needed for Moderate Pain for up to 5 days.          polyethylene glycol 17 g packet  Commonly known as: MIRALAX       Mix 17 grams of powder in 4 to 8 ounces of liquid and take  by mouth Daily.                    CHANGE how you take these medications         Instructions Last Dose Given Next Dose Due   lactulose 10 GM/15ML solution  Commonly known as: CHRONULAC  What  changed: when to take this       Take 15 mL by mouth 2 (Two) Times a Day for 30 days.                    CONTINUE taking these medications         Instructions Last Dose Given Next Dose Due   albuterol sulfate  (90 Base) MCG/ACT inhaler  Commonly known as: PROVENTIL HFA;VENTOLIN HFA;PROAIR HFA       Inhale 2 puffs Every 4 (Four) Hours As Needed for Wheezing.          budesonide-formoterol 160-4.5 MCG/ACT inhaler  Commonly known as: SYMBICORT       Inhale 2 puffs 2 (Two) Times a Day.          Combivent Respimat  MCG/ACT inhaler  Generic drug: ipratropium-albuterol       Inhale 1 puff 4 (Four) Times a Day As Needed for Wheezing or Shortness of Air.          montelukast 10 MG tablet  Commonly known as: SINGULAIR       Take 1 tablet by mouth Every Night.          multivitamin with minerals tablet tablet       Take 1 tablet by mouth Daily.          ondansetron 8 MG tablet  Commonly known as: ZOFRAN       Take 1 tablet by mouth 3 (Three) Times a Day As Needed for Nausea or Vomiting.          pantoprazole 40 MG EC tablet  Commonly known as: PROTONIX       Take 1 tablet by mouth Daily.          vitamin D 1.25 MG (80263 UT) capsule capsule  Commonly known as: ERGOCALCIFEROL       Take 1 capsule by mouth 1 (One) Time Per Week.                    STOP taking these medications       buprenorphine-naloxone 8-2 MG per SL tablet  Commonly known as: SUBOXONE         furosemide 20 MG tablet  Commonly known as: LASIX                       Where to Get Your Medications          These medications were sent to [facility], [facility] [place] [facility] [address] - [phone]  - [phone]   [address] [address]        Phone: [phone]   clonazePAM 1 MG tablet  hydrOXYzine 10 MG tablet  lactulose 10 GM/15ML solution  naloxone 4 MG/0.1ML nasal spray  oxyCODONE 10 MG tablet  polyethylene glycol 17 g packet            Diet Instructions         Diet: Regular/House Diet; Regular Texture  (IDDSI 7); Thin (IDDSI 0)       Discharge Diet: Regular/House Diet     Texture: Regular Texture (IDDSI 7)     Fluid Consistency: Thin (IDDSI 0)             No future appointments.  Additional Instructions for the Follow-ups that You Need to Schedule         Ambulatory Referral to Home Health   As directed        PT/OT should be 2-3 times a week for 8 weeks     Face to Face Visit Date: 1/17/2024   Follow-up provider for Plan of Care?: I treated the patient in an acute care facility and will not continue treatment after discharge.   Follow-up provider: BRUNO TERRY [[id number]]   Reason/Clinical Findings: Fractured hip   Describe mobility limitations that make leaving home difficult: Fractured hip   Nursing/Therapeutic Services Requested: Skilled Nursing Physical Therapy Occupational Therapy   Skilled nursing orders: Medication education (CMP/CBC/magnesium in 2 days and call to attending) Cardiopulmonary assessments Wound care dressing/changes   Instructions: Paint wound with Betadine and cover with dressing daily   PT orders: Home safety assessment (Endurance, balance, bed mobility) Strengthening Gait Training Transfer training Therapeutic exercise   Weight Bearing Status: As Tolerated   Occupational orders: Home safety assessment (ADL retraining, functional mobility) Strengthening   Frequency: 1 Week 1           Discharge Follow-up with PCP   As directed         Currently Documented PCP:    Bruno Terry APRN    PCP Phone Number:    [phone]      Follow Up Details: Within 1 week           Discharge Follow-up with Specified Provider: [facility]; 2 Weeks   As directed        To: [facility]   Follow Up: 2 Weeks   Follow Up Details: anxiety neurosis with hx of opioid use disorder           Discharge Follow-up with Specified Provider: Orthopedics tomorrow as scheduled   As directed        To: Orthopedics tomorrow as scheduled                     Contact information for follow-up  providers         Grace Keith, APRN. Go on 1/22/2024.    Specialty: Nurse Practitioner  Why: 2:30 pm for hospital follow up  Contact information:  [address] [facility] [address]  [phone]                    Robbie Salcedo MD. Go on 1/23/2024.    Specialty: Orthopedic Surgery  Why: at 8:45 am for left hip fracture follow up; appointment will be with HARIKA Burnette  Contact information:  [address] [place] [address]  [phone]                    Grace Keith, APRN .    Specialty: Nurse Practitioner  Why: Within 1 week  Contact information:  [address] [facility] [address]  [phone]                              Contact information for after-discharge care         Home Medical Care         [facility] .    Services: Home Nursing, Home Rehabilitation  Contact information:  [address]  [facility] [address]  [phone]                                             TEST  RESULTS PENDING AT DISCHARGE        CODE STATUS      Code Status and Medical Interventions:   Ordered at: 01/09/24 5568     Level Of Support Discussed With:     Patient     Code Status (Patient has no pulse and is not breathing):     CPR (Attempt to Resuscitate)     Medical Interventions (Patient has pulse or is breathing):     Full Support         Sylvester Tamayo MD  [facility]  01/22/24  11:12 EST     Please note that this discharge summary required less than 30 minutes to complete.

## 2024-01-22 NOTE — THERAPY DISCHARGE NOTE
Inpatient Rehabilitation - Physical Therapy Treatment Note/Discharge  ALBERTA Herrera     Patient Name: Phuong Tuttle  : 1960  MRN: [id number]  Today's Date: 2024                Admit Date: 2024    Visit Dx:    ICD-10-CM ICD-9-CM   1. S/p left hip fracture  Z87.81 V15.51   2. ESPERANZA (generalized anxiety disorder)  F41.1 300.02     Patient Active Problem List   Diagnosis    COPD (chronic obstructive pulmonary disease)    Current smoker    Iron deficiency anemia, unspecified    Chest pain    End stage liver disease    Obesity (BMI 30-39.9)    Portal hypertension    Hepatitis C    History of substance abuse    Esophageal varices    Splenomegaly    Chronic kidney disease (CKD), stage III (moderate)    Leukopenia    Splenic pancytopenia syndrome    Iron deficiency anemia    Iron malabsorption    S/p left hip fracture     Past Medical History:   Diagnosis Date    Chronic kidney disease (CKD), stage III (moderate) 2020    COPD (chronic obstructive pulmonary disease)     non-oxygen dependent    End stage liver disease 2020    Esophageal varices     GI bleed     history of GI bleed     Hepatitis C 2020    History of substance abuse 2020    Immunocompromised patient     Peptic ulceration     Portal hypertension 2020    Splenomegaly 2020     Past Surgical History:   Procedure Laterality Date    ESOPHAGEAL VARICES LIGATION      UPPER GASTROINTESTINAL ENDOSCOPY         PT ASSESSMENT (last 12 hours)       IRF PT Evaluation and Treatment       Row Name 24 1340          PT Time and Intention    Document Type --  Discharge treatment  -LL     Mode of Treatment individual therapy;physical therapy  -LL     Patient/Family/Caregiver Comments/Observations Patient discharged from IRF to home this date with assistance of brother and with home health PT referral.  Education performed with patient requarding home safety, patient safety, hip precautions & HEP.  Written materials issued &  no  questions/concerns voiced.  -LL       Row Name 01/22/24 1340          General Information    Existing Precautions/Restrictions fall;left;hip  -LL       Row Name 01/22/24 1340          Cognition/Psychosocial    Affect/Mental Status (Cognition) WFL  -LL     Orientation Status (Cognition) oriented x 3  -LL     Follows Commands (Cognition) WFL;verbal cues/prompting required;physical/tactile prompts required  -LL     Personal Safety Interventions gait belt;nonskid shoes/slippers when out of bed;supervised activity  -LL     Cognitive Function WFL  -LL       Row Name 01/22/24 1340          Mobility    Left Lower Extremity (Weight-bearing Status) weight-bearing as tolerated (WBAT)  -LL     Right Lower Extremity (Weight-bearing Status) weight-bearing as tolerated (WBAT)  -       Row Name 01/22/24 1340          Bed Mobility    Supine-Sit-Supine [place] (Bed Mobility) modified independence  -       Row Name 01/22/24 1340          Transfer Assessment/Treatment    Transfers car transfer  -       Row Name 01/22/24 1340          Bed-Chair Transfer    Bed-Chair [place] (Transfers) contact guard;standby assist  -LL     Assistive Device (Bed-Chair Transfers) wheelchair;walker, front-wheeled  -LL       Row Name 01/22/24 1340          Chair-Bed Transfer    Chair-Bed [place] (Transfers) contact guard;standby assist  -LL     Assistive Device (Chair-Bed Transfers) wheelchair;walker, front-wheeled  -LL       Row Name 01/22/24 1340          Sit-Stand Transfer    Sit-Stand [place] (Transfers) contact guard;verbal cues;nonverbal cues (demo/gesture)  -LL     Assistive Device (Sit-Stand Transfers) wheelchair;walker, front-wheeled  -LL       Row Name 01/22/24 1340          Stand-Sit Transfer    Stand-Sit [place] (Transfers) contact guard;verbal cues;nonverbal cues (demo/gesture)  -LL     Assistive Device (Stand-Sit Transfers) wheelchair;walker, front-wheeled  -LL       Row Name 01/22/24 1340          Toilet  Transfer    [place] Level (Toilet Transfer) contact guard;verbal cues  -LL     Assistive Device (Toilet Transfer) commode, bedside with drop arms;walker, front-wheeled;wheelchair  -LL       Row Name 01/22/24 1340          Gait/Stairs (Locomotion)    [place] Level (Gait) contact guard;verbal cues;nonverbal cues (demo/gesture);standby assist  -LL     Assistive Device (Gait) walker, front-wheeled  -LL     Distance in Feet (Gait) 220'  -LL     Deviations/Abnormal Patterns (Gait) antalgic;claire decreased;gait speed decreased;stride length decreased;weight shifting decreased  -LL     Bilateral Gait Deviations heel strike decreased  -LL       Row Name 01/22/24 1340          Wheelchair Mobility/Management    Method of Wheelchair Locomotion (Mobility) bimanual (upper extremity) propulsion  -LL     Mobility Activities (Wheelchair) mobility (all) activities  -LL     All Mobility, [place] (Wheelchair) independent  -LL     Distance Propelled in Feet (Wheelchair) 160'  -LL       Row Name 01/22/24 1340          Motor Skills    Therapeutic Exercise --  HEP reviewed; GTB issued  -       Row Name 01/22/24 1340          Hip (Therapeutic Exercise)    Hip Strengthening (Therapeutic Exercise) bilateral;flexion;extension;aBduction;aDduction;marching while seated;sitting;resistance band;green  2.5#  -       Row Name 01/22/24 1340          Knee (Therapeutic Exercise)    Knee Strengthening (Therapeutic Exercise) bilateral;flexion;extension;marching while seated;LAQ (long arc quad);hamstring curls;sitting;resistance band;green  2.5#  -       Row Name 01/22/24 1340          Ankle (Therapeutic Exercise)    Ankle Strengthening (Therapeutic Exercise) bilateral;dorsiflexion;plantarflexion;sitting  2.5#  -       Row Name 01/22/24 1340          Positioning and Restraints    Pre-Treatment Position --  WC  -LL     Post Treatment Position wheelchair  -LL     In Wheelchair sitting;with OT  After 1st; in room with family after  2nd  -LL       [facility] Name 01/22/24 1340          Therapy Assessment/Plan (PT)    Patient's Goals For Discharge return home  -       Row Name 01/22/24 1340          Therapy Assessment/Plan (PT)    Rehab Potential/Prognosis (PT) adequate, monitor progress closely  -     Frequency of Treatment (PT) 5 times per week  -LL     Estimated Duration of Therapy (PT) 2 weeks  -     Problem List (PT) balance;mobility;range of motion (ROM);strength;pain  -LL     Activity Limitations Related to Problem List (PT) unable to ambulate safely;unable to transfer safely  -       Row Name 01/22/24 1340          Therapy Plan Review/Discharge Plan (PT)    Anticipated Equipment Needs at Discharge (PT Eval) --  tbd  -     Anticipated Discharge Disposition (PT) home;home with home health  -[facility] Name 01/22/24 1340          IRF PT Goals    Bed Mobility Goal Selection (PT-IRF) bed mobility, PT goal 1  -LL     Transfer Goal Selection (PT-IRF) transfers, PT goal 1  -LL     Gait (Walking Locomotion) Goal Selection (PT-IRF) gait, PT goal 1  -[facility] Name 01/22/24 1340          Bed Mobility Goal 1 (PT-IRF)    Activity/Assistive Device (Bed Mobility Goal 1, PT-IRF) sit to supine/supine to sit  -LL     [place] Level (Bed Mobility Goal 1, PT-IRF) modified independence  -LL     Time Frame (Bed Mobility Goal 1, PT-IRF) by discharge  -LL     Progress/Outcomes (Bed Mobility Goal 1, PT-IRF) goal met  -[facility] Name 01/22/24 1340          Transfer Goal 1 (PT-IRF)    Activity/Assistive Device (Transfer Goal 1, PT-IRF) sit-to-stand/stand-to-sit;bed-to-chair/chair-to-bed  -LL     [place] Level (Transfer Goal 1, PT-IRF) modified independence  -LL     Time Frame (Transfer Goal 1, PT-IRF) by discharge  -LL     Progress/Outcomes (Transfer Goal 1, PT-IRF) goal not met  -[facility] Name 01/22/24 1340          Gait/Walking Locomotion Goal 1 (PT-IRF)    Activity/Assistive Device (Gait/Walking Locomotion Goal 1, PT-IRF) walker, rolling   -LL     Gait/Walking Locomotion Distance Goal 1 (PT-IRF) 300'  -LL     [place] Level (Gait/Walking Locomotion Goal 1, PT-IRF) modified independence  -LL     Time Frame (Gait/Walking Locomotion Goal 1, PT-IRF) by discharge  -LL     Progress/Outcomes (Gait/Walking Locomotion Goal 1, PT-IRF) goal not met  -LL               User Key  (r) = Recorded By, (t) = Taken By, (c) = Cosigned By      Initials Name Provider Type    LL Juliana Echeverria PTA Physical Therapist Assistant                    Physical Therapy Education       Title: PT OT SLP Therapies (Resolved)       Topic: Physical Therapy (Resolved)       Point: Mobility training (Resolved)       Learning Progress Summary             Patient Acceptance, E,D,H, VU by LL at 1/22/2024 1348    Acceptance, E,D, VU,NR by LL at 1/19/2024 1414    Acceptance, E,D, VU,NR by LL at 1/18/2024 1518    Acceptance, E,D, VU,NR by LL at 1/17/2024 1533    Acceptance, E,D, VU,NR by LL at 1/16/2024 1441    Acceptance, E,D, VU,NR by LL at 1/15/2024 1511    Acceptance, E,TB, VU,DU by HC at 1/13/2024 1154    Acceptance, E,D, VU,NR by LL at 1/12/2024 1356    Acceptance, E, VU,NR by LB at 1/11/2024 1547   Family Acceptance, E,D,H, VU by LL at 1/22/2024 1348                         Point: Home exercise program (Resolved)       Learning Progress Summary             Patient Acceptance, E,D,H, VU by LL at 1/22/2024 1348    Acceptance, E,D, VU,NR by LL at 1/19/2024 1414    Acceptance, E,D, VU,NR by LL at 1/18/2024 1518    Acceptance, E,D, VU,NR by LL at 1/17/2024 1533    Acceptance, E,D, VU,NR by LL at 1/16/2024 1441    Acceptance, E,D, VU,NR by LL at 1/15/2024 1511    Acceptance, E,TB, VU,DU by HC at 1/13/2024 1154    Acceptance, E,D, VU,NR by LL at 1/12/2024 1356    Acceptance, E, VU,NR by LB at 1/11/2024 1547   Family Acceptance, E,D,H, VU by LL at 1/22/2024 1348                         Point: Body mechanics (Resolved)       Learning Progress Summary             Patient Acceptance, E,D,H,  VU by LL at 1/22/2024 1348    Acceptance, E,D, VU,NR by LL at 1/19/2024 1414    Acceptance, E,D, VU,NR by LL at 1/18/2024 1518    Acceptance, E,D, VU,NR by LL at 1/17/2024 1533    Acceptance, E,D, VU,NR by LL at 1/16/2024 1441    Acceptance, E,D, VU,NR by  at 1/15/2024 1511    Acceptance, E,TB, VU,DU by  at 1/13/2024 1154    Acceptance, E,D, VU,NR by  at 1/12/2024 1356    Acceptance, E, VU,NR by LB at 1/11/2024 1547   Family Acceptance, E,D,H, VU by  at 1/22/2024 1348                         Point: Precautions (Resolved)       Learning Progress Summary             Patient Acceptance, E,D,H, VU by  at 1/22/2024 1348    Acceptance, E,D, VU,NR by  at 1/19/2024 1414    Acceptance, E,D, VU,NR by  at 1/18/2024 1518    Acceptance, E,D, VU,NR by  at 1/17/2024 1533    Acceptance, E,D, VU,NR by  at 1/16/2024 1441    Acceptance, E,D, VU,NR by  at 1/15/2024 1511    Acceptance, E,TB, VU,DU by  at 1/13/2024 1154    Acceptance, E,D, VU,NR by  at 1/12/2024 1356    Acceptance, E, VU,NR by LB at 1/11/2024 1547   Family Acceptance, E,D,H, VU by  at 1/22/2024 1348                                         User Key       Initials Effective Dates Name Provider Type Discipline    LB 06/16/21 -  Caryn Rosen, PT Physical Therapist PT    LL 05/02/16 -  Juliana Echeverria, PTA Physical Therapist Assistant PT    HC 01/20/23 -  Cecilia Laureano PTA Physical Therapist Assistant PT                    PT Recommendation and Plan  Frequency of Treatment (PT): 5 times per week  Anticipated Equipment Needs at Discharge (PT Eval):  (tbd)            Time Calculation:    PT Charges       Row Name 01/22/24 1350 01/22/24 1348          Time Calculation    Start Time 1045  -LL 0915  -LL     Stop Time 1130  -LL 1000  -LL     Time Calculation (min) 45 min  -LL 45 min  -LL     PT Received On -- 01/22/24  -LL        Time Calculation- PT    Total Timed Code Minutes- PT 45 minute(s)  -LL 45 minute(s)  -LL               User Key   (r) = Recorded By, (t) = Taken By, (c) = Cosigned By      Initials Name Provider Type     Juliana Echeverria PTA Physical Therapist Assistant                    Therapy Charges for Today       Code Description Service Date Service Provider Modifiers Qty    23840698961 HC GAIT TRAINING EA 15 MIN 1/22/2024 Juliana Echeverria PTA GP, CQ 2    05952755272 HC PT THERAPEUTIC ACT EA 15 MIN 1/22/2024 Juilana Echeverria PTA GP, CQ 1    68403386679 HC PT THER PROC EA 15 MIN 1/22/2024 Juliana Echeverria PTA GP, CQ 3            PT G-Codes  AM-PAC 6 Clicks Score (PT): 18    PT Discharge Summary  Reason for Discharge: Discharge from facility  Outcomes Achieved: Patient able to partially acheive established goals  Discharge Destination: Home with assist, Home with home health    Lindy Echeverria PTA  1/22/2024

## 2024-01-22 NOTE — SIGNIFICANT NOTE
01/22/24 1430   Plan   Plan Contacted [facility] [phone] per Julia who says delivery techs are still out and suggests brother pick-up W/C today. Reviewed this with pt and brother.  Pt is waiting on medications from  Pharmacy.

## 2024-01-22 NOTE — SIGNIFICANT NOTE
01/22/24 9301   Plan   Plan Spoke to pt who says brother Alejandro will be coming to transport her home today.  Brother Earle is suppose to come in today to stay with her.  Informed pt SS will contact [facility] about W/C.  Rehab Director says pt cannot borrow a W/C.  Explained if insurance denies W/C PA then SS can ask [facility] to assist her with W/C.  Explained insurance can take up to 72 hours or longer to make a decision.  Pt wanted to know about paying to rent W/C until insurance makes a decision.  Informed her SS will ask [facility] about this option.

## 2024-01-22 NOTE — SIGNIFICANT NOTE
01/22/24 1348   PT Discharge Summary   Reason for Discharge Discharge from facility   Outcomes Achieved Patient able to partially acheive established goals   Discharge Destination Home with assist;Home with home health

## 2024-01-22 NOTE — SIGNIFICANT NOTE
01/22/24 1150   Plan   Plan Contacted [facility] [phone] per Clarita about pt being discharged home today and Ortho follow-up in the am and she may have staples removed from left hip.   nurse to visit pt on 1-24-24 and PT/OT will visit another day.  Faxed discharge summary to  via epic.  SSreviewed this information with pt and brothemmanuel Allen, insurance approval for W/C and picking this item up at [facility] after pt leaves rehab; provided address.  Brother is agreeable to pick-up W/C.  Reviewed transportation via W/C van with [employer] on 1-23-24 to go to Ortho appointment in [place] at 8:45 am.  Explained pt can call [employer] around 3:30 pm for pick-up time on 1-23-24.  Informed pt [facility] will evaluate her for HCBW program.  Phone numbers provided for agencies and DME company.  Pt is returning to her home today.  Brother Alejandro will be staying with pt along with brother Earle when he arrives.   Final Discharge Disposition Code 06 - home with home health care

## 2024-01-22 NOTE — PROGRESS NOTES
Patient Assessment Instrument  Quality Indicators - Discharge FY 2023    Section A. Transportation      Section A. Medication List        Section B. Health Literacy      Section C. BIMS      Section C. Signs and Symptoms of Delirium (from CAM)      Section D. Mood      Section D. Social Isolation      Section GG. Self-Care Performance     Eating: Patient completed the activities by themself with no assistance from a  helper.   Oral Hygiene: Clayton sets up or cleans up; patient completes activity. Clayton  assists only prior to or following the activity.   Toileting Hygiene: : [place] provides verbal cues and/or touching/steadying  and/or contact guard assistance as patient completes activity.   Shower/Bathe Self: Clayton does less than half the effort. Clayton lifts, holds  or supports trunk or limbs but provides less than half the effort.   Upper Body Dressing: Clayton sets up or cleans up; patient completes activity.  Clayton assists only prior to or following the activity.   Lower Body Dressing: Clayton does less than half the effort. Clayton lifts, holds  or supports trunk or limbs but provides less than half the effort.   Putting On/Taking Off Footwear: Clayton does less than half the effort. Clayton  lifts, holds or supports trunk or limbs but provides less than half the effort.    Section GG. Mobility Performance      Section J. Health Conditions Discharge      Section J. Health Conditions (Pain)      Section K. Swallowing/Nutritional Status  Nutritional Approaches Past 7 Days:  Nutritional Approaches at Discharge:    Section M. Skin Conditions Discharge      . Current Number of Unhealed Pressure Ulcers      Section N. Medication        Section O. Special Treatments, Procedures, and Programs    Signed by: Carlee Carter, Occupational Therapist

## 2024-01-22 NOTE — THERAPY DISCHARGE NOTE
Inpatient Rehabilitation - IRF Occupational Therapy Treatment Note/Discharge  ALBERTA Javier     Patient Name: Phuong Tuttle  : 1960  MRN: [id number]  Today's Date: 2024               Admit Date: 2024       ICD-10-CM ICD-9-CM   1. S/p left hip fracture  Z87.81 V15.51   2. ESPERANZA (generalized anxiety disorder)  F41.1 300.02     Patient Active Problem List   Diagnosis    COPD (chronic obstructive pulmonary disease)    Current smoker    Iron deficiency anemia, unspecified    Chest pain    End stage liver disease    Obesity (BMI 30-39.9)    Portal hypertension    Hepatitis C    History of substance abuse    Esophageal varices    Splenomegaly    Chronic kidney disease (CKD), stage III (moderate)    Leukopenia    Splenic pancytopenia syndrome    Iron deficiency anemia    Iron malabsorption    S/p left hip fracture     Past Medical History:   Diagnosis Date    Chronic kidney disease (CKD), stage III (moderate) 2020    COPD (chronic obstructive pulmonary disease)     non-oxygen dependent    End stage liver disease 2020    Esophageal varices     GI bleed     history of GI bleed     Hepatitis C 2020    History of substance abuse 2020    Immunocompromised patient     Peptic ulceration     Portal hypertension 2020    Splenomegaly 2020     Past Surgical History:   Procedure Laterality Date    ESOPHAGEAL VARICES LIGATION      UPPER GASTROINTESTINAL ENDOSCOPY         IRF OT ASSESSMENT FLOWSHEET (last 12 hours)       IRF OT Evaluation and Treatment       Row Name 24 1217          OT Time and Intention    Document Type discharge evaluation;daily treatment  -CJ     Mode of Treatment occupational therapy  -CJ     Symptoms Noted During/After Treatment none  -CJ       Row Name 24 1217          General Information    Patient/Family/Caregiver Comments/Observations agreeable to therapy  -CJ     Existing Precautions/Restrictions fall;hip  -CJ       Row Name 24 1211           Cognition/Psychosocial    Follows Commands (Cognition) [facility]  -     Personal Safety Interventions gait belt;nonskid shoes/slippers when out of bed;supervised activity;fall prevention program maintained  -       Row Name 01/22/24 1217          Range of Motion Comprehensive    General Range of Motion bilateral upper extremity ROM WFL  -       Row Name 01/22/24 1217          Bathing    Assistive Device (Bathing) long-handled sponge  issued  -     Comment (Bathing) Shahram  -       Row Name 01/22/24 1217          Upper Body Dressing    Comment (Upper Body Dressing) Set up  -       Row Name 01/22/24 1217          Lower Body Dressing    Assistive Device Use (Lower Body Dressing) reacher;sock-aid  issued  -     Comment (Lower Body Dressing) ModA  -       Row Name 01/22/24 1217          Grooming    Comment (Grooming) Set up  -       Row Name 01/22/24 1217          Toileting    Assistive Device Use (Toileting) commode chair  RW  -     Comment (Toileting) CGA  -       Row Name 01/22/24 1217          Self-Feeding    [place] Level (Self-Feeding) modified independence  -       Row Name 01/22/24 1217          Toilet Transfer    [place] Level (Toilet Transfer) contact guard;verbal cues  -     Assistive Device (Toilet Transfer) commode, bedside without drop arms;walker, front-wheeled;wheelchair  -       Row Name 01/22/24 1217          Motor Skills    Motor Skills functional endurance  -     Motor Control/Coordination Interventions therapeutic exercise/ROM  BUE ther ex/act, AROM,  strengthening  -     Therapeutic Exercise --  UBE, hand exerciser  -       Row Name 01/22/24 1217          Positioning and Restraints    Post Treatment Position wheelchair  -     In Wheelchair with PT;sitting;call light within reach;encouraged to call for assist;with family/caregiver;notified nsg  PT - first tx;  room w/family - second session  -       Row Name 01/22/24 1217          Transfer Goal 1 (OT-IRF)     Progress/Outcomes (Transfer Goal 1, OT-IRF) goal met  -       Row Name 01/22/24 1217          Transfer Goal 2 (OT-IRF)    Progress/Outcomes (Transfer Goal 2, OT-IRF) goal not met  -       Row Name 01/22/24 1217          LB Dressing Goal 1 (OT-IRF)    Progress/Outcomes (LB Dressing Goal 1, OT-IRF) goal met  -       Row Name 01/22/24 1217          LB Dressing Goal 2 (OT-IRF)    Progress/Outcomes (LB Dressing Goal 2, OT-IRF) goal not met  -       Row Name 01/22/24 1217          Toileting Goal 1 (OT-IRF)    Progress/Outcomes (Toileting Goal 1, OT-IRF) goal met  -       Row Name 01/22/24 1217          Toileting Goal 2 (OT-IRF)    Progress/Outcomes (Toileting Goal 2, OT-IRF) goal met  -       Row Name 01/22/24 1217          Discharge Summary (OT)    Discharge Summary Statement (OT) Education provided to pt and brother re:  ADL status, safety, AE, THP, DME, supervision, home program, precautions, and D/C concerns.  Verbalized understanding.  -               User Key  (r) = Recorded By, (t) = Taken By, (c) = Cosigned By      Initials Name Effective Dates     Carlee Carter, OT 06/16/21 -                   Wound 01/09/24 1655 Left anterior hip Incision (Active)   Dressing Appearance intact 01/21/24 1935   Closure Unable to assess 01/21/24 1935   Base dressing in place, unable to visualize 01/21/24 1935   Periwound dry 01/21/24 1844   Edges rolled/closed 01/21/24 1844   Drainage Amount none 01/21/24 1844   Care, Wound cleansed with 01/21/24 1844       Occupational Therapy Education       Title: PT OT SLP Therapies (Resolved)       Topic: Occupational Therapy (Resolved)       Point: ADL training (Resolved)       Description:   Instruct learner(s) on proper safety adaptation and remediation techniques during self care or transfers.   Instruct in proper use of assistive devices.                  Learning Progress Summary             Patient Acceptance, E,D,H, VU by LING at 1/22/2024 1229    Acceptance, E,D,  VU,NR by CJ at 1/19/2024 1217    Acceptance, E, VU,NR by HB at 1/18/2024 1409    Acceptance, E, VU,NR by HB at 1/17/2024 1452    Acceptance, E, VU,NR by HB at 1/16/2024 1508    Acceptance, E, VU,NR by HB at 1/15/2024 0951    Acceptance, E, VU,NR by HB at 1/13/2024 1208    Acceptance, E, VU,NR by HB at 1/12/2024 1154    Acceptance, E, VU,NR by HB at 1/11/2024 1354    Acceptance, E, VU,NR by HB at 1/10/2024 1413   Family Acceptance, E,D,H, VU by  at 1/22/2024 1229                         Point: Home exercise program (Resolved)       Description:   Instruct learner(s) on appropriate technique for monitoring, assisting and/or progressing therapeutic exercises/activities.                  Learning Progress Summary             Patient Acceptance, E, VU,NR by HB at 1/18/2024 1409    Acceptance, E, VU,NR by HB at 1/17/2024 1452    Acceptance, E, VU,NR by HB at 1/16/2024 1508    Acceptance, E, VU,NR by HB at 1/15/2024 0951    Acceptance, E, VU,NR by HB at 1/13/2024 1208    Acceptance, E, VU,NR by HB at 1/12/2024 1154    Acceptance, E, VU,NR by HB at 1/11/2024 1354    Acceptance, E, VU,NR by HB at 1/10/2024 1413                         Point: Precautions (Resolved)       Description:   Instruct learner(s) on prescribed precautions during self-care and functional transfers.                  Learning Progress Summary             Patient Acceptance, E,D,H, VU by  at 1/22/2024 1229    Acceptance, E,D, VU,NR by  at 1/19/2024 1217    Acceptance, E, VU,NR by HB at 1/18/2024 1409    Acceptance, E, VU,NR by HB at 1/17/2024 1452    Acceptance, E, VU,NR by HB at 1/16/2024 1508    Acceptance, E, VU,NR by HB at 1/15/2024 0951    Acceptance, E, VU,NR by HB at 1/13/2024 1208    Acceptance, E, VU,NR by HB at 1/12/2024 1154    Acceptance, E, VU,NR by HB at 1/11/2024 1354    Acceptance, E, VU,NR by HB at 1/10/2024 1413   Family Acceptance, E,D,H, VU by CJ at 1/22/2024 1229                         Point: Body mechanics (Resolved)        Description:   Instruct learner(s) on proper positioning and spine alignment during self-care, functional mobility activities and/or exercises.                  Learning Progress Summary             Patient Acceptance, E, VU,NR by  at 1/18/2024 1409    Acceptance, E, VU,NR by HB at 1/17/2024 1452    Acceptance, E, VU,NR by HB at 1/16/2024 1508    Acceptance, E, VU,NR by HB at 1/15/2024 0951    Acceptance, E, VU,NR by HB at 1/13/2024 1208    Acceptance, E, VU,NR by HB at 1/12/2024 1154    Acceptance, E, VU,NR by HB at 1/11/2024 1354    Acceptance, E, VU,NR by HB at 1/10/2024 1413                                         User Key       Initials Effective Dates Name Provider Type Discipline     06/16/21 -  Carlee Carter OT Occupational Therapist OT     05/25/21 -  Julia Quintana OT Occupational Therapist OT                    OT Recommendation and Plan  Anticipated Discharge Disposition (OT): home with /7 care, home with home health  Planned Therapy Interventions (OT): activity tolerance training, adaptive equipment training, BADL retraining, IADL retraining, functional balance retraining, strengthening exercise, ROM/therapeutic exercise, patient/caregiver education/training, transfer/mobility retraining           OT IRF GOALS       Row Name 01/22/24 1217 01/17/24 1431 01/15/24 0944       Transfer Goal 1 (OT-IRF)    Activity/Assistive Device (Transfer Goal 1, OT-IRF) -- all transfers  -HB all transfers  -HB    [place] Level (Transfer Goal 1, OT-IRF) -- contact guard required  -HB contact guard required  -HB    Time Frame (Transfer Goal 1, OT-IRF) -- short-term goal (STG)  -HB short-term goal (STG)  -HB    Progress/Outcomes (Transfer Goal 1, OT-IRF) goal met  -CJ good progress toward goal  -HB good progress toward goal  -HB       Transfer Goal 2 (OT-IRF)    Activity/Assistive Device (Transfer Goal 2, OT-IRF) -- all transfers  -HB all transfers  -HB    [place] Level (Transfer Goal 2, OT-IRF) --  supervision required  -HB supervision required  -HB    Time Frame (Transfer Goal 2, OT-IRF) -- long-term goal (LTG)  -HB long-term goal (LTG)  -HB    Progress/Outcomes (Transfer Goal 2, OT-IRF) goal not met  -CJ good progress toward goal  -HB good progress toward goal  -HB       LB Dressing Goal 1 (OT-IRF)    Activity/Device (LB Dressing Goal 1, OT-IRF) -- lower body dressing  -HB lower body dressing  -HB    [place] (LB Dressing Goal 1, OT-IRF) -- moderate assist (50-74% patient effort)  -HB moderate assist (50-74% patient effort)  -HB    Time Frame (LB Dressing Goal 1, OT-IRF) -- short-term goal (STG)  -HB short-term goal (STG)  -HB    Progress/Outcomes (LB Dressing Goal 1, OT-IRF) goal met  -CJ goal partially met  -HB good progress toward goal  -HB       LB Dressing Goal 2 (OT-IRF)    Activity/Device (LB Dressing Goal 2, OT-IRF) -- lower body dressing  -HB lower body dressing  -HB    [place] (LB Dressing Goal 2, OT-IRF) -- minimum assist (75% or more patient effort)  -HB minimum assist (75% or more patient effort)  -HB    Time Frame (LB Dressing Goal 2, OT-IRF) -- long-term goal (LTG)  -HB long-term goal (LTG)  -HB    Progress/Outcomes (LB Dressing Goal 2, OT-IRF) goal not met  -CJ goal partially met  -HB good progress toward goal  -HB       Toileting Goal 1 (OT-IRF)    Activity/Device (Toileting Goal 1, OT-IRF) -- toileting skills, all  -HB toileting skills, all  -HB    [place] Level (Toileting Goal 1, OT-IRF) -- moderate assist (50-74% patient effort)  -HB moderate assist (50-74% patient effort)  -HB    Progress/Outcomes (Toileting Goal 1, OT-IRF) goal met  -CJ goal partially met  -HB good progress toward goal  -HB    Time Frame (Toileting Goal 1, OT-IRF) -- short-term goal (STG)  -HB --       Toileting Goal 2 (OT-IRF)    Activity/Device (Toileting Goal 2, OT-IRF) -- toileting skills, all  -HB --    [place] Level (Toileting Goal 2, OT-IRF) -- minimum assist (75% or more patient effort)  -HB  --    Progress/Outcomes (Toileting Goal 2, OT-IRF) goal met  -CJ goal partially met  -HB --    Time Frame (Toileting Goal 2, OT-IRF) -- long-term goal (LTG)  -HB --      Row Name 01/11/24 1347 01/10/24 1404          Transfer Goal 1 (OT-IRF)    Activity/Assistive Device (Transfer Goal 1, OT-IRF) all transfers  -HB all transfers  -HB     [place] Level (Transfer Goal 1, OT-IRF) contact guard required  -HB contact guard required  -HB     Time Frame (Transfer Goal 1, OT-IRF) short-term goal (STG)  -HB short-term goal (STG)  -HB     Progress/Outcomes (Transfer Goal 1, OT-IRF) goal ongoing  -HB new goal  -HB        Transfer Goal 2 (OT-IRF)    Activity/Assistive Device (Transfer Goal 2, OT-IRF) all transfers  -HB all transfers  -HB     [place] Level (Transfer Goal 2, OT-IRF) supervision required  -HB supervision required  -HB     Time Frame (Transfer Goal 2, OT-IRF) long-term goal (LTG)  -HB long-term goal (LTG)  -HB     Progress/Outcomes (Transfer Goal 2, OT-IRF) goal ongoing  -HB new goal  -HB        LB Dressing Goal 1 (OT-IRF)    Activity/Device (LB Dressing Goal 1, OT-IRF) lower body dressing  -HB lower body dressing  -HB     [place] (LB Dressing Goal 1, OT-IRF) moderate assist (50-74% patient effort)  -HB moderate assist (50-74% patient effort)  -HB     Time Frame (LB Dressing Goal 1, OT-IRF) short-term goal (STG)  -HB short-term goal (STG)  -HB     Progress/Outcomes (LB Dressing Goal 1, OT-IRF) goal ongoing  -HB new goal  -HB        LB Dressing Goal 2 (OT-IRF)    Activity/Device (LB Dressing Goal 2, OT-IRF) lower body dressing  -HB lower body dressing  -HB     [place] (LB Dressing Goal 2, OT-IRF) minimum assist (75% or more patient effort)  -HB minimum assist (75% or more patient effort)  -HB     Time Frame (LB Dressing Goal 2, OT-IRF) long-term goal (LTG)  -HB long-term goal (LTG)  -HB     Progress/Outcomes (LB Dressing Goal 2, OT-IRF) goal ongoing  -HB new goal  -HB        Toileting Goal 1  (OT-IRF)    Activity/Device (Toileting Goal 1, OT-IRF) toileting skills, all  -HB toileting skills, all  -HB     [place] Level (Toileting Goal 1, OT-IRF) moderate assist (50-74% patient effort)  -HB moderate assist (50-74% patient effort)  -HB     Progress/Outcomes (Toileting Goal 1, OT-IRF) goal ongoing  -HB new goal  -HB     Time Frame (Toileting Goal 1, OT-IRF) short-term goal (STG)  -HB short-term goal (STG)  -HB        Toileting Goal 2 (OT-IRF)    Activity/Device (Toileting Goal 2, OT-IRF) toileting skills, all  -HB toileting skills, all  -HB     [place] Level (Toileting Goal 2, OT-IRF) minimum assist (75% or more patient effort)  -HB minimum assist (75% or more patient effort)  -HB     Progress/Outcomes (Toileting Goal 2, OT-IRF) goal ongoing  -HB new goal  -HB     Time Frame (Toileting Goal 2, OT-IRF) long-term goal (LTG)  -HB long-term goal (LTG)  -HB               User Key  (r) = Recorded By, (t) = Taken By, (c) = Cosigned By      Initials Name Provider Type     Carlee Carter OT Occupational Therapist     Julia Quintana OT Occupational Therapist                        Time Calculation:    Time Calculation- OT       Row Name 01/22/24 1229 01/22/24 1227          Time Calculation- OT    OT Start Time 1135  - 1000  -     OT Stop Time 1145  - 1045  -     OT Time Calculation (min) 10 min  - 45 min  -     Total Timed Code Minutes- OT 10 minute(s)  -CJ 45 minute(s)  -               User Key  (r) = Recorded By, (t) = Taken By, (c) = Cosigned By      Initials Name Provider Type     Carlee Carter OT Occupational Therapist                    Therapy Charges for Today       Code Description Service Date Service Provider Modifiers Qty    [id number]  OT SELF CARE/MGMT/TRAIN EA 15 MIN 1/22/2024 Carlee Carter OT GO 2    [id number]  OT THER PROC EA 15 MIN 1/22/2024 Carlee Carter OT GO 2                 OT Discharge Summary  Anticipated Discharge Disposition (OT): home with  24/7 care, home with home health  Reason for Discharge: Discharge from facility  Discharge Destination: Home with home health, Home with assist    Carlee Carter, OT  1/22/2024   Glycopyrrolate Pregnancy And Lactation Text: This medication is Pregnancy Category B and is considered safe during pregnancy. It is unknown if it is excreted breast milk.

## 2024-01-22 NOTE — SIGNIFICANT NOTE
01/22/24 1100   Plan   Plan Contacted [facility] [phone] and left message for Ca.

## 2024-01-22 NOTE — SIGNIFICANT NOTE
01/22/24 0845   Plan   Plan Contacted [facility] [phone] per Mario who says they have not received a decision from insurance.  SS asked her about pt renting this item until decision is made by insurance and she says this would have to be approved by the owner since it is submitted to insurance and he is not available at this time.  Informed her pt is being discharged today and she wants to have a W/C to use at home.  Mario will try to call owner about renting W/C and call SS back.

## 2024-01-23 NOTE — PROGRESS NOTES
Patient Assessment Instrument  Quality Indicators - Discharge FY 2023    Section A. Transportation      Section A. Medication List  Subsequent Provider:  06 - Home under care of organized home health service  organization  Medication List to Subsequent Provider at Discharge:  Yes - Current reconciled  medication list provided to the subsequent provider  Route(s) of Medication List Transmission to Subsequent Provider:  Paper-based  (e.g., fax, copies, printouts)  Medication List to Patient:  Not applicable.  Patient discharged to a subsequent  provider as defined in 44D    Section B. Health Literacy      Section C. BIMS      Section C. Signs and Symptoms of Delirium (from CAM)      Section D. Mood      Section D. Social Isolation      Section GG. Self-Care Performance      Section GG. Mobility Performance      Section J. Health Conditions Discharge      Section J. Health Conditions (Pain)      Section K. Swallowing/Nutritional Status  Nutritional Approaches Past 7 Days:  None  Nutritional Approaches at Discharge:  None    Section M. Skin Conditions Discharge      . Current Number of Unhealed Pressure Ulcers      Section N. Medication    Medication Intervention: Not applicable - There were no potential clinically  significant medication issues identified since admission or patient is not  taking any medications.  Patient is taking medications in the following pharmacological classification:  H. Opioid An indication is noted for all medications in the Opiod drug class.    Section O. Special Treatments, Procedures, and Programs  None    Signed by: Amanda Nicholas, Supervisor

## 2024-02-05 ENCOUNTER — OFFICE VISIT (OUTPATIENT)
Dept: PSYCHIATRY | Facility: CLINIC | Age: 64
End: 2024-02-05
Payer: MEDICAID

## 2024-02-05 VITALS
SYSTOLIC BLOOD PRESSURE: 112 MMHG | BODY MASS INDEX: 32.14 KG/M2 | DIASTOLIC BLOOD PRESSURE: 67 MMHG | HEIGHT: 66 IN | HEART RATE: 77 BPM | WEIGHT: 200 LBS

## 2024-02-05 DIAGNOSIS — Z79.899 MEDICATION MANAGEMENT: ICD-10-CM

## 2024-02-05 DIAGNOSIS — G47.00 INSOMNIA, UNSPECIFIED TYPE: ICD-10-CM

## 2024-02-05 DIAGNOSIS — F33.1 MODERATE EPISODE OF RECURRENT MAJOR DEPRESSIVE DISORDER: ICD-10-CM

## 2024-02-05 DIAGNOSIS — F41.1 GAD (GENERALIZED ANXIETY DISORDER): Primary | ICD-10-CM

## 2024-02-05 LAB
EXTERNAL AMPHETAMINE SCREEN URINE: NEGATIVE
EXTERNAL BENZODIAZEPINE SCREEN URINE: NEGATIVE
EXTERNAL BUPRENORPHINE SCREEN URINE: POSITIVE
EXTERNAL COCAINE SCREEN URINE: NEGATIVE
EXTERNAL MDMA: NEGATIVE
EXTERNAL METHADONE SCREEN URINE: NEGATIVE
EXTERNAL METHAMPHETAMINE SCREEN URINE: NEGATIVE
EXTERNAL OPIATES SCREEN URINE: NEGATIVE
EXTERNAL OXYCODONE SCREEN URINE: NEGATIVE
EXTERNAL THC SCREEN URINE: NEGATIVE

## 2024-02-05 PROCEDURE — 1159F MED LIST DOCD IN RCRD: CPT

## 2024-02-05 PROCEDURE — 1160F RVW MEDS BY RX/DR IN RCRD: CPT

## 2024-02-05 PROCEDURE — 90792 PSYCH DIAG EVAL W/MED SRVCS: CPT

## 2024-02-05 RX ORDER — QUETIAPINE FUMARATE 25 MG/1
25 TABLET, FILM COATED ORAL AS NEEDED
COMMUNITY
Start: 2024-01-31 | End: 2024-02-05

## 2024-02-05 RX ORDER — OXYCODONE HYDROCHLORIDE 5 MG/1
5 TABLET ORAL EVERY 4 HOURS PRN
COMMUNITY
Start: 2024-01-27 | End: 2024-02-15

## 2024-02-05 RX ORDER — ACETAMINOPHEN 325 MG/1
325 TABLET ORAL EVERY 4 HOURS PRN
COMMUNITY

## 2024-02-05 RX ORDER — PROMETHAZINE HYDROCHLORIDE 25 MG/1
25 TABLET ORAL EVERY 6 HOURS PRN
COMMUNITY
Start: 2023-12-28

## 2024-02-05 RX ORDER — PAROXETINE HYDROCHLORIDE 40 MG/1
40 TABLET, FILM COATED ORAL DAILY
COMMUNITY
Start: 2023-10-09 | End: 2024-02-05 | Stop reason: SDUPTHER

## 2024-02-05 RX ORDER — PAROXETINE HYDROCHLORIDE 40 MG/1
40 TABLET, FILM COATED ORAL DAILY
Qty: 30 TABLET | Refills: 0 | Status: SHIPPED | OUTPATIENT
Start: 2024-02-05 | End: 2024-03-06

## 2024-02-05 RX ORDER — SULFAMETHOXAZOLE AND TRIMETHOPRIM 800; 160 MG/1; MG/1
1 TABLET ORAL 2 TIMES DAILY
COMMUNITY
Start: 2024-02-04 | End: 2024-02-14

## 2024-02-05 RX ORDER — HYDROXYZINE PAMOATE 25 MG/1
25 CAPSULE ORAL 3 TIMES DAILY PRN
Qty: 90 CAPSULE | Refills: 0 | Status: SHIPPED | OUTPATIENT
Start: 2024-02-05 | End: 2024-03-06

## 2024-02-05 NOTE — PROGRESS NOTES
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 Final    Slight hemolysis detected by analyzer. Result may be falsely elevated.    Chloride 01/20/2024 107  98 - 107 mmol/L Final    CO2 01/20/2024 29.7 (H)  22.0 - 29.0 mmol/L Final    Calcium 01/20/2024 8.3 (L)  8.6 - 10.5 mg/dL Final    BUN/Creatinine Ratio 01/20/2024 16.7  7.0 - 25.0 Final    Anion Gap 01/20/2024 5.3  5.0 - 15.0 mmol/L Final    eGFR 01/20/2024 62.7  >60.0 mL/min/1.73 Final    WBC 01/20/2024 1.70 (C)  3.40 - 10.80 10*3/mm3 Final    RBC 01/20/2024 2.98 (L)  3.77 - 5.28 10*6/mm3 Final    Hemoglobin 01/20/2024 8.4 (L)  12.0 - 15.9 g/dL Final    Hematocrit 01/20/2024 28.0 (L)  34.0 - 46.6 % Final    MCV 01/20/2024 94.0  79.0 - 97.0 fL Final    MCH 01/20/2024 28.2  26.6 - 33.0 pg Final    MCHC 01/20/2024 30.0 (L)  31.5 - 35.7 g/dL Final    RDW 01/20/2024 15.1  12.3 - 15.4 % Final    RDW-SD 01/20/2024 52.2  37.0 - 54.0 fl Final    MPV 01/20/2024 10.5  6.0 - 12.0 fL Final    Platelets 01/20/2024 71 (L)  140 - 450 10*3/mm3 Final    Neutrophil % 01/20/2024 58.2  42.7 - 76.0 % Final    Lymphocyte % 01/20/2024 25.3  19.6 - 45.3 % Final    Monocyte % 01/20/2024 9.4  5.0 - 12.0 % Final    Eosinophil % 01/20/2024 5.3  0.3 - 6.2 % Final    Basophil % 01/20/2024 1.2  0.0 - 1.5 % Final    Immature Grans % 01/20/2024 0.6 (H)  0.0 - 0.5 % Final    Neutrophils, Absolute  01/20/2024 0.99 (L)  1.70 - 7.00 10*3/mm3 Final    Lymphocytes, Absolute 01/20/2024 0.43 (L)  0.70 - 3.10 10*3/mm3 Final    Monocytes, Absolute 01/20/2024 0.16  0.10 - 0.90 10*3/mm3 Final    Eosinophils, Absolute 01/20/2024 0.09  0.00 - 0.40 10*3/mm3 Final    Basophils, Absolute 01/20/2024 0.02  0.00 - 0.20 10*3/mm3 Final    Immature Grans, Absolute 01/20/2024 0.01  0.00 - 0.05 10*3/mm3 Final    nRBC 01/20/2024 0.0  0.0 - 0.2 /100 WBC Final         Assessment & Plan   Diagnoses and all orders for this visit:    1. ESPERANZA (generalized anxiety disorder) (Primary)  -     hydrOXYzine pamoate (VISTARIL) 25 MG capsule; Take 1 capsule by mouth 3 (Three) Times a Day As Needed for Anxiety (anxiety and/or sleep) for up to 30 days.  Dispense: 90 capsule; Refill: 0  -     PARoxetine (PAXIL) 40 MG tablet; Take 1 tablet by mouth Daily for 30 days.  Dispense: 30 tablet; Refill: 0    2. Moderate episode of recurrent major depressive disorder  -     PARoxetine (PAXIL) 40 MG tablet; Take 1 tablet by mouth Daily for 30 days.  Dispense: 30 tablet; Refill: 0    3. Insomnia, unspecified type  -     hydrOXYzine pamoate (VISTARIL) 25 MG capsule; Take 1 capsule by mouth 3 (Three) Times a Day As Needed for Anxiety (anxiety and/or sleep) for up to 30 days.  Dispense: 90 capsule; Refill: 0    4. Medication management  -     [facility] Screen        Visit Diagnoses:    ICD-10-CM ICD-9-CM   1. ESPERANZA (generalized anxiety disorder)  F41.1 300.02   2. Moderate episode of recurrent major depressive disorder  F33.1 296.32   3. Insomnia, unspecified type  G47.00 780.52   4. Medication management  Z79.899 V58.69       GOALS:  Short Term Goals: Patient will be compliant with medication, and patient will have no significant medication related side effects.  Patient will be engaged in psychotherapy as indicated.  Patient will report subjective improvement of symptoms.  Long term goals: To stabilize mood and treat/improve subjective symptoms, the patient will  stay out of the hospital, the patient will be at an optimal level of functioning, and the patient will take all medications as prescribed.  The patient/guardian verbalized understanding and agreement with goals that were mutually set.      TREATMENT PLAN:   -Continue paroxetine (Paxil) 40 mg po daily for anxiety and depression.  -Discontinue Hydroxyzine (Atarax) 10 mg p.o. 3 times a day as needed for anxiety.  -Start hydroxyzine (Vistaril)   -discussed supportive psychotherapy efforts to develop coping skills to manage stress and emotions, she reports she will continue to see Lisandro Esquivel LPC.  - We will not continue the clonazepan 1 mg Q 12 hours prn due to patient is taking opiate medication and long term use of benzodiazepine is not indicated for long term treatment of anxiety.    -Pharmacological and Non-Pharmacological treatment options discussed during today's visit.   -The benefits of a healthy diet and exercise were discussed with patient, especially the positive effects they have on mental health. Patient encouraged to consider lifestyle modification regarding  diet and use of PT for exercise to maximize results of mental health treatment.   -We discussed sleep hygiene including going to bed at the same time and getting up at the same time every day, decreased caffeine consumption, going to bed early enough to get 7 or 8 hours in bed, reading and relaxing before bedtime, and avoiding stimulating activities close to bedtime.   -Patient acknowledged and verbally consented with current treatment plan and was educated on the importance of compliance with treatment and follow-up appointments.    -Return to clinic in 4 weeks for follow up.  -Reviewed previous available documentation  -Reviewed most recent available labs  -UDS: buprenorphine. Patient was prescribed oxycodone 1/29/24 for 10 days.  -KEN obtained and reviewed.     MEDICATION ISSUES:  -Discussed medication options and treatment plan of prescribed  medication as well as the risks, benefits, any black box warnings, and side effects.   -I have explained to the patient drugs of the SSRI class can have side effects such as weight gain, sexual dysfunction, insomnia, headache, nausea. I advised the patient of the black box warning for all antidepressants is the increased risk of suicidal thoughts. In addition, he should monitor for signs of serotonin syndrome: shivering, vital sign instability, elevated temperature and hyperreflexia.    -Patient is agreeable to call the office with any worsening of symptoms or onset of side effects, or if any concerns or questions arise.    -The contact information for the office is made available to the patient. Patient is agreeable to call 911 or go to the nearest ER should they begin having any SI/HI, or if any urgent concerns arise. No medication side effects or related complaints today.     MEDS ORDERED DURING VISIT:  New Medications Ordered This Visit   Medications    hydrOXYzine pamoate (VISTARIL) 25 MG capsule     Sig: Take 1 capsule by mouth 3 (Three) Times a Day As Needed for Anxiety (anxiety and/or sleep) for up to 30 days.     Dispense:  90 capsule     Refill:  0    PARoxetine (PAXIL) 40 MG tablet     Sig: Take 1 tablet by mouth Daily for 30 days.     Dispense:  30 tablet     Refill:  0       MEDS DISCONTINUED DURING VISIT:   Medications Discontinued During This Encounter   Medication Reason    montelukast (SINGULAIR) 10 MG tablet Patient Reported Not Taking    naloxone (NARCAN) 4 MG/0.1ML nasal spray Patient Reported Not Taking    hydrOXYzine (ATARAX) 10 MG tablet Alternate therapy    QUEtiapine (SEROquel) 25 MG tablet Patient Reported Not Taking    clonazePAM (KlonoPIN) 1 MG tablet Other- See Medication Note    PARoxetine (PAXIL) 40 MG tablet Reorder        Follow Up Appointment:   Return in about 4 weeks (around 3/4/2024) for Recheck.           This document has been electronically signed by Alice Whitt  APRN  February 5, 2024 15:46 EST    Dictated Utilizing Dragon Dictation: Part of this note may be an electronic transcription/translation of spoken language to printed text using the Dragon Dictation System.

## 2024-02-07 ENCOUNTER — PATIENT ROUNDING (BHMG ONLY) (OUTPATIENT)
Dept: PSYCHIATRY | Facility: CLINIC | Age: 64
End: 2024-02-07
Payer: MEDICAID

## 2024-02-07 NOTE — PROGRESS NOTES
February 7, 2024    Hello, may I speak with Phuong Tuttle?    My name is Ingrid      I am  with [facility]  [address] [place] [address]  [place] [address] [phone].    Before we get started may I verify your date of birth? 1960    I am calling to officially welcome you to our practice and ask about your recent visit. Is this a good time to talk? yes    Tell me about your visit with us. What things went well?  everything went well.       We're always looking for ways to make our patients' experiences even better. Do you have recommendations on ways we may improve?  no    Overall were you satisfied with your first visit to our practice? yes       I appreciate you taking the time to speak with me today. Is there anything else I can do for you? no      Thank you, and have a great day.

## 2024-03-04 ENCOUNTER — OFFICE VISIT (OUTPATIENT)
Dept: PSYCHIATRY | Facility: CLINIC | Age: 64
End: 2024-03-04
Payer: MEDICAID

## 2024-03-04 VITALS
WEIGHT: 191 LBS | HEART RATE: 84 BPM | BODY MASS INDEX: 30.7 KG/M2 | SYSTOLIC BLOOD PRESSURE: 110 MMHG | OXYGEN SATURATION: 96 % | HEIGHT: 66 IN | DIASTOLIC BLOOD PRESSURE: 70 MMHG

## 2024-03-04 DIAGNOSIS — F33.1 MODERATE EPISODE OF RECURRENT MAJOR DEPRESSIVE DISORDER: ICD-10-CM

## 2024-03-04 DIAGNOSIS — G47.00 INSOMNIA, UNSPECIFIED TYPE: ICD-10-CM

## 2024-03-04 DIAGNOSIS — F41.1 GAD (GENERALIZED ANXIETY DISORDER): Primary | ICD-10-CM

## 2024-03-04 PROCEDURE — 99214 OFFICE O/P EST MOD 30 MIN: CPT

## 2024-03-04 PROCEDURE — 1160F RVW MEDS BY RX/DR IN RCRD: CPT

## 2024-03-04 PROCEDURE — 1159F MED LIST DOCD IN RCRD: CPT

## 2024-03-04 RX ORDER — SULFAMETHOXAZOLE AND TRIMETHOPRIM 800; 160 MG/1; MG/1
TABLET ORAL
COMMUNITY
Start: 2024-02-20

## 2024-03-04 RX ORDER — HYDROXYZINE PAMOATE 25 MG/1
25 CAPSULE ORAL EVERY 6 HOURS PRN
Qty: 120 CAPSULE | Refills: 0 | Status: SHIPPED | OUTPATIENT
Start: 2024-03-04 | End: 2024-04-03

## 2024-03-04 RX ORDER — FUROSEMIDE 20 MG/1
TABLET ORAL
COMMUNITY
Start: 2024-02-12

## 2024-03-04 RX ORDER — PAROXETINE HYDROCHLORIDE 40 MG/1
40 TABLET, FILM COATED ORAL DAILY
Qty: 30 TABLET | Refills: 0 | Status: SHIPPED | OUTPATIENT
Start: 2024-03-04 | End: 2024-04-03

## 2024-03-04 RX ORDER — MONTELUKAST SODIUM 10 MG/1
TABLET ORAL
COMMUNITY
Start: 2024-02-12

## 2024-03-04 RX ORDER — BUPRENORPHINE HYDROCHLORIDE AND NALOXONE HYDROCHLORIDE DIHYDRATE 8; 2 MG/1; MG/1
TABLET SUBLINGUAL
COMMUNITY
Start: 2024-02-28

## 2024-03-04 RX ORDER — MULTIVITAMIN,THERAPEUTIC
TABLET ORAL
COMMUNITY
Start: 2024-02-24

## 2024-03-04 NOTE — PROGRESS NOTES
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 10.5  6.0 - 12.0 fL Final    Platelets 01/20/2024 71 (L)  140 - 450 10*3/mm3 Final    Neutrophil % 01/20/2024 58.2  42.7 - 76.0 % Final    Lymphocyte % 01/20/2024 25.3  19.6 - 45.3 % Final    Monocyte % 01/20/2024 9.4  5.0 - 12.0 % Final    Eosinophil % 01/20/2024 5.3  0.3 - 6.2 % Final    Basophil % 01/20/2024 1.2  0.0 - 1.5 % Final    Immature Grans % 01/20/2024 0.6 (H)  0.0 - 0.5 % Final    Neutrophils, Absolute 01/20/2024 0.99 (L)  1.70 - 7.00 10*3/mm3 Final    Lymphocytes, Absolute 01/20/2024 0.43 (L)  0.70 - 3.10 10*3/mm3 Final    Monocytes, Absolute 01/20/2024 0.16  0.10 - 0.90 10*3/mm3 Final    Eosinophils, Absolute 01/20/2024 0.09  0.00 - 0.40 10*3/mm3 Final    Basophils, Absolute 01/20/2024 0.02  0.00 - 0.20 10*3/mm3 Final    Immature Grans, Absolute 01/20/2024 0.01  0.00 - 0.05 10*3/mm3 Final    nRBC 01/20/2024 0.0  0.0 - 0.2 /100 WBC Final         Assessment & Plan   Diagnoses and all orders for this visit:    1. ESPERANZA (generalized anxiety disorder) (Primary)  -     PARoxetine (PAXIL) 40 MG tablet; Take 1 tablet by mouth Daily for 30 days.  Dispense: 30 tablet; Refill: 0  -     hydrOXYzine pamoate (VISTARIL) 25 MG capsule; Take 1 capsule by mouth Every 6 (Six) Hours As Needed for Anxiety (anxiety and/or sleep) for up to 30 days.  Dispense: 120 capsule; Refill: 0    2. Moderate episode of recurrent major depressive  disorder  -     PARoxetine (PAXIL) 40 MG tablet; Take 1 tablet by mouth Daily for 30 days.  Dispense: 30 tablet; Refill: 0    3. Insomnia, unspecified type  -     hydrOXYzine pamoate (VISTARIL) 25 MG capsule; Take 1 capsule by mouth Every 6 (Six) Hours As Needed for Anxiety (anxiety and/or sleep) for up to 30 days.  Dispense: 120 capsule; Refill: 0        Visit Diagnoses:    ICD-10-CM ICD-9-CM   1. ESPERANZA (generalized anxiety disorder)  F41.1 300.02   2. Moderate episode of recurrent major depressive disorder  F33.1 296.32   3. Insomnia, unspecified type  G47.00 780.52       GOALS:  Short Term Goals: Patient will be compliant with medication, and patient will have no significant medication related side effects.  Patient will be engaged in psychotherapy as indicated.  Patient will report subjective improvement of symptoms.  Long term goals: To stabilize mood and treat/improve subjective symptoms, the patient will stay out of the hospital, the patient will be at an optimal level of functioning, and the patient will take all medications as prescribed.  The patient/guardian verbalized understanding and agreement with goals that were mutually set.      TREATMENT PLAN:  -Continue paroxetine (Paxil) 40 mg po daily for anxiety and depression.  -Increase hydroxyzine (Vistaril) to 25 mg p.o. 4 times a day as needed for anxiety and/or sleep  -Discussed supportive psychotherapy efforts to develop coping skills to manage stress and emotions, she reports she will continue to see Lisandro Esquivel LPC.  -Pharmacological and Non-Pharmacological treatment options discussed during today's visit.   -The benefits of a healthy diet and exercise were discussed with patient, especially the positive effects they have on mental health. Patient encouraged to consider lifestyle modification regarding  diet and exercise patterns to maximize results of mental health treatment.   -We discussed sleep hygiene including going to bed at the same time and  getting up at the same time every day, decreased caffeine consumption, going to bed early enough to get 7 or 8 hours in bed, reading and relaxing before bedtime, and avoiding stimulating activities close to bedtime.   -Patient acknowledged and verbally consented with current treatment plan and was educated on the importance of compliance with treatment and follow-up appointments.    -Return to clinic in 4 weeks for follow up.  -Reviewed previous available documentation  -Reviewed most recent available labs  -KEN obtained and reviewed. -patient is getting buprenorphine/naloxone prescribed by another provider    MEDICATION ISSUES:  -Discussed medication options and treatment plan of prescribed medication as well as the risks, benefits, any black box warnings, and side effects.   -I have explained to the patient drugs of the SSRI class can have side effects such as weight gain, sexual dysfunction, insomnia, headache, nausea. I advised the patient of the black box warning for all antidepressants is the increased risk of suicidal thoughts. In addition, he should monitor for signs of serotonin syndrome: shivering, vital sign instability, elevated temperature and hyperreflexia.    -Patient is agreeable to call the office with any worsening of symptoms or onset of side effects, or if any concerns or questions arise.    -The contact information for the office is made available to the patient. Patient is agreeable to call 911 or go to the nearest ER should they begin having any SI/HI, or if any urgent concerns arise. No medication side effects or related complaints today.     MEDS ORDERED DURING VISIT:  New Medications Ordered This Visit   Medications    PARoxetine (PAXIL) 40 MG tablet     Sig: Take 1 tablet by mouth Daily for 30 days.     Dispense:  30 tablet     Refill:  0    hydrOXYzine pamoate (VISTARIL) 25 MG capsule     Sig: Take 1 capsule by mouth Every 6 (Six) Hours As Needed for Anxiety (anxiety and/or sleep) for  up to 30 days.     Dispense:  120 capsule     Refill:  0       MEDS DISCONTINUED DURING VISIT:   Medications Discontinued During This Encounter   Medication Reason    hydrOXYzine pamoate (VISTARIL) 25 MG capsule Reorder    PARoxetine (PAXIL) 40 MG tablet Reorder        Follow Up Appointment:   Return in about 4 weeks (around 4/1/2024) for Recheck.           This document has been electronically signed by HARIKA Canchola  March 4, 2024 13:08 EST    Dictated Utilizing Dragon Dictation: Part of this note may be an electronic transcription/translation of spoken language to printed text using the Dragon Dictation System.

## 2024-04-01 ENCOUNTER — OFFICE VISIT (OUTPATIENT)
Dept: PSYCHIATRY | Facility: CLINIC | Age: 64
End: 2024-04-01
Payer: MEDICAID

## 2024-04-01 VITALS
BODY MASS INDEX: 32.16 KG/M2 | HEIGHT: 66 IN | HEART RATE: 81 BPM | DIASTOLIC BLOOD PRESSURE: 70 MMHG | SYSTOLIC BLOOD PRESSURE: 110 MMHG | WEIGHT: 200.1 LBS | OXYGEN SATURATION: 93 %

## 2024-04-01 DIAGNOSIS — F41.1 GAD (GENERALIZED ANXIETY DISORDER): Primary | ICD-10-CM

## 2024-04-01 DIAGNOSIS — F33.1 MODERATE EPISODE OF RECURRENT MAJOR DEPRESSIVE DISORDER: ICD-10-CM

## 2024-04-01 DIAGNOSIS — G47.00 INSOMNIA, UNSPECIFIED TYPE: ICD-10-CM

## 2024-04-01 PROCEDURE — 99214 OFFICE O/P EST MOD 30 MIN: CPT

## 2024-04-01 PROCEDURE — 1159F MED LIST DOCD IN RCRD: CPT

## 2024-04-01 PROCEDURE — 1160F RVW MEDS BY RX/DR IN RCRD: CPT

## 2024-04-01 RX ORDER — HYDROXYZINE PAMOATE 25 MG/1
25 CAPSULE ORAL EVERY 6 HOURS PRN
Qty: 120 CAPSULE | Refills: 2 | Status: SHIPPED | OUTPATIENT
Start: 2024-04-01 | End: 2024-06-30

## 2024-04-01 RX ORDER — PAROXETINE HYDROCHLORIDE 40 MG/1
40 TABLET, FILM COATED ORAL DAILY
Qty: 30 TABLET | Refills: 2 | Status: SHIPPED | OUTPATIENT
Start: 2024-04-01 | End: 2024-06-30

## 2024-04-01 NOTE — PROGRESS NOTES
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 Final    RBC 01/20/2024 2.98 (L)  3.77 - 5.28 10*6/mm3 Final    Hemoglobin 01/20/2024 8.4 (L)  12.0 - 15.9 g/dL Final    Hematocrit 01/20/2024 28.0 (L)  34.0 - 46.6 % Final    MCV 01/20/2024 94.0  79.0 - 97.0 fL Final    MCH 01/20/2024 28.2  26.6 - 33.0 pg Final    MCHC 01/20/2024 30.0 (L)  31.5 - 35.7 g/dL Final    RDW 01/20/2024 15.1  12.3 - 15.4 % Final    RDW-SD 01/20/2024 52.2  37.0 - 54.0 fl Final    MPV 01/20/2024 10.5  6.0 - 12.0 fL Final    Platelets 01/20/2024 71 (L)  140 - 450 10*3/mm3 Final    Neutrophil % 01/20/2024 58.2  42.7 - 76.0 % Final    Lymphocyte % 01/20/2024 25.3  19.6 - 45.3 % Final    Monocyte % 01/20/2024 9.4  5.0 - 12.0 % Final    Eosinophil % 01/20/2024 5.3  0.3 - 6.2 % Final    Basophil % 01/20/2024 1.2  0.0 - 1.5 % Final    Immature Grans % 01/20/2024 0.6 (H)  0.0 - 0.5 % Final    Neutrophils, Absolute 01/20/2024 0.99 (L)  1.70 - 7.00 10*3/mm3 Final    Lymphocytes, Absolute 01/20/2024 0.43 (L)  0.70 - 3.10 10*3/mm3 Final    Monocytes, Absolute 01/20/2024 0.16  0.10 - 0.90 10*3/mm3 Final    Eosinophils, Absolute 01/20/2024 0.09  0.00 - 0.40 10*3/mm3 Final    Basophils, Absolute 01/20/2024 0.02  0.00 - 0.20 10*3/mm3 Final    Immature Grans, Absolute 01/20/2024 0.01  0.00 - 0.05 10*3/mm3 Final    nRBC 01/20/2024 0.0  0.0 - 0.2 /100 WBC Final         Assessment & Plan   Diagnoses and all orders for this  visit:    1. ESPERANZA (generalized anxiety disorder) (Primary)  -     PARoxetine (PAXIL) 40 MG tablet; Take 1 tablet by mouth Daily for 90 days.  Dispense: 30 tablet; Refill: 2  -     hydrOXYzine pamoate (VISTARIL) 25 MG capsule; Take 1 capsule by mouth Every 6 (Six) Hours As Needed for Anxiety (anxiety and/or sleep) for up to 90 days.  Dispense: 120 capsule; Refill: 2    2. Moderate episode of recurrent major depressive disorder  -     PARoxetine (PAXIL) 40 MG tablet; Take 1 tablet by mouth Daily for 90 days.  Dispense: 30 tablet; Refill: 2    3. Insomnia, unspecified type  -     hydrOXYzine pamoate (VISTARIL) 25 MG capsule; Take 1 capsule by mouth Every 6 (Six) Hours As Needed for Anxiety (anxiety and/or sleep) for up to 90 days.  Dispense: 120 capsule; Refill: 2      Visit Diagnoses:    ICD-10-CM ICD-9-CM   1. ESPERANZA (generalized anxiety disorder)  F41.1 300.02   2. Moderate episode of recurrent major depressive disorder  F33.1 296.32   3. Insomnia, unspecified type  G47.00 780.52     GOALS:  Short Term Goals: Patient will be compliant with medication, and patient will have no significant medication related side effects.  Patient will be engaged in psychotherapy as indicated.  Patient will report subjective improvement of symptoms.  Long term goals: To stabilize mood and treat/improve subjective symptoms, the patient will stay out of the hospital, the patient will be at an optimal level of functioning, and the patient will take all medications as prescribed.  The patient/guardian verbalized understanding and agreement with goals that were mutually set.    TREATMENT PLAN:  -Continue paroxetine (Paxil) 40 mg po daily for anxiety and depression.  -Continue hydroxyzine (Vistaril) 25 mg p.o. 4 times a day as needed for anxiety and/or sleep  -Discussed supportive psychotherapy efforts to develop coping skills to manage stress and emotions, she reports she will continue to see Lisandro Esquivel LPC.  -Pharmacological and  Non-Pharmacological treatment options discussed during today's visit.   -The benefits of a healthy diet and exercise were discussed with patient, especially the positive effects they have on mental health. Patient encouraged to consider lifestyle modification regarding  diet and exercise patterns to maximize results of mental health treatment.   -We discussed sleep hygiene including going to bed at the same time and getting up at the same time every day, decreased caffeine consumption, going to bed early enough to get 7 or 8 hours in bed, reading and relaxing before bedtime, and avoiding stimulating activities close to bedtime.   -Patient acknowledged and verbally consented with current treatment plan and was educated on the importance of compliance with treatment and follow-up appointments.    -Return to clinic in 12 weeks for follow up.  -Reviewed previous available documentation  -Reviewed most recent available labs  -KEN obtained and reviewed. -patient is getting buprenorphine/naloxone prescribed by another provider    MEDICATION ISSUES:  -Discussed medication options and treatment plan of prescribed medication as well as the risks, benefits, any black box warnings, and side effects.   -I have explained to the patient drugs of the SSRI class can have side effects such as weight gain, sexual dysfunction, insomnia, headache, nausea. I advised the patient of the black box warning for all antidepressants is the increased risk of suicidal thoughts. In addition, he should monitor for signs of serotonin syndrome: shivering, vital sign instability, elevated temperature and hyperreflexia.    -Patient is agreeable to call the office with any worsening of symptoms or onset of side effects, or if any concerns or questions arise.    -The contact information for the office is made available to the patient. Patient is agreeable to call 911 or go to the nearest ER should they begin having any SI/HI, or if any urgent concerns  arise. No medication side effects or related complaints today.     MEDS ORDERED DURING VISIT:  New Medications Ordered This Visit   Medications    PARoxetine (PAXIL) 40 MG tablet     Sig: Take 1 tablet by mouth Daily for 90 days.     Dispense:  30 tablet     Refill:  2    hydrOXYzine pamoate (VISTARIL) 25 MG capsule     Sig: Take 1 capsule by mouth Every 6 (Six) Hours As Needed for Anxiety (anxiety and/or sleep) for up to 90 days.     Dispense:  120 capsule     Refill:  2       MEDS DISCONTINUED DURING VISIT:   Medications Discontinued During This Encounter   Medication Reason    PARoxetine (PAXIL) 40 MG tablet Reorder    hydrOXYzine pamoate (VISTARIL) 25 MG capsule Reorder          Follow Up Appointment:   Return in about 12 weeks (around 6/24/2024).           This document has been electronically signed by HARIKA Canchola  April 1, 2024 12:56 EDT    Dictated Utilizing Dragon Dictation: Part of this note may be an electronic transcription/translation of spoken language to printed text using the Dragon Dictation System.

## 2024-06-16 NOTE — PLAN OF CARE
Problem: Rehabilitation ([facility]) Plan of Care  Goal: Plan of Care Review  Outcome: Ongoing, Progressing  Flowsheets (Taken 1/15/2024 0200)  Progress: no change  Plan of Care Reviewed With: patient  Goal: Patient-Specific Goal (Individualized)  Outcome: Ongoing, Progressing  Goal: Absence of New-Onset Illness or Injury  Outcome: Ongoing, Progressing  Goal: Optimal Comfort and Wellbeing  Outcome: Ongoing, Progressing  Goal: Home and Community Transition Plan Established  Outcome: Ongoing, Progressing     Problem: Skin Injury Risk Increased  Goal: Skin Health and Integrity  Outcome: Ongoing, Progressing  Intervention: Promote and Optimize Oral Intake  Recent Flowsheet Documentation  Taken 1/14/2024 2130 by Mony Maurice, RN  Oral Nutrition Promotion: physical activity promoted  Intervention: Optimize Skin Protection  Recent Flowsheet Documentation  Taken 1/14/2024 2130 by Mony Maurice, RN  Pressure Reduction Techniques: heels elevated off bed  Pressure Reduction Devices:   positioning supports utilized   heel offloading device utilized     Problem: Fall Injury Risk  Goal: Absence of Fall and Fall-Related Injury  Outcome: Ongoing, Progressing   Goal Outcome Evaluation:  Plan of Care Reviewed With: patient        Progress: no change                                   Xray Hand 3 Views, Right

## 2024-06-24 ENCOUNTER — OFFICE VISIT (OUTPATIENT)
Dept: PSYCHIATRY | Facility: CLINIC | Age: 64
End: 2024-06-24
Payer: MEDICAID

## 2024-06-24 VITALS
SYSTOLIC BLOOD PRESSURE: 118 MMHG | HEART RATE: 68 BPM | WEIGHT: 215.8 LBS | HEIGHT: 66 IN | DIASTOLIC BLOOD PRESSURE: 72 MMHG | BODY MASS INDEX: 34.68 KG/M2 | OXYGEN SATURATION: 94 %

## 2024-06-24 DIAGNOSIS — F33.1 MODERATE EPISODE OF RECURRENT MAJOR DEPRESSIVE DISORDER: ICD-10-CM

## 2024-06-24 DIAGNOSIS — F41.1 GAD (GENERALIZED ANXIETY DISORDER): Primary | ICD-10-CM

## 2024-06-24 DIAGNOSIS — G47.00 INSOMNIA, UNSPECIFIED TYPE: ICD-10-CM

## 2024-06-24 PROCEDURE — 99214 OFFICE O/P EST MOD 30 MIN: CPT

## 2024-06-24 PROCEDURE — 1159F MED LIST DOCD IN RCRD: CPT

## 2024-06-24 PROCEDURE — 1160F RVW MEDS BY RX/DR IN RCRD: CPT

## 2024-06-24 RX ORDER — CALCIUM CARBONATE/VITAMIN D3 500 MG-10
TABLET ORAL
COMMUNITY
Start: 2024-06-10

## 2024-06-24 RX ORDER — POTASSIUM CHLORIDE 20 MEQ/1
TABLET, EXTENDED RELEASE ORAL
COMMUNITY
Start: 2024-05-28

## 2024-06-24 RX ORDER — HYDROXYZINE PAMOATE 25 MG/1
25 CAPSULE ORAL EVERY 6 HOURS PRN
Qty: 120 CAPSULE | Refills: 2 | Status: SHIPPED | OUTPATIENT
Start: 2024-06-24 | End: 2024-09-22

## 2024-06-24 RX ORDER — LEVOTHYROXINE SODIUM 13 UG/1
CAPSULE ORAL
COMMUNITY
Start: 2024-05-22

## 2024-06-24 RX ORDER — FERROUS GLUCONATE 324(38)MG
TABLET ORAL
COMMUNITY
Start: 2024-05-11

## 2024-06-24 RX ORDER — LACTULOSE 10 G/15ML
SOLUTION ORAL
COMMUNITY
Start: 2024-06-10

## 2024-06-24 RX ORDER — PAROXETINE HYDROCHLORIDE 40 MG/1
40 TABLET, FILM COATED ORAL DAILY
Qty: 30 TABLET | Refills: 2 | Status: SHIPPED | OUTPATIENT
Start: 2024-06-24 | End: 2024-09-22
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
## 2024-09-10 ENCOUNTER — OFFICE VISIT (OUTPATIENT)
Dept: PSYCHIATRY | Facility: CLINIC | Age: 64
End: 2024-09-10
Payer: MEDICAID

## 2024-09-10 VITALS
SYSTOLIC BLOOD PRESSURE: 138 MMHG | OXYGEN SATURATION: 96 % | HEART RATE: 71 BPM | DIASTOLIC BLOOD PRESSURE: 82 MMHG | BODY MASS INDEX: 35.62 KG/M2 | WEIGHT: 221.6 LBS | HEIGHT: 66 IN

## 2024-09-10 DIAGNOSIS — G47.00 INSOMNIA, UNSPECIFIED TYPE: ICD-10-CM

## 2024-09-10 DIAGNOSIS — F41.1 GAD (GENERALIZED ANXIETY DISORDER): ICD-10-CM

## 2024-09-10 DIAGNOSIS — F33.1 MODERATE EPISODE OF RECURRENT MAJOR DEPRESSIVE DISORDER: Primary | ICD-10-CM

## 2024-09-10 PROCEDURE — 99214 OFFICE O/P EST MOD 30 MIN: CPT

## 2024-09-10 RX ORDER — QUETIAPINE FUMARATE 25 MG/1
25 TABLET, FILM COATED ORAL NIGHTLY
Qty: 30 TABLET | Refills: 2 | Status: SHIPPED | OUTPATIENT
Start: 2024-09-10 | End: 2024-12-09

## 2024-09-10 RX ORDER — PAROXETINE 40 MG/1
40 TABLET, FILM COATED ORAL DAILY
Qty: 30 TABLET | Refills: 2 | Status: SHIPPED | OUTPATIENT
Start: 2024-09-10 | End: 2024-12-09

## 2024-09-10 RX ORDER — HYDROXYZINE PAMOATE 25 MG/1
25 CAPSULE ORAL EVERY 6 HOURS PRN
Qty: 120 CAPSULE | Refills: 2 | Status: SHIPPED | OUTPATIENT
Start: 2024-09-10 | End: 2024-12-09

## 2024-09-10 RX ORDER — QUETIAPINE FUMARATE 25 MG/1
TABLET, FILM COATED ORAL
COMMUNITY
Start: 2024-07-17 | End: 2024-09-10 | Stop reason: SDUPTHER
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
